# Patient Record
Sex: FEMALE | Race: BLACK OR AFRICAN AMERICAN | NOT HISPANIC OR LATINO | Employment: OTHER | URBAN - METROPOLITAN AREA
[De-identification: names, ages, dates, MRNs, and addresses within clinical notes are randomized per-mention and may not be internally consistent; named-entity substitution may affect disease eponyms.]

---

## 2017-01-12 DIAGNOSIS — I42.9 CARDIOMYOPATHY, UNSPECIFIED TYPE: Primary | ICD-10-CM

## 2017-01-20 ENCOUNTER — OFFICE VISIT (OUTPATIENT)
Dept: ELECTROPHYSIOLOGY | Facility: CLINIC | Age: 67
End: 2017-01-20
Payer: COMMERCIAL

## 2017-01-20 ENCOUNTER — HOSPITAL ENCOUNTER (OUTPATIENT)
Dept: CARDIOLOGY | Facility: CLINIC | Age: 67
Discharge: HOME | End: 2017-01-20
Payer: COMMERCIAL

## 2017-01-20 ENCOUNTER — CLINICAL SUPPORT (OUTPATIENT)
Dept: ELECTROPHYSIOLOGY | Facility: CLINIC | Age: 67
End: 2017-01-20
Payer: COMMERCIAL

## 2017-01-20 VITALS
DIASTOLIC BLOOD PRESSURE: 52 MMHG | HEART RATE: 60 BPM | WEIGHT: 202 LBS | HEIGHT: 65 IN | SYSTOLIC BLOOD PRESSURE: 95 MMHG | BODY MASS INDEX: 33.66 KG/M2

## 2017-01-20 DIAGNOSIS — E78.5 HYPERLIPIDEMIA, UNSPECIFIED HYPERLIPIDEMIA TYPE: Chronic | ICD-10-CM

## 2017-01-20 DIAGNOSIS — I48.0 PAROXYSMAL ATRIAL FIBRILLATION: Primary | ICD-10-CM

## 2017-01-20 DIAGNOSIS — I42.0 DILATED CARDIOMYOPATHY: Chronic | ICD-10-CM

## 2017-01-20 DIAGNOSIS — Z95.810 AUTOMATIC IMPLANTABLE CARDIOVERTER-DEFIBRILLATOR IN SITU: ICD-10-CM

## 2017-01-20 DIAGNOSIS — I42.9 CARDIOMYOPATHY, PRIMARY: ICD-10-CM

## 2017-01-20 DIAGNOSIS — I10 ESSENTIAL HYPERTENSION: Chronic | ICD-10-CM

## 2017-01-20 DIAGNOSIS — I42.9 CARDIOMYOPATHY, UNSPECIFIED TYPE: ICD-10-CM

## 2017-01-20 LAB — DIASTOLIC DYSFUNCTION: NO

## 2017-01-20 PROCEDURE — 94621 CARDIOPULM EXERCISE TESTING: CPT | Mod: S$GLB,,, | Performed by: INTERNAL MEDICINE

## 2017-01-20 PROCEDURE — 1157F ADVNC CARE PLAN IN RCRD: CPT | Mod: S$GLB,,, | Performed by: INTERNAL MEDICINE

## 2017-01-20 PROCEDURE — 93283 PRGRMG EVAL IMPLANTABLE DFB: CPT | Mod: S$GLB,,, | Performed by: INTERNAL MEDICINE

## 2017-01-20 PROCEDURE — 93000 ELECTROCARDIOGRAM COMPLETE: CPT | Mod: S$GLB,,, | Performed by: INTERNAL MEDICINE

## 2017-01-20 PROCEDURE — 1160F RVW MEDS BY RX/DR IN RCRD: CPT | Mod: S$GLB,,, | Performed by: INTERNAL MEDICINE

## 2017-01-20 PROCEDURE — 99214 OFFICE O/P EST MOD 30 MIN: CPT | Mod: S$GLB,,, | Performed by: INTERNAL MEDICINE

## 2017-01-20 PROCEDURE — 3078F DIAST BP <80 MM HG: CPT | Mod: S$GLB,,, | Performed by: INTERNAL MEDICINE

## 2017-01-20 PROCEDURE — 1126F AMNT PAIN NOTED NONE PRSNT: CPT | Mod: S$GLB,,, | Performed by: INTERNAL MEDICINE

## 2017-01-20 PROCEDURE — 99999 PR PBB SHADOW E&M-EST. PATIENT-LVL III: CPT | Mod: PBBFAC,,, | Performed by: INTERNAL MEDICINE

## 2017-01-20 PROCEDURE — 1159F MED LIST DOCD IN RCRD: CPT | Mod: S$GLB,,, | Performed by: INTERNAL MEDICINE

## 2017-01-20 PROCEDURE — 3074F SYST BP LT 130 MM HG: CPT | Mod: S$GLB,,, | Performed by: INTERNAL MEDICINE

## 2017-01-20 NOTE — MR AVS SNAPSHOT
[facility]  [address]  [facility] [address]  Phone: [phone]  Fax: [phone]                  Sherice Squires   2017 9:00 AM   Office Visit    Description:  Female : 1950   Provider:  Jose Reece MD   Department:  [facility]           Reason for Visit     Pacemaker Check           Diagnoses this Visit        Comments    Paroxysmal atrial fibrillation    -  Primary     Dilated cardiomyopathy         Essential hypertension         Hyperlipidemia, unspecified hyperlipidemia type         Automatic implantable cardioverter-defibrillator in situ                To Do List           Future Appointments        Provider Department Dept Phone    2017 2:15 PM CPX [facility] [phone]    2017 9:00 AM LAB, APPOINT[place] [facility] [phone]    2017 10:00 AM Annamarie Soria MD [facility] [phone]    2017 8:00 AM HOME MONITOR DEVICE CHECK, [facility] [phone]      Goals (5 Years of Data)     None      Follow-Up and Disposition     Return in about 1 year (around 2018).      [facility]     [facility] -  Assistance  Registered nurses in the [facility] provide clinical advisement, health education, appointment booking, and other advisory services.  Call for this free service at [phone].             Medications           Message regarding Medications     Verify the changes and/or additions to your medication regime listed below are the same as discussed with your clinician today.  If any of these changes or additions are incorrect, please notify your healthcare provider.             Verify that the below list of medications is an accurate representation of the medications you are currently taking.  If none reported, the list may be blank. If incorrect, please contact your healthcare provider. Carry this list with  "you in case of emergency.           Current Medications     apixaban 5 mg Tab Take 1 tablet (5 mg total) by mouth 2 (two) times daily.    bumetanide (BUMEX) 1 MG tablet Take 1 tablet (1 mg total) by mouth once daily.    digoxin (LANOXIN) 250 mcg tablet Take 1 tablet (0.25 mg total) by mouth once daily.    hydrALAZINE (APRESOLINE) 50 MG tablet Take 1 tablet (50 mg total) by mouth 3 (three) times daily.    isosorbide mononitrate (IMDUR) 30 MG 24 hr tablet Take 1 tablet (30 mg total) by mouth once daily.    lisinopril (PRINIVIL,ZESTRIL) 20 MG tablet Take 1 tablet (20 mg total) by mouth 2 (two) times daily.    metoprolol succinate (TOPROL-XL) 100 MG 24 hr tablet Take 1 tablet (100 mg total) by mouth 2 (two) times daily.    potassium chloride (KLOR-CON) 10 MEQ TbSR Take 2 tablets (20 mEq total) by mouth once daily.    simvastatin (ZOCOR) 40 MG tablet Take 1 tablet (40 mg total) by mouth every evening.    spironolactone (ALDACTONE) 25 MG tablet Take 1 tablet (25 mg total) by mouth once daily.           Clinical Reference Information           Vital Signs - Last Recorded  Most recent update: 1/20/2017  9:41 AM by Kaur Vyas MA    BP Pulse Ht Wt BMI    (!) 95/52 60 5' 5" (1.651 m) 91.6 kg (202 lb) 33.61 kg/m2      Blood Pressure          Most Recent Value    BP  (!)  95/52      Allergies as of 1/20/2017     No Known Allergies      Immunizations Administered on Date of Encounter - 1/20/2017     None      [facility] Sign-Up     Activating your [facility] account is as easy as 1-2-3!     1) Visit [email or web address], select Sign Up Now, enter this activation code and your date of birth, then select Next.  [email or web address]  Expires: 3/6/2017 10:07 AM      2) Create a username and password to use when you visit [facility] in the future and select a security question in case you lose your password and select Next.    3) Enter your e-mail address and click Sign Up!    Additional Information  If you have questions, please e-mail " [email or web address] or call [phone] to talk to our [facility] staff. Remember, [facility] is NOT to be used for urgent needs. For medical emergencies, dial 911.

## 2017-01-20 NOTE — PROGRESS NOTES
Subjective:    Patient ID:  Sherice Squires is a 66 y.o. female who presents for follow-up of Pacemaker Check      HPI  66 y.o. yo female  (mental health facility in [facility])  NICM, HTN, HLD  ICD 2010  asymptomatic atrial fibrillation discovered on ICD interrogation.  hx VF 2011, rx'd with ICD shocks  RBBB/LAFB, present since 5/2011    Interrogation reveals stable lead function, overall AMS burden 1%.  Continues to feel well: NYHA I sx. No CP. 3 flights stairs without a problem!    Echo 5/14 30% -> 11/16 30%    She is feeling well and denies any palpitations, increased sob, fatigue, syncope, or edema. She denies any falls or issues with balance.  My interpretation of today's ECG is SR, RBBB/LAFB  CHADS=2 on Eliquis    Review of Systems   Constitution: Negative. Negative for weakness and malaise/fatigue.   HENT: Negative.  Negative for ear pain and tinnitus.    Eyes: Negative for blurred vision.   Cardiovascular: Negative.  Negative for chest pain, dyspnea on exertion, irregular heartbeat, leg swelling, near-syncope, palpitations and syncope.   Respiratory: Negative.  Negative for shortness of breath.    Endocrine: Negative.  Negative for polyuria.   Hematologic/Lymphatic: Does not bruise/bleed easily.   Skin: Negative.  Negative for rash.   Musculoskeletal: Negative.  Negative for joint pain and muscle weakness.   Gastrointestinal: Negative.  Negative for abdominal pain and change in bowel habit.   Genitourinary: Negative for frequency.   Neurological: Negative.  Negative for dizziness and light-headedness.   Psychiatric/Behavioral: Negative.  Negative for depression. The patient is not nervous/anxious.    Allergic/Immunologic: Negative for environmental allergies.        Objective:    Physical Exam   Constitutional: She is oriented to person, place, and time. Vital signs are normal. She appears well-developed and well-nourished. She is active and cooperative.   HENT:   Head: Normocephalic and  atraumatic.   Eyes: Conjunctivae and EOM are normal.   Neck: Normal range of motion. Carotid bruit is not present. No tracheal deviation and no edema present. No thyroid mass and no thyromegaly present.   Cardiovascular: Normal rate, regular rhythm, normal heart sounds, intact distal pulses and normal pulses.   No extrasystoles are present. PMI is not displaced.  Exam reveals no gallop and no friction rub.    No murmur heard.  Pulmonary/Chest: Effort normal and breath sounds normal. No respiratory distress. She has no wheezes. She has no rales.   Abdominal: Soft. Normal appearance. She exhibits no distension. There is no hepatosplenomegaly.   Musculoskeletal: Normal range of motion.   Neurological: She is alert and oriented to person, place, and time. Coordination normal.   Skin: Skin is warm and dry. No rash noted.   Psychiatric: She has a normal mood and affect. Her speech is normal and behavior is normal. Thought content normal. Cognition and memory are normal.   Nursing note and vitals reviewed.        Assessment:       1. Dilated cardiomyopathy    2. Automatic implantable cardiac defibrillator in situ    3. Atrial fibrillation-paroxysmal, asymptomatic discovered on ICD interrogation    4. Hypertension    5. Hyperlipidemia         Plan:       Continue f/u in device clinic.  Return in 1 year, or earlier prn.

## 2017-01-23 ENCOUNTER — TELEPHONE (OUTPATIENT)
Dept: TRANSPLANT | Facility: CLINIC | Age: 67
End: 2017-01-23

## 2017-01-24 NOTE — TELEPHONE ENCOUNTER
----- Message from Nadir Gutierrez MD sent at 1/20/2017  3:28 PM CST -----  Please update pt that she did better on CPX  Will followup at next visit

## 2017-02-13 ENCOUNTER — LAB VISIT (OUTPATIENT)
Dept: LAB | Facility: HOSPITAL | Age: 67
End: 2017-02-13
Attending: INTERNAL MEDICINE
Payer: COMMERCIAL

## 2017-02-13 ENCOUNTER — OFFICE VISIT (OUTPATIENT)
Dept: TRANSPLANT | Facility: CLINIC | Age: 67
End: 2017-02-13
Payer: COMMERCIAL

## 2017-02-13 VITALS
DIASTOLIC BLOOD PRESSURE: 66 MMHG | SYSTOLIC BLOOD PRESSURE: 126 MMHG | HEART RATE: 59 BPM | WEIGHT: 204.38 LBS | HEIGHT: 66 IN | BODY MASS INDEX: 32.85 KG/M2

## 2017-02-13 DIAGNOSIS — I48.0 PAROXYSMAL ATRIAL FIBRILLATION: ICD-10-CM

## 2017-02-13 DIAGNOSIS — E78.5 HYPERLIPIDEMIA, UNSPECIFIED HYPERLIPIDEMIA TYPE: Chronic | ICD-10-CM

## 2017-02-13 DIAGNOSIS — Z95.810 AUTOMATIC IMPLANTABLE CARDIOVERTER-DEFIBRILLATOR IN SITU: ICD-10-CM

## 2017-02-13 DIAGNOSIS — I50.22 NYHA CLASS 2 AND ACA/AHA STAGE C CHRONIC SYSTOLIC CONGESTIVE HEART FAILURE: ICD-10-CM

## 2017-02-13 DIAGNOSIS — E66.9 NON MORBID OBESITY, UNSPECIFIED OBESITY TYPE: Chronic | ICD-10-CM

## 2017-02-13 DIAGNOSIS — I42.0 DILATED CARDIOMYOPATHY: Primary | Chronic | ICD-10-CM

## 2017-02-13 DIAGNOSIS — I10 ESSENTIAL HYPERTENSION: Chronic | ICD-10-CM

## 2017-02-13 LAB
ANION GAP SERPL CALC-SCNC: 8 MMOL/L
BNP SERPL-MCNC: 56 PG/ML
BUN SERPL-MCNC: 20 MG/DL
CALCIUM SERPL-MCNC: 9.2 MG/DL
CHLORIDE SERPL-SCNC: 106 MMOL/L
CO2 SERPL-SCNC: 29 MMOL/L
CREAT SERPL-MCNC: 1.2 MG/DL
EST. GFR  (AFRICAN AMERICAN): 54.4 ML/MIN/1.73 M^2
EST. GFR  (NON AFRICAN AMERICAN): 47.2 ML/MIN/1.73 M^2
GLUCOSE SERPL-MCNC: 96 MG/DL
POTASSIUM SERPL-SCNC: 4.2 MMOL/L
SODIUM SERPL-SCNC: 143 MMOL/L

## 2017-02-13 PROCEDURE — 36415 COLL VENOUS BLD VENIPUNCTURE: CPT

## 2017-02-13 PROCEDURE — 80048 BASIC METABOLIC PNL TOTAL CA: CPT

## 2017-02-13 PROCEDURE — 99213 OFFICE O/P EST LOW 20 MIN: CPT | Mod: S$GLB,,, | Performed by: INTERNAL MEDICINE

## 2017-02-13 PROCEDURE — 1126F AMNT PAIN NOTED NONE PRSNT: CPT | Mod: S$GLB,,, | Performed by: INTERNAL MEDICINE

## 2017-02-13 PROCEDURE — 83880 ASSAY OF NATRIURETIC PEPTIDE: CPT

## 2017-02-13 PROCEDURE — 3078F DIAST BP <80 MM HG: CPT | Mod: S$GLB,,, | Performed by: INTERNAL MEDICINE

## 2017-02-13 PROCEDURE — 99999 PR PBB SHADOW E&M-EST. PATIENT-LVL III: CPT | Mod: PBBFAC,,, | Performed by: INTERNAL MEDICINE

## 2017-02-13 PROCEDURE — 3074F SYST BP LT 130 MM HG: CPT | Mod: S$GLB,,, | Performed by: INTERNAL MEDICINE

## 2017-02-13 PROCEDURE — 1160F RVW MEDS BY RX/DR IN RCRD: CPT | Mod: S$GLB,,, | Performed by: INTERNAL MEDICINE

## 2017-02-13 PROCEDURE — 1157F ADVNC CARE PLAN IN RCRD: CPT | Mod: S$GLB,,, | Performed by: INTERNAL MEDICINE

## 2017-02-13 PROCEDURE — 1159F MED LIST DOCD IN RCRD: CPT | Mod: S$GLB,,, | Performed by: INTERNAL MEDICINE

## 2017-02-13 RX ORDER — ISOSORBIDE DINITRATE 30 MG/1
30 TABLET ORAL
COMMUNITY
End: 2017-02-13 | Stop reason: SDUPTHER

## 2017-02-13 RX ORDER — SPIRONOLACTONE 25 MG/1
25 TABLET ORAL DAILY
Qty: 30 TABLET | Refills: 11 | Status: SHIPPED | OUTPATIENT
Start: 2017-02-13 | End: 2018-02-21 | Stop reason: SDUPTHER

## 2017-02-13 RX ORDER — POTASSIUM CHLORIDE 750 MG/1
20 TABLET, EXTENDED RELEASE ORAL DAILY
Qty: 60 TABLET | Refills: 6 | Status: SHIPPED | OUTPATIENT
Start: 2017-02-13 | End: 2017-11-09 | Stop reason: SDUPTHER

## 2017-02-13 RX ORDER — METOPROLOL SUCCINATE 100 MG/1
100 TABLET, EXTENDED RELEASE ORAL 2 TIMES DAILY
Qty: 60 TABLET | Refills: 11 | Status: SHIPPED | OUTPATIENT
Start: 2017-02-13 | End: 2018-02-21 | Stop reason: SDUPTHER

## 2017-02-13 RX ORDER — LISINOPRIL 20 MG/1
20 TABLET ORAL 2 TIMES DAILY
Qty: 60 TABLET | Refills: 6 | Status: SHIPPED | OUTPATIENT
Start: 2017-02-13 | End: 2017-04-07

## 2017-02-13 NOTE — MR AVS SNAPSHOT
[facility]  [address]  [facility] [address]  Phone: [phone]                  Sherice Squires   2017 10:00 AM   Appointment    Description:  Female : 1950   Provider:  Annamarie Soria MD   Department:  [facility]                To Do List           Future Appointments        Provider Department Dept Phone    2017 9:00 AM LAB, [place]POINT[place] [facility] [phone]    2017 10:00 AM Annamarie Soria MD [facility] [phone]    2017 8:00 AM [facility]ICE [facility] [phone]      Goals (5 Years of Data)     None      [facility]     [facility] Care [facility] -  Assistance  Registered nurses in the [facility] provide clinical advisement, health education, appointment booking, and other advisory services.  Call for this free service at [phone].             Medications           Message regarding Medications     Verify the changes and/or additions to your medication regime listed below are the same as discussed with your clinician today.  If any of these changes or additions are incorrect, please notify your healthcare provider.             Verify that the below list of medications is an accurate representation of the medications you are currently taking.  If none reported, the list may be blank. If incorrect, please contact your healthcare provider. Carry this list with you in case of emergency.           Current Medications     apixaban 5 mg Tab Take 1 tablet (5 mg total) by mouth 2 (two) times daily.    bumetanide (BUMEX) 1 MG tablet Take 1 tablet (1 mg total) by mouth once daily.    digoxin (LANOXIN) 250 mcg tablet Take 1 tablet (0.25 mg total) by mouth once daily.    hydrALAZINE (APRESOLINE) 50 MG tablet Take 1 tablet (50 mg total) by mouth 3 (three) times daily.    isosorbide mononitrate (IMDUR) 30 MG 24 hr tablet Take 1 tablet (30 mg  total) by mouth once daily.    lisinopril (PRINIVIL,ZESTRIL) 20 MG tablet Take 1 tablet (20 mg total) by mouth 2 (two) times daily.    metoprolol succinate (TOPROL-XL) 100 MG 24 hr tablet Take 1 tablet (100 mg total) by mouth 2 (two) times daily.    potassium chloride (KLOR-CON) 10 MEQ TbSR Take 2 tablets (20 mEq total) by mouth once daily.    simvastatin (ZOCOR) 40 MG tablet Take 1 tablet (40 mg total) by mouth every evening.    spironolactone (ALDACTONE) 25 MG tablet Take 1 tablet (25 mg total) by mouth once daily.           Clinical Reference Information           Allergies as of 2/13/2017     No Known Allergies      Immunizations Administered on Date of Encounter - 2/13/2017     None      [facility] Sign-Up     Activating your [facility] account is as easy as 1-2-3!     1) Visit [email or web address], select Sign Up Now, enter this activation code and your date of birth, then select Next.  [email or web address]  Expires: 3/6/2017 10:07 AM      2) Create a username and password to use when you visit [facility] in the future and select a security question in case you lose your password and select Next.    3) Enter your e-mail address and click Sign Up!    Additional Information  If you have questions, please e-mail [email or web address] or call [phone] to talk to our [facility] staff. Remember, [facility] is NOT to be used for urgent needs. For medical emergencies, dial 911.         Language Assistance Services     ATTENTION: Language assistance services are available, free of charge. Please call [phone].      ATENCIÓN: Si habla español, tiene a mesa disposición servicios gratuitos de asistencia lingüística. Llame al [phone].     CHÚ Ý: N?u b?n nói Ti?ng Vi?t, có các d?ch v? h? tr? ngôn ng? mi?n phí dành cho b?n. G?i s? [phone].         [facility] complies with applicable Federal civil rights laws and does not discriminate on the basis of race, color, national origin, age, disability, or sex.

## 2017-02-13 NOTE — PROGRESS NOTES
Subjective: class 2     Patient ID:  Sherice Squires is a 66 y.o. female who presents for eight month follow-up of Congestive Heart Failure    Congestive Heart Failure   Pertinent negatives include no abdominal pain, anorexia, chest pain, chills, congestion, coughing, diaphoresis, fever, headaches, myalgias, nausea, rash, vomiting or weakness.   Mrs. Squires is a 66 year old AAF with NIDCM (LVEF 30 - Nov 2016), PkVO2 16.1 (June 2015)  s/p ICD who continues to be well from a cardiopulmonary standpoint, no complaints, no chest pain, chest pressure, N/V/F/C, lightheadedness, dizziness, PND, orthopnea, LE edema. Still working as a , and able to do everything she needs to do without any trouble, only occasional issue is her knee.     Review of Systems   Constitution: Negative for chills, decreased appetite, diaphoresis, fever, weakness, malaise/fatigue, weight gain and weight loss.   HENT: Negative for congestion and headaches.    Eyes: Negative for blurred vision and visual disturbance.   Cardiovascular: Negative for chest pain, dyspnea on exertion, irregular heartbeat, leg swelling, near-syncope, orthopnea, palpitations, paroxysmal nocturnal dyspnea and syncope.   Respiratory: Negative for cough, shortness of breath, sleep disturbances due to breathing, snoring and wheezing.    Hematologic/Lymphatic: Negative for bleeding problem. Does not bruise/bleed easily.   Skin: Negative for poor wound healing and rash.   Musculoskeletal: Positive for joint pain (knee). Negative for arthritis, muscle weakness and myalgias.   Gastrointestinal: Negative for bloating, abdominal pain, anorexia, constipation, diarrhea, hematemesis, hematochezia, jaundice, melena, nausea and vomiting.   Genitourinary: Negative for frequency and urgency.   Neurological: Negative for difficulty with concentration, excessive daytime sleepiness, dizziness and light-headedness.   Psychiatric/Behavioral: Negative for depression. The patient  "does not have insomnia and is not nervous/anxious.         Objective:    Physical Exam   Constitutional: She appears well-developed and well-nourished. No distress.   /66  Pulse (!) 59  Ht 5' 5.5" (1.664 m)  Wt 92.7 kg (204 lb 5.9 oz)  BMI 33.49 kg/m2     HENT:   Head: Normocephalic and atraumatic. Head is without abrasion and without contusion.   Right Ear: External ear normal.   Left Ear: External ear normal.   Nose: Nose normal. No epistaxis.   Mouth/Throat: Oropharynx is clear and moist. Mucous membranes are not cyanotic.   Eyes: Conjunctivae and EOM are normal. Pupils are equal, round, and reactive to light.   Neck: Normal range of motion. Neck supple. No tracheal deviation present.   Cardiovascular: Normal rate, regular rhythm, normal heart sounds and normal pulses.  Exam reveals no gallop.    No murmur heard.  Pulmonary/Chest: Effort normal and breath sounds normal. No stridor. No respiratory distress. She has no wheezes.   Abdominal: Soft. Normal appearance, normal aorta and bowel sounds are normal. She exhibits no distension. There is no tenderness.   Musculoskeletal: She exhibits no edema or tenderness.   Neurological: She is alert. She has normal strength and normal reflexes. She exhibits normal muscle tone.   Skin: Skin is warm. No rash noted. No erythema.   Psychiatric: She has a normal mood and affect. Her speech is normal and behavior is normal. Judgment and thought content normal. Cognition and memory are normal.   Nursing note and vitals reviewed.    Lab Results   Component Value Date    BNP 56 02/13/2017     02/13/2017    K 4.2 02/13/2017    MG 1.9 11/11/2016     02/13/2017    CO2 29 02/13/2017    BUN 20 02/13/2017    CREATININE 1.2 02/13/2017    GLU 96 02/13/2017    AST 18 11/11/2016    ALT 16 11/11/2016    ALBUMIN 3.5 11/11/2016    PROT 7.7 11/11/2016    BILITOT 0.3 11/11/2016    CHOL 176 09/10/2014    HDL 63 09/10/2014    LDLCALC 100.2 09/10/2014    TRIG 64 09/10/2014 "       Magnesium   Date Value Ref Range Status   11/11/2016 1.9 1.6 - 2.6 mg/dL Final       Lab Results   Component Value Date    WBC 9.13 11/11/2016    HGB 11.6 (L) 11/11/2016    HCT 37.8 11/11/2016    MCV 89 11/11/2016     11/11/2016       BNP   Date Value Ref Range Status   02/13/2017 56 0 - 99 pg/mL Final     Comment:     Values of less than 100 pg/ml are consistent with non-CHF populations.   11/11/2016 77 0 - 99 pg/mL Final     Comment:     Values of less than 100 pg/ml are consistent with non-CHF populations.   03/18/2016 94 0 - 99 pg/mL Final     Comment:     Values of less than 100 pg/ml are consistent with non-CHF populations.           Assessment:       1. Dilated cardiomyopathy    2. NYHA class 2 and LUDIN/AHA stage C chronic systolic congestive heart failure    3. Automatic implantable cardioverter-defibrillator in situ    4. Hyperlipidemia, unspecified hyperlipidemia type    5. Non morbid obesity, unspecified obesity type    6. Paroxysmal atrial fibrillation    7. Essential hypertension         Plan:       Patient did well on CPX last visit, with pkVO2 17.6 ml/kg/min.  Refilled medications.   Her blood pressure us usually in the 90s to 100s systolic at home, she will call us if her home pressures continue to run in the same range as they did today.   Otherwise no changes, continue current regimen.  NYHA FC I-II.  RTC 6 months with labs, clinic.

## 2017-02-16 ENCOUNTER — TELEPHONE (OUTPATIENT)
Dept: TRANSPLANT | Facility: CLINIC | Age: 67
End: 2017-02-16

## 2017-02-16 NOTE — TELEPHONE ENCOUNTER
----- Message from Andrez Mares sent at 2/16/2017  3:14 PM CST -----  Contact: patient  Please call pt at [phone]. Need copies of recent lab results and office visit with Dr Soria    Thank you

## 2017-04-06 RX ORDER — METOPROLOL SUCCINATE 100 MG/1
TABLET, EXTENDED RELEASE ORAL
Qty: 60 TABLET | Refills: 0 | Status: SHIPPED | OUTPATIENT
Start: 2017-04-06 | End: 2017-05-10 | Stop reason: SDUPTHER

## 2017-04-07 DIAGNOSIS — I42.0 DILATED CARDIOMYOPATHY: Primary | ICD-10-CM

## 2017-04-07 DIAGNOSIS — I50.22 NYHA CLASS 2 AND ACA/AHA STAGE C CHRONIC SYSTOLIC CONGESTIVE HEART FAILURE: ICD-10-CM

## 2017-04-07 RX ORDER — LOSARTAN POTASSIUM 25 MG/1
25 TABLET ORAL 2 TIMES DAILY
Qty: 180 TABLET | Refills: 3 | Status: SHIPPED | OUTPATIENT
Start: 2017-04-07 | End: 2018-01-24

## 2017-04-07 NOTE — TELEPHONE ENCOUNTER
4/7/17 1345 Returned patients call she is calling to inquire if her Dr. nAnamarie Soria would want to restart her Lisinopril 20 mg oral twice daily. She states that two weeks ago on March 2nd she was taken off by her primary care physician due to a reaction of lip swelling. But at this time she was also started on a new antibiotic as well Augmentin 875 mg. The physician discontinued both the antibiotic and Lisinopril. Now her PCP wants her cardiologist to recommend if she should restart the Lisinopril? Called [facility] spoke with Kacy to confirm the dates and name of medication. Confirmed that also on March 6th Doxycycline 100mg was prescribed. Augmentin has now been marked on patient chart as allergy with reaction as lip swelling.   Patient states that she has not been monitoring her blood pressures since she has discontinued the Lisinopril. But it was being taken as HF management.   ----- Message from Georgie Dawson MA sent at 4/7/2017 12:07 PM CDT -----  Contact:  patient called  Please call the patient at home she need to talk to you about her visit with  out patient clinic  and her medication. Thank you.

## 2017-04-07 NOTE — TELEPHONE ENCOUNTER
4/7/17 1715 RBVO Dr. Annamarie Soria/ Jessy Carrillo LPN Discontinue Lisinopril from medication list. Losartan 25 mg oral twice daily. CMP/ BNP on 4/17/17 at local facility and monitor blood pressures. Called patient informed of new medications. Requested new medication to be called in to [facility] locally. Will fax orders to local lab. Patient confirmed labs would be obtained on 4/17/17. Patient verbalized understanding of all instruction given. Nurse phone review put in for 4/17/17.

## 2017-04-10 NOTE — TELEPHONE ENCOUNTER
4/10/17 1200 Patient called requested labs to be rescheduled due to work scheduled. 4/18/17 to [facility] [place] [facility]. Changed per patient request Nurse phone review updated and mailed appointment.

## 2017-04-18 ENCOUNTER — LAB VISIT (OUTPATIENT)
Dept: LAB | Facility: HOSPITAL | Age: 67
End: 2017-04-18
Attending: INTERNAL MEDICINE
Payer: COMMERCIAL

## 2017-04-18 DIAGNOSIS — I10 ESSENTIAL HYPERTENSION: Chronic | ICD-10-CM

## 2017-04-18 DIAGNOSIS — E78.5 HYPERLIPIDEMIA: Chronic | ICD-10-CM

## 2017-04-18 DIAGNOSIS — M17.11 OSTEOARTHRITIS OF RIGHT KNEE, UNSPECIFIED OSTEOARTHRITIS TYPE: Chronic | ICD-10-CM

## 2017-04-18 DIAGNOSIS — I42.0 DILATED CARDIOMYOPATHY: Chronic | ICD-10-CM

## 2017-04-18 DIAGNOSIS — Z95.810 AUTOMATIC IMPLANTABLE CARDIOVERTER-DEFIBRILLATOR IN SITU: ICD-10-CM

## 2017-04-18 DIAGNOSIS — E66.9 OBESITY: Chronic | ICD-10-CM

## 2017-04-18 DIAGNOSIS — I48.20 CHRONIC ATRIAL FIBRILLATION: ICD-10-CM

## 2017-04-18 LAB
ALBUMIN SERPL BCP-MCNC: 3.7 G/DL
ALP SERPL-CCNC: 75 U/L
ALT SERPL W/O P-5'-P-CCNC: 20 U/L
ANION GAP SERPL CALC-SCNC: 12 MMOL/L
AST SERPL-CCNC: 19 U/L
BILIRUB SERPL-MCNC: 0.7 MG/DL
BNP SERPL-MCNC: 46 PG/ML
BUN SERPL-MCNC: 10 MG/DL
CALCIUM SERPL-MCNC: 9.1 MG/DL
CHLORIDE SERPL-SCNC: 105 MMOL/L
CO2 SERPL-SCNC: 27 MMOL/L
CREAT SERPL-MCNC: 1.1 MG/DL
DIGOXIN SERPL-MCNC: 1 NG/ML
EST. GFR  (AFRICAN AMERICAN): >60 ML/MIN/1.73 M^2
EST. GFR  (NON AFRICAN AMERICAN): 52.4 ML/MIN/1.73 M^2
GLUCOSE SERPL-MCNC: 92 MG/DL
INR PPP: 1
MAGNESIUM SERPL-MCNC: 1.7 MG/DL
POTASSIUM SERPL-SCNC: 3.5 MMOL/L
PROT SERPL-MCNC: 7.4 G/DL
PROTHROMBIN TIME: 10.7 SEC
SODIUM SERPL-SCNC: 144 MMOL/L
T4 SERPL-MCNC: 6.9 UG/DL

## 2017-04-18 PROCEDURE — 80162 ASSAY OF DIGOXIN TOTAL: CPT

## 2017-04-18 PROCEDURE — 85610 PROTHROMBIN TIME: CPT | Mod: PO

## 2017-04-18 PROCEDURE — 83735 ASSAY OF MAGNESIUM: CPT

## 2017-04-18 PROCEDURE — 80053 COMPREHEN METABOLIC PANEL: CPT

## 2017-04-18 PROCEDURE — 36415 COLL VENOUS BLD VENIPUNCTURE: CPT | Mod: PO

## 2017-04-18 PROCEDURE — 84436 ASSAY OF TOTAL THYROXINE: CPT

## 2017-04-18 PROCEDURE — 83880 ASSAY OF NATRIURETIC PEPTIDE: CPT

## 2017-04-24 ENCOUNTER — CLINICAL SUPPORT (OUTPATIENT)
Dept: ELECTROPHYSIOLOGY | Facility: CLINIC | Age: 67
End: 2017-04-24
Payer: COMMERCIAL

## 2017-04-24 DIAGNOSIS — I42.9 CARDIOMYOPATHY: ICD-10-CM

## 2017-04-24 DIAGNOSIS — Z95.810 PRESENCE OF AUTOMATIC CARDIOVERTER/DEFIBRILLATOR (AICD): ICD-10-CM

## 2017-04-24 PROCEDURE — 93295 DEV INTERROG REMOTE 1/2/MLT: CPT | Mod: S$GLB,,, | Performed by: INTERNAL MEDICINE

## 2017-04-24 PROCEDURE — 93296 REM INTERROG EVL PM/IDS: CPT | Mod: S$GLB,,, | Performed by: INTERNAL MEDICINE

## 2017-05-10 RX ORDER — METOPROLOL SUCCINATE 100 MG/1
TABLET, EXTENDED RELEASE ORAL
Qty: 60 TABLET | Refills: 0 | Status: SHIPPED | OUTPATIENT
Start: 2017-05-10 | End: 2017-06-20 | Stop reason: SDUPTHER

## 2017-06-22 RX ORDER — METOPROLOL SUCCINATE 100 MG/1
TABLET, EXTENDED RELEASE ORAL
Qty: 60 TABLET | Refills: 0 | Status: SHIPPED | OUTPATIENT
Start: 2017-06-22 | End: 2017-09-01 | Stop reason: SDUPTHER

## 2017-07-14 ENCOUNTER — TELEPHONE (OUTPATIENT)
Dept: ELECTROPHYSIOLOGY | Facility: CLINIC | Age: 67
End: 2017-07-14

## 2017-07-14 NOTE — TELEPHONE ENCOUNTER
----- Message from Caprice García sent at 7/14/2017  2:08 PM CDT -----  Contact: pt   PT called to cancel her upcoming appt on the 24th and reschedule.  The only date available was in September @ 8:20 am.  She said she works nights and would not be able to get here at that time.  She can be reached @ [phone] to reschedule.  Thanks!!

## 2017-07-14 NOTE — TELEPHONE ENCOUNTER
Attempted to return the patient's call on this afternoon.  Left message @ preferred # [phone].  Message left on voice mail did state the patient does Not have to come into the clinic for this ICD check as to she is able to be scheduled for a Remote check.  Contact # for the Device Clinic left on patient's voice mail.

## 2017-07-31 ENCOUNTER — CLINICAL SUPPORT (OUTPATIENT)
Dept: ELECTROPHYSIOLOGY | Facility: CLINIC | Age: 67
End: 2017-07-31
Payer: COMMERCIAL

## 2017-07-31 DIAGNOSIS — I42.9 CARDIOMYOPATHY: ICD-10-CM

## 2017-07-31 DIAGNOSIS — Z95.810 PRESENCE OF AUTOMATIC CARDIOVERTER/DEFIBRILLATOR (AICD): ICD-10-CM

## 2017-07-31 PROCEDURE — 93295 DEV INTERROG REMOTE 1/2/MLT: CPT | Mod: S$GLB,,, | Performed by: INTERNAL MEDICINE

## 2017-07-31 PROCEDURE — 93296 REM INTERROG EVL PM/IDS: CPT | Mod: S$GLB,,, | Performed by: INTERNAL MEDICINE

## 2017-08-25 RX ORDER — ISOSORBIDE MONONITRATE 30 MG/1
TABLET, EXTENDED RELEASE ORAL
Qty: 30 TABLET | Refills: 6 | OUTPATIENT
Start: 2017-08-25

## 2017-08-25 RX ORDER — BUMETANIDE 1 MG/1
TABLET ORAL
Qty: 30 TABLET | Refills: 6 | OUTPATIENT
Start: 2017-08-25

## 2017-08-25 RX ORDER — DIGOXIN 250 MCG
TABLET ORAL
Qty: 30 TABLET | Refills: 6 | OUTPATIENT
Start: 2017-08-25

## 2017-08-29 ENCOUNTER — TELEPHONE (OUTPATIENT)
Dept: TRANSPLANT | Facility: CLINIC | Age: 67
End: 2017-08-29

## 2017-08-29 DIAGNOSIS — I50.22 CHRONIC SYSTOLIC CONGESTIVE HEART FAILURE: Primary | ICD-10-CM

## 2017-08-30 RX ORDER — BUMETANIDE 1 MG/1
TABLET ORAL
Qty: 30 TABLET | Refills: 6 | Status: SHIPPED | OUTPATIENT
Start: 2017-08-30 | End: 2018-04-17 | Stop reason: SDUPTHER

## 2017-09-01 ENCOUNTER — OFFICE VISIT (OUTPATIENT)
Dept: TRANSPLANT | Facility: CLINIC | Age: 67
End: 2017-09-01
Payer: COMMERCIAL

## 2017-09-01 ENCOUNTER — TELEPHONE (OUTPATIENT)
Dept: TRANSPLANT | Facility: CLINIC | Age: 67
End: 2017-09-01

## 2017-09-01 ENCOUNTER — DOCUMENTATION ONLY (OUTPATIENT)
Dept: TRANSPLANT | Facility: CLINIC | Age: 67
End: 2017-09-01

## 2017-09-01 ENCOUNTER — LAB VISIT (OUTPATIENT)
Dept: LAB | Facility: HOSPITAL | Age: 67
End: 2017-09-01
Attending: INTERNAL MEDICINE
Payer: COMMERCIAL

## 2017-09-01 VITALS
BODY MASS INDEX: 33.43 KG/M2 | WEIGHT: 200.63 LBS | HEIGHT: 65 IN | DIASTOLIC BLOOD PRESSURE: 72 MMHG | SYSTOLIC BLOOD PRESSURE: 116 MMHG | HEART RATE: 60 BPM

## 2017-09-01 DIAGNOSIS — E66.9 NON MORBID OBESITY, UNSPECIFIED OBESITY TYPE: Chronic | ICD-10-CM

## 2017-09-01 DIAGNOSIS — I10 ESSENTIAL HYPERTENSION: Chronic | ICD-10-CM

## 2017-09-01 DIAGNOSIS — I50.22 NYHA CLASS 2 AND ACA/AHA STAGE C CHRONIC SYSTOLIC CONGESTIVE HEART FAILURE: Primary | ICD-10-CM

## 2017-09-01 DIAGNOSIS — I42.0 DILATED CARDIOMYOPATHY: Chronic | ICD-10-CM

## 2017-09-01 DIAGNOSIS — I50.22 CHRONIC SYSTOLIC CONGESTIVE HEART FAILURE: ICD-10-CM

## 2017-09-01 DIAGNOSIS — M17.11 OSTEOARTHRITIS OF RIGHT KNEE, UNSPECIFIED OSTEOARTHRITIS TYPE: Chronic | ICD-10-CM

## 2017-09-01 DIAGNOSIS — E78.5 HYPERLIPIDEMIA, UNSPECIFIED HYPERLIPIDEMIA TYPE: Chronic | ICD-10-CM

## 2017-09-01 DIAGNOSIS — Z95.810 AUTOMATIC IMPLANTABLE CARDIOVERTER-DEFIBRILLATOR IN SITU: ICD-10-CM

## 2017-09-01 LAB
ANION GAP SERPL CALC-SCNC: 9 MMOL/L
BNP SERPL-MCNC: 71 PG/ML
BUN SERPL-MCNC: 18 MG/DL
CALCIUM SERPL-MCNC: 9.6 MG/DL
CHLORIDE SERPL-SCNC: 101 MMOL/L
CO2 SERPL-SCNC: 32 MMOL/L
CREAT SERPL-MCNC: 1.4 MG/DL
EST. GFR  (AFRICAN AMERICAN): 45.2 ML/MIN/1.73 M^2
EST. GFR  (NON AFRICAN AMERICAN): 39.2 ML/MIN/1.73 M^2
GLUCOSE SERPL-MCNC: 111 MG/DL
POTASSIUM SERPL-SCNC: 3.6 MMOL/L
SODIUM SERPL-SCNC: 142 MMOL/L

## 2017-09-01 PROCEDURE — 1159F MED LIST DOCD IN RCRD: CPT | Mod: S$GLB,,, | Performed by: INTERNAL MEDICINE

## 2017-09-01 PROCEDURE — 3074F SYST BP LT 130 MM HG: CPT | Mod: S$GLB,,, | Performed by: INTERNAL MEDICINE

## 2017-09-01 PROCEDURE — 99999 PR PBB SHADOW E&M-EST. PATIENT-LVL III: CPT | Mod: PBBFAC,,, | Performed by: INTERNAL MEDICINE

## 2017-09-01 PROCEDURE — 80048 BASIC METABOLIC PNL TOTAL CA: CPT

## 2017-09-01 PROCEDURE — 3008F BODY MASS INDEX DOCD: CPT | Mod: S$GLB,,, | Performed by: INTERNAL MEDICINE

## 2017-09-01 PROCEDURE — 83880 ASSAY OF NATRIURETIC PEPTIDE: CPT

## 2017-09-01 PROCEDURE — 1126F AMNT PAIN NOTED NONE PRSNT: CPT | Mod: S$GLB,,, | Performed by: INTERNAL MEDICINE

## 2017-09-01 PROCEDURE — 3078F DIAST BP <80 MM HG: CPT | Mod: S$GLB,,, | Performed by: INTERNAL MEDICINE

## 2017-09-01 PROCEDURE — 99213 OFFICE O/P EST LOW 20 MIN: CPT | Mod: S$GLB,,, | Performed by: INTERNAL MEDICINE

## 2017-09-01 PROCEDURE — 36415 COLL VENOUS BLD VENIPUNCTURE: CPT

## 2017-09-01 RX ORDER — ISOSORBIDE MONONITRATE 30 MG/1
30 TABLET, EXTENDED RELEASE ORAL DAILY
Qty: 30 TABLET | Refills: 6 | Status: SHIPPED | OUTPATIENT
Start: 2017-09-01 | End: 2018-04-17 | Stop reason: SDUPTHER

## 2017-09-01 RX ORDER — DIGOXIN 250 MCG
0.25 TABLET ORAL DAILY
Qty: 30 TABLET | Refills: 6 | Status: SHIPPED | OUTPATIENT
Start: 2017-09-01 | End: 2018-04-17 | Stop reason: SDUPTHER

## 2017-09-01 NOTE — TELEPHONE ENCOUNTER
2:15 pm:  Dr. Soria completed dental clearance form during pts clinid visit today. This form just faxed to Dr. Fco Chicas's office.

## 2017-09-02 NOTE — PROGRESS NOTES
"2:15 pm:  Faxed" medical clearance for dental procedure" form to the office of Dr. Fco Chicas ( was completed and signed by Dr. Soria in clinic today).   "

## 2017-09-04 NOTE — PROGRESS NOTES
Subjective: class 2     Patient ID:  Sherice Squires is a 66 y.o. female who presents for eight month follow-up of Congestive Heart Failure    Congestive Heart Failure   Pertinent negatives include no abdominal pain, anorexia, chest pain, chills, congestion, coughing, diaphoresis, fever, headaches, myalgias, nausea, rash, vomiting or weakness.   Mrs. Squires is a 66 year old AAF with NIDCM (LVEF 30 - Nov 2016), PkVO2 16.1 (June 2015)  s/p ICD who continues to be well from a cardiopulmonary standpoint, no complaints, no chest pain, chest pressure, N/V/F/C, lightheadedness, dizziness, PND, orthopnea, LE edema. Still working as a , and able to do everything she needs to do without any trouble, only occasional issue is her knee. States she needs cleareance for dental extraction and bridge work.     Review of Systems   Constitution: Negative for chills, decreased appetite, diaphoresis, fever, weakness, malaise/fatigue, weight gain and weight loss.   HENT: Negative for congestion.    Eyes: Negative for blurred vision and visual disturbance.   Cardiovascular: Negative for chest pain, dyspnea on exertion, irregular heartbeat, leg swelling, near-syncope, orthopnea, palpitations, paroxysmal nocturnal dyspnea and syncope.   Respiratory: Negative for cough, shortness of breath, sleep disturbances due to breathing, snoring and wheezing.    Hematologic/Lymphatic: Negative for bleeding problem. Does not bruise/bleed easily.   Skin: Negative for poor wound healing and rash.   Musculoskeletal: Positive for joint pain (knee). Negative for arthritis, muscle weakness and myalgias.   Gastrointestinal: Negative for bloating, abdominal pain, anorexia, constipation, diarrhea, hematemesis, hematochezia, jaundice, melena, nausea and vomiting.   Genitourinary: Negative for frequency and urgency.   Neurological: Negative for difficulty with concentration, excessive daytime sleepiness, dizziness, headaches and light-headedness.  "  Psychiatric/Behavioral: Negative for depression. The patient does not have insomnia and is not nervous/anxious.         Objective:    Physical Exam   Constitutional: She appears well-developed and well-nourished. No distress.   /72 (BP Location: Left arm, Patient Position: Sitting, BP Method: Medium (Automatic))   Pulse 60   Ht 5' 5.2" (1.656 m)   Wt 91 kg (200 lb 9.9 oz)   BMI 33.18 kg/m²      HENT:   Head: Normocephalic and atraumatic. Head is without abrasion and without contusion.   Right Ear: External ear normal.   Left Ear: External ear normal.   Nose: Nose normal. No epistaxis.   Mouth/Throat: Oropharynx is clear and moist. Mucous membranes are not cyanotic.   Eyes: Conjunctivae and EOM are normal. Pupils are equal, round, and reactive to light.   Neck: Normal range of motion. Neck supple. No tracheal deviation present.   Cardiovascular: Normal rate, regular rhythm, normal heart sounds and normal pulses.  Exam reveals no gallop.    No murmur heard.  Pulmonary/Chest: Effort normal and breath sounds normal. No stridor. No respiratory distress. She has no wheezes.   Abdominal: Soft. Normal appearance, normal aorta and bowel sounds are normal. She exhibits no distension. There is no tenderness.   Musculoskeletal: She exhibits no edema or tenderness.   Neurological: She is alert. She has normal strength and normal reflexes. She exhibits normal muscle tone.   Skin: Skin is warm. No rash noted. No erythema.   Psychiatric: She has a normal mood and affect. Her speech is normal and behavior is normal. Judgment and thought content normal. Cognition and memory are normal.   Nursing note and vitals reviewed.    Lab Results   Component Value Date    BNP 71 09/01/2017     09/01/2017    K 3.6 09/01/2017    MG 1.7 04/18/2017     09/01/2017    CO2 32 (H) 09/01/2017    BUN 18 09/01/2017    CREATININE 1.4 09/01/2017     (H) 09/01/2017    AST 19 04/18/2017    ALT 20 04/18/2017    ALBUMIN 3.7 " 04/18/2017    PROT 7.4 04/18/2017    BILITOT 0.7 04/18/2017    CHOL 176 09/10/2014    HDL 63 09/10/2014    LDLCALC 100.2 09/10/2014    TRIG 64 09/10/2014       Magnesium   Date Value Ref Range Status   04/18/2017 1.7 1.6 - 2.6 mg/dL Final       Lab Results   Component Value Date    WBC 9.13 11/11/2016    HGB 11.6 (L) 11/11/2016    HCT 37.8 11/11/2016    MCV 89 11/11/2016     11/11/2016       BNP   Date Value Ref Range Status   09/01/2017 71 0 - 99 pg/mL Final     Comment:     Values of less than 100 pg/ml are consistent with non-CHF populations.   04/18/2017 46 0 - 99 pg/mL Final     Comment:     Values of less than 100 pg/ml are consistent with non-CHF populations.   02/13/2017 56 0 - 99 pg/mL Final     Comment:     Values of less than 100 pg/ml are consistent with non-CHF populations.           Assessment:       1. NYHA class 2 and LUDIN/AHA stage C chronic systolic congestive heart failure    2. Dilated cardiomyopathy    3. Automatic implantable cardioverter-defibrillator in situ    4. Non morbid obesity, unspecified obesity type    5. Essential hypertension    6. Hyperlipidemia, unspecified hyperlipidemia type    7. Osteoarthritis of right knee, unspecified osteoarthritis type         Plan:       Patient seems to continue to do well, really only limited by her knee more so than her heart failure. Despite her knee did well on CPX last visit, will reassess next clinic visit.   Patient can proceed with dental extraction, based on guidelines will not need pre-procedure antibiotics.   Otherwise no changes, continue current regimen.  NYHA FC II.  RTC 6 months with labs, clinic, and echo.

## 2017-11-02 ENCOUNTER — CLINICAL SUPPORT (OUTPATIENT)
Dept: ELECTROPHYSIOLOGY | Facility: CLINIC | Age: 67
End: 2017-11-02
Payer: COMMERCIAL

## 2017-11-02 DIAGNOSIS — Z95.810 PRESENCE OF AUTOMATIC CARDIOVERTER/DEFIBRILLATOR (AICD): ICD-10-CM

## 2017-11-02 DIAGNOSIS — I42.9 CARDIOMYOPATHY: ICD-10-CM

## 2017-11-02 PROCEDURE — 93296 REM INTERROG EVL PM/IDS: CPT | Mod: S$GLB,,, | Performed by: INTERNAL MEDICINE

## 2017-11-02 PROCEDURE — 93295 DEV INTERROG REMOTE 1/2/MLT: CPT | Mod: S$GLB,,, | Performed by: INTERNAL MEDICINE

## 2017-11-07 RX ORDER — POTASSIUM CHLORIDE 750 MG/1
TABLET, EXTENDED RELEASE ORAL
Qty: 60 TABLET | Refills: 6 | Status: CANCELLED | OUTPATIENT
Start: 2017-11-07

## 2017-11-09 RX ORDER — POTASSIUM CHLORIDE 750 MG/1
20 TABLET, EXTENDED RELEASE ORAL DAILY
Qty: 60 TABLET | Refills: 6 | Status: SHIPPED | OUTPATIENT
Start: 2017-11-09 | End: 2018-01-30

## 2018-01-01 ENCOUNTER — CLINICAL SUPPORT (OUTPATIENT)
Dept: CARDIOLOGY | Facility: HOSPITAL | Age: 68
End: 2018-01-01
Attending: INTERNAL MEDICINE
Payer: MEDICARE

## 2018-01-01 ENCOUNTER — TELEPHONE (OUTPATIENT)
Dept: TRANSPLANT | Facility: CLINIC | Age: 68
End: 2018-01-01

## 2018-01-01 ENCOUNTER — LAB VISIT (OUTPATIENT)
Dept: LAB | Facility: HOSPITAL | Age: 68
End: 2018-01-01
Attending: INTERNAL MEDICINE
Payer: MEDICARE

## 2018-01-01 DIAGNOSIS — Z95.810 AICD (AUTOMATIC CARDIOVERTER/DEFIBRILLATOR) PRESENT: ICD-10-CM

## 2018-01-01 DIAGNOSIS — I50.9 ACUTE HEART FAILURE, UNSPECIFIED HEART FAILURE TYPE: Primary | ICD-10-CM

## 2018-01-01 DIAGNOSIS — I48.91 ATRIAL FIBRILLATION: ICD-10-CM

## 2018-01-01 DIAGNOSIS — I50.9 ACUTE HEART FAILURE, UNSPECIFIED HEART FAILURE TYPE: ICD-10-CM

## 2018-01-01 DIAGNOSIS — I50.9 CHF (CONGESTIVE HEART FAILURE): ICD-10-CM

## 2018-01-01 DIAGNOSIS — I42.0 DILATED CARDIOMYOPATHY: ICD-10-CM

## 2018-01-01 LAB
ANION GAP SERPL CALC-SCNC: 10 MMOL/L
BUN SERPL-MCNC: 20 MG/DL
CALCIUM SERPL-MCNC: 9.5 MG/DL
CHLORIDE SERPL-SCNC: 101 MMOL/L
CO2 SERPL-SCNC: 31 MMOL/L
CREAT SERPL-MCNC: 1.3 MG/DL
EST. GFR  (AFRICAN AMERICAN): 49 ML/MIN/1.73 M^2
EST. GFR  (NON AFRICAN AMERICAN): 42 ML/MIN/1.73 M^2
GLUCOSE SERPL-MCNC: 116 MG/DL
POTASSIUM SERPL-SCNC: 4.1 MMOL/L
SODIUM SERPL-SCNC: 142 MMOL/L

## 2018-01-01 PROCEDURE — 93296 REM INTERROG EVL PM/IDS: CPT

## 2018-01-01 PROCEDURE — 36415 COLL VENOUS BLD VENIPUNCTURE: CPT

## 2018-01-01 PROCEDURE — 80048 BASIC METABOLIC PNL TOTAL CA: CPT

## 2018-01-22 DIAGNOSIS — I42.0 DILATED CARDIOMYOPATHY: ICD-10-CM

## 2018-01-22 DIAGNOSIS — Z95.810 AUTOMATIC IMPLANTABLE CARDIOVERTER-DEFIBRILLATOR IN SITU: ICD-10-CM

## 2018-01-22 DIAGNOSIS — I48.0 PAROXYSMAL ATRIAL FIBRILLATION: Primary | ICD-10-CM

## 2018-01-24 ENCOUNTER — OFFICE VISIT (OUTPATIENT)
Dept: ELECTROPHYSIOLOGY | Facility: CLINIC | Age: 68
End: 2018-01-24
Payer: COMMERCIAL

## 2018-01-24 ENCOUNTER — HOSPITAL ENCOUNTER (OUTPATIENT)
Dept: CARDIOLOGY | Facility: CLINIC | Age: 68
Discharge: HOME | End: 2018-01-24
Attending: INTERNAL MEDICINE
Payer: COMMERCIAL

## 2018-01-24 ENCOUNTER — HOSPITAL ENCOUNTER (OUTPATIENT)
Dept: CARDIOLOGY | Facility: CLINIC | Age: 68
Discharge: HOME | End: 2018-01-24
Payer: COMMERCIAL

## 2018-01-24 ENCOUNTER — OFFICE VISIT (OUTPATIENT)
Dept: TRANSPLANT | Facility: CLINIC | Age: 68
End: 2018-01-24
Payer: COMMERCIAL

## 2018-01-24 VITALS
WEIGHT: 198 LBS | BODY MASS INDEX: 32.99 KG/M2 | HEART RATE: 60 BPM | HEIGHT: 65 IN | DIASTOLIC BLOOD PRESSURE: 65 MMHG | SYSTOLIC BLOOD PRESSURE: 113 MMHG

## 2018-01-24 VITALS
BODY MASS INDEX: 32.17 KG/M2 | HEIGHT: 66 IN | WEIGHT: 200.19 LBS | SYSTOLIC BLOOD PRESSURE: 127 MMHG | HEART RATE: 59 BPM | DIASTOLIC BLOOD PRESSURE: 65 MMHG

## 2018-01-24 DIAGNOSIS — I50.22 NYHA CLASS 2 AND ACA/AHA STAGE C CHRONIC SYSTOLIC CONGESTIVE HEART FAILURE: ICD-10-CM

## 2018-01-24 DIAGNOSIS — Z95.810 AUTOMATIC IMPLANTABLE CARDIOVERTER-DEFIBRILLATOR IN SITU: ICD-10-CM

## 2018-01-24 DIAGNOSIS — E78.5 HYPERLIPIDEMIA, UNSPECIFIED HYPERLIPIDEMIA TYPE: Chronic | ICD-10-CM

## 2018-01-24 DIAGNOSIS — I42.0 DILATED CARDIOMYOPATHY: Chronic | ICD-10-CM

## 2018-01-24 DIAGNOSIS — I42.0 DILATED CARDIOMYOPATHY: Primary | ICD-10-CM

## 2018-01-24 DIAGNOSIS — I42.0 DILATED CARDIOMYOPATHY: ICD-10-CM

## 2018-01-24 DIAGNOSIS — I50.22 CHRONIC SYSTOLIC CONGESTIVE HEART FAILURE: Primary | ICD-10-CM

## 2018-01-24 DIAGNOSIS — I48.0 PAROXYSMAL ATRIAL FIBRILLATION: ICD-10-CM

## 2018-01-24 LAB
AORTIC VALVE REGURGITATION: ABNORMAL
ESTIMATED PA SYSTOLIC PRESSURE: 43.96
MITRAL VALVE MOBILITY: ABNORMAL
MITRAL VALVE REGURGITATION: ABNORMAL
RETIRED EF AND QEF - SEE NOTES: 25 (ref 55–65)
TRICUSPID VALVE REGURGITATION: ABNORMAL

## 2018-01-24 PROCEDURE — 1159F MED LIST DOCD IN RCRD: CPT | Mod: S$GLB,,, | Performed by: INTERNAL MEDICINE

## 2018-01-24 PROCEDURE — 1126F AMNT PAIN NOTED NONE PRSNT: CPT | Mod: S$GLB,,, | Performed by: INTERNAL MEDICINE

## 2018-01-24 PROCEDURE — 93000 ELECTROCARDIOGRAM COMPLETE: CPT | Mod: S$GLB,,, | Performed by: INTERNAL MEDICINE

## 2018-01-24 PROCEDURE — 99214 OFFICE O/P EST MOD 30 MIN: CPT | Mod: S$GLB,,, | Performed by: INTERNAL MEDICINE

## 2018-01-24 PROCEDURE — 99999 PR PBB SHADOW E&M-EST. PATIENT-LVL III: CPT | Mod: PBBFAC,,, | Performed by: INTERNAL MEDICINE

## 2018-01-24 PROCEDURE — 3008F BODY MASS INDEX DOCD: CPT | Mod: S$GLB,,, | Performed by: INTERNAL MEDICINE

## 2018-01-24 PROCEDURE — 93306 TTE W/DOPPLER COMPLETE: CPT | Mod: S$GLB,,, | Performed by: INTERNAL MEDICINE

## 2018-01-24 RX ORDER — LOSARTAN POTASSIUM 25 MG/1
25 TABLET ORAL 2 TIMES DAILY
Qty: 180 TABLET | Refills: 3 | Status: CANCELLED | OUTPATIENT
Start: 2018-01-24 | End: 2019-01-01

## 2018-01-24 RX ORDER — HYDRALAZINE HYDROCHLORIDE 50 MG/1
50 TABLET, FILM COATED ORAL 3 TIMES DAILY
Qty: 270 TABLET | Refills: 3 | Status: SHIPPED | OUTPATIENT
Start: 2018-01-24 | End: 2018-04-20 | Stop reason: SDUPTHER

## 2018-01-24 RX ORDER — SIMVASTATIN 40 MG/1
40 TABLET, FILM COATED ORAL NIGHTLY
Qty: 90 TABLET | Refills: 3 | Status: SHIPPED | OUTPATIENT
Start: 2018-01-24 | End: 2018-04-20 | Stop reason: SDUPTHER

## 2018-01-24 NOTE — PATIENT INSTRUCTIONS
STOP taking losartan (cozaar) and potassium tablets.    START: entresto ONE tablet TWICE a day.    Be careful with foods rich in potassium.    We will get labs in 1 week to make sure your labs are okay and that potassium and kidney function are stable.

## 2018-01-24 NOTE — PROGRESS NOTES
Subjective:    Patient ID:  Sherice Squires is a 67 y.o. female who presents for follow-up of Atrial Fibrillation      Atrial Fibrillation   Symptoms are negative for chest pain, dizziness, palpitations, shortness of breath, syncope and weakness. Past medical history includes atrial fibrillation.     67 y.o. yo female  ([facility] in [facility])  NICM,   HTN, HLD, both on meds  ICD 2010  asymptomatic atrial fibrillation discovered on ICD interrogation.  hx VF 2011, rx'd with ICD shocks  RBBB/LAFB, present since 5/2011    Interrogation reveals stable lead function, overall AMS burden 1%.  Continues to feel well: NYHA I sx. No CP. No NAZARIO. Limited only by knee discomfort, never by NAZARIO.    Echo 5/14 30% -> 11/16 30% -> 2018 pending    She is feeling well and denies any palpitations, increased sob, fatigue, syncope, or edema. She denies any falls or issues with balance.  My interpretation of today's ECG is SR, RBBB/LAFB  CHADS=2 on Eliquis    Review of Systems   Constitution: Negative. Negative for weakness and malaise/fatigue.   HENT: Negative.  Negative for ear pain and tinnitus.    Eyes: Negative for blurred vision.   Cardiovascular: Negative.  Negative for chest pain, dyspnea on exertion, irregular heartbeat, leg swelling, near-syncope, palpitations and syncope.   Respiratory: Negative.  Negative for shortness of breath.    Endocrine: Negative.  Negative for polyuria.   Hematologic/Lymphatic: Does not bruise/bleed easily.   Skin: Negative.  Negative for rash.   Musculoskeletal: Positive for joint pain. Negative for muscle weakness.   Gastrointestinal: Negative.  Negative for abdominal pain and change in bowel habit.   Genitourinary: Negative for frequency.   Neurological: Negative.  Negative for dizziness and light-headedness.   Psychiatric/Behavioral: Negative.  Negative for depression. The patient is not nervous/anxious.    Allergic/Immunologic: Negative for environmental allergies.         Objective:    Physical Exam   Constitutional: She is oriented to person, place, and time. Vital signs are normal. She appears well-developed and well-nourished. She is active and cooperative.   HENT:   Head: Normocephalic and atraumatic.   Eyes: Conjunctivae and EOM are normal.   Neck: Normal range of motion. Carotid bruit is not present. No tracheal deviation and no edema present. No thyroid mass and no thyromegaly present.   Cardiovascular: Normal rate, regular rhythm, normal heart sounds, intact distal pulses and normal pulses.   No extrasystoles are present. PMI is not displaced.  Exam reveals no gallop and no friction rub.    No murmur heard.  Pulmonary/Chest: Effort normal and breath sounds normal. No respiratory distress. She has no wheezes. She has no rales.   Abdominal: Soft. Normal appearance. She exhibits no distension. There is no hepatosplenomegaly.   Musculoskeletal: Normal range of motion.   Neurological: She is alert and oriented to person, place, and time. Coordination normal.   Skin: Skin is warm and dry. No rash noted.   Psychiatric: She has a normal mood and affect. Her speech is normal and behavior is normal. Thought content normal. Cognition and memory are normal.   Nursing note and vitals reviewed.        Assessment:       1. Dilated cardiomyopathy    2. Automatic implantable cardiac defibrillator in situ    3. Atrial fibrillation-paroxysmal, asymptomatic discovered on ICD interrogation    4. Hypertension    5. Hyperlipidemia         Plan:       Doing well. No indication for CRT, despite wide QRS and low LVEF, given no NAZARIO.  Continue f/u in device clinic.  Return in 1 year, or earlier prn (e.g., if she develops SOB/NAZARIO).

## 2018-01-24 NOTE — LETTER
January 24, 2018      Annamarie Soria MD  [address]  [facility] [address]           [facility] - Arrhythmia  [address]  [facility] [address]  Phone: [phone]  Fax: [phone]          Patient: Sherice Squires   MR Number: [id number]   YOB: 1950   Date of Visit: 1/24/2018       Dear Dr. Annamarie Soria:    Thank you for referring Sherice Squires to me for evaluation. Attached you will find relevant portions of my assessment and plan of care.    If you have questions, please do not hesitate to call me. I look forward to following Sherice Squires along with you.    Sincerely,    Jose Reece MD    Enclosure  CC:  No Recipients    If you would like to receive this communication electronically, please contact [email or web address] or [phone] to request more information on [facility] Link access.    For providers and/or their staff who would like to refer a patient to [facility], please contact us through our one-stop-shop provider referral line, [facility], at [phone].    If you feel you have received this communication in error or would no longer like to receive these types of communications, please e-mail [email or web address]

## 2018-01-30 ENCOUNTER — LAB VISIT (OUTPATIENT)
Dept: LAB | Facility: HOSPITAL | Age: 68
End: 2018-01-30
Attending: INTERNAL MEDICINE
Payer: COMMERCIAL

## 2018-01-30 DIAGNOSIS — I50.22 NYHA CLASS 2 AND ACA/AHA STAGE C CHRONIC SYSTOLIC CONGESTIVE HEART FAILURE: ICD-10-CM

## 2018-01-30 DIAGNOSIS — I42.0 DILATED CARDIOMYOPATHY: ICD-10-CM

## 2018-01-30 LAB
ANION GAP SERPL CALC-SCNC: 10 MMOL/L
BUN SERPL-MCNC: 13 MG/DL
CALCIUM SERPL-MCNC: 9.3 MG/DL
CHLORIDE SERPL-SCNC: 106 MMOL/L
CO2 SERPL-SCNC: 29 MMOL/L
CREAT SERPL-MCNC: 1.1 MG/DL
DIGOXIN SERPL-MCNC: 1 NG/ML
EST. GFR  (AFRICAN AMERICAN): >60 ML/MIN/1.73 M^2
EST. GFR  (NON AFRICAN AMERICAN): 52.1 ML/MIN/1.73 M^2
GLUCOSE SERPL-MCNC: 94 MG/DL
POTASSIUM SERPL-SCNC: 4.1 MMOL/L
SODIUM SERPL-SCNC: 145 MMOL/L

## 2018-01-30 PROCEDURE — 80162 ASSAY OF DIGOXIN TOTAL: CPT

## 2018-01-30 PROCEDURE — 36415 COLL VENOUS BLD VENIPUNCTURE: CPT | Mod: PO

## 2018-01-30 PROCEDURE — 80048 BASIC METABOLIC PNL TOTAL CA: CPT

## 2018-01-31 NOTE — PROGRESS NOTES
Subjective: class 2     Patient ID:  Sherice Squires is a 67 y.o. female who presents for eight month follow-up of Congestive Heart Failure    Congestive Heart Failure   Pertinent negatives include no abdominal pain, anorexia, chest pain, chills, congestion, coughing, diaphoresis, fever, headaches, myalgias, nausea, rash, vomiting or weakness.   Mrs. Squires is a 67 year old AAF with NIDCM (LVEF 30 - Nov 2016), PkVO2 16.1 (June 2015)  s/p ICD who continues to be well from a cardiopulmonary standpoint, no complaints, no chest pain, chest pressure, N/V/F/C, lightheadedness, dizziness, PND, orthopnea, LE edema. She is retiring finally from being a , states she is excited and cannot wait. Does a lot of walking and stair climbing at work, without any cardiopulmonary complaints. States her son is buying a treadmill that will stay at her house and she plans to use this for exercise since she will not be as active as when she was/is a . Denies shortness of breath, functionally doing very well, without any cardiopulmonary complaints. Seeing Dr. Reece today as well for ICD follow up and afib. ROS really only continues to be her knee pain.     TTE 01/24/18:   1 - Eccentric LVH with severely depressed left ventricular systolic function (EF 25-30%). LVEDD 6.4cm    2 - Severe left atrial enlargement.     3 - Normal LAP.     4 - Normal central venous pressures.     5 - Moderate mitral regurgitation.     6 - Mild to moderate tricuspid regurgitation.     7 - Pulmonary hypertension. The estimated PA systolic pressure is 44 mmHg.     8 - Normal RV size with low normal right ventricular systolic function .     Review of Systems   Constitution: Negative for chills, decreased appetite, diaphoresis, fever, weakness, malaise/fatigue, weight gain and weight loss.   HENT: Negative for congestion.    Eyes: Negative for blurred vision and visual disturbance.   Cardiovascular: Negative for chest pain, dyspnea on  "exertion, irregular heartbeat, leg swelling, near-syncope, orthopnea, palpitations, paroxysmal nocturnal dyspnea and syncope.   Respiratory: Negative for cough, shortness of breath, sleep disturbances due to breathing, snoring and wheezing.    Hematologic/Lymphatic: Negative for bleeding problem. Does not bruise/bleed easily.   Skin: Negative for poor wound healing and rash.   Musculoskeletal: Positive for joint pain (knee). Negative for arthritis, muscle weakness and myalgias.   Gastrointestinal: Negative for bloating, abdominal pain, anorexia, constipation, diarrhea, hematemesis, hematochezia, jaundice, melena, nausea and vomiting.   Genitourinary: Negative for frequency and urgency.   Neurological: Negative for difficulty with concentration, excessive daytime sleepiness, dizziness, headaches and light-headedness.   Psychiatric/Behavioral: Negative for depression. The patient does not have insomnia and is not nervous/anxious.         Objective:    Physical Exam   Constitutional: She appears well-developed and well-nourished. No distress.   /65 (BP Location: Right arm, Patient Position: Sitting, BP Method: Large (Automatic))   Pulse (!) 59   Ht 5' 5.5" (1.664 m)   Wt 90.8 kg (200 lb 2.8 oz)   BMI 32.80 kg/m²      HENT:   Head: Normocephalic and atraumatic. Head is without abrasion and without contusion.   Right Ear: External ear normal.   Left Ear: External ear normal.   Nose: Nose normal. No epistaxis.   Mouth/Throat: Oropharynx is clear and moist. Mucous membranes are not cyanotic.   Eyes: Conjunctivae and EOM are normal. Pupils are equal, round, and reactive to light.   Neck: Normal range of motion. Neck supple. No tracheal deviation present.   Cardiovascular: Normal rate, regular rhythm, normal heart sounds and normal pulses.  Exam reveals no gallop.    No murmur heard.  Pulmonary/Chest: Effort normal and breath sounds normal. No stridor. No respiratory distress. She has no wheezes.   Abdominal: Soft. " Normal appearance, normal aorta and bowel sounds are normal. She exhibits no distension. There is no tenderness.   Musculoskeletal: She exhibits no edema or tenderness.   Neurological: She is alert. She has normal strength and normal reflexes. She exhibits normal muscle tone.   Skin: Skin is warm. No rash noted. No erythema.   Psychiatric: She has a normal mood and affect. Her speech is normal and behavior is normal. Judgment and thought content normal. Cognition and memory are normal.   Nursing note and vitals reviewed.    Lab Results   Component Value Date     (H) 01/24/2018     01/30/2018    K 4.1 01/30/2018    MG 1.9 01/24/2018     01/30/2018    CO2 29 01/30/2018    BUN 13 01/30/2018    CREATININE 1.1 01/30/2018    GLU 94 01/30/2018    AST 16 01/24/2018    ALT 13 01/24/2018    ALBUMIN 3.5 01/24/2018    PROT 7.6 01/24/2018    BILITOT 0.5 01/24/2018    CHOL 176 09/10/2014    HDL 63 09/10/2014    LDLCALC 100.2 09/10/2014    TRIG 64 09/10/2014       Magnesium   Date Value Ref Range Status   01/24/2018 1.9 1.6 - 2.6 mg/dL Final       Lab Results   Component Value Date    WBC 7.86 01/24/2018    HGB 11.8 (L) 01/24/2018    HCT 38.3 01/24/2018    MCV 88 01/24/2018     (L) 01/24/2018       BNP   Date Value Ref Range Status   01/24/2018 229 (H) 0 - 99 pg/mL Final     Comment:     Values of less than 100 pg/ml are consistent with non-CHF populations.   09/01/2017 71 0 - 99 pg/mL Final     Comment:     Values of less than 100 pg/ml are consistent with non-CHF populations.   04/18/2017 46 0 - 99 pg/mL Final     Comment:     Values of less than 100 pg/ml are consistent with non-CHF populations.           Assessment:       1. Dilated cardiomyopathy    2. NYHA class 2 and LUDIN/AHA stage C chronic systolic congestive heart failure    3. Automatic implantable cardioverter-defibrillator in situ    4. Hyperlipidemia, unspecified hyperlipidemia type         Plan:       Will have patient try entresto. Stop  taking losartan and potassium tablets.   She will start entresto at 49/51 BID given her dose of losartan she is on now.  Repeat labs in 1 week to follow up on renal function and potassium.   Otherwise, continue rest of regimen as indicated/tolerated.   Encourage exercise, weight loss if possible, reduction in carbs, and increase in protein and complex carbs.   Continue rest of regimen with HF meds including toprol XL, aldactone, digoxin.  zocor for lipids, repeat lipid panel next visit.   RTC 6 months with labs, clinic.    NYHA FC II.

## 2018-02-21 DIAGNOSIS — E78.5 HYPERLIPIDEMIA, UNSPECIFIED HYPERLIPIDEMIA TYPE: Chronic | ICD-10-CM

## 2018-02-21 DIAGNOSIS — I42.0 DILATED CARDIOMYOPATHY: Chronic | ICD-10-CM

## 2018-02-21 RX ORDER — SPIRONOLACTONE 25 MG/1
TABLET ORAL
Qty: 30 TABLET | Refills: 11 | Status: SHIPPED | OUTPATIENT
Start: 2018-02-21 | End: 2018-04-20 | Stop reason: SDUPTHER

## 2018-02-21 RX ORDER — METOPROLOL SUCCINATE 100 MG/1
TABLET, EXTENDED RELEASE ORAL
Qty: 60 TABLET | Refills: 11 | Status: SHIPPED | OUTPATIENT
Start: 2018-02-21 | End: 2018-03-02 | Stop reason: SDUPTHER

## 2018-02-21 RX ORDER — APIXABAN 5 MG/1
TABLET, FILM COATED ORAL
Qty: 60 TABLET | Refills: 11 | Status: SHIPPED | OUTPATIENT
Start: 2018-02-21 | End: 2018-04-20 | Stop reason: SDUPTHER

## 2018-02-23 ENCOUNTER — LAB VISIT (OUTPATIENT)
Dept: LAB | Facility: HOSPITAL | Age: 68
End: 2018-02-23
Attending: INTERNAL MEDICINE
Payer: COMMERCIAL

## 2018-02-23 DIAGNOSIS — I42.0 DILATED CARDIOMYOPATHY: Chronic | ICD-10-CM

## 2018-02-23 DIAGNOSIS — E78.5 HYPERLIPIDEMIA, UNSPECIFIED HYPERLIPIDEMIA TYPE: Chronic | ICD-10-CM

## 2018-02-23 LAB
ANION GAP SERPL CALC-SCNC: 14 MMOL/L
BUN SERPL-MCNC: 10 MG/DL
CALCIUM SERPL-MCNC: 9.4 MG/DL
CHLORIDE SERPL-SCNC: 105 MMOL/L
CO2 SERPL-SCNC: 26 MMOL/L
CREAT SERPL-MCNC: 1 MG/DL
EST. GFR  (AFRICAN AMERICAN): >60 ML/MIN/1.73 M^2
EST. GFR  (NON AFRICAN AMERICAN): 58 ML/MIN/1.73 M^2
GLUCOSE SERPL-MCNC: 104 MG/DL
POTASSIUM SERPL-SCNC: 3.3 MMOL/L
SODIUM SERPL-SCNC: 145 MMOL/L

## 2018-02-23 PROCEDURE — 80048 BASIC METABOLIC PNL TOTAL CA: CPT | Mod: PO

## 2018-02-23 PROCEDURE — 36415 COLL VENOUS BLD VENIPUNCTURE: CPT | Mod: PO

## 2018-02-23 NOTE — TELEPHONE ENCOUNTER
1:00-1:15 pm:  Returned call to pt. Sheeba Ang LPN-Dr. Soria wants pts Entresto dose increased to 97/103 (1) tablet by mouth twice daily and labs in 1 week  Returned call to pt and explained this as well as Dr. Soria has reviewed her medications.  Pt told me she was told by her local [facility] pharmacy this am-a refill of the Entresto 49-51 will cost her $500 and she cannot afford this. Pt told me she is currently taking the 30 day free trial. She also reports she had already activated the $10 co pay card and told [facility] this as well.   Pt told me she has 18 tablets left of the 49/51 dose-so if she doubles up on it (2) tablets twice daily she will run out after Tuesday, 2/27/18 am  dose. Afraid we may not have the 97/103 dose approved to a c opay she can afford by then-so I asked pt to remain on the 49/51 (1) tablet once daily dose for now-so at least this way she wont be without any Entresto until we can get the higher dose approve. Pt also aware that she will not need the 1 week lab appt until she is taking the higher dose for a week.   Pt lives in [place]-but in light of the difficulty she is having with getting Entresto at an affordable rate; pt agreeable for this higher dose RX be sent to [facility], since they work with numerous pts in assisting with Entresto., PA, tier change etc. And they are able to mail RX to pts home. Explained to pt once it is approved to a copay she can afford , the rX can be transferred to her local [facility] pharmacy.    Will also send an epic office message to out [facility] pharmacy letting them know whats going on  Pt will call her local pharmacy and advise them not to pursue running the 49/51 rx through since her doctor is changing the dose and she is getting filled elsewhere.         ----- Message from Andrez Mares sent at 2/23/2018 12:16 PM CST -----  Contact: patient  Please call pt at [phone]. Patient is returning a nurse from today. Labs done  today    Thank you

## 2018-02-26 ENCOUNTER — CLINICAL SUPPORT (OUTPATIENT)
Dept: ELECTROPHYSIOLOGY | Facility: CLINIC | Age: 68
End: 2018-02-26
Attending: INTERNAL MEDICINE
Payer: COMMERCIAL

## 2018-02-26 ENCOUNTER — TELEPHONE (OUTPATIENT)
Dept: ELECTROPHYSIOLOGY | Facility: CLINIC | Age: 68
End: 2018-02-26

## 2018-02-26 DIAGNOSIS — Z95.810 AICD (AUTOMATIC CARDIOVERTER/DEFIBRILLATOR) PRESENT: Primary | ICD-10-CM

## 2018-02-26 DIAGNOSIS — Z95.810 AICD (AUTOMATIC CARDIOVERTER/DEFIBRILLATOR) PRESENT: ICD-10-CM

## 2018-02-26 DIAGNOSIS — I42.0 DILATED CARDIOMYOPATHY: ICD-10-CM

## 2018-02-26 DIAGNOSIS — I50.9 CHF (CONGESTIVE HEART FAILURE): ICD-10-CM

## 2018-02-26 DIAGNOSIS — I48.91 ATRIAL FIBRILLATION: ICD-10-CM

## 2018-02-26 PROCEDURE — 93295 DEV INTERROG REMOTE 1/2/MLT: CPT | Mod: S$GLB,,, | Performed by: INTERNAL MEDICINE

## 2018-02-26 PROCEDURE — 93296 REM INTERROG EVL PM/IDS: CPT | Mod: S$GLB,,, | Performed by: INTERNAL MEDICINE

## 2018-02-26 NOTE — TELEPHONE ENCOUNTER
Patient was returning a call to the Device Clinic in relation to multiple shocks from her ICD.  After speaking to the patient, it was found that she was at work when the shocks occurred and she was brought to [facility] via EMS.  Patient stated a person from [employer] came to the hospital and checked her ICD and gave her a copy of the print outs to give to her doctor.  Patient then stated she was instructed to see Dr. Jacob Dela Cruz for further follow up.  Asked the patient if that is the doctor that she is being followed by.  The patient stated she has not seen this doctor for a few years she was just doing what she was instructed to do.  Informed the patient that Dr. Reece is the doctor that has been following her and that is also who does her remote monitoring.  Patient stated she does want to stay with Dr. Reece.  Informed the patient a message has already been sent to Dr. Reece who is currently working @ the [facility] for today and will return to this office on tomorrow.  Patient is asymptomatic now and has not had any additional problems.  Informed the patient she would receive a phone call on tomorrow with further recommendations and/or instructions.  Patient verbalized understanding to all of the above.

## 2018-02-26 NOTE — TELEPHONE ENCOUNTER
Left voicemails on both home and mobile #'s listed.  Pt shocked by her AICD 2/23/18, rhythms c/w probable SVT.  Will notify pt's primary EP.

## 2018-02-27 ENCOUNTER — TELEPHONE (OUTPATIENT)
Dept: ELECTROPHYSIOLOGY | Facility: CLINIC | Age: 68
End: 2018-02-27

## 2018-02-27 DIAGNOSIS — I42.9 CARDIOMYOPATHY, UNSPECIFIED TYPE: Primary | ICD-10-CM

## 2018-02-27 NOTE — TELEPHONE ENCOUNTER
Pt contacted the clinic today for further recommendations of her recent shocks.  Pt states she feels fine.  Was seen at her local ED for shocks, was interrogated and given printouts of the 8 treated events.  Pt was advised that she could be seen by Dr. Reece this week and was given an appointment.  She expressed concern over being told by the local ED to see Dr. Cali (or Abebe).  I've advised her to contact his office to see if he will manage her arrhythmia OR if he prefers her to see Dr. Reece, who is a heart rhythm specialist.  Pt expresses concern about needing a letter to return to work.  I've advised her that we will hold a time for her on Friday, 3/2/18 to see Dr. Reece and address any employment restrictions.  I also asked the patient to please call us if she DOES NOT intend to come to [place] for this appointment.

## 2018-03-02 ENCOUNTER — OFFICE VISIT (OUTPATIENT)
Dept: ELECTROPHYSIOLOGY | Facility: CLINIC | Age: 68
End: 2018-03-02
Payer: COMMERCIAL

## 2018-03-02 ENCOUNTER — HOSPITAL ENCOUNTER (OUTPATIENT)
Dept: CARDIOLOGY | Facility: CLINIC | Age: 68
Discharge: HOME | End: 2018-03-02
Payer: COMMERCIAL

## 2018-03-02 VITALS
WEIGHT: 202 LBS | HEART RATE: 60 BPM | SYSTOLIC BLOOD PRESSURE: 126 MMHG | BODY MASS INDEX: 33.66 KG/M2 | DIASTOLIC BLOOD PRESSURE: 71 MMHG | HEIGHT: 65 IN

## 2018-03-02 DIAGNOSIS — I48.0 PAROXYSMAL ATRIAL FIBRILLATION: Primary | ICD-10-CM

## 2018-03-02 DIAGNOSIS — E78.5 HYPERLIPIDEMIA, UNSPECIFIED HYPERLIPIDEMIA TYPE: Chronic | ICD-10-CM

## 2018-03-02 DIAGNOSIS — I47.10 PSVT (PAROXYSMAL SUPRAVENTRICULAR TACHYCARDIA): ICD-10-CM

## 2018-03-02 DIAGNOSIS — I10 ESSENTIAL HYPERTENSION: Chronic | ICD-10-CM

## 2018-03-02 DIAGNOSIS — I42.9 CARDIOMYOPATHY, UNSPECIFIED TYPE: ICD-10-CM

## 2018-03-02 DIAGNOSIS — I42.0 DILATED CARDIOMYOPATHY: Chronic | ICD-10-CM

## 2018-03-02 PROCEDURE — 3078F DIAST BP <80 MM HG: CPT | Mod: S$GLB,,, | Performed by: INTERNAL MEDICINE

## 2018-03-02 PROCEDURE — 99999 PR PBB SHADOW E&M-EST. PATIENT-LVL III: CPT | Mod: PBBFAC,,, | Performed by: INTERNAL MEDICINE

## 2018-03-02 PROCEDURE — 3074F SYST BP LT 130 MM HG: CPT | Mod: S$GLB,,, | Performed by: INTERNAL MEDICINE

## 2018-03-02 PROCEDURE — 93000 ELECTROCARDIOGRAM COMPLETE: CPT | Mod: S$GLB,,, | Performed by: INTERNAL MEDICINE

## 2018-03-02 PROCEDURE — 99214 OFFICE O/P EST MOD 30 MIN: CPT | Mod: S$GLB,,, | Performed by: INTERNAL MEDICINE

## 2018-03-02 RX ORDER — METOPROLOL SUCCINATE 200 MG/1
200 TABLET, EXTENDED RELEASE ORAL 2 TIMES DAILY
Qty: 180 TABLET | Refills: 3 | Status: SHIPPED | OUTPATIENT
Start: 2018-03-02 | End: 2018-04-20 | Stop reason: SDUPTHER

## 2018-03-02 NOTE — PROGRESS NOTES
Subjective:    Patient ID:  Sherice Squires is a 67 y.o. female who presents for follow-up of Atrial Fibrillation      Atrial Fibrillation   Symptoms are negative for chest pain, dizziness, palpitations, shortness of breath, syncope and weakness. Past medical history includes atrial fibrillation.     67 y.o. yo female  ([facility] facility in [facility])  NICM,   HTN, HLD, both on meds  ICD 2010  asymptomatic atrial fibrillation discovered on ICD interrogation.  hx VF 2011, rx'd with ICD shocks  RBBB/LAFB, present since 5/2011    Interrogation reveals stable lead function, overall AMS burden 1%.  Continues to feel well: NYHA I sx. No CP. No NAZARIO. Limited only by knee discomfort, never by NAZARIO.  She is feeling well and denies any palpitations, increased sob, fatigue, syncope, or edema. She denies any falls or issues with balance.    On 2/23/18, was at work seated, feeling fine. No palpitations, no LH. Shocked by ICD x 6.  ICD interrogation shows AT with RVR, as well as degeneration to AF, resulting in ATP and shocks.    My interpretation of today's ECG is SR, RBBB/LAFB  CHADS=2 on Eliquis    Review of Systems   Constitution: Negative. Negative for weakness and malaise/fatigue.   HENT: Negative.  Negative for ear pain and tinnitus.    Eyes: Negative for blurred vision.   Cardiovascular: Negative.  Negative for chest pain, dyspnea on exertion, irregular heartbeat, leg swelling, near-syncope, palpitations and syncope.   Respiratory: Negative.  Negative for shortness of breath.    Endocrine: Negative.  Negative for polyuria.   Hematologic/Lymphatic: Does not bruise/bleed easily.   Skin: Negative.  Negative for rash.   Musculoskeletal: Positive for joint pain. Negative for muscle weakness.   Gastrointestinal: Negative.  Negative for abdominal pain and change in bowel habit.   Genitourinary: Negative for frequency.   Neurological: Negative.  Negative for dizziness and light-headedness.    Psychiatric/Behavioral: Negative.  Negative for depression. The patient is not nervous/anxious.    Allergic/Immunologic: Negative for environmental allergies.        Objective:    Physical Exam   Constitutional: She is oriented to person, place, and time. Vital signs are normal. She appears well-developed and well-nourished. She is active and cooperative.   HENT:   Head: Normocephalic and atraumatic.   Eyes: Conjunctivae and EOM are normal.   Neck: Normal range of motion. Carotid bruit is not present. No tracheal deviation and no edema present. No thyroid mass and no thyromegaly present.   Cardiovascular: Normal rate, regular rhythm, normal heart sounds, intact distal pulses and normal pulses.   No extrasystoles are present. PMI is not displaced.  Exam reveals no gallop and no friction rub.    No murmur heard.  Pulmonary/Chest: Effort normal and breath sounds normal. No respiratory distress. She has no wheezes. She has no rales.   Abdominal: Soft. Normal appearance. She exhibits no distension. There is no hepatosplenomegaly.   Musculoskeletal: Normal range of motion.   Neurological: She is alert and oriented to person, place, and time. Coordination normal.   Skin: Skin is warm and dry. No rash noted.   Psychiatric: She has a normal mood and affect. Her speech is normal and behavior is normal. Thought content normal. Cognition and memory are normal.   Nursing note and vitals reviewed.        Assessment:       1. Dilated cardiomyopathy    2. Automatic implantable cardiac defibrillator in situ    3. Atrial fibrillation-paroxysmal, asymptomatic discovered on ICD interrogation    4. Hypertension    5. Hyperlipidemia         Plan:       AT/AF with RVR.  Increase beta blocker: 100bid -> 200am/100pm x 2wk, then if tolerates, plan 200 bid.  If this recurs, consider amio vs ablation.    No indication for CRT, despite wide QRS and low LVEF, given no NAZARIO.  Continue f/u in device clinic.  Return in 1 year, or earlier prn  (e.g., if she develops SOB/NAZARIO).

## 2018-03-07 ENCOUNTER — TELEPHONE (OUTPATIENT)
Dept: ELECTROPHYSIOLOGY | Facility: CLINIC | Age: 68
End: 2018-03-07

## 2018-03-07 NOTE — TELEPHONE ENCOUNTER
Arrhythmia clinic  Pt had an appt booked from November to be seen on 3/8/18 for device check.  Pt had remote and clinic visit recently.  Called and left vm for pt that she does not need to come in for an appt tomorrow.  Will reschedule device check 3mos from now.  Left clinic number for pt to call back

## 2018-03-20 ENCOUNTER — TELEPHONE (OUTPATIENT)
Dept: TRANSPLANT | Facility: CLINIC | Age: 68
End: 2018-03-20

## 2018-03-20 NOTE — TELEPHONE ENCOUNTER
Pt called to notify us that she will have medicare  As of Mat and she will only be able to have scripts filled by [employer] mail order. Pt will call us mid May to request we send new scripts to [employer].

## 2018-04-03 ENCOUNTER — TELEPHONE (OUTPATIENT)
Dept: ELECTROPHYSIOLOGY | Facility: CLINIC | Age: 68
End: 2018-04-03

## 2018-04-03 NOTE — TELEPHONE ENCOUNTER
----- Message from Georgie Dawson MA sent at 4/3/2018  9:35 AM CDT -----  Contact: patient called  Please call the patient at [phone] the patient would like to reschedule her appointment on 6- she need morning time. Thank you.      I called patient and moved her appointment to 07/10/18 at 08:40 AM. Patient would rather this time.

## 2018-04-17 RX ORDER — ISOSORBIDE MONONITRATE 30 MG/1
TABLET, EXTENDED RELEASE ORAL
Qty: 30 TABLET | Refills: 6 | Status: SHIPPED | OUTPATIENT
Start: 2018-04-17 | End: 2018-04-20 | Stop reason: SDUPTHER

## 2018-04-17 RX ORDER — DIGOXIN 250 MCG
TABLET ORAL
Qty: 30 TABLET | Refills: 6 | Status: CANCELLED | OUTPATIENT
Start: 2018-04-17

## 2018-04-17 RX ORDER — BUMETANIDE 1 MG/1
TABLET ORAL
Qty: 30 TABLET | Refills: 6 | Status: SHIPPED | OUTPATIENT
Start: 2018-04-17 | End: 2018-04-20 | Stop reason: SDUPTHER

## 2018-04-17 RX ORDER — DIGOXIN 125 MCG
0.25 TABLET ORAL DAILY
Qty: 60 TABLET | Refills: 11 | Status: SHIPPED | OUTPATIENT
Start: 2018-04-17 | End: 2018-08-14

## 2018-04-20 DIAGNOSIS — I42.0 DILATED CARDIOMYOPATHY: Chronic | ICD-10-CM

## 2018-04-20 DIAGNOSIS — E78.5 HYPERLIPIDEMIA, UNSPECIFIED HYPERLIPIDEMIA TYPE: Chronic | ICD-10-CM

## 2018-04-22 RX ORDER — HYDRALAZINE HYDROCHLORIDE 50 MG/1
50 TABLET, FILM COATED ORAL 3 TIMES DAILY
Qty: 270 TABLET | Refills: 3 | Status: SHIPPED | OUTPATIENT
Start: 2018-04-22 | End: 2019-01-01 | Stop reason: SDUPTHER

## 2018-04-22 RX ORDER — METOPROLOL SUCCINATE 200 MG/1
200 TABLET, EXTENDED RELEASE ORAL 2 TIMES DAILY
Qty: 180 TABLET | Refills: 3 | Status: SHIPPED | OUTPATIENT
Start: 2018-04-22 | End: 2019-01-01 | Stop reason: SDUPTHER

## 2018-04-22 RX ORDER — BUMETANIDE 1 MG/1
1 TABLET ORAL DAILY
Qty: 180 TABLET | Refills: 3 | Status: SHIPPED | OUTPATIENT
Start: 2018-04-22 | End: 2019-01-01 | Stop reason: SDUPTHER

## 2018-04-22 RX ORDER — SPIRONOLACTONE 25 MG/1
25 TABLET ORAL DAILY
Qty: 90 TABLET | Refills: 3 | Status: SHIPPED | OUTPATIENT
Start: 2018-04-22 | End: 2019-01-01 | Stop reason: SDUPTHER

## 2018-04-22 RX ORDER — SIMVASTATIN 40 MG/1
40 TABLET, FILM COATED ORAL NIGHTLY
Qty: 90 TABLET | Refills: 3 | Status: SHIPPED | OUTPATIENT
Start: 2018-04-22 | End: 2019-01-01 | Stop reason: SDUPTHER

## 2018-04-22 RX ORDER — ISOSORBIDE MONONITRATE 30 MG/1
30 TABLET, EXTENDED RELEASE ORAL DAILY
Qty: 90 TABLET | Refills: 3 | Status: SHIPPED | OUTPATIENT
Start: 2018-04-22 | End: 2019-01-01 | Stop reason: SDUPTHER

## 2018-07-24 ENCOUNTER — LAB VISIT (OUTPATIENT)
Dept: LAB | Facility: HOSPITAL | Age: 68
End: 2018-07-24
Attending: INTERNAL MEDICINE
Payer: MEDICARE

## 2018-07-24 ENCOUNTER — OFFICE VISIT (OUTPATIENT)
Dept: TRANSPLANT | Facility: CLINIC | Age: 68
End: 2018-07-24
Payer: MEDICARE

## 2018-07-24 VITALS
HEART RATE: 60 BPM | HEIGHT: 65 IN | SYSTOLIC BLOOD PRESSURE: 126 MMHG | WEIGHT: 187.81 LBS | RESPIRATION RATE: 20 BRPM | DIASTOLIC BLOOD PRESSURE: 82 MMHG | BODY MASS INDEX: 31.29 KG/M2

## 2018-07-24 DIAGNOSIS — I42.0 DILATED CARDIOMYOPATHY: Chronic | ICD-10-CM

## 2018-07-24 DIAGNOSIS — I42.0 DILATED CARDIOMYOPATHY: ICD-10-CM

## 2018-07-24 DIAGNOSIS — I10 ESSENTIAL HYPERTENSION: Primary | ICD-10-CM

## 2018-07-24 DIAGNOSIS — I50.22 NYHA CLASS 2 AND ACA/AHA STAGE C CHRONIC SYSTOLIC CONGESTIVE HEART FAILURE: ICD-10-CM

## 2018-07-24 LAB
BNP SERPL-MCNC: 225 PG/ML
DIGOXIN SERPL-MCNC: 0.7 NG/ML
MAGNESIUM SERPL-MCNC: 1.8 MG/DL

## 2018-07-24 PROCEDURE — 99213 OFFICE O/P EST LOW 20 MIN: CPT | Mod: S$GLB,,, | Performed by: INTERNAL MEDICINE

## 2018-07-24 PROCEDURE — 3074F SYST BP LT 130 MM HG: CPT | Mod: CPTII,S$GLB,, | Performed by: INTERNAL MEDICINE

## 2018-07-24 PROCEDURE — 99999 PR PBB SHADOW E&M-EST. PATIENT-LVL III: CPT | Mod: PBBFAC,,, | Performed by: INTERNAL MEDICINE

## 2018-07-24 PROCEDURE — 93000 ELECTROCARDIOGRAM COMPLETE: CPT | Mod: S$GLB,,, | Performed by: INTERNAL MEDICINE

## 2018-07-24 PROCEDURE — 83735 ASSAY OF MAGNESIUM: CPT

## 2018-07-24 PROCEDURE — 3079F DIAST BP 80-89 MM HG: CPT | Mod: CPTII,S$GLB,, | Performed by: INTERNAL MEDICINE

## 2018-07-24 PROCEDURE — 80162 ASSAY OF DIGOXIN TOTAL: CPT

## 2018-07-24 PROCEDURE — 83880 ASSAY OF NATRIURETIC PEPTIDE: CPT

## 2018-07-24 PROCEDURE — 36415 COLL VENOUS BLD VENIPUNCTURE: CPT

## 2018-07-25 ENCOUNTER — TELEPHONE (OUTPATIENT)
Dept: TRANSPLANT | Facility: CLINIC | Age: 68
End: 2018-07-25

## 2018-07-25 NOTE — TELEPHONE ENCOUNTER
----- Message from Georgie Dawson MA sent at 7/25/2018 12:34 PM CDT -----  Contact: patient called  The patient would like to talk to the nurse about her medication Entresto . Please call [phone]. Thank you.

## 2018-07-30 NOTE — PROGRESS NOTES
Subjective: class 2     Patient ID:  Sherice Squires is a 67 y.o. female who presents for eight month follow-up of Congestive Heart Failure    Congestive Heart Failure   Pertinent negatives include no abdominal pain, anorexia, chest pain, chills, congestion, coughing, diaphoresis, fever, headaches, myalgias, nausea, rash, vomiting or weakness.   Mrs. Squires is a 67 year old AAF with NIDCM (LVEF 30 - Nov 2016), PkVO2 16.1 (June 2015)  s/p ICD who continues to be well from a cardiopulmonary standpoint, no complaints, no chest pain, chest pressure, N/V/F/C, lightheadedness, dizziness, PND, orthopnea, LE edema.   Patient with some knee pain, had some weight loss with getting a couple of teeth pulled which she was excited about. Denies any symptoms from her heart failure, is walking on the treadmill her son has at her house without any trouble. Though she is retired she states she is staying active around the house and doing renovations which have kept her busy. Patient denies N/V/F/C, lightheadedness, dizziness, PND, orthopnea, LE edema, abdominal pain, abdominal pressure, chest pain, chest pressure, syncope, pre-syncope.  NYHA FC II.     TTE 01/24/18:   1 - Eccentric LVH with severely depressed left ventricular systolic function (EF 25-30%). LVEDD 6.4cm    2 - Severe left atrial enlargement.     3 - Normal LAP.     4 - Normal central venous pressures.     5 - Moderate mitral regurgitation.     6 - Mild to moderate tricuspid regurgitation.     7 - Pulmonary hypertension. The estimated PA systolic pressure is 44 mmHg.     8 - Normal RV size with low normal right ventricular systolic function .     Review of Systems   Constitution: Negative for chills, decreased appetite, diaphoresis, fever, weakness, malaise/fatigue, weight gain and weight loss.   HENT: Negative for congestion.    Eyes: Negative for blurred vision and visual disturbance.   Cardiovascular: Negative for chest pain, dyspnea on exertion, irregular  "heartbeat, leg swelling, near-syncope, orthopnea, palpitations, paroxysmal nocturnal dyspnea and syncope.   Respiratory: Negative for cough, shortness of breath, sleep disturbances due to breathing, snoring and wheezing.    Hematologic/Lymphatic: Negative for bleeding problem. Does not bruise/bleed easily.   Skin: Negative for poor wound healing and rash.   Musculoskeletal: Positive for joint pain (knee). Negative for arthritis, muscle weakness and myalgias.   Gastrointestinal: Negative for bloating, abdominal pain, anorexia, constipation, diarrhea, hematemesis, hematochezia, jaundice, melena, nausea and vomiting.   Genitourinary: Negative for frequency and urgency.   Neurological: Negative for difficulty with concentration, excessive daytime sleepiness, dizziness, headaches and light-headedness.   Psychiatric/Behavioral: Negative for depression. The patient does not have insomnia and is not nervous/anxious.         Objective:    Physical Exam   Constitutional: She appears well-developed and well-nourished. No distress.   /82 (BP Location: Left arm, Patient Position: Sitting)   Pulse 60   Resp 20   Ht 5' 5" (1.651 m)   Wt 85.2 kg (187 lb 13.3 oz)   BMI 31.26 kg/m²      HENT:   Head: Normocephalic and atraumatic. Head is without abrasion and without contusion.   Right Ear: External ear normal.   Left Ear: External ear normal.   Nose: Nose normal. No epistaxis.   Mouth/Throat: Oropharynx is clear and moist. Mucous membranes are not cyanotic.   Eyes: Conjunctivae and EOM are normal. Pupils are equal, round, and reactive to light.   Neck: Normal range of motion. Neck supple. No tracheal deviation present.   Cardiovascular: Normal rate, regular rhythm, normal heart sounds and normal pulses.  Exam reveals no gallop.    No murmur heard.  Pulmonary/Chest: Effort normal and breath sounds normal. No stridor. No respiratory distress. She has no wheezes.   Abdominal: Soft. Normal appearance, normal aorta and bowel " sounds are normal. She exhibits no distension. There is no tenderness.   Musculoskeletal: She exhibits no edema or tenderness.   Neurological: She is alert. She has normal strength and normal reflexes. She exhibits normal muscle tone.   Skin: Skin is warm. No rash noted. No erythema.   Psychiatric: She has a normal mood and affect. Her speech is normal and behavior is normal. Judgment and thought content normal. Cognition and memory are normal.   Nursing note and vitals reviewed.    Lab Results   Component Value Date     (H) 07/24/2018     02/23/2018    K 3.3 (L) 02/23/2018    MG 1.8 07/24/2018     02/23/2018    CO2 26 02/23/2018    BUN 10 02/23/2018    CREATININE 1.0 02/23/2018     02/23/2018    AST 16 01/24/2018    ALT 13 01/24/2018    ALBUMIN 3.5 01/24/2018    PROT 7.6 01/24/2018    BILITOT 0.5 01/24/2018    CHOL 176 09/10/2014    HDL 63 09/10/2014    LDLCALC 100.2 09/10/2014    TRIG 64 09/10/2014       Magnesium   Date Value Ref Range Status   07/24/2018 1.8 1.6 - 2.6 mg/dL Final       Lab Results   Component Value Date    WBC 7.86 01/24/2018    HGB 11.8 (L) 01/24/2018    HCT 38.3 01/24/2018    MCV 88 01/24/2018     (L) 01/24/2018       BNP   Date Value Ref Range Status   07/24/2018 225 (H) 0 - 99 pg/mL Final     Comment:     Values of less than 100 pg/ml are consistent with non-CHF populations.   01/24/2018 229 (H) 0 - 99 pg/mL Final     Comment:     Values of less than 100 pg/ml are consistent with non-CHF populations.   09/01/2017 71 0 - 99 pg/mL Final     Comment:     Values of less than 100 pg/ml are consistent with non-CHF populations.           Assessment:       1. Essential hypertension    2. Dilated cardiomyopathy    3. NYHA class 2 and LUDIN/AHA stage C chronic systolic congestive heart failure         Plan:       No changes today to her meds, is on highest entresto dose and hydralazine/isordil combination, in addition to other BP meds.  Will get repeat echo in 6 months,  with labs, ekg. Otherwise, no changes, continue to work on exercise, weight loss. Patient is doing quite well. Willing to alternate [facility] and [facility] for clinic visits.  RTC 6 months.   NYHA FC II.

## 2018-08-02 ENCOUNTER — TELEPHONE (OUTPATIENT)
Dept: TRANSPLANT | Facility: CLINIC | Age: 68
End: 2018-08-02

## 2018-08-02 NOTE — TELEPHONE ENCOUNTER
Spoke to Marce at [facility] and Marce says pts Eliquis is $0 for 30day supply, and the Entresto is $161.00 on patients [employer] Insurance and doesn't require a PA. I gave patient the Entresto [facility] number and asked her to call and ask for assistance with her Entresto. Pt verbalized understanding and says she has lots of Entresto left definitely more than 2 months.

## 2018-08-13 ENCOUNTER — CLINICAL SUPPORT (OUTPATIENT)
Dept: ELECTROPHYSIOLOGY | Facility: CLINIC | Age: 68
End: 2018-08-13
Payer: MEDICARE

## 2018-08-13 DIAGNOSIS — Z95.810 ICD (IMPLANTABLE CARDIOVERTER-DEFIBRILLATOR) IN PLACE: ICD-10-CM

## 2018-08-13 DIAGNOSIS — I42.0 DILATED CARDIOMYOPATHY: ICD-10-CM

## 2018-08-13 DIAGNOSIS — Z95.810 ICD (IMPLANTABLE CARDIOVERTER-DEFIBRILLATOR) IN PLACE: Primary | ICD-10-CM

## 2018-08-13 PROCEDURE — 93283 PRGRMG EVAL IMPLANTABLE DFB: CPT | Mod: S$GLB,,, | Performed by: INTERNAL MEDICINE

## 2018-08-14 DIAGNOSIS — I50.22 CHRONIC SYSTOLIC CONGESTIVE HEART FAILURE: Primary | ICD-10-CM

## 2018-08-14 RX ORDER — DIGOXIN 125 MCG
TABLET ORAL
Qty: 30 TABLET | Refills: 11 | Status: ON HOLD | OUTPATIENT
Start: 2018-08-14 | End: 2019-01-01 | Stop reason: HOSPADM

## 2018-08-21 ENCOUNTER — TELEPHONE (OUTPATIENT)
Dept: TRANSPLANT | Facility: CLINIC | Age: 68
End: 2018-08-21

## 2018-08-29 ENCOUNTER — TELEPHONE (OUTPATIENT)
Dept: TRANSPLANT | Facility: CLINIC | Age: 68
End: 2018-08-29

## 2018-08-29 NOTE — TELEPHONE ENCOUNTER
----- Message from Georgie Dawson MA sent at 8/29/2018  8:41 AM CDT -----  Contact: patient called  The patient is returning the nurse phone called from yesterday. Please call [phone]. Thank you.

## 2018-08-29 NOTE — TELEPHONE ENCOUNTER
LVM for pt again today. No need for pt to retunr my call at this time. I will call her once I get a  Response from [employer] on Tier exception request.

## 2018-08-30 ENCOUNTER — CLINICAL SUPPORT (OUTPATIENT)
Dept: CARDIOLOGY | Facility: CLINIC | Age: 68
End: 2018-08-30
Attending: INTERNAL MEDICINE
Payer: MEDICARE

## 2018-08-30 DIAGNOSIS — I10 ESSENTIAL HYPERTENSION: ICD-10-CM

## 2018-08-30 LAB
ESTIMATED PA SYSTOLIC PRESSURE: 26.83
MITRAL VALVE REGURGITATION: ABNORMAL
RETIRED EF AND QEF - SEE NOTES: 25 (ref 55–65)

## 2018-08-30 PROCEDURE — 93306 TTE W/DOPPLER COMPLETE: CPT | Mod: S$GLB,,, | Performed by: INTERNAL MEDICINE

## 2018-12-21 NOTE — TELEPHONE ENCOUNTER
12/21/18 - Following up with patient for BAR Adame LPN. Patient called Wednesday 12/19/18 with complaint of SOB while walking and unable to lie flat when sleeping.  Per RICHARD Soria M.D./BAR Adame LPN, patient was instructed to increase Bumex to 1 mg twice a day x 3 days wieth BMP Friday.  Received lab results BUN - 20, Cr - 1.3, K+ 4.1, Na - 142.  Called and spoke with patient who stated she feels much better now.  Patient states she is able to lie flat and is no longer short of breath while walking.  Asked patient what her weight was today and patient stated she does not really weigh herself.  Discussed/Reveiwed with RICHARD Soria M.D.  VORB: RICHARD Soria M.D./LONNY Sanches RN: Decrease Bumex to 1 mg daily.  Instruct patient if she becomes symptomatic with SOB and unable to lie flat again may increase Bumex to 1 mg twice a day, but patient must notify our office first.  Above instructions given to patient and patient verbalized understanding to all instructions given.

## 2019-01-01 ENCOUNTER — TELEPHONE (OUTPATIENT)
Dept: CARDIOLOGY | Facility: CLINIC | Age: 69
End: 2019-01-01

## 2019-01-01 ENCOUNTER — NURSE TRIAGE (OUTPATIENT)
Dept: ADMINISTRATIVE | Facility: CLINIC | Age: 69
End: 2019-01-01

## 2019-01-01 ENCOUNTER — DOCUMENTATION ONLY (OUTPATIENT)
Dept: CARDIOLOGY | Facility: HOSPITAL | Age: 69
End: 2019-01-01

## 2019-01-01 ENCOUNTER — TELEPHONE (OUTPATIENT)
Dept: ELECTROPHYSIOLOGY | Facility: CLINIC | Age: 69
End: 2019-01-01

## 2019-01-01 ENCOUNTER — LAB VISIT (OUTPATIENT)
Dept: LAB | Facility: HOSPITAL | Age: 69
End: 2019-01-01
Attending: INTERNAL MEDICINE
Payer: MEDICARE

## 2019-01-01 ENCOUNTER — TELEPHONE (OUTPATIENT)
Dept: CARDIOTHORACIC SURGERY | Facility: CLINIC | Age: 69
End: 2019-01-01

## 2019-01-01 ENCOUNTER — TELEPHONE (OUTPATIENT)
Dept: TRANSPLANT | Facility: CLINIC | Age: 69
End: 2019-01-01

## 2019-01-01 ENCOUNTER — HOSPITAL ENCOUNTER (OUTPATIENT)
Dept: CARDIOLOGY | Facility: CLINIC | Age: 69
Discharge: HOME | End: 2019-03-01
Attending: INTERNAL MEDICINE
Payer: MEDICARE

## 2019-01-01 ENCOUNTER — OFFICE VISIT (OUTPATIENT)
Dept: ORTHOPEDICS | Facility: CLINIC | Age: 69
End: 2019-01-01
Payer: MEDICARE

## 2019-01-01 ENCOUNTER — HOSPITAL ENCOUNTER (OUTPATIENT)
Dept: RADIOLOGY | Facility: HOSPITAL | Age: 69
Discharge: HOME | End: 2019-04-16
Attending: FAMILY MEDICINE
Payer: MEDICARE

## 2019-01-01 ENCOUNTER — HOSPITAL ENCOUNTER (OUTPATIENT)
Dept: CARDIOLOGY | Facility: CLINIC | Age: 69
Discharge: HOME | End: 2019-03-01
Payer: MEDICARE

## 2019-01-01 ENCOUNTER — OFFICE VISIT (OUTPATIENT)
Dept: TRANSPLANT | Facility: CLINIC | Age: 69
End: 2019-01-01
Payer: MEDICARE

## 2019-01-01 ENCOUNTER — HOSPITAL ENCOUNTER (OUTPATIENT)
Facility: HOSPITAL | Age: 69
Discharge: HOME | End: 2019-03-08
Attending: INTERNAL MEDICINE | Admitting: INTERNAL MEDICINE
Payer: MEDICARE

## 2019-01-01 ENCOUNTER — HOSPITAL ENCOUNTER (EMERGENCY)
Facility: HOSPITAL | Age: 69
Discharge: OTHER INSTITUTION | End: 2019-08-19
Attending: EMERGENCY MEDICINE
Payer: MEDICARE

## 2019-01-01 ENCOUNTER — OFFICE VISIT (OUTPATIENT)
Dept: ELECTROPHYSIOLOGY | Facility: CLINIC | Age: 69
End: 2019-01-01
Payer: MEDICARE

## 2019-01-01 ENCOUNTER — TELEPHONE (OUTPATIENT)
Dept: ORTHOPEDICS | Facility: CLINIC | Age: 69
End: 2019-01-01

## 2019-01-01 ENCOUNTER — TELEPHONE (OUTPATIENT)
Dept: LAB | Facility: HOSPITAL | Age: 69
End: 2019-01-01

## 2019-01-01 ENCOUNTER — ANESTHESIA (OUTPATIENT)
Dept: MEDSURG UNIT | Facility: HOSPITAL | Age: 69
DRG: 226 | End: 2019-01-01
Payer: MEDICARE

## 2019-01-01 ENCOUNTER — ANESTHESIA EVENT (OUTPATIENT)
Dept: MEDSURG UNIT | Facility: HOSPITAL | Age: 69
DRG: 226 | End: 2019-01-01
Payer: MEDICARE

## 2019-01-01 ENCOUNTER — TELEPHONE (OUTPATIENT)
Dept: CARDIOLOGY | Facility: HOSPITAL | Age: 69
End: 2019-01-01

## 2019-01-01 ENCOUNTER — CLINICAL SUPPORT (OUTPATIENT)
Dept: CARDIOLOGY | Facility: HOSPITAL | Age: 69
DRG: 226 | End: 2019-01-01
Attending: INTERNAL MEDICINE
Payer: MEDICARE

## 2019-01-01 ENCOUNTER — HOSPITAL ENCOUNTER (EMERGENCY)
Facility: HOSPITAL | Age: 69
Discharge: HOME | End: 2019-06-28
Attending: EMERGENCY MEDICINE
Payer: MEDICARE

## 2019-01-01 ENCOUNTER — CLINICAL SUPPORT (OUTPATIENT)
Dept: CARDIOLOGY | Facility: HOSPITAL | Age: 69
End: 2019-01-01
Attending: INTERNAL MEDICINE
Payer: MEDICARE

## 2019-01-01 ENCOUNTER — HOSPITAL ENCOUNTER (INPATIENT)
Facility: HOSPITAL | Age: 69
LOS: 24 days | Discharge: NURSING, REHAB OR LONG-TERM CARE | DRG: 226 | End: 2019-09-13
Attending: INTERNAL MEDICINE | Admitting: INTERNAL MEDICINE
Payer: MEDICARE

## 2019-01-01 ENCOUNTER — CLINICAL SUPPORT (OUTPATIENT)
Dept: CARDIOLOGY | Facility: CLINIC | Age: 69
End: 2019-01-01
Attending: NURSE PRACTITIONER
Payer: MEDICARE

## 2019-01-01 ENCOUNTER — OFFICE VISIT (OUTPATIENT)
Dept: CARDIOLOGY | Facility: CLINIC | Age: 69
End: 2019-01-01
Payer: MEDICARE

## 2019-01-01 ENCOUNTER — HOSPITAL ENCOUNTER (OUTPATIENT)
Dept: RADIOLOGY | Facility: HOSPITAL | Age: 69
Discharge: HOME | End: 2019-07-18
Attending: NURSE PRACTITIONER
Payer: MEDICARE

## 2019-01-01 ENCOUNTER — ANESTHESIA (OUTPATIENT)
Dept: MEDSURG UNIT | Facility: HOSPITAL | Age: 69
End: 2019-01-01
Payer: MEDICARE

## 2019-01-01 ENCOUNTER — DOCUMENTATION ONLY (OUTPATIENT)
Dept: ELECTROPHYSIOLOGY | Facility: CLINIC | Age: 69
End: 2019-01-01

## 2019-01-01 ENCOUNTER — LAB VISIT (OUTPATIENT)
Dept: LAB | Facility: HOSPITAL | Age: 69
End: 2019-01-01
Attending: NURSE PRACTITIONER
Payer: MEDICARE

## 2019-01-01 ENCOUNTER — ANESTHESIA EVENT (OUTPATIENT)
Dept: MEDSURG UNIT | Facility: HOSPITAL | Age: 69
End: 2019-01-01
Payer: MEDICARE

## 2019-01-01 VITALS
SYSTOLIC BLOOD PRESSURE: 100 MMHG | OXYGEN SATURATION: 98 % | HEIGHT: 65 IN | WEIGHT: 202 LBS | HEART RATE: 70 BPM | DIASTOLIC BLOOD PRESSURE: 62 MMHG | BODY MASS INDEX: 33.66 KG/M2 | HEART RATE: 85 BPM | DIASTOLIC BLOOD PRESSURE: 78 MMHG | BODY MASS INDEX: 34.16 KG/M2 | HEIGHT: 65 IN | SYSTOLIC BLOOD PRESSURE: 100 MMHG | WEIGHT: 205 LBS

## 2019-01-01 VITALS
SYSTOLIC BLOOD PRESSURE: 116 MMHG | DIASTOLIC BLOOD PRESSURE: 76 MMHG | WEIGHT: 184.31 LBS | HEART RATE: 98 BPM | HEIGHT: 65 IN | BODY MASS INDEX: 30.71 KG/M2

## 2019-01-01 VITALS
BODY MASS INDEX: 27.25 KG/M2 | SYSTOLIC BLOOD PRESSURE: 114 MMHG | WEIGHT: 163.56 LBS | HEIGHT: 65 IN | RESPIRATION RATE: 18 BRPM | TEMPERATURE: 98 F | DIASTOLIC BLOOD PRESSURE: 70 MMHG | HEART RATE: 70 BPM | OXYGEN SATURATION: 96 %

## 2019-01-01 VITALS
BODY MASS INDEX: 29.12 KG/M2 | DIASTOLIC BLOOD PRESSURE: 68 MMHG | HEIGHT: 66 IN | WEIGHT: 181.19 LBS | HEART RATE: 68 BPM | SYSTOLIC BLOOD PRESSURE: 94 MMHG

## 2019-01-01 VITALS
SYSTOLIC BLOOD PRESSURE: 100 MMHG | DIASTOLIC BLOOD PRESSURE: 64 MMHG | DIASTOLIC BLOOD PRESSURE: 62 MMHG | HEIGHT: 65 IN | HEART RATE: 63 BPM | WEIGHT: 194 LBS | WEIGHT: 195.13 LBS | HEIGHT: 65 IN | BODY MASS INDEX: 32.32 KG/M2 | HEART RATE: 98 BPM | OXYGEN SATURATION: 97 % | BODY MASS INDEX: 32.51 KG/M2 | SYSTOLIC BLOOD PRESSURE: 102 MMHG

## 2019-01-01 VITALS
RESPIRATION RATE: 27 BRPM | OXYGEN SATURATION: 96 % | HEART RATE: 81 BPM | WEIGHT: 186.19 LBS | SYSTOLIC BLOOD PRESSURE: 115 MMHG | DIASTOLIC BLOOD PRESSURE: 58 MMHG | BODY MASS INDEX: 30.98 KG/M2 | TEMPERATURE: 99 F

## 2019-01-01 VITALS
DIASTOLIC BLOOD PRESSURE: 70 MMHG | SYSTOLIC BLOOD PRESSURE: 108 MMHG | HEART RATE: 64 BPM | BODY MASS INDEX: 30.89 KG/M2 | HEIGHT: 65 IN | WEIGHT: 185.44 LBS

## 2019-01-01 VITALS
RESPIRATION RATE: 16 BRPM | BODY MASS INDEX: 31.66 KG/M2 | WEIGHT: 197 LBS | SYSTOLIC BLOOD PRESSURE: 99 MMHG | HEIGHT: 66 IN | DIASTOLIC BLOOD PRESSURE: 64 MMHG | HEART RATE: 60 BPM | OXYGEN SATURATION: 96 % | TEMPERATURE: 97 F

## 2019-01-01 VITALS
HEART RATE: 71 BPM | BODY MASS INDEX: 34.16 KG/M2 | WEIGHT: 205 LBS | SYSTOLIC BLOOD PRESSURE: 104 MMHG | HEIGHT: 65 IN | DIASTOLIC BLOOD PRESSURE: 62 MMHG

## 2019-01-01 VITALS
HEIGHT: 65 IN | HEART RATE: 80 BPM | SYSTOLIC BLOOD PRESSURE: 112 MMHG | BODY MASS INDEX: 34.16 KG/M2 | DIASTOLIC BLOOD PRESSURE: 74 MMHG | WEIGHT: 205 LBS

## 2019-01-01 VITALS
WEIGHT: 176 LBS | BODY MASS INDEX: 29.32 KG/M2 | TEMPERATURE: 98 F | SYSTOLIC BLOOD PRESSURE: 104 MMHG | HEART RATE: 109 BPM | RESPIRATION RATE: 20 BRPM | OXYGEN SATURATION: 100 % | DIASTOLIC BLOOD PRESSURE: 54 MMHG | HEIGHT: 65 IN

## 2019-01-01 VITALS
SYSTOLIC BLOOD PRESSURE: 100 MMHG | HEART RATE: 60 BPM | BODY MASS INDEX: 33.76 KG/M2 | WEIGHT: 202.63 LBS | DIASTOLIC BLOOD PRESSURE: 62 MMHG | HEIGHT: 65 IN

## 2019-01-01 VITALS
HEART RATE: 70 BPM | SYSTOLIC BLOOD PRESSURE: 106 MMHG | BODY MASS INDEX: 30.82 KG/M2 | HEIGHT: 65 IN | DIASTOLIC BLOOD PRESSURE: 76 MMHG | WEIGHT: 185 LBS

## 2019-01-01 DIAGNOSIS — I48.91 ATRIAL FIBRILLATION WITH RVR: ICD-10-CM

## 2019-01-01 DIAGNOSIS — M17.11 PRIMARY LOCALIZED OSTEOARTHRITIS OF RIGHT KNEE: ICD-10-CM

## 2019-01-01 DIAGNOSIS — M17.11 PRIMARY OSTEOARTHRITIS OF RIGHT KNEE: ICD-10-CM

## 2019-01-01 DIAGNOSIS — Z95.810 AICD (AUTOMATIC CARDIOVERTER/DEFIBRILLATOR) PRESENT: ICD-10-CM

## 2019-01-01 DIAGNOSIS — I50.9 CONGESTIVE HEART FAILURE, NYHA CLASS 3 AND ACC/AHA STAGE C: ICD-10-CM

## 2019-01-01 DIAGNOSIS — E87.6 HYPOKALEMIA: ICD-10-CM

## 2019-01-01 DIAGNOSIS — I10 ESSENTIAL HYPERTENSION: Chronic | ICD-10-CM

## 2019-01-01 DIAGNOSIS — I42.0 DILATED CARDIOMYOPATHY: Chronic | ICD-10-CM

## 2019-01-01 DIAGNOSIS — I42.0 DILATED CARDIOMYOPATHY: Primary | Chronic | ICD-10-CM

## 2019-01-01 DIAGNOSIS — I42.0 DILATED CARDIOMYOPATHY: ICD-10-CM

## 2019-01-01 DIAGNOSIS — Z95.810 AICD (AUTOMATIC CARDIOVERTER/DEFIBRILLATOR) PRESENT: Primary | ICD-10-CM

## 2019-01-01 DIAGNOSIS — I10 ESSENTIAL HYPERTENSION: ICD-10-CM

## 2019-01-01 DIAGNOSIS — R07.9 CHEST PAIN: ICD-10-CM

## 2019-01-01 DIAGNOSIS — I50.22 CHRONIC SYSTOLIC CONGESTIVE HEART FAILURE: Primary | ICD-10-CM

## 2019-01-01 DIAGNOSIS — E78.5 HYPERLIPIDEMIA, UNSPECIFIED HYPERLIPIDEMIA TYPE: Chronic | ICD-10-CM

## 2019-01-01 DIAGNOSIS — I50.9 CONGESTIVE HEART FAILURE, NYHA CLASS 3 AND ACC/AHA STAGE C: Primary | ICD-10-CM

## 2019-01-01 DIAGNOSIS — I50.22 CHRONIC SYSTOLIC HEART FAILURE: ICD-10-CM

## 2019-01-01 DIAGNOSIS — M17.12 PRIMARY OSTEOARTHRITIS OF LEFT KNEE: Primary | ICD-10-CM

## 2019-01-01 DIAGNOSIS — N18.9 ACUTE RENAL FAILURE SUPERIMPOSED ON CHRONIC KIDNEY DISEASE, UNSPECIFIED CKD STAGE, UNSPECIFIED ACUTE RENAL FAILURE TYPE: ICD-10-CM

## 2019-01-01 DIAGNOSIS — M18.12 OSTEOARTHRITIS OF LEFT THUMB: ICD-10-CM

## 2019-01-01 DIAGNOSIS — Z95.810 AUTOMATIC IMPLANTABLE CARDIOVERTER-DEFIBRILLATOR IN SITU: ICD-10-CM

## 2019-01-01 DIAGNOSIS — I50.22 NYHA CLASS 2 AND ACA/AHA STAGE C CHRONIC SYSTOLIC CONGESTIVE HEART FAILURE: ICD-10-CM

## 2019-01-01 DIAGNOSIS — I50.43 ACUTE ON CHRONIC COMBINED SYSTOLIC AND DIASTOLIC HEART FAILURE: Primary | ICD-10-CM

## 2019-01-01 DIAGNOSIS — I50.22 NYHA CLASS 2 AND ACA/AHA STAGE C CHRONIC SYSTOLIC CONGESTIVE HEART FAILURE: Primary | ICD-10-CM

## 2019-01-01 DIAGNOSIS — R52 PAIN: Primary | ICD-10-CM

## 2019-01-01 DIAGNOSIS — R52 PAIN: ICD-10-CM

## 2019-01-01 DIAGNOSIS — R06.09 DOE (DYSPNEA ON EXERTION): ICD-10-CM

## 2019-01-01 DIAGNOSIS — I48.91 ATRIAL FIBRILLATION: Primary | ICD-10-CM

## 2019-01-01 DIAGNOSIS — I50.9 ACUTE DECOMPENSATED HEART FAILURE: Primary | ICD-10-CM

## 2019-01-01 DIAGNOSIS — E78.2 MIXED HYPERLIPIDEMIA: Chronic | ICD-10-CM

## 2019-01-01 DIAGNOSIS — I48.91 ATRIAL FIBRILLATION, UNSPECIFIED TYPE: Primary | ICD-10-CM

## 2019-01-01 DIAGNOSIS — K59.09 OTHER CONSTIPATION: ICD-10-CM

## 2019-01-01 DIAGNOSIS — I48.91 ATRIAL FIBRILLATION: ICD-10-CM

## 2019-01-01 DIAGNOSIS — R57.0 CARDIOGENIC SHOCK: ICD-10-CM

## 2019-01-01 DIAGNOSIS — I48.19 PERSISTENT ATRIAL FIBRILLATION: ICD-10-CM

## 2019-01-01 DIAGNOSIS — I42.8 NON-ISCHEMIC CARDIOMYOPATHY: ICD-10-CM

## 2019-01-01 DIAGNOSIS — I48.0 PAF (PAROXYSMAL ATRIAL FIBRILLATION): ICD-10-CM

## 2019-01-01 DIAGNOSIS — I48.91 A-FIB: ICD-10-CM

## 2019-01-01 DIAGNOSIS — I49.9 ARRHYTHMIA: ICD-10-CM

## 2019-01-01 DIAGNOSIS — N39.0 SEPSIS DUE TO GRAM-NEGATIVE URINARY TRACT INFECTION: ICD-10-CM

## 2019-01-01 DIAGNOSIS — Z45.02 ICD (IMPLANTABLE CARDIOVERTER-DEFIBRILLATOR) BATTERY DEPLETION: ICD-10-CM

## 2019-01-01 DIAGNOSIS — I34.81 MITRAL ANNULAR CALCIFICATION: ICD-10-CM

## 2019-01-01 DIAGNOSIS — R07.9 CHEST PAIN, UNSPECIFIED TYPE: ICD-10-CM

## 2019-01-01 DIAGNOSIS — I50.9 CHF (CONGESTIVE HEART FAILURE): ICD-10-CM

## 2019-01-01 DIAGNOSIS — I50.22 CHRONIC SYSTOLIC HEART FAILURE: Primary | ICD-10-CM

## 2019-01-01 DIAGNOSIS — I50.22 CHRONIC SYSTOLIC CONGESTIVE HEART FAILURE: ICD-10-CM

## 2019-01-01 DIAGNOSIS — I50.9 HF (HEART FAILURE): ICD-10-CM

## 2019-01-01 DIAGNOSIS — D69.6 THROMBOCYTOPENIA, UNSPECIFIED: ICD-10-CM

## 2019-01-01 DIAGNOSIS — N17.9 ACUTE RENAL FAILURE SUPERIMPOSED ON CHRONIC KIDNEY DISEASE, UNSPECIFIED CKD STAGE, UNSPECIFIED ACUTE RENAL FAILURE TYPE: ICD-10-CM

## 2019-01-01 DIAGNOSIS — Z74.09 IMPAIRED FUNCTIONAL MOBILITY AND ENDURANCE: ICD-10-CM

## 2019-01-01 DIAGNOSIS — G89.29 CHRONIC PAIN OF LEFT KNEE: ICD-10-CM

## 2019-01-01 DIAGNOSIS — I50.9 CHF (CONGESTIVE HEART FAILURE): Primary | ICD-10-CM

## 2019-01-01 DIAGNOSIS — Z96.651 H/O TOTAL KNEE REPLACEMENT, RIGHT: ICD-10-CM

## 2019-01-01 DIAGNOSIS — A41.50 SEPSIS DUE TO GRAM-NEGATIVE URINARY TRACT INFECTION: ICD-10-CM

## 2019-01-01 DIAGNOSIS — N17.0 ACUTE RENAL FAILURE WITH ACUTE TUBULAR NECROSIS SUPERIMPOSED ON STAGE 3 CHRONIC KIDNEY DISEASE: ICD-10-CM

## 2019-01-01 DIAGNOSIS — I48.0 PAROXYSMAL ATRIAL FIBRILLATION: ICD-10-CM

## 2019-01-01 DIAGNOSIS — I48.91 ATRIAL FIBRILLATION, UNSPECIFIED TYPE: ICD-10-CM

## 2019-01-01 DIAGNOSIS — N18.30 ACUTE RENAL FAILURE WITH ACUTE TUBULAR NECROSIS SUPERIMPOSED ON STAGE 3 CHRONIC KIDNEY DISEASE: ICD-10-CM

## 2019-01-01 DIAGNOSIS — M25.562 CHRONIC PAIN OF LEFT KNEE: ICD-10-CM

## 2019-01-01 DIAGNOSIS — R94.31 QT PROLONGATION: ICD-10-CM

## 2019-01-01 DIAGNOSIS — I48.0 PAF (PAROXYSMAL ATRIAL FIBRILLATION): Primary | ICD-10-CM

## 2019-01-01 DIAGNOSIS — E78.5 HYPERLIPIDEMIA, UNSPECIFIED HYPERLIPIDEMIA TYPE: ICD-10-CM

## 2019-01-01 DIAGNOSIS — I42.9 CARDIOMYOPATHY, UNSPECIFIED TYPE: ICD-10-CM

## 2019-01-01 DIAGNOSIS — I49.9 CARDIAC RHYTHM DISORDER OR DISTURBANCE OR CHANGE: ICD-10-CM

## 2019-01-01 LAB
ABO + RH BLD: NORMAL
ABO + RH BLD: NORMAL
ALBUMIN SERPL BCP-MCNC: 2.1 G/DL (ref 3.5–5.2)
ALBUMIN SERPL BCP-MCNC: 2.1 G/DL (ref 3.5–5.2)
ALBUMIN SERPL BCP-MCNC: 2.2 G/DL (ref 3.5–5.2)
ALBUMIN SERPL BCP-MCNC: 2.3 G/DL (ref 3.5–5.2)
ALBUMIN SERPL BCP-MCNC: 2.4 G/DL (ref 3.5–5.2)
ALBUMIN SERPL BCP-MCNC: 2.5 G/DL (ref 3.5–5.2)
ALBUMIN SERPL BCP-MCNC: 2.6 G/DL (ref 3.5–5.2)
ALBUMIN SERPL BCP-MCNC: 2.7 G/DL (ref 3.5–5.2)
ALBUMIN SERPL BCP-MCNC: 2.8 G/DL (ref 3.5–5.2)
ALBUMIN SERPL BCP-MCNC: 2.9 G/DL (ref 3.5–5.2)
ALBUMIN SERPL BCP-MCNC: 3.1 G/DL (ref 3.5–5.2)
ALBUMIN SERPL BCP-MCNC: 3.5 G/DL
ALBUMIN SERPL BCP-MCNC: 3.9 G/DL
ALLENS TEST: ABNORMAL
ALP SERPL-CCNC: 100 U/L (ref 55–135)
ALP SERPL-CCNC: 101 U/L (ref 55–135)
ALP SERPL-CCNC: 104 U/L (ref 55–135)
ALP SERPL-CCNC: 106 U/L (ref 55–135)
ALP SERPL-CCNC: 106 U/L (ref 55–135)
ALP SERPL-CCNC: 108 U/L (ref 55–135)
ALP SERPL-CCNC: 109 U/L (ref 55–135)
ALP SERPL-CCNC: 110 U/L (ref 55–135)
ALP SERPL-CCNC: 110 U/L (ref 55–135)
ALP SERPL-CCNC: 111 U/L (ref 55–135)
ALP SERPL-CCNC: 113 U/L (ref 55–135)
ALP SERPL-CCNC: 113 U/L (ref 55–135)
ALP SERPL-CCNC: 114 U/L (ref 55–135)
ALP SERPL-CCNC: 117 U/L (ref 55–135)
ALP SERPL-CCNC: 117 U/L (ref 55–135)
ALP SERPL-CCNC: 119 U/L (ref 55–135)
ALP SERPL-CCNC: 119 U/L (ref 55–135)
ALP SERPL-CCNC: 120 U/L (ref 55–135)
ALP SERPL-CCNC: 124 U/L (ref 55–135)
ALP SERPL-CCNC: 125 U/L (ref 55–135)
ALP SERPL-CCNC: 125 U/L (ref 55–135)
ALP SERPL-CCNC: 129 U/L (ref 55–135)
ALP SERPL-CCNC: 136 U/L (ref 55–135)
ALP SERPL-CCNC: 139 U/L (ref 55–135)
ALP SERPL-CCNC: 150 U/L (ref 55–135)
ALP SERPL-CCNC: 154 U/L (ref 55–135)
ALP SERPL-CCNC: 155 U/L (ref 55–135)
ALP SERPL-CCNC: 159 U/L (ref 55–135)
ALP SERPL-CCNC: 168 U/L (ref 55–135)
ALP SERPL-CCNC: 169 U/L (ref 55–135)
ALP SERPL-CCNC: 181 U/L (ref 55–135)
ALP SERPL-CCNC: 186 U/L (ref 55–135)
ALP SERPL-CCNC: 204 U/L (ref 55–135)
ALP SERPL-CCNC: 212 U/L (ref 55–135)
ALP SERPL-CCNC: 225 U/L (ref 55–135)
ALP SERPL-CCNC: 242 U/L (ref 55–135)
ALP SERPL-CCNC: 242 U/L (ref 55–135)
ALP SERPL-CCNC: 245 U/L (ref 55–135)
ALP SERPL-CCNC: 251 U/L (ref 55–135)
ALP SERPL-CCNC: 262 U/L (ref 55–135)
ALP SERPL-CCNC: 265 U/L (ref 55–135)
ALP SERPL-CCNC: 265 U/L (ref 55–135)
ALP SERPL-CCNC: 274 U/L (ref 55–135)
ALP SERPL-CCNC: 277 U/L (ref 55–135)
ALP SERPL-CCNC: 280 U/L (ref 55–135)
ALP SERPL-CCNC: 297 U/L (ref 55–135)
ALP SERPL-CCNC: 308 U/L (ref 55–135)
ALP SERPL-CCNC: 312 U/L (ref 55–135)
ALP SERPL-CCNC: 314 U/L (ref 55–135)
ALP SERPL-CCNC: 64 U/L
ALP SERPL-CCNC: 66 U/L
ALP SERPL-CCNC: 83 U/L (ref 55–135)
ALP SERPL-CCNC: 88 U/L (ref 55–135)
ALT SERPL W/O P-5'-P-CCNC: 11 U/L
ALT SERPL W/O P-5'-P-CCNC: 13 U/L (ref 10–44)
ALT SERPL W/O P-5'-P-CCNC: 14 U/L
ALT SERPL W/O P-5'-P-CCNC: 14 U/L (ref 10–44)
ALT SERPL W/O P-5'-P-CCNC: 15 U/L (ref 10–44)
ALT SERPL W/O P-5'-P-CCNC: 16 U/L (ref 10–44)
ALT SERPL W/O P-5'-P-CCNC: 17 U/L (ref 10–44)
ALT SERPL W/O P-5'-P-CCNC: 17 U/L (ref 10–44)
ALT SERPL W/O P-5'-P-CCNC: 18 U/L (ref 10–44)
ALT SERPL W/O P-5'-P-CCNC: 19 U/L (ref 10–44)
ALT SERPL W/O P-5'-P-CCNC: 20 U/L (ref 10–44)
ALT SERPL W/O P-5'-P-CCNC: 20 U/L (ref 10–44)
ALT SERPL W/O P-5'-P-CCNC: 21 U/L (ref 10–44)
ALT SERPL W/O P-5'-P-CCNC: 22 U/L (ref 10–44)
ALT SERPL W/O P-5'-P-CCNC: 23 U/L (ref 10–44)
ALT SERPL W/O P-5'-P-CCNC: 24 U/L (ref 10–44)
ALT SERPL W/O P-5'-P-CCNC: 25 U/L (ref 10–44)
ALT SERPL W/O P-5'-P-CCNC: 26 U/L (ref 10–44)
ALT SERPL W/O P-5'-P-CCNC: 27 U/L (ref 10–44)
ALT SERPL W/O P-5'-P-CCNC: 28 U/L (ref 10–44)
ALT SERPL W/O P-5'-P-CCNC: 28 U/L (ref 10–44)
ALT SERPL W/O P-5'-P-CCNC: 30 U/L (ref 10–44)
ALT SERPL W/O P-5'-P-CCNC: 31 U/L (ref 10–44)
ALT SERPL W/O P-5'-P-CCNC: 31 U/L (ref 10–44)
ALT SERPL W/O P-5'-P-CCNC: 32 U/L (ref 10–44)
ALT SERPL W/O P-5'-P-CCNC: 33 U/L (ref 10–44)
ALT SERPL W/O P-5'-P-CCNC: 36 U/L (ref 10–44)
ANION GAP SERPL CALC-SCNC: 10 MMOL/L (ref 8–16)
ANION GAP SERPL CALC-SCNC: 11 MMOL/L (ref 8–16)
ANION GAP SERPL CALC-SCNC: 12 MMOL/L (ref 8–16)
ANION GAP SERPL CALC-SCNC: 13 MMOL/L
ANION GAP SERPL CALC-SCNC: 13 MMOL/L (ref 8–16)
ANION GAP SERPL CALC-SCNC: 14 MMOL/L (ref 8–16)
ANION GAP SERPL CALC-SCNC: 15 MMOL/L (ref 8–16)
ANION GAP SERPL CALC-SCNC: 16 MMOL/L (ref 8–16)
ANION GAP SERPL CALC-SCNC: 17 MMOL/L (ref 8–16)
ANION GAP SERPL CALC-SCNC: 17 MMOL/L (ref 8–16)
ANION GAP SERPL CALC-SCNC: 18 MMOL/L (ref 8–16)
ANION GAP SERPL CALC-SCNC: 18 MMOL/L (ref 8–16)
ANION GAP SERPL CALC-SCNC: 20 MMOL/L (ref 8–16)
ANION GAP SERPL CALC-SCNC: 23 MMOL/L (ref 8–16)
ANION GAP SERPL CALC-SCNC: 8 MMOL/L
ANION GAP SERPL CALC-SCNC: 8 MMOL/L (ref 8–16)
ANION GAP SERPL CALC-SCNC: 9 MMOL/L (ref 8–16)
ANISOCYTOSIS BLD QL SMEAR: SLIGHT
AORTIC ROOT ANNULUS: 2 CM
AORTIC VALVE REGURGITATION: ABNORMAL
APTT BLDCRRT: 26.4 SEC (ref 21–32)
APTT BLDCRRT: 26.5 SEC (ref 21–32)
APTT BLDCRRT: 32.4 SEC (ref 21–32)
APTT BLDCRRT: 37.5 SEC (ref 21–32)
APTT BLDCRRT: 38.2 SEC (ref 21–32)
APTT BLDCRRT: 42.2 SEC (ref 21–32)
APTT BLDCRRT: 43 SEC (ref 21–32)
APTT BLDCRRT: 43.9 SEC (ref 21–32)
APTT BLDCRRT: 45.7 SEC (ref 21–32)
APTT BLDCRRT: 46.3 SEC (ref 21–32)
APTT BLDCRRT: 47 SEC (ref 21–32)
APTT BLDCRRT: 47.8 SEC (ref 21–32)
APTT BLDCRRT: 48.1 SEC (ref 21–32)
APTT BLDCRRT: 49.9 SEC (ref 21–32)
APTT BLDCRRT: 49.9 SEC (ref 21–32)
APTT BLDCRRT: 53.8 SEC (ref 21–32)
APTT BLDCRRT: 62.5 SEC (ref 21–32)
APTT BLDCRRT: 67.2 SEC (ref 21–32)
APTT BLDCRRT: 74.9 SEC (ref 21–32)
ASCENDING AORTA: 3.07 CM
ASCENDING AORTA: 3.1 CM
ASCENDING AORTA: 3.29 CM
AST SERPL-CCNC: 12 U/L
AST SERPL-CCNC: 14 U/L (ref 10–40)
AST SERPL-CCNC: 14 U/L (ref 10–40)
AST SERPL-CCNC: 15 U/L
AST SERPL-CCNC: 15 U/L (ref 10–40)
AST SERPL-CCNC: 17 U/L (ref 10–40)
AST SERPL-CCNC: 18 U/L (ref 10–40)
AST SERPL-CCNC: 19 U/L (ref 10–40)
AST SERPL-CCNC: 20 U/L (ref 10–40)
AST SERPL-CCNC: 21 U/L (ref 10–40)
AST SERPL-CCNC: 21 U/L (ref 10–40)
AST SERPL-CCNC: 23 U/L (ref 10–40)
AST SERPL-CCNC: 24 U/L (ref 10–40)
AST SERPL-CCNC: 25 U/L (ref 10–40)
AST SERPL-CCNC: 26 U/L (ref 10–40)
AST SERPL-CCNC: 27 U/L (ref 10–40)
AST SERPL-CCNC: 27 U/L (ref 10–40)
AST SERPL-CCNC: 28 U/L (ref 10–40)
AST SERPL-CCNC: 29 U/L (ref 10–40)
AST SERPL-CCNC: 30 U/L (ref 10–40)
AST SERPL-CCNC: 30 U/L (ref 10–40)
AST SERPL-CCNC: 31 U/L (ref 10–40)
AST SERPL-CCNC: 31 U/L (ref 10–40)
AST SERPL-CCNC: 32 U/L (ref 10–40)
AST SERPL-CCNC: 32 U/L (ref 10–40)
AST SERPL-CCNC: 34 U/L (ref 10–40)
AST SERPL-CCNC: 35 U/L (ref 10–40)
AST SERPL-CCNC: 35 U/L (ref 10–40)
AST SERPL-CCNC: 38 U/L (ref 10–40)
AST SERPL-CCNC: 40 U/L (ref 10–40)
AST SERPL-CCNC: 42 U/L (ref 10–40)
AST SERPL-CCNC: 50 U/L (ref 10–40)
AV INDEX (PROSTH): 0.49
AV INDEX (PROSTH): 0.52
AV MEAN GRADIENT: 3.68 MMHG
AV MEAN GRADIENT: 4 MMHG
AV PEAK GRADIENT: 7.29 MMHG
AV PEAK GRADIENT: 8 MMHG
AV VALVE AREA: 1.36 CM2
AV VALVE AREA: 1.78 CM2
AV VELOCITY RATIO: 0.52
AV VELOCITY RATIO: 0.55
BACTERIA #/AREA URNS AUTO: ABNORMAL /HPF
BACTERIA #/AREA URNS HPF: ABNORMAL /HPF
BACTERIA UR CULT: ABNORMAL
BACTERIA UR CULT: ABNORMAL
BASOPHILS # BLD AUTO: 0.01 K/UL (ref 0–0.2)
BASOPHILS # BLD AUTO: 0.02 K/UL (ref 0–0.2)
BASOPHILS # BLD AUTO: 0.03 K/UL (ref 0–0.2)
BASOPHILS # BLD AUTO: 0.04 K/UL (ref 0–0.2)
BASOPHILS # BLD AUTO: 0.05 K/UL (ref 0–0.2)
BASOPHILS NFR BLD: 0.1 % (ref 0–1.9)
BASOPHILS NFR BLD: 0.2 % (ref 0–1.9)
BASOPHILS NFR BLD: 0.3 % (ref 0–1.9)
BASOPHILS NFR BLD: 0.4 % (ref 0–1.9)
BASOPHILS NFR BLD: 0.5 % (ref 0–1.9)
BASOPHILS NFR BLD: 0.5 % (ref 0–1.9)
BASOPHILS NFR BLD: 0.6 % (ref 0–1.9)
BASOPHILS NFR BLD: 0.6 % (ref 0–1.9)
BILIRUB SERPL-MCNC: 0.3 MG/DL
BILIRUB SERPL-MCNC: 0.4 MG/DL (ref 0.1–1)
BILIRUB SERPL-MCNC: 0.5 MG/DL
BILIRUB SERPL-MCNC: 0.5 MG/DL (ref 0.1–1)
BILIRUB SERPL-MCNC: 0.6 MG/DL (ref 0.1–1)
BILIRUB SERPL-MCNC: 0.7 MG/DL (ref 0.1–1)
BILIRUB SERPL-MCNC: 0.8 MG/DL (ref 0.1–1)
BILIRUB SERPL-MCNC: 0.9 MG/DL (ref 0.1–1)
BILIRUB SERPL-MCNC: 1.2 MG/DL (ref 0.1–1)
BILIRUB SERPL-MCNC: 1.2 MG/DL (ref 0.1–1)
BILIRUB SERPL-MCNC: 1.9 MG/DL (ref 0.1–1)
BILIRUB SERPL-MCNC: 2.3 MG/DL (ref 0.1–1)
BILIRUB UR QL STRIP: ABNORMAL
BILIRUB UR QL STRIP: NEGATIVE
BLD GP AB SCN CELLS X3 SERPL QL: NORMAL
BLD GP AB SCN CELLS X3 SERPL QL: NORMAL
BNP SERPL-MCNC: 1145 PG/ML (ref 0–99)
BNP SERPL-MCNC: 1822 PG/ML (ref 0–99)
BNP SERPL-MCNC: 2783 PG/ML (ref 0–99)
BNP SERPL-MCNC: 2901 PG/ML (ref 0–99)
BNP SERPL-MCNC: 2967 PG/ML (ref 0–99)
BNP SERPL-MCNC: 3234 PG/ML (ref 0–99)
BNP SERPL-MCNC: 3347 PG/ML (ref 0–99)
BNP SERPL-MCNC: 3347 PG/ML (ref 0–99)
BNP SERPL-MCNC: 479 PG/ML
BNP SERPL-MCNC: 915 PG/ML (ref 0–99)
BSA FOR ECHO PROCEDURE: 1.95 M2
BSA FOR ECHO PROCEDURE: 2.03 M2
BSA FOR ECHO PROCEDURE: 2.05 M2
BUN SERPL-MCNC: 19 MG/DL
BUN SERPL-MCNC: 20 MG/DL
BUN SERPL-MCNC: 23 MG/DL (ref 8–23)
BUN SERPL-MCNC: 23 MG/DL (ref 8–23)
BUN SERPL-MCNC: 24 MG/DL (ref 8–23)
BUN SERPL-MCNC: 25 MG/DL (ref 8–23)
BUN SERPL-MCNC: 26 MG/DL (ref 8–23)
BUN SERPL-MCNC: 27 MG/DL (ref 8–23)
BUN SERPL-MCNC: 28 MG/DL (ref 8–23)
BUN SERPL-MCNC: 30 MG/DL (ref 8–23)
BUN SERPL-MCNC: 34 MG/DL (ref 8–23)
BUN SERPL-MCNC: 34 MG/DL (ref 8–23)
BUN SERPL-MCNC: 37 MG/DL (ref 8–23)
BUN SERPL-MCNC: 38 MG/DL (ref 8–23)
BUN SERPL-MCNC: 39 MG/DL (ref 8–23)
BUN SERPL-MCNC: 39 MG/DL (ref 8–23)
BUN SERPL-MCNC: 41 MG/DL (ref 8–23)
BUN SERPL-MCNC: 42 MG/DL (ref 8–23)
BUN SERPL-MCNC: 42 MG/DL (ref 8–23)
BUN SERPL-MCNC: 43 MG/DL (ref 8–23)
BUN SERPL-MCNC: 44 MG/DL (ref 8–23)
BUN SERPL-MCNC: 45 MG/DL (ref 8–23)
BUN SERPL-MCNC: 46 MG/DL (ref 8–23)
BUN SERPL-MCNC: 46 MG/DL (ref 8–23)
BUN SERPL-MCNC: 47 MG/DL (ref 8–23)
BUN SERPL-MCNC: 47 MG/DL (ref 8–23)
BUN SERPL-MCNC: 48 MG/DL (ref 8–23)
BUN SERPL-MCNC: 49 MG/DL (ref 8–23)
BUN SERPL-MCNC: 50 MG/DL (ref 8–23)
BUN SERPL-MCNC: 51 MG/DL (ref 8–23)
BUN SERPL-MCNC: 54 MG/DL (ref 8–23)
BUN SERPL-MCNC: 54 MG/DL (ref 8–23)
BUN SERPL-MCNC: 57 MG/DL (ref 8–23)
BUN SERPL-MCNC: 58 MG/DL (ref 8–23)
BUN SERPL-MCNC: 60 MG/DL (ref 8–23)
BUN SERPL-MCNC: 63 MG/DL (ref 8–23)
BUN SERPL-MCNC: 69 MG/DL (ref 8–23)
BUN SERPL-MCNC: 71 MG/DL (ref 8–23)
BUN SERPL-MCNC: 71 MG/DL (ref 8–23)
BUN SERPL-MCNC: 74 MG/DL (ref 8–23)
BUN SERPL-MCNC: 76 MG/DL (ref 8–23)
BUN SERPL-MCNC: 79 MG/DL (ref 8–23)
BUN SERPL-MCNC: 84 MG/DL (ref 8–23)
BUN SERPL-MCNC: 85 MG/DL (ref 8–23)
BUN SERPL-MCNC: 86 MG/DL (ref 8–23)
BUN SERPL-MCNC: 87 MG/DL (ref 8–23)
BUN SERPL-MCNC: 87 MG/DL (ref 8–23)
BUN SERPL-MCNC: 88 MG/DL (ref 8–23)
BUN SERPL-MCNC: 90 MG/DL (ref 8–23)
BURR CELLS BLD QL SMEAR: ABNORMAL
C DIFF GDH STL QL: NEGATIVE
C DIFF TOX A+B STL QL IA: NEGATIVE
CALCIUM SERPL-MCNC: 10 MG/DL (ref 8.7–10.5)
CALCIUM SERPL-MCNC: 10.1 MG/DL (ref 8.7–10.5)
CALCIUM SERPL-MCNC: 10.2 MG/DL (ref 8.7–10.5)
CALCIUM SERPL-MCNC: 8.6 MG/DL (ref 8.7–10.5)
CALCIUM SERPL-MCNC: 8.8 MG/DL (ref 8.7–10.5)
CALCIUM SERPL-MCNC: 8.8 MG/DL (ref 8.7–10.5)
CALCIUM SERPL-MCNC: 8.9 MG/DL (ref 8.7–10.5)
CALCIUM SERPL-MCNC: 8.9 MG/DL (ref 8.7–10.5)
CALCIUM SERPL-MCNC: 9 MG/DL (ref 8.7–10.5)
CALCIUM SERPL-MCNC: 9 MG/DL (ref 8.7–10.5)
CALCIUM SERPL-MCNC: 9.1 MG/DL (ref 8.7–10.5)
CALCIUM SERPL-MCNC: 9.2 MG/DL (ref 8.7–10.5)
CALCIUM SERPL-MCNC: 9.3 MG/DL (ref 8.7–10.5)
CALCIUM SERPL-MCNC: 9.4 MG/DL (ref 8.7–10.5)
CALCIUM SERPL-MCNC: 9.5 MG/DL (ref 8.7–10.5)
CALCIUM SERPL-MCNC: 9.6 MG/DL
CALCIUM SERPL-MCNC: 9.6 MG/DL (ref 8.7–10.5)
CALCIUM SERPL-MCNC: 9.7 MG/DL
CALCIUM SERPL-MCNC: 9.7 MG/DL (ref 8.7–10.5)
CALCIUM SERPL-MCNC: 9.8 MG/DL (ref 8.7–10.5)
CALCIUM SERPL-MCNC: 9.9 MG/DL (ref 8.7–10.5)
CCP AB SER IA-ACNC: <0.5 U/ML
CHLORIDE SERPL-SCNC: 100 MMOL/L (ref 95–110)
CHLORIDE SERPL-SCNC: 101 MMOL/L (ref 95–110)
CHLORIDE SERPL-SCNC: 102 MMOL/L (ref 95–110)
CHLORIDE SERPL-SCNC: 103 MMOL/L (ref 95–110)
CHLORIDE SERPL-SCNC: 103 MMOL/L (ref 95–110)
CHLORIDE SERPL-SCNC: 104 MMOL/L (ref 95–110)
CHLORIDE SERPL-SCNC: 105 MMOL/L
CHLORIDE SERPL-SCNC: 105 MMOL/L
CHLORIDE SERPL-SCNC: 105 MMOL/L (ref 95–110)
CHLORIDE SERPL-SCNC: 106 MMOL/L (ref 95–110)
CHLORIDE SERPL-SCNC: 83 MMOL/L (ref 95–110)
CHLORIDE SERPL-SCNC: 84 MMOL/L (ref 95–110)
CHLORIDE SERPL-SCNC: 85 MMOL/L (ref 95–110)
CHLORIDE SERPL-SCNC: 86 MMOL/L (ref 95–110)
CHLORIDE SERPL-SCNC: 87 MMOL/L (ref 95–110)
CHLORIDE SERPL-SCNC: 89 MMOL/L (ref 95–110)
CHLORIDE SERPL-SCNC: 89 MMOL/L (ref 95–110)
CHLORIDE SERPL-SCNC: 90 MMOL/L (ref 95–110)
CHLORIDE SERPL-SCNC: 90 MMOL/L (ref 95–110)
CHLORIDE SERPL-SCNC: 91 MMOL/L (ref 95–110)
CHLORIDE SERPL-SCNC: 94 MMOL/L (ref 95–110)
CHLORIDE SERPL-SCNC: 95 MMOL/L (ref 95–110)
CHLORIDE SERPL-SCNC: 96 MMOL/L (ref 95–110)
CHLORIDE SERPL-SCNC: 96 MMOL/L (ref 95–110)
CHLORIDE SERPL-SCNC: 97 MMOL/L (ref 95–110)
CHLORIDE SERPL-SCNC: 97 MMOL/L (ref 95–110)
CHLORIDE SERPL-SCNC: 98 MMOL/L (ref 95–110)
CHLORIDE SERPL-SCNC: 99 MMOL/L (ref 95–110)
CLARITY UR REFRACT.AUTO: ABNORMAL
CLARITY UR: CLEAR
CO2 SERPL-SCNC: 16 MMOL/L (ref 23–29)
CO2 SERPL-SCNC: 17 MMOL/L (ref 23–29)
CO2 SERPL-SCNC: 18 MMOL/L (ref 23–29)
CO2 SERPL-SCNC: 18 MMOL/L (ref 23–29)
CO2 SERPL-SCNC: 19 MMOL/L (ref 23–29)
CO2 SERPL-SCNC: 21 MMOL/L (ref 23–29)
CO2 SERPL-SCNC: 21 MMOL/L (ref 23–29)
CO2 SERPL-SCNC: 22 MMOL/L (ref 23–29)
CO2 SERPL-SCNC: 22 MMOL/L (ref 23–29)
CO2 SERPL-SCNC: 24 MMOL/L (ref 23–29)
CO2 SERPL-SCNC: 25 MMOL/L (ref 23–29)
CO2 SERPL-SCNC: 26 MMOL/L (ref 23–29)
CO2 SERPL-SCNC: 27 MMOL/L (ref 23–29)
CO2 SERPL-SCNC: 28 MMOL/L
CO2 SERPL-SCNC: 28 MMOL/L (ref 23–29)
CO2 SERPL-SCNC: 29 MMOL/L (ref 23–29)
CO2 SERPL-SCNC: 29 MMOL/L (ref 23–29)
CO2 SERPL-SCNC: 30 MMOL/L (ref 23–29)
CO2 SERPL-SCNC: 30 MMOL/L (ref 23–29)
CO2 SERPL-SCNC: 31 MMOL/L
CO2 SERPL-SCNC: 31 MMOL/L (ref 23–29)
CO2 SERPL-SCNC: 32 MMOL/L (ref 23–29)
CO2 SERPL-SCNC: 33 MMOL/L (ref 23–29)
CO2 SERPL-SCNC: 33 MMOL/L (ref 23–29)
CO2 SERPL-SCNC: 35 MMOL/L (ref 23–29)
CO2 SERPL-SCNC: 36 MMOL/L (ref 23–29)
COLOR UR AUTO: ABNORMAL
COLOR UR: YELLOW
CREAT SERPL-MCNC: 1.1 MG/DL
CREAT SERPL-MCNC: 1.2 MG/DL
CREAT SERPL-MCNC: 1.2 MG/DL (ref 0.5–1.4)
CREAT SERPL-MCNC: 1.3 MG/DL (ref 0.5–1.4)
CREAT SERPL-MCNC: 1.4 MG/DL (ref 0.5–1.4)
CREAT SERPL-MCNC: 1.5 MG/DL (ref 0.5–1.4)
CREAT SERPL-MCNC: 1.7 MG/DL (ref 0.5–1.4)
CREAT SERPL-MCNC: 1.8 MG/DL (ref 0.5–1.4)
CREAT SERPL-MCNC: 1.9 MG/DL (ref 0.5–1.4)
CREAT SERPL-MCNC: 2 MG/DL (ref 0.5–1.4)
CREAT SERPL-MCNC: 2.2 MG/DL (ref 0.5–1.4)
CREAT SERPL-MCNC: 2.2 MG/DL (ref 0.5–1.4)
CREAT SERPL-MCNC: 2.3 MG/DL (ref 0.5–1.4)
CREAT SERPL-MCNC: 2.6 MG/DL (ref 0.5–1.4)
CREAT SERPL-MCNC: 2.7 MG/DL (ref 0.5–1.4)
CREAT SERPL-MCNC: 2.7 MG/DL (ref 0.5–1.4)
CREAT SERPL-MCNC: 2.8 MG/DL (ref 0.5–1.4)
CREAT SERPL-MCNC: 2.8 MG/DL (ref 0.5–1.4)
CREAT SERPL-MCNC: 2.9 MG/DL (ref 0.5–1.4)
CREAT SERPL-MCNC: 3.1 MG/DL (ref 0.5–1.4)
CREAT SERPL-MCNC: 3.3 MG/DL (ref 0.5–1.4)
CREAT SERPL-MCNC: 3.4 MG/DL (ref 0.5–1.4)
CREAT SERPL-MCNC: 3.7 MG/DL (ref 0.5–1.4)
CREAT SERPL-MCNC: 3.8 MG/DL (ref 0.5–1.4)
CREAT SERPL-MCNC: 3.8 MG/DL (ref 0.5–1.4)
CREAT SERPL-MCNC: 4 MG/DL (ref 0.5–1.4)
CREAT SERPL-MCNC: 4 MG/DL (ref 0.5–1.4)
CRP SERPL-MCNC: 38 MG/L (ref 0–8.2)
CV ECHO LV RWT: 0.23 CM
CV ECHO LV RWT: 0.28 CM
DACRYOCYTES BLD QL SMEAR: ABNORMAL
DELSYS: ABNORMAL
DEPRECATED SRA 0.1U/ML PORCINE HEPARIN S: 1 %
DEPRECATED SRA 100U/ML PORCINE HEPARIN S: 1 %
DIASTOLIC DYSFUNCTION: NO
DIFFERENTIAL METHOD: ABNORMAL
DIGOXIN SERPL-MCNC: 0.1 NG/ML (ref 0.8–2)
DIGOXIN SERPL-MCNC: 0.7 NG/ML (ref 0.8–2)
DIGOXIN SERPL-MCNC: 1 NG/ML (ref 0.8–2)
DOP CALC AO PEAK VEL: 1.35 M/S
DOP CALC AO PEAK VEL: 1.44 M/S
DOP CALC AO VTI: 17.27 CM
DOP CALC AO VTI: 21.13 CM
DOP CALC LVOT AREA: 2.77 CM2
DOP CALC LVOT AREA: 3.4 CM2
DOP CALC LVOT DIAMETER: 1.88 CM
DOP CALC LVOT DIAMETER: 2.09 CM
DOP CALC LVOT PEAK VEL: 0.74 M/S
DOP CALC LVOT PEAK VEL: 0.75 M/S
DOP CALC LVOT STROKE VOLUME: 28.69 CM3
DOP CALC LVOT STROKE VOLUME: 30.69 CM3
DOP CALCLVOT PEAK VEL VTI: 10.34 CM
DOP CALCLVOT PEAK VEL VTI: 8.95 CM
E/E' RATIO: 16 M/S
E/E' RATIO: 18.67
ECHO LV POSTERIOR WALL: 0.75 CM (ref 0.6–1.1)
ECHO LV POSTERIOR WALL: 0.9 CM (ref 0.6–1.1)
EOSINOPHIL # BLD AUTO: 0 K/UL (ref 0–0.5)
EOSINOPHIL # BLD AUTO: 0.1 K/UL (ref 0–0.5)
EOSINOPHIL NFR BLD: 0 % (ref 0–8)
EOSINOPHIL NFR BLD: 0.1 % (ref 0–8)
EOSINOPHIL NFR BLD: 0.2 % (ref 0–8)
EOSINOPHIL NFR BLD: 0.3 % (ref 0–8)
EOSINOPHIL NFR BLD: 0.4 % (ref 0–8)
EOSINOPHIL NFR BLD: 0.4 % (ref 0–8)
EOSINOPHIL NFR BLD: 0.5 % (ref 0–8)
EOSINOPHIL NFR BLD: 0.6 % (ref 0–8)
EOSINOPHIL NFR BLD: 0.6 % (ref 0–8)
EOSINOPHIL NFR BLD: 0.7 % (ref 0–8)
EOSINOPHIL NFR BLD: 0.7 % (ref 0–8)
EOSINOPHIL NFR BLD: 0.8 % (ref 0–8)
EOSINOPHIL NFR BLD: 1.1 % (ref 0–8)
EOSINOPHIL NFR BLD: 1.2 % (ref 0–8)
EOSINOPHIL NFR BLD: 1.4 % (ref 0–8)
ERYTHROCYTE [DISTWIDTH] IN BLOOD BY AUTOMATED COUNT: 16.1 % (ref 11.5–14.5)
ERYTHROCYTE [DISTWIDTH] IN BLOOD BY AUTOMATED COUNT: 17.2 % (ref 11.5–14.5)
ERYTHROCYTE [DISTWIDTH] IN BLOOD BY AUTOMATED COUNT: 17.4 % (ref 11.5–14.5)
ERYTHROCYTE [DISTWIDTH] IN BLOOD BY AUTOMATED COUNT: 17.5 % (ref 11.5–14.5)
ERYTHROCYTE [DISTWIDTH] IN BLOOD BY AUTOMATED COUNT: 17.6 % (ref 11.5–14.5)
ERYTHROCYTE [DISTWIDTH] IN BLOOD BY AUTOMATED COUNT: 17.6 % (ref 11.5–14.5)
ERYTHROCYTE [DISTWIDTH] IN BLOOD BY AUTOMATED COUNT: 17.7 % (ref 11.5–14.5)
ERYTHROCYTE [DISTWIDTH] IN BLOOD BY AUTOMATED COUNT: 17.7 % (ref 11.5–14.5)
ERYTHROCYTE [DISTWIDTH] IN BLOOD BY AUTOMATED COUNT: 17.8 % (ref 11.5–14.5)
ERYTHROCYTE [DISTWIDTH] IN BLOOD BY AUTOMATED COUNT: 17.9 % (ref 11.5–14.5)
ERYTHROCYTE [DISTWIDTH] IN BLOOD BY AUTOMATED COUNT: 18 % (ref 11.5–14.5)
ERYTHROCYTE [DISTWIDTH] IN BLOOD BY AUTOMATED COUNT: 18.1 % (ref 11.5–14.5)
ERYTHROCYTE [DISTWIDTH] IN BLOOD BY AUTOMATED COUNT: 18.2 % (ref 11.5–14.5)
ERYTHROCYTE [DISTWIDTH] IN BLOOD BY AUTOMATED COUNT: 18.3 % (ref 11.5–14.5)
ERYTHROCYTE [DISTWIDTH] IN BLOOD BY AUTOMATED COUNT: 18.3 % (ref 11.5–14.5)
ERYTHROCYTE [DISTWIDTH] IN BLOOD BY AUTOMATED COUNT: 18.4 % (ref 11.5–14.5)
ERYTHROCYTE [DISTWIDTH] IN BLOOD BY AUTOMATED COUNT: 18.5 % (ref 11.5–14.5)
ERYTHROCYTE [DISTWIDTH] IN BLOOD BY AUTOMATED COUNT: 18.8 % (ref 11.5–14.5)
ERYTHROCYTE [DISTWIDTH] IN BLOOD BY AUTOMATED COUNT: 18.9 % (ref 11.5–14.5)
ERYTHROCYTE [DISTWIDTH] IN BLOOD BY AUTOMATED COUNT: 19.1 % (ref 11.5–14.5)
ERYTHROCYTE [DISTWIDTH] IN BLOOD BY AUTOMATED COUNT: 19.3 % (ref 11.5–14.5)
ERYTHROCYTE [DISTWIDTH] IN BLOOD BY AUTOMATED COUNT: 19.4 % (ref 11.5–14.5)
ERYTHROCYTE [SEDIMENTATION RATE] IN BLOOD BY WESTERGREN METHOD: 18 MM/H
ERYTHROCYTE [SEDIMENTATION RATE] IN BLOOD BY WESTERGREN METHOD: >120 MM/HR (ref 0–36)
EST. GFR  (AFRICAN AMERICAN): 12.5 ML/MIN/1.73 M^2
EST. GFR  (AFRICAN AMERICAN): 12.5 ML/MIN/1.73 M^2
EST. GFR  (AFRICAN AMERICAN): 13 ML/MIN/1.73 M^2
EST. GFR  (AFRICAN AMERICAN): 13.3 ML/MIN/1.73 M^2
EST. GFR  (AFRICAN AMERICAN): 13.8 ML/MIN/1.73 M^2
EST. GFR  (AFRICAN AMERICAN): 15.2 ML/MIN/1.73 M^2
EST. GFR  (AFRICAN AMERICAN): 15.8 ML/MIN/1.73 M^2
EST. GFR  (AFRICAN AMERICAN): 17 ML/MIN/1.73 M^2
EST. GFR  (AFRICAN AMERICAN): 18.5 ML/MIN/1.73 M^2
EST. GFR  (AFRICAN AMERICAN): 19.3 ML/MIN/1.73 M^2
EST. GFR  (AFRICAN AMERICAN): 19.3 ML/MIN/1.73 M^2
EST. GFR  (AFRICAN AMERICAN): 20 ML/MIN/1.73 M^2
EST. GFR  (AFRICAN AMERICAN): 20.1 ML/MIN/1.73 M^2
EST. GFR  (AFRICAN AMERICAN): 21.1 ML/MIN/1.73 M^2
EST. GFR  (AFRICAN AMERICAN): 24.4 ML/MIN/1.73 M^2
EST. GFR  (AFRICAN AMERICAN): 25.8 ML/MIN/1.73 M^2
EST. GFR  (AFRICAN AMERICAN): 25.8 ML/MIN/1.73 M^2
EST. GFR  (AFRICAN AMERICAN): 28.9 ML/MIN/1.73 M^2
EST. GFR  (AFRICAN AMERICAN): 30.8 ML/MIN/1.73 M^2
EST. GFR  (AFRICAN AMERICAN): 32.9 ML/MIN/1.73 M^2
EST. GFR  (AFRICAN AMERICAN): 35.2 ML/MIN/1.73 M^2
EST. GFR  (AFRICAN AMERICAN): 40.7 ML/MIN/1.73 M^2
EST. GFR  (AFRICAN AMERICAN): 41 ML/MIN/1.73 M^2
EST. GFR  (AFRICAN AMERICAN): 44.2 ML/MIN/1.73 M^2
EST. GFR  (AFRICAN AMERICAN): 44.5 ML/MIN/1.73 M^2
EST. GFR  (AFRICAN AMERICAN): 48.7 ML/MIN/1.73 M^2
EST. GFR  (AFRICAN AMERICAN): 48.7 ML/MIN/1.73 M^2
EST. GFR  (AFRICAN AMERICAN): 49 ML/MIN/1.73 M^2
EST. GFR  (AFRICAN AMERICAN): 53.7 ML/MIN/1.73 M^2
EST. GFR  (AFRICAN AMERICAN): 53.7 ML/MIN/1.73 M^2
EST. GFR  (AFRICAN AMERICAN): 59.6 ML/MIN/1.73 M^2
EST. GFR  (NON AFRICAN AMERICAN): 10.9 ML/MIN/1.73 M^2
EST. GFR  (NON AFRICAN AMERICAN): 10.9 ML/MIN/1.73 M^2
EST. GFR  (NON AFRICAN AMERICAN): 11.6 ML/MIN/1.73 M^2
EST. GFR  (NON AFRICAN AMERICAN): 11.9 ML/MIN/1.73 M^2
EST. GFR  (NON AFRICAN AMERICAN): 12 ML/MIN/1.73 M^2
EST. GFR  (NON AFRICAN AMERICAN): 13.2 ML/MIN/1.73 M^2
EST. GFR  (NON AFRICAN AMERICAN): 13.7 ML/MIN/1.73 M^2
EST. GFR  (NON AFRICAN AMERICAN): 14.8 ML/MIN/1.73 M^2
EST. GFR  (NON AFRICAN AMERICAN): 15 ML/MIN/1.73 M^2
EST. GFR  (NON AFRICAN AMERICAN): 15 ML/MIN/1.73 M^2
EST. GFR  (NON AFRICAN AMERICAN): 16 ML/MIN/1.73 M^2
EST. GFR  (NON AFRICAN AMERICAN): 16.7 ML/MIN/1.73 M^2
EST. GFR  (NON AFRICAN AMERICAN): 16.7 ML/MIN/1.73 M^2
EST. GFR  (NON AFRICAN AMERICAN): 17 ML/MIN/1.73 M^2
EST. GFR  (NON AFRICAN AMERICAN): 17.5 ML/MIN/1.73 M^2
EST. GFR  (NON AFRICAN AMERICAN): 18.3 ML/MIN/1.73 M^2
EST. GFR  (NON AFRICAN AMERICAN): 21.2 ML/MIN/1.73 M^2
EST. GFR  (NON AFRICAN AMERICAN): 22.4 ML/MIN/1.73 M^2
EST. GFR  (NON AFRICAN AMERICAN): 22.4 ML/MIN/1.73 M^2
EST. GFR  (NON AFRICAN AMERICAN): 25.1 ML/MIN/1.73 M^2
EST. GFR  (NON AFRICAN AMERICAN): 26.7 ML/MIN/1.73 M^2
EST. GFR  (NON AFRICAN AMERICAN): 28.5 ML/MIN/1.73 M^2
EST. GFR  (NON AFRICAN AMERICAN): 30.6 ML/MIN/1.73 M^2
EST. GFR  (NON AFRICAN AMERICAN): 35.3 ML/MIN/1.73 M^2
EST. GFR  (NON AFRICAN AMERICAN): 35.5 ML/MIN/1.73 M^2
EST. GFR  (NON AFRICAN AMERICAN): 36 ML/MIN/1.73 M^2
EST. GFR  (NON AFRICAN AMERICAN): 38.4 ML/MIN/1.73 M^2
EST. GFR  (NON AFRICAN AMERICAN): 38.6 ML/MIN/1.73 M^2
EST. GFR  (NON AFRICAN AMERICAN): 42 ML/MIN/1.73 M^2
EST. GFR  (NON AFRICAN AMERICAN): 42.3 ML/MIN/1.73 M^2
EST. GFR  (NON AFRICAN AMERICAN): 42.3 ML/MIN/1.73 M^2
EST. GFR  (NON AFRICAN AMERICAN): 46.5 ML/MIN/1.73 M^2
EST. GFR  (NON AFRICAN AMERICAN): 46.5 ML/MIN/1.73 M^2
EST. GFR  (NON AFRICAN AMERICAN): 51.7 ML/MIN/1.73 M^2
ESTIMATED PA SYSTOLIC PRESSURE: 54.19
FIO2: 21
FIO2: 21
FLOW: 4
FRACTIONAL SHORTENING: 7 % (ref 28–44)
FRACTIONAL SHORTENING: 8 % (ref 28–44)
GLUCOSE SERPL-MCNC: 100 MG/DL (ref 70–110)
GLUCOSE SERPL-MCNC: 101 MG/DL (ref 70–110)
GLUCOSE SERPL-MCNC: 103 MG/DL (ref 70–110)
GLUCOSE SERPL-MCNC: 104 MG/DL
GLUCOSE SERPL-MCNC: 104 MG/DL (ref 70–110)
GLUCOSE SERPL-MCNC: 105 MG/DL (ref 70–110)
GLUCOSE SERPL-MCNC: 106 MG/DL (ref 70–110)
GLUCOSE SERPL-MCNC: 106 MG/DL (ref 70–110)
GLUCOSE SERPL-MCNC: 107 MG/DL
GLUCOSE SERPL-MCNC: 108 MG/DL (ref 70–110)
GLUCOSE SERPL-MCNC: 109 MG/DL (ref 70–110)
GLUCOSE SERPL-MCNC: 110 MG/DL (ref 70–110)
GLUCOSE SERPL-MCNC: 111 MG/DL (ref 70–110)
GLUCOSE SERPL-MCNC: 111 MG/DL (ref 70–110)
GLUCOSE SERPL-MCNC: 112 MG/DL (ref 70–110)
GLUCOSE SERPL-MCNC: 114 MG/DL (ref 70–110)
GLUCOSE SERPL-MCNC: 118 MG/DL (ref 70–110)
GLUCOSE SERPL-MCNC: 119 MG/DL (ref 70–110)
GLUCOSE SERPL-MCNC: 119 MG/DL (ref 70–110)
GLUCOSE SERPL-MCNC: 120 MG/DL (ref 70–110)
GLUCOSE SERPL-MCNC: 123 MG/DL (ref 70–110)
GLUCOSE SERPL-MCNC: 125 MG/DL (ref 70–110)
GLUCOSE SERPL-MCNC: 126 MG/DL (ref 70–110)
GLUCOSE SERPL-MCNC: 127 MG/DL (ref 70–110)
GLUCOSE SERPL-MCNC: 147 MG/DL (ref 70–110)
GLUCOSE SERPL-MCNC: 154 MG/DL (ref 70–110)
GLUCOSE SERPL-MCNC: 158 MG/DL (ref 70–110)
GLUCOSE SERPL-MCNC: 159 MG/DL (ref 70–110)
GLUCOSE SERPL-MCNC: 163 MG/DL (ref 70–110)
GLUCOSE SERPL-MCNC: 164 MG/DL (ref 70–110)
GLUCOSE SERPL-MCNC: 164 MG/DL (ref 70–110)
GLUCOSE SERPL-MCNC: 171 MG/DL (ref 70–110)
GLUCOSE SERPL-MCNC: 178 MG/DL (ref 70–110)
GLUCOSE SERPL-MCNC: 195 MG/DL (ref 70–110)
GLUCOSE SERPL-MCNC: 211 MG/DL (ref 70–110)
GLUCOSE SERPL-MCNC: 211 MG/DL (ref 70–110)
GLUCOSE SERPL-MCNC: 230 MG/DL (ref 70–110)
GLUCOSE SERPL-MCNC: 64 MG/DL (ref 70–110)
GLUCOSE SERPL-MCNC: 68 MG/DL (ref 70–110)
GLUCOSE SERPL-MCNC: 68 MG/DL (ref 70–110)
GLUCOSE SERPL-MCNC: 69 MG/DL (ref 70–110)
GLUCOSE SERPL-MCNC: 74 MG/DL (ref 70–110)
GLUCOSE SERPL-MCNC: 76 MG/DL (ref 70–110)
GLUCOSE SERPL-MCNC: 76 MG/DL (ref 70–110)
GLUCOSE SERPL-MCNC: 82 MG/DL (ref 70–110)
GLUCOSE SERPL-MCNC: 83 MG/DL (ref 70–110)
GLUCOSE SERPL-MCNC: 83 MG/DL (ref 70–110)
GLUCOSE SERPL-MCNC: 84 MG/DL (ref 70–110)
GLUCOSE SERPL-MCNC: 85 MG/DL (ref 70–110)
GLUCOSE SERPL-MCNC: 88 MG/DL (ref 70–110)
GLUCOSE SERPL-MCNC: 89 MG/DL (ref 70–110)
GLUCOSE SERPL-MCNC: 90 MG/DL (ref 70–110)
GLUCOSE SERPL-MCNC: 92 MG/DL (ref 70–110)
GLUCOSE SERPL-MCNC: 94 MG/DL (ref 70–110)
GLUCOSE SERPL-MCNC: 95 MG/DL (ref 70–110)
GLUCOSE SERPL-MCNC: 99 MG/DL (ref 70–110)
GLUCOSE UR QL STRIP: NEGATIVE
GLUCOSE UR QL STRIP: NEGATIVE
HCO3 UR-SCNC: 22.8 MMOL/L (ref 24–28)
HCO3 UR-SCNC: 23.7 MMOL/L (ref 24–28)
HCO3 UR-SCNC: 23.8 MMOL/L (ref 24–28)
HCO3 UR-SCNC: 25.1 MMOL/L (ref 24–28)
HCO3 UR-SCNC: 26.3 MMOL/L (ref 24–28)
HCO3 UR-SCNC: 26.4 MMOL/L (ref 24–28)
HCO3 UR-SCNC: 27.4 MMOL/L (ref 24–28)
HCO3 UR-SCNC: 28.1 MMOL/L (ref 24–28)
HCO3 UR-SCNC: 29.4 MMOL/L (ref 24–28)
HCO3 UR-SCNC: 30.4 MMOL/L (ref 24–28)
HCO3 UR-SCNC: 31.7 MMOL/L (ref 24–28)
HCO3 UR-SCNC: 33.1 MMOL/L (ref 24–28)
HCO3 UR-SCNC: 33.8 MMOL/L (ref 24–28)
HCO3 UR-SCNC: 33.9 MMOL/L (ref 24–28)
HCO3 UR-SCNC: 34.2 MMOL/L (ref 24–28)
HCO3 UR-SCNC: 34.6 MMOL/L (ref 24–28)
HCO3 UR-SCNC: 34.6 MMOL/L (ref 24–28)
HCO3 UR-SCNC: 35.3 MMOL/L (ref 24–28)
HCO3 UR-SCNC: 36.6 MMOL/L (ref 24–28)
HCO3 UR-SCNC: 37.8 MMOL/L (ref 24–28)
HCO3 UR-SCNC: 37.9 MMOL/L (ref 24–28)
HCO3 UR-SCNC: 37.9 MMOL/L (ref 24–28)
HCO3 UR-SCNC: 38 MMOL/L (ref 24–28)
HCO3 UR-SCNC: 38.4 MMOL/L (ref 24–28)
HCO3 UR-SCNC: 38.5 MMOL/L (ref 24–28)
HCO3 UR-SCNC: 38.6 MMOL/L (ref 24–28)
HCT VFR BLD AUTO: 34.1 % (ref 37–48.5)
HCT VFR BLD AUTO: 34.4 % (ref 37–48.5)
HCT VFR BLD AUTO: 34.7 % (ref 37–48.5)
HCT VFR BLD AUTO: 34.8 % (ref 37–48.5)
HCT VFR BLD AUTO: 35 % (ref 37–48.5)
HCT VFR BLD AUTO: 35 % (ref 37–48.5)
HCT VFR BLD AUTO: 35.3 % (ref 37–48.5)
HCT VFR BLD AUTO: 35.5 % (ref 37–48.5)
HCT VFR BLD AUTO: 35.6 % (ref 37–48.5)
HCT VFR BLD AUTO: 35.6 % (ref 37–48.5)
HCT VFR BLD AUTO: 36 % (ref 37–48.5)
HCT VFR BLD AUTO: 36 % (ref 37–48.5)
HCT VFR BLD AUTO: 36.3 % (ref 37–48.5)
HCT VFR BLD AUTO: 37.2 % (ref 37–48.5)
HCT VFR BLD AUTO: 37.5 % (ref 37–48.5)
HCT VFR BLD AUTO: 37.6 % (ref 37–48.5)
HCT VFR BLD AUTO: 37.8 % (ref 37–48.5)
HCT VFR BLD AUTO: 38.1 % (ref 37–48.5)
HCT VFR BLD AUTO: 38.5 % (ref 37–48.5)
HCT VFR BLD AUTO: 39 % (ref 37–48.5)
HCT VFR BLD AUTO: 39.3 % (ref 37–48.5)
HCT VFR BLD AUTO: 39.4 % (ref 37–48.5)
HCT VFR BLD AUTO: 39.8 % (ref 37–48.5)
HCT VFR BLD AUTO: 39.9 % (ref 37–48.5)
HCT VFR BLD AUTO: 40.1 % (ref 37–48.5)
HCT VFR BLD AUTO: 40.8 % (ref 37–48.5)
HCT VFR BLD AUTO: 41.4 % (ref 37–48.5)
HCT VFR BLD AUTO: 41.9 % (ref 37–48.5)
HCV AB SERPL QL IA: NEGATIVE
HEPARIN INDUCED THROMBOCYTOPENIA: 0.16 OD (ref 0–0.4)
HGB BLD-MCNC: 10.7 G/DL (ref 12–16)
HGB BLD-MCNC: 10.8 G/DL (ref 12–16)
HGB BLD-MCNC: 10.9 G/DL (ref 12–16)
HGB BLD-MCNC: 11.1 G/DL (ref 12–16)
HGB BLD-MCNC: 11.2 G/DL (ref 12–16)
HGB BLD-MCNC: 11.2 G/DL (ref 12–16)
HGB BLD-MCNC: 11.3 G/DL (ref 12–16)
HGB BLD-MCNC: 11.3 G/DL (ref 12–16)
HGB BLD-MCNC: 11.5 G/DL (ref 12–16)
HGB BLD-MCNC: 11.7 G/DL (ref 12–16)
HGB BLD-MCNC: 12.1 G/DL (ref 12–16)
HGB BLD-MCNC: 12.2 G/DL (ref 12–16)
HGB BLD-MCNC: 12.3 G/DL (ref 12–16)
HGB BLD-MCNC: 12.6 G/DL (ref 12–16)
HGB BLD-MCNC: 12.7 G/DL (ref 12–16)
HGB BLD-MCNC: 13 G/DL (ref 12–16)
HGB BLD-MCNC: 13.2 G/DL (ref 12–16)
HGB BLD-MCNC: 13.2 G/DL (ref 12–16)
HGB BLD-MCNC: 13.3 G/DL (ref 12–16)
HGB BLD-MCNC: 13.4 G/DL (ref 12–16)
HGB BLD-MCNC: 13.4 G/DL (ref 12–16)
HGB BLD-MCNC: 13.7 G/DL (ref 12–16)
HGB BLD-MCNC: 13.9 G/DL (ref 12–16)
HGB UR QL STRIP: ABNORMAL
HGB UR QL STRIP: NEGATIVE
HYALINE CASTS #/AREA URNS LPF: 15 /LPF
HYALINE CASTS UR QL AUTO: 75 /LPF
HYPOCHROMIA BLD QL SMEAR: ABNORMAL
IMM GRANULOCYTES # BLD AUTO: 0.03 K/UL (ref 0–0.04)
IMM GRANULOCYTES # BLD AUTO: 0.04 K/UL (ref 0–0.04)
IMM GRANULOCYTES # BLD AUTO: 0.05 K/UL (ref 0–0.04)
IMM GRANULOCYTES # BLD AUTO: 0.06 K/UL (ref 0–0.04)
IMM GRANULOCYTES # BLD AUTO: 0.07 K/UL (ref 0–0.04)
IMM GRANULOCYTES # BLD AUTO: 0.08 K/UL (ref 0–0.04)
IMM GRANULOCYTES # BLD AUTO: 0.08 K/UL (ref 0–0.04)
IMM GRANULOCYTES # BLD AUTO: 0.09 K/UL (ref 0–0.04)
IMM GRANULOCYTES NFR BLD AUTO: 0.3 % (ref 0–0.5)
IMM GRANULOCYTES NFR BLD AUTO: 0.4 % (ref 0–0.5)
IMM GRANULOCYTES NFR BLD AUTO: 0.5 % (ref 0–0.5)
IMM GRANULOCYTES NFR BLD AUTO: 0.6 % (ref 0–0.5)
IMM GRANULOCYTES NFR BLD AUTO: 0.7 % (ref 0–0.5)
IMM GRANULOCYTES NFR BLD AUTO: 0.8 % (ref 0–0.5)
IMM GRANULOCYTES NFR BLD AUTO: 1 % (ref 0–0.5)
IMM GRANULOCYTES NFR BLD AUTO: 1.1 % (ref 0–0.5)
IMM GRANULOCYTES NFR BLD AUTO: 1.2 % (ref 0–0.5)
INR PPP: 1.1 (ref 0.8–1.2)
INR PPP: 1.2 (ref 0.8–1.2)
INR PPP: 1.3 (ref 0.8–1.2)
INR PPP: 1.6 (ref 0.8–1.2)
INR PPP: 1.7 (ref 0.8–1.2)
INR PPP: 1.9 (ref 0.8–1.2)
INR PPP: 2 (ref 0.8–1.2)
INR PPP: 2 (ref 0.8–1.2)
INR PPP: 2.1 (ref 0.8–1.2)
INR PPP: 2.2 (ref 0.8–1.2)
INR PPP: 2.3 (ref 0.8–1.2)
INR PPP: 2.4 (ref 0.8–1.2)
INR PPP: 2.5 (ref 0.8–1.2)
INR PPP: 2.7 (ref 0.8–1.2)
INR PPP: 2.8 (ref 0.8–1.2)
INR PPP: 3 (ref 0.8–1.2)
INTERVENTRICULAR SEPTUM: 0.78 CM (ref 0.6–1.1)
INTERVENTRICULAR SEPTUM: 1.04 CM (ref 0.6–1.1)
KETONES UR QL STRIP: NEGATIVE
KETONES UR QL STRIP: NEGATIVE
LA MAJOR: 6.64 CM
LA MAJOR: 7.3 CM
LA MINOR: 6.34 CM
LA MINOR: 7.2 CM
LA WIDTH: 5.68 CM
LA WIDTH: 6.62 CM
LACTATE SERPL-SCNC: 2 MMOL/L (ref 0.5–2.2)
LACTATE SERPL-SCNC: 2.1 MMOL/L (ref 0.5–2.2)
LACTATE SERPL-SCNC: 2.7 MMOL/L (ref 0.5–2.2)
LACTATE SERPL-SCNC: 4 MMOL/L (ref 0.5–2.2)
LACTATE SERPL-SCNC: 4.2 MMOL/L (ref 0.5–2.2)
LACTOFERRIN, STOOL: NEGATIVE
LEFT ATRIUM SIZE: 4.9 CM
LEFT ATRIUM SIZE: 6 CM
LEFT ATRIUM VOLUME INDEX: 110.2 ML/M2
LEFT ATRIUM VOLUME INDEX: 91.3 ML/M2
LEFT ATRIUM VOLUME: 171.51 CM3
LEFT ATRIUM VOLUME: 219 CM3
LEFT INTERNAL DIMENSION IN SYSTOLE: 6.03 CM (ref 2.1–4)
LEFT INTERNAL DIMENSION IN SYSTOLE: 6.05 CM (ref 2.1–4)
LEFT VENTRICLE DIASTOLIC VOLUME INDEX: 110.53 ML/M2
LEFT VENTRICLE DIASTOLIC VOLUME INDEX: 115.39 ML/M2
LEFT VENTRICLE DIASTOLIC VOLUME: 216.83 ML
LEFT VENTRICLE DIASTOLIC VOLUME: 219.57 ML
LEFT VENTRICLE MASS INDEX: 121 G/M2
LEFT VENTRICLE MASS INDEX: 125.6 G/M2
LEFT VENTRICLE SYSTOLIC VOLUME INDEX: 92.4 ML/M2
LEFT VENTRICLE SYSTOLIC VOLUME INDEX: 96.7 ML/M2
LEFT VENTRICLE SYSTOLIC VOLUME: 181.73 ML
LEFT VENTRICLE SYSTOLIC VOLUME: 183.48 ML
LEFT VENTRICULAR INTERNAL DIMENSION IN DIASTOLE: 6.51 CM (ref 3.5–6)
LEFT VENTRICULAR INTERNAL DIMENSION IN DIASTOLE: 6.55 CM (ref 3.5–6)
LEFT VENTRICULAR MASS: 228.12 G
LEFT VENTRICULAR MASS: 249.44 G
LEUKOCYTE ESTERASE UR QL STRIP: ABNORMAL
LEUKOCYTE ESTERASE UR QL STRIP: ABNORMAL
LV LATERAL E/E' RATIO: 16 M/S
LV LATERAL E/E' RATIO: 18.67
LV SEPTAL E/E' RATIO: 16 M/S
LV SEPTAL E/E' RATIO: 18.67
LYMPHOCYTES # BLD AUTO: 0.9 K/UL (ref 1–4.8)
LYMPHOCYTES # BLD AUTO: 1 K/UL (ref 1–4.8)
LYMPHOCYTES # BLD AUTO: 1.1 K/UL (ref 1–4.8)
LYMPHOCYTES # BLD AUTO: 1.2 K/UL (ref 1–4.8)
LYMPHOCYTES # BLD AUTO: 1.3 K/UL (ref 1–4.8)
LYMPHOCYTES # BLD AUTO: 1.3 K/UL (ref 1–4.8)
LYMPHOCYTES # BLD AUTO: 1.4 K/UL (ref 1–4.8)
LYMPHOCYTES # BLD AUTO: 1.5 K/UL (ref 1–4.8)
LYMPHOCYTES # BLD AUTO: 1.6 K/UL (ref 1–4.8)
LYMPHOCYTES # BLD AUTO: 1.7 K/UL (ref 1–4.8)
LYMPHOCYTES # BLD AUTO: 1.8 K/UL (ref 1–4.8)
LYMPHOCYTES # BLD AUTO: 1.9 K/UL (ref 1–4.8)
LYMPHOCYTES # BLD AUTO: 1.9 K/UL (ref 1–4.8)
LYMPHOCYTES # BLD AUTO: 2.1 K/UL (ref 1–4.8)
LYMPHOCYTES # BLD AUTO: 2.2 K/UL (ref 1–4.8)
LYMPHOCYTES # BLD AUTO: 2.5 K/UL (ref 1–4.8)
LYMPHOCYTES NFR BLD: 10 % (ref 18–48)
LYMPHOCYTES NFR BLD: 11.6 % (ref 18–48)
LYMPHOCYTES NFR BLD: 14 % (ref 18–48)
LYMPHOCYTES NFR BLD: 16.2 % (ref 18–48)
LYMPHOCYTES NFR BLD: 16.5 % (ref 18–48)
LYMPHOCYTES NFR BLD: 16.5 % (ref 18–48)
LYMPHOCYTES NFR BLD: 16.8 % (ref 18–48)
LYMPHOCYTES NFR BLD: 16.8 % (ref 18–48)
LYMPHOCYTES NFR BLD: 16.9 % (ref 18–48)
LYMPHOCYTES NFR BLD: 17 % (ref 18–48)
LYMPHOCYTES NFR BLD: 17.5 % (ref 18–48)
LYMPHOCYTES NFR BLD: 17.6 % (ref 18–48)
LYMPHOCYTES NFR BLD: 18.3 % (ref 18–48)
LYMPHOCYTES NFR BLD: 18.4 % (ref 18–48)
LYMPHOCYTES NFR BLD: 18.5 % (ref 18–48)
LYMPHOCYTES NFR BLD: 19.4 % (ref 18–48)
LYMPHOCYTES NFR BLD: 20.5 % (ref 18–48)
LYMPHOCYTES NFR BLD: 20.6 % (ref 18–48)
LYMPHOCYTES NFR BLD: 21 % (ref 18–48)
LYMPHOCYTES NFR BLD: 22.9 % (ref 18–48)
LYMPHOCYTES NFR BLD: 23.2 % (ref 18–48)
LYMPHOCYTES NFR BLD: 23.3 % (ref 18–48)
LYMPHOCYTES NFR BLD: 24.7 % (ref 18–48)
LYMPHOCYTES NFR BLD: 25.5 % (ref 18–48)
LYMPHOCYTES NFR BLD: 27.3 % (ref 18–48)
LYMPHOCYTES NFR BLD: 27.8 % (ref 18–48)
LYMPHOCYTES NFR BLD: 32.2 % (ref 18–48)
LYMPHOCYTES NFR BLD: 37.4 % (ref 18–48)
MAGNESIUM SERPL-MCNC: 1.5 MG/DL (ref 1.6–2.6)
MAGNESIUM SERPL-MCNC: 1.6 MG/DL (ref 1.6–2.6)
MAGNESIUM SERPL-MCNC: 1.7 MG/DL (ref 1.6–2.6)
MAGNESIUM SERPL-MCNC: 1.8 MG/DL (ref 1.6–2.6)
MAGNESIUM SERPL-MCNC: 1.9 MG/DL (ref 1.6–2.6)
MAGNESIUM SERPL-MCNC: 2 MG/DL (ref 1.6–2.6)
MAGNESIUM SERPL-MCNC: 2.1 MG/DL
MAGNESIUM SERPL-MCNC: 2.1 MG/DL (ref 1.6–2.6)
MAGNESIUM SERPL-MCNC: 2.2 MG/DL (ref 1.6–2.6)
MAGNESIUM SERPL-MCNC: 2.3 MG/DL (ref 1.6–2.6)
MAGNESIUM SERPL-MCNC: 2.4 MG/DL (ref 1.6–2.6)
MAGNESIUM SERPL-MCNC: 3 MG/DL (ref 1.6–2.6)
MCH RBC QN AUTO: 28 PG (ref 27–31)
MCH RBC QN AUTO: 28.3 PG (ref 27–31)
MCH RBC QN AUTO: 28.4 PG (ref 27–31)
MCH RBC QN AUTO: 28.4 PG (ref 27–31)
MCH RBC QN AUTO: 28.5 PG (ref 27–31)
MCH RBC QN AUTO: 28.5 PG (ref 27–31)
MCH RBC QN AUTO: 28.6 PG (ref 27–31)
MCH RBC QN AUTO: 28.8 PG (ref 27–31)
MCH RBC QN AUTO: 28.9 PG (ref 27–31)
MCH RBC QN AUTO: 29 PG (ref 27–31)
MCH RBC QN AUTO: 29.3 PG (ref 27–31)
MCH RBC QN AUTO: 29.5 PG (ref 27–31)
MCH RBC QN AUTO: 29.8 PG (ref 27–31)
MCH RBC QN AUTO: 29.9 PG (ref 27–31)
MCH RBC QN AUTO: 30 PG (ref 27–31)
MCH RBC QN AUTO: 30.2 PG (ref 27–31)
MCH RBC QN AUTO: 30.6 PG (ref 27–31)
MCHC RBC AUTO-ENTMCNC: 30.3 G/DL (ref 32–36)
MCHC RBC AUTO-ENTMCNC: 30.4 G/DL (ref 32–36)
MCHC RBC AUTO-ENTMCNC: 30.9 G/DL (ref 32–36)
MCHC RBC AUTO-ENTMCNC: 31.1 G/DL (ref 32–36)
MCHC RBC AUTO-ENTMCNC: 31.1 G/DL (ref 32–36)
MCHC RBC AUTO-ENTMCNC: 31.2 G/DL (ref 32–36)
MCHC RBC AUTO-ENTMCNC: 31.4 G/DL (ref 32–36)
MCHC RBC AUTO-ENTMCNC: 31.5 G/DL (ref 32–36)
MCHC RBC AUTO-ENTMCNC: 31.7 G/DL (ref 32–36)
MCHC RBC AUTO-ENTMCNC: 31.9 G/DL (ref 32–36)
MCHC RBC AUTO-ENTMCNC: 32 G/DL (ref 32–36)
MCHC RBC AUTO-ENTMCNC: 32.4 G/DL (ref 32–36)
MCHC RBC AUTO-ENTMCNC: 32.4 G/DL (ref 32–36)
MCHC RBC AUTO-ENTMCNC: 32.6 G/DL (ref 32–36)
MCHC RBC AUTO-ENTMCNC: 32.6 G/DL (ref 32–36)
MCHC RBC AUTO-ENTMCNC: 32.7 G/DL (ref 32–36)
MCHC RBC AUTO-ENTMCNC: 32.8 G/DL (ref 32–36)
MCHC RBC AUTO-ENTMCNC: 33 G/DL (ref 32–36)
MCHC RBC AUTO-ENTMCNC: 33.1 G/DL (ref 32–36)
MCHC RBC AUTO-ENTMCNC: 33.2 G/DL (ref 32–36)
MCHC RBC AUTO-ENTMCNC: 33.3 G/DL (ref 32–36)
MCHC RBC AUTO-ENTMCNC: 33.4 G/DL (ref 32–36)
MCHC RBC AUTO-ENTMCNC: 33.5 G/DL (ref 32–36)
MCHC RBC AUTO-ENTMCNC: 33.6 G/DL (ref 32–36)
MCHC RBC AUTO-ENTMCNC: 34.3 G/DL (ref 32–36)
MCHC RBC AUTO-ENTMCNC: 34.4 G/DL (ref 32–36)
MCHC RBC AUTO-ENTMCNC: 34.6 G/DL (ref 32–36)
MCHC RBC AUTO-ENTMCNC: 34.8 G/DL (ref 32–36)
MCV RBC AUTO: 84 FL (ref 82–98)
MCV RBC AUTO: 84 FL (ref 82–98)
MCV RBC AUTO: 85 FL (ref 82–98)
MCV RBC AUTO: 87 FL (ref 82–98)
MCV RBC AUTO: 87 FL (ref 82–98)
MCV RBC AUTO: 88 FL (ref 82–98)
MCV RBC AUTO: 89 FL (ref 82–98)
MCV RBC AUTO: 90 FL (ref 82–98)
MCV RBC AUTO: 91 FL (ref 82–98)
MCV RBC AUTO: 92 FL (ref 82–98)
MCV RBC AUTO: 93 FL (ref 82–98)
MCV RBC AUTO: 94 FL (ref 82–98)
METHEMOGLOBIN: 0.3 % (ref 0–3)
METHEMOGLOBIN: 0.4 % (ref 0–3)
MICROSCOPIC COMMENT: ABNORMAL
MICROSCOPIC COMMENT: ABNORMAL
MITRAL VALVE REGURGITATION: ABNORMAL
MODE: ABNORMAL
MONOCYTES # BLD AUTO: 0.5 K/UL (ref 0.3–1)
MONOCYTES # BLD AUTO: 0.5 K/UL (ref 0.3–1)
MONOCYTES # BLD AUTO: 0.6 K/UL (ref 0.3–1)
MONOCYTES # BLD AUTO: 0.8 K/UL (ref 0.3–1)
MONOCYTES # BLD AUTO: 0.9 K/UL (ref 0.3–1)
MONOCYTES # BLD AUTO: 1 K/UL (ref 0.3–1)
MONOCYTES # BLD AUTO: 1.1 K/UL (ref 0.3–1)
MONOCYTES # BLD AUTO: 1.2 K/UL (ref 0.3–1)
MONOCYTES # BLD AUTO: 1.3 K/UL (ref 0.3–1)
MONOCYTES # BLD AUTO: 1.4 K/UL (ref 0.3–1)
MONOCYTES NFR BLD: 11 % (ref 4–15)
MONOCYTES NFR BLD: 11.2 % (ref 4–15)
MONOCYTES NFR BLD: 11.3 % (ref 4–15)
MONOCYTES NFR BLD: 11.3 % (ref 4–15)
MONOCYTES NFR BLD: 11.5 % (ref 4–15)
MONOCYTES NFR BLD: 11.6 % (ref 4–15)
MONOCYTES NFR BLD: 11.7 % (ref 4–15)
MONOCYTES NFR BLD: 12 % (ref 4–15)
MONOCYTES NFR BLD: 12.6 % (ref 4–15)
MONOCYTES NFR BLD: 12.7 % (ref 4–15)
MONOCYTES NFR BLD: 13.2 % (ref 4–15)
MONOCYTES NFR BLD: 13.2 % (ref 4–15)
MONOCYTES NFR BLD: 13.3 % (ref 4–15)
MONOCYTES NFR BLD: 13.4 % (ref 4–15)
MONOCYTES NFR BLD: 13.5 % (ref 4–15)
MONOCYTES NFR BLD: 14 % (ref 4–15)
MONOCYTES NFR BLD: 14.2 % (ref 4–15)
MONOCYTES NFR BLD: 14.4 % (ref 4–15)
MONOCYTES NFR BLD: 14.4 % (ref 4–15)
MONOCYTES NFR BLD: 14.5 % (ref 4–15)
MONOCYTES NFR BLD: 15.1 % (ref 4–15)
MONOCYTES NFR BLD: 15.2 % (ref 4–15)
MONOCYTES NFR BLD: 15.6 % (ref 4–15)
MONOCYTES NFR BLD: 7.7 % (ref 4–15)
MONOCYTES NFR BLD: 8 % (ref 4–15)
MONOCYTES NFR BLD: 8 % (ref 4–15)
MONOCYTES NFR BLD: 8.6 % (ref 4–15)
MONOCYTES NFR BLD: 8.9 % (ref 4–15)
MONOCYTES NFR BLD: 9 % (ref 4–15)
MONOCYTES NFR BLD: 9.9 % (ref 4–15)
MV PEAK E VEL: 1.12 M/S
MV PEAK E VEL: 1.12 M/S
NEUTROPHILS # BLD AUTO: 3.5 K/UL (ref 1.8–7.7)
NEUTROPHILS # BLD AUTO: 3.6 K/UL (ref 1.8–7.7)
NEUTROPHILS # BLD AUTO: 3.6 K/UL (ref 1.8–7.7)
NEUTROPHILS # BLD AUTO: 3.7 K/UL (ref 1.8–7.7)
NEUTROPHILS # BLD AUTO: 4 K/UL (ref 1.8–7.7)
NEUTROPHILS # BLD AUTO: 4.1 K/UL (ref 1.8–7.7)
NEUTROPHILS # BLD AUTO: 4.1 K/UL (ref 1.8–7.7)
NEUTROPHILS # BLD AUTO: 4.2 K/UL (ref 1.8–7.7)
NEUTROPHILS # BLD AUTO: 4.5 K/UL (ref 1.8–7.7)
NEUTROPHILS # BLD AUTO: 4.7 K/UL (ref 1.8–7.7)
NEUTROPHILS # BLD AUTO: 4.7 K/UL (ref 1.8–7.7)
NEUTROPHILS # BLD AUTO: 5 K/UL (ref 1.8–7.7)
NEUTROPHILS # BLD AUTO: 5.1 K/UL (ref 1.8–7.7)
NEUTROPHILS # BLD AUTO: 5.2 K/UL (ref 1.8–7.7)
NEUTROPHILS # BLD AUTO: 5.3 K/UL (ref 1.8–7.7)
NEUTROPHILS # BLD AUTO: 5.3 K/UL (ref 1.8–7.7)
NEUTROPHILS # BLD AUTO: 5.6 K/UL (ref 1.8–7.7)
NEUTROPHILS # BLD AUTO: 5.8 K/UL (ref 1.8–7.7)
NEUTROPHILS # BLD AUTO: 6 K/UL (ref 1.8–7.7)
NEUTROPHILS # BLD AUTO: 6 K/UL (ref 1.8–7.7)
NEUTROPHILS # BLD AUTO: 6.2 K/UL (ref 1.8–7.7)
NEUTROPHILS # BLD AUTO: 6.4 K/UL (ref 1.8–7.7)
NEUTROPHILS # BLD AUTO: 6.7 K/UL (ref 1.8–7.7)
NEUTROPHILS # BLD AUTO: 6.9 K/UL (ref 1.8–7.7)
NEUTROPHILS # BLD AUTO: 7.1 K/UL (ref 1.8–7.7)
NEUTROPHILS # BLD AUTO: 7.2 K/UL (ref 1.8–7.7)
NEUTROPHILS # BLD AUTO: 7.8 K/UL (ref 1.8–7.7)
NEUTROPHILS # BLD AUTO: 7.9 K/UL (ref 1.8–7.7)
NEUTROPHILS NFR BLD: 52.8 % (ref 38–73)
NEUTROPHILS NFR BLD: 54.2 % (ref 38–73)
NEUTROPHILS NFR BLD: 58.6 % (ref 38–73)
NEUTROPHILS NFR BLD: 58.6 % (ref 38–73)
NEUTROPHILS NFR BLD: 60.8 % (ref 38–73)
NEUTROPHILS NFR BLD: 61.3 % (ref 38–73)
NEUTROPHILS NFR BLD: 62.3 % (ref 38–73)
NEUTROPHILS NFR BLD: 62.6 % (ref 38–73)
NEUTROPHILS NFR BLD: 64 % (ref 38–73)
NEUTROPHILS NFR BLD: 64.3 % (ref 38–73)
NEUTROPHILS NFR BLD: 64.9 % (ref 38–73)
NEUTROPHILS NFR BLD: 64.9 % (ref 38–73)
NEUTROPHILS NFR BLD: 66.5 % (ref 38–73)
NEUTROPHILS NFR BLD: 66.6 % (ref 38–73)
NEUTROPHILS NFR BLD: 66.7 % (ref 38–73)
NEUTROPHILS NFR BLD: 66.9 % (ref 38–73)
NEUTROPHILS NFR BLD: 67.3 % (ref 38–73)
NEUTROPHILS NFR BLD: 67.5 % (ref 38–73)
NEUTROPHILS NFR BLD: 67.8 % (ref 38–73)
NEUTROPHILS NFR BLD: 68.9 % (ref 38–73)
NEUTROPHILS NFR BLD: 69.1 % (ref 38–73)
NEUTROPHILS NFR BLD: 70.2 % (ref 38–73)
NEUTROPHILS NFR BLD: 70.6 % (ref 38–73)
NEUTROPHILS NFR BLD: 70.7 % (ref 38–73)
NEUTROPHILS NFR BLD: 72.9 % (ref 38–73)
NEUTROPHILS NFR BLD: 73 % (ref 38–73)
NEUTROPHILS NFR BLD: 74.1 % (ref 38–73)
NEUTROPHILS NFR BLD: 74.8 % (ref 38–73)
NEUTROPHILS NFR BLD: 75.4 % (ref 38–73)
NEUTROPHILS NFR BLD: 77.9 % (ref 38–73)
NITRITE UR QL STRIP: NEGATIVE
NITRITE UR QL STRIP: NEGATIVE
NRBC BLD-RTO: 0 /100 WBC
NRBC BLD-RTO: 1 /100 WBC
NRBC BLD-RTO: 3 /100 WBC
NRBC BLD-RTO: 4 /100 WBC
NRBC BLD-RTO: 5 /100 WBC
NT-PROBNP SERPL-MCNC: ABNORMAL PG/ML
NT-PROBNP SERPL-MCNC: ABNORMAL PG/ML
OVALOCYTES BLD QL SMEAR: ABNORMAL
PCO2 BLDA: 40.7 MMHG (ref 35–45)
PCO2 BLDA: 41.1 MMHG (ref 35–45)
PCO2 BLDA: 41.3 MMHG (ref 35–45)
PCO2 BLDA: 41.3 MMHG (ref 35–45)
PCO2 BLDA: 41.4 MMHG (ref 35–45)
PCO2 BLDA: 42 MMHG (ref 35–45)
PCO2 BLDA: 43.2 MMHG (ref 35–45)
PCO2 BLDA: 44.4 MMHG (ref 35–45)
PCO2 BLDA: 46.7 MMHG (ref 35–45)
PCO2 BLDA: 46.9 MMHG (ref 35–45)
PCO2 BLDA: 47.1 MMHG (ref 35–45)
PCO2 BLDA: 47.6 MMHG (ref 35–45)
PCO2 BLDA: 47.6 MMHG (ref 35–45)
PCO2 BLDA: 48 MMHG (ref 35–45)
PCO2 BLDA: 49.2 MMHG (ref 35–45)
PCO2 BLDA: 49.3 MMHG (ref 35–45)
PCO2 BLDA: 50.3 MMHG (ref 35–45)
PCO2 BLDA: 51.2 MMHG (ref 35–45)
PCO2 BLDA: 53.8 MMHG (ref 35–45)
PCO2 BLDA: 54.7 MMHG (ref 35–45)
PCO2 BLDA: 54.7 MMHG (ref 35–45)
PCO2 BLDA: 55.4 MMHG (ref 35–45)
PCO2 BLDA: 55.5 MMHG (ref 35–45)
PCO2 BLDA: 56.6 MMHG (ref 35–45)
PCO2 BLDA: 56.7 MMHG (ref 35–45)
PCO2 BLDA: 58.1 MMHG (ref 35–45)
PCO2 BLDA: 59.8 MMHG (ref 35–45)
PCO2 BLDA: 60.2 MMHG (ref 35–45)
PH SMN: 7.34 [PH] (ref 7.35–7.45)
PH SMN: 7.34 [PH] (ref 7.35–7.45)
PH SMN: 7.35 [PH] (ref 7.35–7.45)
PH SMN: 7.37 [PH] (ref 7.35–7.45)
PH SMN: 7.41 [PH] (ref 7.35–7.45)
PH SMN: 7.41 [PH] (ref 7.35–7.45)
PH SMN: 7.42 [PH] (ref 7.35–7.45)
PH SMN: 7.42 [PH] (ref 7.35–7.45)
PH SMN: 7.43 [PH] (ref 7.35–7.45)
PH SMN: 7.44 [PH] (ref 7.35–7.45)
PH SMN: 7.45 [PH] (ref 7.35–7.45)
PH SMN: 7.46 [PH] (ref 7.35–7.45)
PH SMN: 7.46 [PH] (ref 7.35–7.45)
PH SMN: 7.47 [PH] (ref 7.35–7.45)
PH SMN: 7.48 [PH] (ref 7.35–7.45)
PH SMN: 7.49 [PH] (ref 7.35–7.45)
PH SMN: 7.49 [PH] (ref 7.35–7.45)
PH UR STRIP: 5 [PH] (ref 5–8)
PH UR STRIP: 5 [PH] (ref 5–8)
PISA TR MAX VEL: 2.8 M/S
PISA TR MAX VEL: 2.98 M/S
PISA TR MAX VEL: 3.1 M/S
PLATELET # BLD AUTO: 107 K/UL (ref 150–350)
PLATELET # BLD AUTO: 118 K/UL (ref 150–350)
PLATELET # BLD AUTO: 127 K/UL (ref 150–350)
PLATELET # BLD AUTO: 131 K/UL (ref 150–350)
PLATELET # BLD AUTO: 132 K/UL (ref 150–350)
PLATELET # BLD AUTO: 132 K/UL (ref 150–350)
PLATELET # BLD AUTO: 135 K/UL (ref 150–350)
PLATELET # BLD AUTO: 148 K/UL (ref 150–350)
PLATELET # BLD AUTO: 149 K/UL (ref 150–350)
PLATELET # BLD AUTO: 152 K/UL (ref 150–350)
PLATELET # BLD AUTO: 166 K/UL (ref 150–350)
PLATELET # BLD AUTO: 178 K/UL (ref 150–350)
PLATELET # BLD AUTO: 180 K/UL (ref 150–350)
PLATELET # BLD AUTO: 183 K/UL (ref 150–350)
PLATELET # BLD AUTO: 184 K/UL (ref 150–350)
PLATELET # BLD AUTO: 187 K/UL (ref 150–350)
PLATELET # BLD AUTO: 192 K/UL (ref 150–350)
PLATELET # BLD AUTO: 196 K/UL (ref 150–350)
PLATELET # BLD AUTO: 198 K/UL (ref 150–350)
PLATELET # BLD AUTO: 218 K/UL (ref 150–350)
PLATELET # BLD AUTO: 222 K/UL (ref 150–350)
PLATELET # BLD AUTO: 243 K/UL (ref 150–350)
PLATELET # BLD AUTO: 249 K/UL (ref 150–350)
PLATELET # BLD AUTO: 256 K/UL (ref 150–350)
PLATELET # BLD AUTO: 266 K/UL (ref 150–350)
PLATELET # BLD AUTO: 75 K/UL (ref 150–350)
PLATELET # BLD AUTO: 85 K/UL (ref 150–350)
PLATELET # BLD AUTO: 87 K/UL (ref 150–350)
PLATELET # BLD AUTO: 95 K/UL (ref 150–350)
PLATELET # BLD AUTO: 98 K/UL (ref 150–350)
PLATELET BLD QL SMEAR: ABNORMAL
PMV BLD AUTO: 11.7 FL (ref 9.2–12.9)
PMV BLD AUTO: 11.8 FL (ref 9.2–12.9)
PMV BLD AUTO: 12.1 FL (ref 9.2–12.9)
PMV BLD AUTO: 12.2 FL (ref 9.2–12.9)
PMV BLD AUTO: 12.3 FL (ref 9.2–12.9)
PMV BLD AUTO: 12.4 FL (ref 9.2–12.9)
PMV BLD AUTO: 12.4 FL (ref 9.2–12.9)
PMV BLD AUTO: 12.5 FL (ref 9.2–12.9)
PMV BLD AUTO: 12.6 FL (ref 9.2–12.9)
PMV BLD AUTO: 12.7 FL (ref 9.2–12.9)
PMV BLD AUTO: 12.7 FL (ref 9.2–12.9)
PMV BLD AUTO: 12.8 FL (ref 9.2–12.9)
PMV BLD AUTO: 12.9 FL (ref 9.2–12.9)
PMV BLD AUTO: 13 FL (ref 9.2–12.9)
PMV BLD AUTO: 13.2 FL (ref 9.2–12.9)
PMV BLD AUTO: 13.3 FL (ref 9.2–12.9)
PMV BLD AUTO: 13.5 FL (ref 9.2–12.9)
PMV BLD AUTO: 13.7 FL (ref 9.2–12.9)
PO2 BLDA: 27 MMHG (ref 40–60)
PO2 BLDA: 28 MMHG (ref 40–60)
PO2 BLDA: 29 MMHG (ref 40–60)
PO2 BLDA: 29 MMHG (ref 40–60)
PO2 BLDA: 30 MMHG (ref 40–60)
PO2 BLDA: 31 MMHG (ref 40–60)
PO2 BLDA: 32 MMHG (ref 40–60)
PO2 BLDA: 33 MMHG (ref 40–60)
PO2 BLDA: 33 MMHG (ref 40–60)
PO2 BLDA: 34 MMHG (ref 40–60)
PO2 BLDA: 35 MMHG (ref 40–60)
PO2 BLDA: 36 MMHG (ref 40–60)
PO2 BLDA: 37 MMHG (ref 40–60)
PO2 BLDA: 37 MMHG (ref 40–60)
PO2 BLDA: 38 MMHG (ref 40–60)
PO2 BLDA: 39 MMHG (ref 40–60)
PO2 BLDA: 41 MMHG (ref 40–60)
PO2 BLDA: 43 MMHG (ref 40–60)
POC ACTIVATED CLOTTING TIME K: 142 SEC (ref 74–137)
POC BE: -1 MMOL/L
POC BE: -2 MMOL/L
POC BE: -2 MMOL/L
POC BE: -3 MMOL/L
POC BE: 1 MMOL/L
POC BE: 10 MMOL/L
POC BE: 11 MMOL/L
POC BE: 13 MMOL/L
POC BE: 13 MMOL/L
POC BE: 14 MMOL/L
POC BE: 15 MMOL/L
POC BE: 2 MMOL/L
POC BE: 2 MMOL/L
POC BE: 3 MMOL/L
POC BE: 5 MMOL/L
POC BE: 7 MMOL/L
POC BE: 8 MMOL/L
POC BE: 9 MMOL/L
POC SATURATED O2: 54 % (ref 95–100)
POC SATURATED O2: 55 % (ref 95–100)
POC SATURATED O2: 57 % (ref 95–100)
POC SATURATED O2: 57 % (ref 95–100)
POC SATURATED O2: 58 % (ref 95–100)
POC SATURATED O2: 58 % (ref 95–100)
POC SATURATED O2: 59 % (ref 95–100)
POC SATURATED O2: 59 % (ref 95–100)
POC SATURATED O2: 61 % (ref 95–100)
POC SATURATED O2: 62 % (ref 95–100)
POC SATURATED O2: 63 % (ref 95–100)
POC SATURATED O2: 63 % (ref 95–100)
POC SATURATED O2: 64 % (ref 95–100)
POC SATURATED O2: 64 % (ref 95–100)
POC SATURATED O2: 65 % (ref 95–100)
POC SATURATED O2: 67 % (ref 95–100)
POC SATURATED O2: 69 % (ref 95–100)
POC SATURATED O2: 71 % (ref 95–100)
POC SATURATED O2: 72 % (ref 95–100)
POC SATURATED O2: 73 % (ref 95–100)
POC SATURATED O2: 73 % (ref 95–100)
POC SATURATED O2: 74 % (ref 95–100)
POC SATURATED O2: 76 % (ref 95–100)
POC SATURATED O2: 80 % (ref 95–100)
POC TCO2: 24 MMOL/L (ref 24–29)
POC TCO2: 25 MMOL/L (ref 24–29)
POC TCO2: 25 MMOL/L (ref 24–29)
POC TCO2: 27 MMOL/L (ref 24–29)
POC TCO2: 28 MMOL/L (ref 24–29)
POC TCO2: 28 MMOL/L (ref 24–29)
POC TCO2: 29 MMOL/L (ref 24–29)
POC TCO2: 30 MMOL/L (ref 24–29)
POC TCO2: 31 MMOL/L (ref 24–29)
POC TCO2: 32 MMOL/L (ref 24–29)
POC TCO2: 33 MMOL/L (ref 24–29)
POC TCO2: 35 MMOL/L (ref 24–29)
POC TCO2: 36 MMOL/L (ref 24–29)
POC TCO2: 36 MMOL/L (ref 24–29)
POC TCO2: 37 MMOL/L (ref 24–29)
POC TCO2: 38 MMOL/L (ref 24–29)
POC TCO2: 39 MMOL/L (ref 24–29)
POC TCO2: 40 MMOL/L (ref 24–29)
POIKILOCYTOSIS BLD QL SMEAR: SLIGHT
POLYCHROMASIA BLD QL SMEAR: ABNORMAL
POTASSIUM SERPL-SCNC: 3.3 MMOL/L (ref 3.5–5.1)
POTASSIUM SERPL-SCNC: 3.4 MMOL/L (ref 3.5–5.1)
POTASSIUM SERPL-SCNC: 3.4 MMOL/L (ref 3.5–5.1)
POTASSIUM SERPL-SCNC: 3.5 MMOL/L (ref 3.5–5.1)
POTASSIUM SERPL-SCNC: 3.6 MMOL/L (ref 3.5–5.1)
POTASSIUM SERPL-SCNC: 3.7 MMOL/L
POTASSIUM SERPL-SCNC: 3.7 MMOL/L (ref 3.5–5.1)
POTASSIUM SERPL-SCNC: 3.8 MMOL/L (ref 3.5–5.1)
POTASSIUM SERPL-SCNC: 3.9 MMOL/L (ref 3.5–5.1)
POTASSIUM SERPL-SCNC: 4 MMOL/L (ref 3.5–5.1)
POTASSIUM SERPL-SCNC: 4.1 MMOL/L
POTASSIUM SERPL-SCNC: 4.1 MMOL/L (ref 3.5–5.1)
POTASSIUM SERPL-SCNC: 4.2 MMOL/L (ref 3.5–5.1)
POTASSIUM SERPL-SCNC: 4.3 MMOL/L (ref 3.5–5.1)
POTASSIUM SERPL-SCNC: 4.4 MMOL/L (ref 3.5–5.1)
POTASSIUM SERPL-SCNC: 4.5 MMOL/L (ref 3.5–5.1)
POTASSIUM SERPL-SCNC: 4.5 MMOL/L (ref 3.5–5.1)
POTASSIUM SERPL-SCNC: 4.6 MMOL/L (ref 3.5–5.1)
POTASSIUM SERPL-SCNC: 4.7 MMOL/L (ref 3.5–5.1)
POTASSIUM SERPL-SCNC: 4.8 MMOL/L (ref 3.5–5.1)
POTASSIUM SERPL-SCNC: 4.9 MMOL/L (ref 3.5–5.1)
POTASSIUM SERPL-SCNC: 5 MMOL/L (ref 3.5–5.1)
POTASSIUM SERPL-SCNC: 5.1 MMOL/L (ref 3.5–5.1)
POTASSIUM SERPL-SCNC: 5.2 MMOL/L (ref 3.5–5.1)
POTASSIUM SERPL-SCNC: 5.3 MMOL/L (ref 3.5–5.1)
POTASSIUM SERPL-SCNC: 5.3 MMOL/L (ref 3.5–5.1)
PROT SERPL-MCNC: 6 G/DL (ref 6–8.4)
PROT SERPL-MCNC: 6.2 G/DL (ref 6–8.4)
PROT SERPL-MCNC: 6.3 G/DL (ref 6–8.4)
PROT SERPL-MCNC: 6.3 G/DL (ref 6–8.4)
PROT SERPL-MCNC: 6.4 G/DL (ref 6–8.4)
PROT SERPL-MCNC: 6.5 G/DL (ref 6–8.4)
PROT SERPL-MCNC: 6.5 G/DL (ref 6–8.4)
PROT SERPL-MCNC: 6.6 G/DL (ref 6–8.4)
PROT SERPL-MCNC: 6.7 G/DL
PROT SERPL-MCNC: 6.7 G/DL (ref 6–8.4)
PROT SERPL-MCNC: 6.8 G/DL (ref 6–8.4)
PROT SERPL-MCNC: 6.9 G/DL (ref 6–8.4)
PROT SERPL-MCNC: 7 G/DL (ref 6–8.4)
PROT SERPL-MCNC: 7.1 G/DL (ref 6–8.4)
PROT SERPL-MCNC: 7.1 G/DL (ref 6–8.4)
PROT SERPL-MCNC: 7.2 G/DL (ref 6–8.4)
PROT SERPL-MCNC: 7.4 G/DL (ref 6–8.4)
PROT SERPL-MCNC: 7.4 G/DL (ref 6–8.4)
PROT SERPL-MCNC: 7.5 G/DL (ref 6–8.4)
PROT SERPL-MCNC: 7.6 G/DL (ref 6–8.4)
PROT SERPL-MCNC: 7.7 G/DL (ref 6–8.4)
PROT SERPL-MCNC: 8.1 G/DL (ref 6–8.4)
PROT SERPL-MCNC: 8.2 G/DL
PROT UR QL STRIP: ABNORMAL
PROT UR QL STRIP: ABNORMAL
PROTHROMBIN TIME: 11.7 SEC (ref 9–12.5)
PROTHROMBIN TIME: 12.1 SEC (ref 9–12.5)
PROTHROMBIN TIME: 12.6 SEC (ref 9–12.5)
PROTHROMBIN TIME: 12.9 SEC (ref 9–12.5)
PROTHROMBIN TIME: 13 SEC (ref 9–12.5)
PROTHROMBIN TIME: 15.9 SEC (ref 9–12.5)
PROTHROMBIN TIME: 17.1 SEC (ref 9–12.5)
PROTHROMBIN TIME: 18.4 SEC (ref 9–12.5)
PROTHROMBIN TIME: 18.5 SEC (ref 9–12.5)
PROTHROMBIN TIME: 18.7 SEC (ref 9–12.5)
PROTHROMBIN TIME: 19.1 SEC (ref 9–12.5)
PROTHROMBIN TIME: 19.3 SEC (ref 9–12.5)
PROTHROMBIN TIME: 20 SEC (ref 9–12.5)
PROTHROMBIN TIME: 21.1 SEC (ref 9–12.5)
PROTHROMBIN TIME: 21.9 SEC (ref 9–12.5)
PROTHROMBIN TIME: 22.2 SEC (ref 9–12.5)
PROTHROMBIN TIME: 22.6 SEC (ref 9–12.5)
PROTHROMBIN TIME: 23.1 SEC (ref 9–12.5)
PROTHROMBIN TIME: 23.2 SEC (ref 9–12.5)
PROTHROMBIN TIME: 23.2 SEC (ref 9–12.5)
PROTHROMBIN TIME: 24.2 SEC (ref 9–12.5)
PROTHROMBIN TIME: 26.3 SEC (ref 9–12.5)
PROTHROMBIN TIME: 26.8 SEC (ref 9–12.5)
PROTHROMBIN TIME: 29.3 SEC (ref 9–12.5)
RA MAJOR: 5.35 CM
RA MAJOR: 5.63 CM
RA PRESSURE: 15 MMHG
RA PRESSURE: 8 MMHG
RA WIDTH: 4.31 CM
RA WIDTH: 4.35 CM
RBC # BLD AUTO: 3.79 M/UL (ref 4–5.4)
RBC # BLD AUTO: 3.8 M/UL (ref 4–5.4)
RBC # BLD AUTO: 3.82 M/UL (ref 4–5.4)
RBC # BLD AUTO: 3.85 M/UL (ref 4–5.4)
RBC # BLD AUTO: 3.86 M/UL (ref 4–5.4)
RBC # BLD AUTO: 3.87 M/UL (ref 4–5.4)
RBC # BLD AUTO: 3.88 M/UL (ref 4–5.4)
RBC # BLD AUTO: 3.89 M/UL (ref 4–5.4)
RBC # BLD AUTO: 3.91 M/UL (ref 4–5.4)
RBC # BLD AUTO: 3.92 M/UL (ref 4–5.4)
RBC # BLD AUTO: 3.97 M/UL (ref 4–5.4)
RBC # BLD AUTO: 4.1 M/UL (ref 4–5.4)
RBC # BLD AUTO: 4.11 M/UL (ref 4–5.4)
RBC # BLD AUTO: 4.17 M/UL (ref 4–5.4)
RBC # BLD AUTO: 4.24 M/UL (ref 4–5.4)
RBC # BLD AUTO: 4.26 M/UL (ref 4–5.4)
RBC # BLD AUTO: 4.29 M/UL (ref 4–5.4)
RBC # BLD AUTO: 4.3 M/UL (ref 4–5.4)
RBC # BLD AUTO: 4.35 M/UL (ref 4–5.4)
RBC # BLD AUTO: 4.36 M/UL (ref 4–5.4)
RBC # BLD AUTO: 4.37 M/UL (ref 4–5.4)
RBC # BLD AUTO: 4.43 M/UL (ref 4–5.4)
RBC # BLD AUTO: 4.44 M/UL (ref 4–5.4)
RBC # BLD AUTO: 4.51 M/UL (ref 4–5.4)
RBC # BLD AUTO: 4.57 M/UL (ref 4–5.4)
RBC # BLD AUTO: 4.58 M/UL (ref 4–5.4)
RBC # BLD AUTO: 4.6 M/UL (ref 4–5.4)
RBC # BLD AUTO: 4.68 M/UL (ref 4–5.4)
RBC #/AREA URNS AUTO: 6 /HPF (ref 0–4)
RETIRED EF AND QEF - SEE NOTES: 20 (ref 55–65)
RHEUMATOID FACT SERPL-ACNC: <10 IU/ML (ref 0–15)
RIGHT VENTRICULAR END-DIASTOLIC DIMENSION: 4.51 CM
RIGHT VENTRICULAR END-DIASTOLIC DIMENSION: 5.12 CM
RV TISSUE DOPPLER FREE WALL SYSTOLIC VELOCITY 1 (APICAL 4 CHAMBER VIEW): 8.87 M/S
SAMPLE: ABNORMAL
SINUS: 2.86 CM
SINUS: 2.9 CM
SINUS: 3 CM
SITE: ABNORMAL
SODIUM SERPL-SCNC: 129 MMOL/L (ref 136–145)
SODIUM SERPL-SCNC: 130 MMOL/L (ref 136–145)
SODIUM SERPL-SCNC: 131 MMOL/L (ref 136–145)
SODIUM SERPL-SCNC: 133 MMOL/L (ref 136–145)
SODIUM SERPL-SCNC: 134 MMOL/L (ref 136–145)
SODIUM SERPL-SCNC: 135 MMOL/L (ref 136–145)
SODIUM SERPL-SCNC: 136 MMOL/L (ref 136–145)
SODIUM SERPL-SCNC: 137 MMOL/L (ref 136–145)
SODIUM SERPL-SCNC: 138 MMOL/L (ref 136–145)
SODIUM SERPL-SCNC: 139 MMOL/L (ref 136–145)
SODIUM SERPL-SCNC: 140 MMOL/L (ref 136–145)
SODIUM SERPL-SCNC: 141 MMOL/L (ref 136–145)
SODIUM SERPL-SCNC: 141 MMOL/L (ref 136–145)
SODIUM SERPL-SCNC: 142 MMOL/L (ref 136–145)
SODIUM SERPL-SCNC: 142 MMOL/L (ref 136–145)
SODIUM SERPL-SCNC: 143 MMOL/L (ref 136–145)
SODIUM SERPL-SCNC: 144 MMOL/L
SODIUM SERPL-SCNC: 144 MMOL/L (ref 136–145)
SODIUM SERPL-SCNC: 146 MMOL/L
SP GR UR STRIP: 1.02 (ref 1–1.03)
SP GR UR STRIP: 1.02 (ref 1–1.03)
SP02: 100
SP02: 95
SP02: 95
SP02: 97
SP02: 97
SP02: 98
SQUAMOUS #/AREA URNS AUTO: 3 /HPF
SQUAMOUS #/AREA URNS HPF: 10 /HPF
SRA PORCINE INTERP SER-IMP: NEGATIVE
STJ: 2.6 CM
STJ: 2.77 CM
STJ: 3.25 CM
T4 FREE SERPL-MCNC: 1.07 NG/DL (ref 0.71–1.51)
TARGETS BLD QL SMEAR: ABNORMAL
TDI LATERAL: 0.06
TDI LATERAL: 0.07 M/S
TDI SEPTAL: 0.06
TDI SEPTAL: 0.07 M/S
TDI: 0.06
TDI: 0.07 M/S
TR MAX PG: 31 MMHG
TR MAX PG: 36 MMHG
TR MAX PG: 38.44 MMHG
TRICUSPID ANNULAR PLANE SYSTOLIC EXCURSION: 0.92 CM
TRICUSPID ANNULAR PLANE SYSTOLIC EXCURSION: 1.07 CM
TRICUSPID VALVE REGURGITATION: ABNORMAL
TROPONIN I SERPL DL<=0.01 NG/ML-MCNC: 0.03 NG/ML (ref 0–0.03)
TROPONIN I SERPL DL<=0.01 NG/ML-MCNC: 0.04 NG/ML (ref 0–0.03)
TSH SERPL DL<=0.005 MIU/L-ACNC: 1.25 UIU/ML (ref 0.4–4)
TV REST PULMONARY ARTERY PRESSURE: 46 MMHG
TV REST PULMONARY ARTERY PRESSURE: 51 MMHG
URATE SERPL-MCNC: 13 MG/DL (ref 2.4–5.7)
URN SPEC COLLECT METH UR: ABNORMAL
URN SPEC COLLECT METH UR: ABNORMAL
UROBILINOGEN UR STRIP-ACNC: 1 EU/DL
WBC # BLD AUTO: 10.08 K/UL (ref 3.9–12.7)
WBC # BLD AUTO: 10.23 K/UL (ref 3.9–12.7)
WBC # BLD AUTO: 11.03 K/UL (ref 3.9–12.7)
WBC # BLD AUTO: 5.97 K/UL (ref 3.9–12.7)
WBC # BLD AUTO: 6.12 K/UL (ref 3.9–12.7)
WBC # BLD AUTO: 6.34 K/UL (ref 3.9–12.7)
WBC # BLD AUTO: 6.43 K/UL (ref 3.9–12.7)
WBC # BLD AUTO: 6.59 K/UL (ref 3.9–12.7)
WBC # BLD AUTO: 6.66 K/UL (ref 3.9–12.7)
WBC # BLD AUTO: 6.68 K/UL (ref 3.9–12.7)
WBC # BLD AUTO: 6.76 K/UL (ref 3.9–12.7)
WBC # BLD AUTO: 6.77 K/UL (ref 3.9–12.7)
WBC # BLD AUTO: 7.1 K/UL (ref 3.9–12.7)
WBC # BLD AUTO: 7.27 K/UL (ref 3.9–12.7)
WBC # BLD AUTO: 7.41 K/UL (ref 3.9–12.7)
WBC # BLD AUTO: 7.76 K/UL (ref 3.9–12.7)
WBC # BLD AUTO: 7.8 K/UL (ref 3.9–12.7)
WBC # BLD AUTO: 7.87 K/UL (ref 3.9–12.7)
WBC # BLD AUTO: 8.28 K/UL (ref 3.9–12.7)
WBC # BLD AUTO: 8.33 K/UL (ref 3.9–12.7)
WBC # BLD AUTO: 8.38 K/UL (ref 3.9–12.7)
WBC # BLD AUTO: 8.64 K/UL (ref 3.9–12.7)
WBC # BLD AUTO: 8.72 K/UL (ref 3.9–12.7)
WBC # BLD AUTO: 8.94 K/UL (ref 3.9–12.7)
WBC # BLD AUTO: 9.01 K/UL (ref 3.9–12.7)
WBC # BLD AUTO: 9.53 K/UL (ref 3.9–12.7)
WBC # BLD AUTO: 9.54 K/UL (ref 3.9–12.7)
WBC # BLD AUTO: 9.91 K/UL (ref 3.9–12.7)
WBC # BLD AUTO: 9.96 K/UL (ref 3.9–12.7)
WBC # BLD AUTO: 9.98 K/UL (ref 3.9–12.7)
WBC #/AREA URNS AUTO: >100 /HPF (ref 0–5)
WBC #/AREA URNS HPF: >100 /HPF (ref 0–5)

## 2019-01-01 PROCEDURE — 87077 CULTURE AEROBIC IDENTIFY: CPT

## 2019-01-01 PROCEDURE — 3074F SYST BP LT 130 MM HG: CPT | Mod: CPTII,S$GLB,, | Performed by: INTERNAL MEDICINE

## 2019-01-01 PROCEDURE — 3078F PR MOST RECENT DIASTOLIC BLOOD PRESSURE < 80 MM HG: ICD-10-PCS | Mod: CPTII,S$GLB,, | Performed by: INTERNAL MEDICINE

## 2019-01-01 PROCEDURE — 86200 CCP ANTIBODY: CPT

## 2019-01-01 PROCEDURE — 97530 THERAPEUTIC ACTIVITIES: CPT

## 2019-01-01 PROCEDURE — 97110 THERAPEUTIC EXERCISES: CPT

## 2019-01-01 PROCEDURE — 99999 PR PBB SHADOW E&M-EST. PATIENT-LVL III: ICD-10-PCS | Mod: PBBFAC,,, | Performed by: INTERNAL MEDICINE

## 2019-01-01 PROCEDURE — D9220A PRA ANESTHESIA: ICD-10-PCS | Mod: ANES,,, | Performed by: ANESTHESIOLOGY

## 2019-01-01 PROCEDURE — 3078F PR MOST RECENT DIASTOLIC BLOOD PRESSURE < 80 MM HG: ICD-10-PCS | Mod: CPTII,S$GLB,, | Performed by: NURSE PRACTITIONER

## 2019-01-01 PROCEDURE — 63600175 PHARM REV CODE 636 W HCPCS: Performed by: NURSE ANESTHETIST, CERTIFIED REGISTERED

## 2019-01-01 PROCEDURE — 3074F PR MOST RECENT SYSTOLIC BLOOD PRESSURE < 130 MM HG: ICD-10-PCS | Mod: CPTII,S$GLB,, | Performed by: FAMILY MEDICINE

## 2019-01-01 PROCEDURE — 80048 BASIC METABOLIC PNL TOTAL CA: CPT

## 2019-01-01 PROCEDURE — 85025 COMPLETE CBC W/AUTO DIFF WBC: CPT

## 2019-01-01 PROCEDURE — 93005 ELECTROCARDIOGRAM TRACING: CPT

## 2019-01-01 PROCEDURE — 80053 COMPREHEN METABOLIC PANEL: CPT | Mod: 91

## 2019-01-01 PROCEDURE — 94761 N-INVAS EAR/PLS OXIMETRY MLT: CPT

## 2019-01-01 PROCEDURE — 63600175 PHARM REV CODE 636 W HCPCS: Performed by: INTERNAL MEDICINE

## 2019-01-01 PROCEDURE — 85610 PROTHROMBIN TIME: CPT

## 2019-01-01 PROCEDURE — 3078F DIAST BP <80 MM HG: CPT | Mod: CPTII,S$GLB,, | Performed by: INTERNAL MEDICINE

## 2019-01-01 PROCEDURE — 85730 THROMBOPLASTIN TIME PARTIAL: CPT

## 2019-01-01 PROCEDURE — 86850 RBC ANTIBODY SCREEN: CPT

## 2019-01-01 PROCEDURE — 25000003 PHARM REV CODE 250: Performed by: INTERNAL MEDICINE

## 2019-01-01 PROCEDURE — 36415 COLL VENOUS BLD VENIPUNCTURE: CPT

## 2019-01-01 PROCEDURE — 83735 ASSAY OF MAGNESIUM: CPT

## 2019-01-01 PROCEDURE — 96365 THER/PROPH/DIAG IV INF INIT: CPT

## 2019-01-01 PROCEDURE — 20600001 HC STEP DOWN PRIVATE ROOM

## 2019-01-01 PROCEDURE — 83735 ASSAY OF MAGNESIUM: CPT | Mod: 91

## 2019-01-01 PROCEDURE — 3074F PR MOST RECENT SYSTOLIC BLOOD PRESSURE < 130 MM HG: ICD-10-PCS | Mod: CPTII,S$GLB,, | Performed by: INTERNAL MEDICINE

## 2019-01-01 PROCEDURE — 82803 BLOOD GASES ANY COMBINATION: CPT

## 2019-01-01 PROCEDURE — 3074F SYST BP LT 130 MM HG: CPT | Mod: CPTII,S$GLB,, | Performed by: NURSE PRACTITIONER

## 2019-01-01 PROCEDURE — 92960 CARDIOVERSION ELECTRIC EXT: CPT | Mod: ,,, | Performed by: INTERNAL MEDICINE

## 2019-01-01 PROCEDURE — 27100094 HC IABP, SET-UP

## 2019-01-01 PROCEDURE — 80053 COMPREHEN METABOLIC PANEL: CPT

## 2019-01-01 PROCEDURE — 97116 GAIT TRAINING THERAPY: CPT

## 2019-01-01 PROCEDURE — 97803 MED NUTRITION INDIV SUBSEQ: CPT

## 2019-01-01 PROCEDURE — 63600175 PHARM REV CODE 636 W HCPCS: Performed by: PHYSICIAN ASSISTANT

## 2019-01-01 PROCEDURE — 99233 SBSQ HOSP IP/OBS HIGH 50: CPT | Mod: ,,, | Performed by: INTERNAL MEDICINE

## 2019-01-01 PROCEDURE — D9220A PRA ANESTHESIA: Mod: ANES,,, | Performed by: ANESTHESIOLOGY

## 2019-01-01 PROCEDURE — 37000009 HC ANESTHESIA EA ADD 15 MINS: Performed by: INTERNAL MEDICINE

## 2019-01-01 PROCEDURE — 3078F PR MOST RECENT DIASTOLIC BLOOD PRESSURE < 80 MM HG: ICD-10-PCS | Mod: CPTII,S$GLB,, | Performed by: FAMILY MEDICINE

## 2019-01-01 PROCEDURE — 20000000 HC ICU ROOM

## 2019-01-01 PROCEDURE — 99233 PR SUBSEQUENT HOSPITAL CARE,LEVL III: ICD-10-PCS | Mod: ,,, | Performed by: INTERNAL MEDICINE

## 2019-01-01 PROCEDURE — 85730 THROMBOPLASTIN TIME PARTIAL: CPT | Mod: 91

## 2019-01-01 PROCEDURE — 27200234 HC IABP BALLOON

## 2019-01-01 PROCEDURE — 63600175 PHARM REV CODE 636 W HCPCS: Performed by: NURSE PRACTITIONER

## 2019-01-01 PROCEDURE — 87186 SC STD MICRODIL/AGAR DIL: CPT

## 2019-01-01 PROCEDURE — 97535 SELF CARE MNGMENT TRAINING: CPT

## 2019-01-01 PROCEDURE — 93010 EKG 12-LEAD: ICD-10-PCS | Mod: ,,, | Performed by: INTERNAL MEDICINE

## 2019-01-01 PROCEDURE — 33225 L VENTRIC PACING LEAD ADD-ON: CPT | Performed by: INTERNAL MEDICINE

## 2019-01-01 PROCEDURE — 87449 NOS EACH ORGANISM AG IA: CPT

## 2019-01-01 PROCEDURE — 93000 ELECTROCARDIOGRAM COMPLETE: CPT | Mod: S$GLB,,, | Performed by: INTERNAL MEDICINE

## 2019-01-01 PROCEDURE — 25000003 PHARM REV CODE 250: Performed by: STUDENT IN AN ORGANIZED HEALTH CARE EDUCATION/TRAINING PROGRAM

## 2019-01-01 PROCEDURE — 99214 OFFICE O/P EST MOD 30 MIN: CPT | Mod: S$GLB,,, | Performed by: NURSE PRACTITIONER

## 2019-01-01 PROCEDURE — 99232 PR SUBSEQUENT HOSPITAL CARE,LEVL II: ICD-10-PCS | Mod: GC,,, | Performed by: INTERNAL MEDICINE

## 2019-01-01 PROCEDURE — 33264 PR REMV&REPLC CVD GEN MULT LEAD: ICD-10-PCS | Mod: ,,, | Performed by: INTERNAL MEDICINE

## 2019-01-01 PROCEDURE — 93000 ELECTROCARDIOGRAM COMPLETE: CPT | Mod: S$GLB,,, | Performed by: NUCLEAR MEDICINE

## 2019-01-01 PROCEDURE — 83880 ASSAY OF NATRIURETIC PEPTIDE: CPT

## 2019-01-01 PROCEDURE — 1101F PR PT FALLS ASSESS DOC 0-1 FALLS W/OUT INJ PAST YR: ICD-10-PCS | Mod: CPTII,S$GLB,, | Performed by: FAMILY MEDICINE

## 2019-01-01 PROCEDURE — 25000003 PHARM REV CODE 250: Performed by: NURSE PRACTITIONER

## 2019-01-01 PROCEDURE — 36556 INSERT NON-TUNNEL CV CATH: CPT

## 2019-01-01 PROCEDURE — 1101F PR PT FALLS ASSESS DOC 0-1 FALLS W/OUT INJ PAST YR: ICD-10-PCS | Mod: CPTII,S$GLB,, | Performed by: INTERNAL MEDICINE

## 2019-01-01 PROCEDURE — 1101F PT FALLS ASSESS-DOCD LE1/YR: CPT | Mod: CPTII,S$GLB,, | Performed by: FAMILY MEDICINE

## 2019-01-01 PROCEDURE — 1101F PT FALLS ASSESS-DOCD LE1/YR: CPT | Mod: CPTII,S$GLB,, | Performed by: INTERNAL MEDICINE

## 2019-01-01 PROCEDURE — 83050 HGB METHEMOGLOBIN QUAN: CPT

## 2019-01-01 PROCEDURE — 97162 PT EVAL MOD COMPLEX 30 MIN: CPT

## 2019-01-01 PROCEDURE — 33255 ABLATE ATRIA W/O BYPASS EXT: CPT | Mod: ,,, | Performed by: INTERNAL MEDICINE

## 2019-01-01 PROCEDURE — 25000003 PHARM REV CODE 250: Performed by: EMERGENCY MEDICINE

## 2019-01-01 PROCEDURE — 37000008 HC ANESTHESIA 1ST 15 MINUTES: Performed by: INTERNAL MEDICINE

## 2019-01-01 PROCEDURE — 25000003 PHARM REV CODE 250: Performed by: PHYSICIAN ASSISTANT

## 2019-01-01 PROCEDURE — 63600175 PHARM REV CODE 636 W HCPCS: Performed by: STUDENT IN AN ORGANIZED HEALTH CARE EDUCATION/TRAINING PROGRAM

## 2019-01-01 PROCEDURE — 85025 COMPLETE CBC W/AUTO DIFF WBC: CPT | Mod: 91

## 2019-01-01 PROCEDURE — 80162 ASSAY OF DIGOXIN TOTAL: CPT

## 2019-01-01 PROCEDURE — D9220A PRA ANESTHESIA: ICD-10-PCS | Mod: CRNA,,, | Performed by: NURSE ANESTHETIST, CERTIFIED REGISTERED

## 2019-01-01 PROCEDURE — 81000 URINALYSIS NONAUTO W/SCOPE: CPT

## 2019-01-01 PROCEDURE — 99900035 HC TECH TIME PER 15 MIN (STAT)

## 2019-01-01 PROCEDURE — 99291 CRITICAL CARE FIRST HOUR: CPT | Mod: ,,, | Performed by: INTERNAL MEDICINE

## 2019-01-01 PROCEDURE — 99232 SBSQ HOSP IP/OBS MODERATE 35: CPT | Mod: ,,, | Performed by: INTERNAL MEDICINE

## 2019-01-01 PROCEDURE — 63600175 PHARM REV CODE 636 W HCPCS

## 2019-01-01 PROCEDURE — C1900 LEAD, CORONARY VENOUS: HCPCS | Performed by: INTERNAL MEDICINE

## 2019-01-01 PROCEDURE — 99232 SBSQ HOSP IP/OBS MODERATE 35: CPT | Mod: GC,,, | Performed by: INTERNAL MEDICINE

## 2019-01-01 PROCEDURE — 27200188 HC TRANSDUCER, ART ADULT/PEDS

## 2019-01-01 PROCEDURE — 99232 PR SUBSEQUENT HOSPITAL CARE,LEVL II: ICD-10-PCS | Mod: ,,, | Performed by: INTERNAL MEDICINE

## 2019-01-01 PROCEDURE — 93287 PR PERI-PROC PROG EVAL (IN PERSON) IMPLANT DEVICE,CARDVERT/DEFIB: ICD-10-PCS | Mod: 26,59,, | Performed by: INTERNAL MEDICINE

## 2019-01-01 PROCEDURE — 73562 X-RAY EXAM OF KNEE 3: CPT | Mod: 26,59,RT, | Performed by: RADIOLOGY

## 2019-01-01 PROCEDURE — 82800 BLOOD PH: CPT

## 2019-01-01 PROCEDURE — 1101F PR PT FALLS ASSESS DOC 0-1 FALLS W/OUT INJ PAST YR: ICD-10-PCS | Mod: CPTII,S$GLB,, | Performed by: NURSE PRACTITIONER

## 2019-01-01 PROCEDURE — 86022 PLATELET ANTIBODIES: CPT

## 2019-01-01 PROCEDURE — 99999 PR PBB SHADOW E&M-EST. PATIENT-LVL III: ICD-10-PCS | Mod: PBBFAC,,, | Performed by: NURSE PRACTITIONER

## 2019-01-01 PROCEDURE — 93312 ECHO TRANSESOPHAGEAL: CPT

## 2019-01-01 PROCEDURE — 81001 URINALYSIS AUTO W/SCOPE: CPT

## 2019-01-01 PROCEDURE — 27000221 HC OXYGEN, UP TO 24 HOURS

## 2019-01-01 PROCEDURE — 37799 UNLISTED PX VASCULAR SURGERY: CPT

## 2019-01-01 PROCEDURE — 99214 OFFICE O/P EST MOD 30 MIN: CPT | Mod: S$GLB,,, | Performed by: INTERNAL MEDICINE

## 2019-01-01 PROCEDURE — 99232 SBSQ HOSP IP/OBS MODERATE 35: CPT | Mod: ,,, | Performed by: NURSE PRACTITIONER

## 2019-01-01 PROCEDURE — 3074F PR MOST RECENT SYSTOLIC BLOOD PRESSURE < 130 MM HG: ICD-10-PCS | Mod: CPTII,S$GLB,, | Performed by: NURSE PRACTITIONER

## 2019-01-01 PROCEDURE — 86431 RHEUMATOID FACTOR QUANT: CPT

## 2019-01-01 PROCEDURE — 99215 OFFICE O/P EST HI 40 MIN: CPT | Mod: S$GLB,,, | Performed by: INTERNAL MEDICINE

## 2019-01-01 PROCEDURE — 93010 EKG 12-LEAD: ICD-10-PCS | Mod: 59,,, | Performed by: INTERNAL MEDICINE

## 2019-01-01 PROCEDURE — 84550 ASSAY OF BLOOD/URIC ACID: CPT

## 2019-01-01 PROCEDURE — 1101F PT FALLS ASSESS-DOCD LE1/YR: CPT | Mod: CPTII,S$GLB,, | Performed by: NURSE PRACTITIONER

## 2019-01-01 PROCEDURE — 84439 ASSAY OF FREE THYROXINE: CPT

## 2019-01-01 PROCEDURE — 93005 ELECTROCARDIOGRAM TRACING: CPT | Mod: S$GLB,,, | Performed by: NURSE PRACTITIONER

## 2019-01-01 PROCEDURE — 86803 HEPATITIS C AB TEST: CPT

## 2019-01-01 PROCEDURE — 27201423 OPTIME MED/SURG SUP & DEVICES STERILE SUPPLY: Performed by: INTERNAL MEDICINE

## 2019-01-01 PROCEDURE — 99291 PR CRITICAL CARE, E/M 30-74 MINUTES: ICD-10-PCS | Mod: ,,, | Performed by: NURSE PRACTITIONER

## 2019-01-01 PROCEDURE — 33264 RMVL & RPLCMT DFB GEN MLT LD: CPT | Performed by: INTERNAL MEDICINE

## 2019-01-01 PROCEDURE — 86140 C-REACTIVE PROTEIN: CPT

## 2019-01-01 PROCEDURE — A9502 TC99M TETROFOSMIN: HCPCS

## 2019-01-01 PROCEDURE — 33967 INSERT I-AORT PERCUT DEVICE: CPT

## 2019-01-01 PROCEDURE — S0073 INJECTION, AZTREONAM, 500 MG: HCPCS | Performed by: STUDENT IN AN ORGANIZED HEALTH CARE EDUCATION/TRAINING PROGRAM

## 2019-01-01 PROCEDURE — 93306 2D ECHO WITH COLOR FLOW DOPPLER: ICD-10-PCS | Mod: S$GLB,,, | Performed by: NUCLEAR MEDICINE

## 2019-01-01 PROCEDURE — 27000202 HC IABP, ADD'L DAY

## 2019-01-01 PROCEDURE — 3078F DIAST BP <80 MM HG: CPT | Mod: CPTII,S$GLB,, | Performed by: NURSE PRACTITIONER

## 2019-01-01 PROCEDURE — 99999 PR PBB SHADOW E&M-EST. PATIENT-LVL III: CPT | Mod: PBBFAC,,, | Performed by: INTERNAL MEDICINE

## 2019-01-01 PROCEDURE — 99999 PR PBB SHADOW E&M-EST. PATIENT-LVL III: CPT | Mod: PBBFAC,,, | Performed by: NURSE PRACTITIONER

## 2019-01-01 PROCEDURE — 25500020 PHARM REV CODE 255: Performed by: INTERNAL MEDICINE

## 2019-01-01 PROCEDURE — 99233 PR SUBSEQUENT HOSPITAL CARE,LEVL III: ICD-10-PCS | Mod: GC,,, | Performed by: INTERNAL MEDICINE

## 2019-01-01 PROCEDURE — 93010 ELECTROCARDIOGRAM REPORT: CPT | Mod: ,,, | Performed by: INTERNAL MEDICINE

## 2019-01-01 PROCEDURE — 73564 XR KNEE ORTHO RIGHT WITH FLEXION: ICD-10-PCS | Mod: 26,RT,, | Performed by: RADIOLOGY

## 2019-01-01 PROCEDURE — 93287 PERI-PX DEVICE EVAL & PRGR: CPT | Mod: 26,59,, | Performed by: INTERNAL MEDICINE

## 2019-01-01 PROCEDURE — C1751 CATH, INF, PER/CENT/MIDLINE: HCPCS

## 2019-01-01 PROCEDURE — 93010 ELECTROCARDIOGRAM REPORT: CPT | Mod: 76,,, | Performed by: INTERNAL MEDICINE

## 2019-01-01 PROCEDURE — 73564 X-RAY EXAM KNEE 4 OR MORE: CPT | Mod: 26,RT,, | Performed by: RADIOLOGY

## 2019-01-01 PROCEDURE — C1733 CATH, EP, OTHR THAN COOL-TIP: HCPCS | Performed by: INTERNAL MEDICINE

## 2019-01-01 PROCEDURE — 63600367 HC NITRIC OXIDE PER HOUR

## 2019-01-01 PROCEDURE — 99214 PR OFFICE/OUTPT VISIT, EST, LEVL IV, 30-39 MIN: ICD-10-PCS | Mod: S$GLB,,, | Performed by: INTERNAL MEDICINE

## 2019-01-01 PROCEDURE — 99999 PR PBB SHADOW E&M-EST. PATIENT-LVL III: CPT | Mod: PBBFAC,,, | Performed by: FAMILY MEDICINE

## 2019-01-01 PROCEDURE — 99233 PR SUBSEQUENT HOSPITAL CARE,LEVL III: ICD-10-PCS | Mod: 25,,, | Performed by: INTERNAL MEDICINE

## 2019-01-01 PROCEDURE — D9220A PRA ANESTHESIA: Mod: CRNA,,, | Performed by: NURSE ANESTHETIST, CERTIFIED REGISTERED

## 2019-01-01 PROCEDURE — C1894 INTRO/SHEATH, NON-LASER: HCPCS | Performed by: INTERNAL MEDICINE

## 2019-01-01 PROCEDURE — 99214 PR OFFICE/OUTPT VISIT, EST, LEVL IV, 30-39 MIN: ICD-10-PCS | Mod: S$GLB,,, | Performed by: NURSE PRACTITIONER

## 2019-01-01 PROCEDURE — 99223 1ST HOSP IP/OBS HIGH 75: CPT | Mod: 25,GC,, | Performed by: INTERNAL MEDICINE

## 2019-01-01 PROCEDURE — 92960 CARDIOVERSION ELECTRIC EXT: CPT | Performed by: INTERNAL MEDICINE

## 2019-01-01 PROCEDURE — 99222 1ST HOSP IP/OBS MODERATE 55: CPT | Mod: ,,, | Performed by: NURSE PRACTITIONER

## 2019-01-01 PROCEDURE — 78452 HT MUSCLE IMAGE SPECT MULT: CPT | Mod: 26,,, | Performed by: INTERNAL MEDICINE

## 2019-01-01 PROCEDURE — 99285 EMERGENCY DEPT VISIT HI MDM: CPT | Mod: 25

## 2019-01-01 PROCEDURE — 99214 PR OFFICE/OUTPT VISIT, EST, LEVL IV, 30-39 MIN: ICD-10-PCS | Mod: 25,S$GLB,, | Performed by: FAMILY MEDICINE

## 2019-01-01 PROCEDURE — 93010 RHYTHM STRIP: ICD-10-PCS | Mod: S$GLB,,, | Performed by: INTERNAL MEDICINE

## 2019-01-01 PROCEDURE — 97168 OT RE-EVAL EST PLAN CARE: CPT

## 2019-01-01 PROCEDURE — 63600175 PHARM REV CODE 636 W HCPCS: Performed by: EMERGENCY MEDICINE

## 2019-01-01 PROCEDURE — 99223 PR INITIAL HOSPITAL CARE,LEVL III: ICD-10-PCS | Mod: 25,GC,, | Performed by: INTERNAL MEDICINE

## 2019-01-01 PROCEDURE — 20610 LARGE JOINT ASPIRATION/INJECTION: L KNEE: ICD-10-PCS | Mod: LT,S$GLB,, | Performed by: FAMILY MEDICINE

## 2019-01-01 PROCEDURE — 93296 REM INTERROG EVL PM/IDS: CPT

## 2019-01-01 PROCEDURE — 92960 PR CARDIOVERSION, ELECTIVE;EXTERN: ICD-10-PCS | Mod: ,,, | Performed by: INTERNAL MEDICINE

## 2019-01-01 PROCEDURE — 97165 OT EVAL LOW COMPLEX 30 MIN: CPT

## 2019-01-01 PROCEDURE — 99215 PR OFFICE/OUTPT VISIT, EST, LEVL V, 40-54 MIN: ICD-10-PCS | Mod: S$GLB,,, | Performed by: INTERNAL MEDICINE

## 2019-01-01 PROCEDURE — 97802 MEDICAL NUTRITION INDIV IN: CPT

## 2019-01-01 PROCEDURE — 93650 ICAR CATH ABLTJ AV NODE FUNC: CPT | Mod: ,,, | Performed by: INTERNAL MEDICINE

## 2019-01-01 PROCEDURE — C1769 GUIDE WIRE: HCPCS | Performed by: INTERNAL MEDICINE

## 2019-01-01 PROCEDURE — 93650 PR ABLATE AV NODE FUNCTION: ICD-10-PCS | Mod: ,,, | Performed by: INTERNAL MEDICINE

## 2019-01-01 PROCEDURE — C1730 CATH, EP, 19 OR FEW ELECT: HCPCS | Performed by: INTERNAL MEDICINE

## 2019-01-01 PROCEDURE — 51701 INSERT BLADDER CATHETER: CPT

## 2019-01-01 PROCEDURE — 85347 COAGULATION TIME ACTIVATED: CPT

## 2019-01-01 PROCEDURE — 99232 PR SUBSEQUENT HOSPITAL CARE,LEVL II: ICD-10-PCS | Mod: ,,, | Performed by: NURSE PRACTITIONER

## 2019-01-01 PROCEDURE — 96375 TX/PRO/DX INJ NEW DRUG ADDON: CPT

## 2019-01-01 PROCEDURE — 3078F DIAST BP <80 MM HG: CPT | Mod: CPTII,S$GLB,, | Performed by: FAMILY MEDICINE

## 2019-01-01 PROCEDURE — 92960 CARDIOVERSION ELECTRIC EXT: CPT

## 2019-01-01 PROCEDURE — 83880 ASSAY OF NATRIURETIC PEPTIDE: CPT | Mod: 91

## 2019-01-01 PROCEDURE — 78452 NM MULTI PHARM STRESS CARDIAC COMPONENT: ICD-10-PCS | Mod: 26,,, | Performed by: INTERNAL MEDICINE

## 2019-01-01 PROCEDURE — 99291 CRITICAL CARE FIRST HOUR: CPT | Mod: ,,, | Performed by: NURSE PRACTITIONER

## 2019-01-01 PROCEDURE — 93306 TRANSTHORACIC ECHO (TTE) COMPLETE: ICD-10-PCS | Mod: S$GLB,,, | Performed by: INTERNAL MEDICINE

## 2019-01-01 PROCEDURE — 20605 DRAIN/INJ JOINT/BURSA W/O US: CPT | Mod: LT,,, | Performed by: STUDENT IN AN ORGANIZED HEALTH CARE EDUCATION/TRAINING PROGRAM

## 2019-01-01 PROCEDURE — 97112 NEUROMUSCULAR REEDUCATION: CPT

## 2019-01-01 PROCEDURE — 93306 TTE W/DOPPLER COMPLETE: CPT | Mod: S$GLB,,, | Performed by: NUCLEAR MEDICINE

## 2019-01-01 PROCEDURE — 83605 ASSAY OF LACTIC ACID: CPT

## 2019-01-01 PROCEDURE — 93000 RHYTHM STRIP: ICD-10-PCS | Mod: S$GLB,,, | Performed by: INTERNAL MEDICINE

## 2019-01-01 PROCEDURE — 73562 X-RAY EXAM OF KNEE 3: CPT | Mod: TC,RT

## 2019-01-01 PROCEDURE — 92960 CARDIOVERSION ELECTRIC EXT: CPT | Mod: 59,,, | Performed by: INTERNAL MEDICINE

## 2019-01-01 PROCEDURE — 84132 ASSAY OF SERUM POTASSIUM: CPT

## 2019-01-01 PROCEDURE — 99233 SBSQ HOSP IP/OBS HIGH 50: CPT | Mod: 25,,, | Performed by: INTERNAL MEDICINE

## 2019-01-01 PROCEDURE — 25000003 PHARM REV CODE 250: Performed by: NURSE ANESTHETIST, CERTIFIED REGISTERED

## 2019-01-01 PROCEDURE — S0073 INJECTION, AZTREONAM, 500 MG: HCPCS | Performed by: INTERNAL MEDICINE

## 2019-01-01 PROCEDURE — 20605 ARTHROCENTESIS: ICD-10-PCS | Mod: LT,,, | Performed by: STUDENT IN AN ORGANIZED HEALTH CARE EDUCATION/TRAINING PROGRAM

## 2019-01-01 PROCEDURE — 99222 PR INITIAL HOSPITAL CARE,LEVL II: ICD-10-PCS | Mod: ,,, | Performed by: NURSE PRACTITIONER

## 2019-01-01 PROCEDURE — 20610 DRAIN/INJ JOINT/BURSA W/O US: CPT | Mod: LT,S$GLB,, | Performed by: FAMILY MEDICINE

## 2019-01-01 PROCEDURE — 93005 ELECTROCARDIOGRAM TRACING: CPT | Mod: 59

## 2019-01-01 PROCEDURE — A4216 STERILE WATER/SALINE, 10 ML: HCPCS | Performed by: NURSE ANESTHETIST, CERTIFIED REGISTERED

## 2019-01-01 PROCEDURE — 33264 RMVL & RPLCMT DFB GEN MLT LD: CPT | Mod: ,,, | Performed by: INTERNAL MEDICINE

## 2019-01-01 PROCEDURE — 84484 ASSAY OF TROPONIN QUANT: CPT | Mod: 91

## 2019-01-01 PROCEDURE — 87088 URINE BACTERIA CULTURE: CPT

## 2019-01-01 PROCEDURE — 93010 EKG 12-LEAD: ICD-10-PCS | Mod: 76,,, | Performed by: INTERNAL MEDICINE

## 2019-01-01 PROCEDURE — 99238 HOSP IP/OBS DSCHRG MGMT 30/<: CPT | Mod: ,,, | Performed by: INTERNAL MEDICINE

## 2019-01-01 PROCEDURE — C1725 CATH, TRANSLUMIN NON-LASER: HCPCS | Performed by: INTERNAL MEDICINE

## 2019-01-01 PROCEDURE — 63600175 PHARM REV CODE 636 W HCPCS: Performed by: ANESTHESIOLOGY

## 2019-01-01 PROCEDURE — 99214 PR OFFICE/OUTPT VISIT, EST, LEVL IV, 30-39 MIN: ICD-10-PCS | Mod: S$GLB,,, | Performed by: FAMILY MEDICINE

## 2019-01-01 PROCEDURE — 73562 XR KNEE ORTHO RIGHT WITH FLEXION: ICD-10-PCS | Mod: 26,59,RT, | Performed by: RADIOLOGY

## 2019-01-01 PROCEDURE — 87086 URINE CULTURE/COLONY COUNT: CPT

## 2019-01-01 PROCEDURE — 92960 PR CARDIOVERSION, ELECTIVE;EXTERN: ICD-10-PCS | Mod: 59,,, | Performed by: INTERNAL MEDICINE

## 2019-01-01 PROCEDURE — 85610 PROTHROMBIN TIME: CPT | Mod: 91

## 2019-01-01 PROCEDURE — 99214 OFFICE O/P EST MOD 30 MIN: CPT | Mod: S$GLB,,, | Performed by: FAMILY MEDICINE

## 2019-01-01 PROCEDURE — C1882 AICD, OTHER THAN SING/DUAL: HCPCS | Performed by: INTERNAL MEDICINE

## 2019-01-01 PROCEDURE — 99238 PR HOSPITAL DISCHARGE DAY,<30 MIN: ICD-10-PCS | Mod: ,,, | Performed by: INTERNAL MEDICINE

## 2019-01-01 PROCEDURE — 3074F SYST BP LT 130 MM HG: CPT | Mod: CPTII,S$GLB,, | Performed by: FAMILY MEDICINE

## 2019-01-01 PROCEDURE — 86022 PLATELET ANTIBODIES: CPT | Mod: 91

## 2019-01-01 PROCEDURE — 84443 ASSAY THYROID STIM HORMONE: CPT

## 2019-01-01 PROCEDURE — 80048 BASIC METABOLIC PNL TOTAL CA: CPT | Mod: 91

## 2019-01-01 PROCEDURE — 83630 LACTOFERRIN FECAL (QUAL): CPT

## 2019-01-01 PROCEDURE — 99222 PR INITIAL HOSPITAL CARE,LEVL II: ICD-10-PCS | Mod: ,,, | Performed by: INTERNAL MEDICINE

## 2019-01-01 PROCEDURE — 84484 ASSAY OF TROPONIN QUANT: CPT

## 2019-01-01 PROCEDURE — 93005 EKG 12-LEAD: ICD-10-PCS | Mod: S$GLB,,, | Performed by: NURSE PRACTITIONER

## 2019-01-01 PROCEDURE — 99291 CRITICAL CARE FIRST HOUR: CPT | Mod: 25

## 2019-01-01 PROCEDURE — 99291 PR CRITICAL CARE, E/M 30-74 MINUTES: ICD-10-PCS | Mod: ,,, | Performed by: INTERNAL MEDICINE

## 2019-01-01 PROCEDURE — 99214 OFFICE O/P EST MOD 30 MIN: CPT | Mod: 25,S$GLB,, | Performed by: FAMILY MEDICINE

## 2019-01-01 PROCEDURE — 33255 PR ABLATE/ RECONSTUCT ATRIA, EXTENS, W/O BYPASS: ICD-10-PCS | Mod: ,,, | Performed by: INTERNAL MEDICINE

## 2019-01-01 PROCEDURE — 93005 ELECTROCARDIOGRAM TRACING: CPT | Mod: S$GLB,,, | Performed by: INTERNAL MEDICINE

## 2019-01-01 PROCEDURE — 25000003 PHARM REV CODE 250

## 2019-01-01 PROCEDURE — 99222 1ST HOSP IP/OBS MODERATE 55: CPT | Mod: ,,, | Performed by: INTERNAL MEDICINE

## 2019-01-01 PROCEDURE — 93015 CV STRESS TEST SUPVJ I&R: CPT | Mod: S$GLB,,, | Performed by: INTERNAL MEDICINE

## 2019-01-01 PROCEDURE — 93000 EKG 12-LEAD: ICD-10-PCS | Mod: S$GLB,,, | Performed by: NUCLEAR MEDICINE

## 2019-01-01 PROCEDURE — C1887 CATHETER, GUIDING: HCPCS | Performed by: INTERNAL MEDICINE

## 2019-01-01 PROCEDURE — 51702 INSERT TEMP BLADDER CATH: CPT

## 2019-01-01 PROCEDURE — 93015 NM MULTI PHARM STRESS CARDIAC COMPONENT: ICD-10-PCS | Mod: S$GLB,,, | Performed by: INTERNAL MEDICINE

## 2019-01-01 PROCEDURE — 93650 ICAR CATH ABLTJ AV NODE FUNC: CPT | Performed by: INTERNAL MEDICINE

## 2019-01-01 PROCEDURE — 93306 TTE W/DOPPLER COMPLETE: CPT | Mod: S$GLB,,, | Performed by: INTERNAL MEDICINE

## 2019-01-01 PROCEDURE — 85652 RBC SED RATE AUTOMATED: CPT

## 2019-01-01 PROCEDURE — 93010 ELECTROCARDIOGRAM REPORT: CPT | Mod: 59,,, | Performed by: INTERNAL MEDICINE

## 2019-01-01 PROCEDURE — 93287 PERI-PX DEVICE EVAL & PRGR: CPT | Mod: 59 | Performed by: INTERNAL MEDICINE

## 2019-01-01 PROCEDURE — 99999 PR PBB SHADOW E&M-EST. PATIENT-LVL III: ICD-10-PCS | Mod: PBBFAC,,, | Performed by: FAMILY MEDICINE

## 2019-01-01 PROCEDURE — 93005 RHYTHM STRIP: ICD-10-PCS | Mod: S$GLB,,, | Performed by: INTERNAL MEDICINE

## 2019-01-01 PROCEDURE — 27201037 HC PRESSURE MONITORING SET UP

## 2019-01-01 PROCEDURE — 92960 CARDIOVERSION ELECTRIC EXT: CPT | Mod: 59 | Performed by: INTERNAL MEDICINE

## 2019-01-01 PROCEDURE — 93010 ELECTROCARDIOGRAM REPORT: CPT | Mod: S$GLB,,, | Performed by: INTERNAL MEDICINE

## 2019-01-01 PROCEDURE — 99233 SBSQ HOSP IP/OBS HIGH 50: CPT | Mod: GC,,, | Performed by: INTERNAL MEDICINE

## 2019-01-01 PROCEDURE — C1893 INTRO/SHEATH, FIXED,NON-PEEL: HCPCS | Performed by: INTERNAL MEDICINE

## 2019-01-01 DEVICE — IMPLANTABLE DEVICE: Type: IMPLANTABLE DEVICE | Site: HEART | Status: FUNCTIONAL

## 2019-01-01 DEVICE — Μ LEFT HEART LEAD
Type: IMPLANTABLE DEVICE | Site: HEART | Status: FUNCTIONAL
Brand: QUICKFLEX™

## 2019-01-01 RX ORDER — LIDOCAINE HYDROCHLORIDE 20 MG/ML
SOLUTION OROPHARYNGEAL
Status: DISCONTINUED | OUTPATIENT
Start: 2019-01-01 | End: 2019-01-01

## 2019-01-01 RX ORDER — ASPIRIN 325 MG
325 TABLET ORAL
Status: COMPLETED | OUTPATIENT
Start: 2019-01-01 | End: 2019-01-01

## 2019-01-01 RX ORDER — HYDRALAZINE HYDROCHLORIDE 25 MG/1
25 TABLET, FILM COATED ORAL ONCE
Status: COMPLETED | OUTPATIENT
Start: 2019-01-01 | End: 2019-01-01

## 2019-01-01 RX ORDER — SILVER SULFADIAZINE 10 G/1000G
CREAM TOPICAL
Status: DISCONTINUED | OUTPATIENT
Start: 2019-01-01 | End: 2019-01-01

## 2019-01-01 RX ORDER — POLYETHYLENE GLYCOL 3350 17 G/17G
17 POWDER, FOR SOLUTION ORAL DAILY
Status: DISCONTINUED | OUTPATIENT
Start: 2019-01-01 | End: 2019-01-01

## 2019-01-01 RX ORDER — SODIUM CHLORIDE 0.9 % (FLUSH) 0.9 %
5 SYRINGE (ML) INJECTION
Status: DISCONTINUED | OUTPATIENT
Start: 2019-01-01 | End: 2019-01-01 | Stop reason: HOSPADM

## 2019-01-01 RX ORDER — OXYCODONE AND ACETAMINOPHEN 5; 325 MG/1; MG/1
1 TABLET ORAL ONCE
Status: COMPLETED | OUTPATIENT
Start: 2019-01-01 | End: 2019-01-01

## 2019-01-01 RX ORDER — SIMVASTATIN 40 MG/1
TABLET, FILM COATED ORAL
Qty: 90 TABLET | Refills: 3 | Status: ON HOLD | OUTPATIENT
Start: 2019-01-01 | End: 2019-01-01 | Stop reason: HOSPADM

## 2019-01-01 RX ORDER — BUPIVACAINE HYDROCHLORIDE 2.5 MG/ML
INJECTION, SOLUTION EPIDURAL; INFILTRATION; INTRACAUDAL
Status: DISCONTINUED | OUTPATIENT
Start: 2019-01-01 | End: 2019-01-01

## 2019-01-01 RX ORDER — POTASSIUM CHLORIDE 20 MEQ/1
20 TABLET, EXTENDED RELEASE ORAL ONCE
Status: COMPLETED | OUTPATIENT
Start: 2019-01-01 | End: 2019-01-01

## 2019-01-01 RX ORDER — PROPOFOL 10 MG/ML
VIAL (ML) INTRAVENOUS
Status: DISCONTINUED | OUTPATIENT
Start: 2019-01-01 | End: 2019-01-01

## 2019-01-01 RX ORDER — ISOSORBIDE DINITRATE 20 MG/1
20 TABLET ORAL EVERY 8 HOURS
Status: DISCONTINUED | OUTPATIENT
Start: 2019-01-01 | End: 2019-01-01

## 2019-01-01 RX ORDER — LISINOPRIL 2.5 MG/1
2.5 TABLET ORAL DAILY
Status: DISCONTINUED | OUTPATIENT
Start: 2019-01-01 | End: 2019-01-01

## 2019-01-01 RX ORDER — BUMETANIDE 1 MG/1
1 TABLET ORAL DAILY
Qty: 90 TABLET | Refills: 3 | Status: SHIPPED | OUTPATIENT
Start: 2019-01-01 | End: 2019-01-01 | Stop reason: SDUPTHER

## 2019-01-01 RX ORDER — LIDOCAINE HYDROCHLORIDE 10 MG/ML
1 INJECTION INFILTRATION; PERINEURAL ONCE
Status: COMPLETED | OUTPATIENT
Start: 2019-01-01 | End: 2019-01-01

## 2019-01-01 RX ORDER — DIPHENHYDRAMINE HYDROCHLORIDE 50 MG/ML
25 INJECTION INTRAMUSCULAR; INTRAVENOUS EVERY 6 HOURS PRN
Status: DISCONTINUED | OUTPATIENT
Start: 2019-01-01 | End: 2019-01-01

## 2019-01-01 RX ORDER — WARFARIN 7.5 MG/1
7.5 TABLET ORAL DAILY
Status: DISCONTINUED | OUTPATIENT
Start: 2019-01-01 | End: 2019-01-01

## 2019-01-01 RX ORDER — HYDRALAZINE HYDROCHLORIDE 25 MG/1
25 TABLET, FILM COATED ORAL EVERY 8 HOURS
Status: DISCONTINUED | OUTPATIENT
Start: 2019-01-01 | End: 2019-01-01

## 2019-01-01 RX ORDER — WARFARIN 2.5 MG/1
2.5 TABLET ORAL
Status: DISCONTINUED | OUTPATIENT
Start: 2019-01-01 | End: 2019-01-01 | Stop reason: HOSPADM

## 2019-01-01 RX ORDER — LORAZEPAM 2 MG/ML
INJECTION INTRAMUSCULAR
Status: COMPLETED
Start: 2019-01-01 | End: 2019-01-01

## 2019-01-01 RX ORDER — LANOLIN ALCOHOL/MO/W.PET/CERES
400 CREAM (GRAM) TOPICAL DAILY
Status: DISCONTINUED | OUTPATIENT
Start: 2019-01-01 | End: 2019-01-01 | Stop reason: HOSPADM

## 2019-01-01 RX ORDER — WARFARIN SODIUM 5 MG/1
5 TABLET ORAL DAILY
Status: DISCONTINUED | OUTPATIENT
Start: 2019-01-01 | End: 2019-01-01

## 2019-01-01 RX ORDER — AMIODARONE HYDROCHLORIDE 200 MG/1
200 TABLET ORAL DAILY
Status: DISCONTINUED | OUTPATIENT
Start: 2019-01-01 | End: 2019-01-01 | Stop reason: HOSPADM

## 2019-01-01 RX ORDER — VANCOMYCIN HCL IN 5 % DEXTROSE 1G/250ML
1000 PLASTIC BAG, INJECTION (ML) INTRAVENOUS
Status: DISCONTINUED | OUTPATIENT
Start: 2019-01-01 | End: 2019-01-01

## 2019-01-01 RX ORDER — COLCHICINE 0.6 MG/1
0.6 TABLET, FILM COATED ORAL 2 TIMES DAILY
Status: DISCONTINUED | OUTPATIENT
Start: 2019-01-01 | End: 2019-01-01

## 2019-01-01 RX ORDER — DOXYCYCLINE HYCLATE 100 MG
100 TABLET ORAL EVERY 12 HOURS
Status: COMPLETED | OUTPATIENT
Start: 2019-01-01 | End: 2019-01-01

## 2019-01-01 RX ORDER — AMIODARONE HYDROCHLORIDE 200 MG/1
200 TABLET ORAL DAILY
Qty: 30 TABLET | Refills: 11
Start: 2019-01-01 | End: 2019-01-01

## 2019-01-01 RX ORDER — WARFARIN 2.5 MG/1
2.5 TABLET ORAL
Status: DISCONTINUED | OUTPATIENT
Start: 2019-01-01 | End: 2019-01-01

## 2019-01-01 RX ORDER — MAGNESIUM SULFATE HEPTAHYDRATE 40 MG/ML
2 INJECTION, SOLUTION INTRAVENOUS ONCE
Status: COMPLETED | OUTPATIENT
Start: 2019-01-01 | End: 2019-01-01

## 2019-01-01 RX ORDER — OXYCODONE HYDROCHLORIDE 5 MG/1
5 TABLET ORAL ONCE
Status: COMPLETED | OUTPATIENT
Start: 2019-01-01 | End: 2019-01-01

## 2019-01-01 RX ORDER — ISOSORBIDE DINITRATE 10 MG/1
10 TABLET ORAL EVERY 8 HOURS
Status: DISCONTINUED | OUTPATIENT
Start: 2019-01-01 | End: 2019-01-01

## 2019-01-01 RX ORDER — LIDOCAINE HYDROCHLORIDE 20 MG/ML
INJECTION, SOLUTION INFILTRATION; PERINEURAL
Status: DISCONTINUED | OUTPATIENT
Start: 2019-01-01 | End: 2019-01-01

## 2019-01-01 RX ORDER — LIDOCAINE HYDROCHLORIDE 10 MG/ML
INJECTION, SOLUTION EPIDURAL; INFILTRATION; INTRACAUDAL; PERINEURAL
Status: DISCONTINUED
Start: 2019-01-01 | End: 2019-01-01 | Stop reason: WASHOUT

## 2019-01-01 RX ORDER — ACETAMINOPHEN 325 MG/1
650 TABLET ORAL ONCE
Status: COMPLETED | OUTPATIENT
Start: 2019-01-01 | End: 2019-01-01

## 2019-01-01 RX ORDER — LIDOCAINE HYDROCHLORIDE 10 MG/ML
INJECTION, SOLUTION EPIDURAL; INFILTRATION; INTRACAUDAL; PERINEURAL
Status: COMPLETED
Start: 2019-01-01 | End: 2019-01-01

## 2019-01-01 RX ORDER — PHENYLEPHRINE HYDROCHLORIDE 10 MG/ML
INJECTION INTRAVENOUS
Status: DISCONTINUED | OUTPATIENT
Start: 2019-01-01 | End: 2019-01-01

## 2019-01-01 RX ORDER — ISOSORBIDE DINITRATE 10 MG/1
10 TABLET ORAL ONCE
Status: DISCONTINUED | OUTPATIENT
Start: 2019-01-01 | End: 2019-01-01

## 2019-01-01 RX ORDER — HYDRALAZINE HYDROCHLORIDE 25 MG/1
25 TABLET, FILM COATED ORAL ONCE
Status: DISCONTINUED | OUTPATIENT
Start: 2019-01-01 | End: 2019-01-01

## 2019-01-01 RX ORDER — METOPROLOL SUCCINATE 200 MG/1
200 TABLET, EXTENDED RELEASE ORAL 2 TIMES DAILY
Qty: 180 TABLET | Refills: 3 | Status: SHIPPED | OUTPATIENT
Start: 2019-01-01 | End: 2019-01-01 | Stop reason: SDUPTHER

## 2019-01-01 RX ORDER — TRIAMCINOLONE ACETONIDE 40 MG/ML
80 INJECTION, SUSPENSION INTRA-ARTICULAR; INTRAMUSCULAR
Status: DISCONTINUED | OUTPATIENT
Start: 2019-01-01 | End: 2019-01-01 | Stop reason: HOSPADM

## 2019-01-01 RX ORDER — PROPOFOL 10 MG/ML
INJECTION, EMULSION INTRAVENOUS
Status: DISCONTINUED | OUTPATIENT
Start: 2019-01-01 | End: 2019-01-01

## 2019-01-01 RX ORDER — FENTANYL CITRATE 50 UG/ML
25 INJECTION, SOLUTION INTRAMUSCULAR; INTRAVENOUS EVERY 5 MIN PRN
Status: DISCONTINUED | OUTPATIENT
Start: 2019-01-01 | End: 2019-01-01

## 2019-01-01 RX ORDER — DILTIAZEM HYDROCHLORIDE 5 MG/ML
20 INJECTION INTRAVENOUS
Status: COMPLETED | OUTPATIENT
Start: 2019-01-01 | End: 2019-01-01

## 2019-01-01 RX ORDER — FUROSEMIDE 10 MG/ML
40 INJECTION INTRAMUSCULAR; INTRAVENOUS DAILY
Status: DISCONTINUED | OUTPATIENT
Start: 2019-01-01 | End: 2019-01-01

## 2019-01-01 RX ORDER — DEXMEDETOMIDINE HYDROCHLORIDE 100 UG/ML
INJECTION, SOLUTION INTRAVENOUS
Status: DISCONTINUED | OUTPATIENT
Start: 2019-01-01 | End: 2019-01-01

## 2019-01-01 RX ORDER — POTASSIUM CHLORIDE 29.8 MG/ML
40 INJECTION INTRAVENOUS ONCE
Status: COMPLETED | OUTPATIENT
Start: 2019-01-01 | End: 2019-01-01

## 2019-01-01 RX ORDER — BUMETANIDE 1 MG/1
2 TABLET ORAL 2 TIMES DAILY
Qty: 120 TABLET | Refills: 3 | Status: SHIPPED | OUTPATIENT
Start: 2019-01-01 | End: 2019-01-01 | Stop reason: CLARIF

## 2019-01-01 RX ORDER — KETAMINE HCL IN 0.9 % NACL 50 MG/5 ML
SYRINGE (ML) INTRAVENOUS
Status: DISCONTINUED | OUTPATIENT
Start: 2019-01-01 | End: 2019-01-01

## 2019-01-01 RX ORDER — EPHEDRINE SULFATE 50 MG/ML
INJECTION, SOLUTION INTRAVENOUS
Status: DISCONTINUED | OUTPATIENT
Start: 2019-01-01 | End: 2019-01-01

## 2019-01-01 RX ORDER — SPIRONOLACTONE 25 MG/1
25 TABLET ORAL DAILY
Status: DISCONTINUED | OUTPATIENT
Start: 2019-01-01 | End: 2019-01-01

## 2019-01-01 RX ORDER — ISOSORBIDE DINITRATE 10 MG/1
10 TABLET ORAL 3 TIMES DAILY
Status: DISCONTINUED | OUTPATIENT
Start: 2019-01-01 | End: 2019-01-01

## 2019-01-01 RX ORDER — TORSEMIDE 20 MG/1
40 TABLET ORAL ONCE
Status: DISCONTINUED | OUTPATIENT
Start: 2019-01-01 | End: 2019-01-01

## 2019-01-01 RX ORDER — WARFARIN SODIUM 5 MG/1
5 TABLET ORAL
Status: DISCONTINUED | OUTPATIENT
Start: 2019-01-01 | End: 2019-01-01

## 2019-01-01 RX ORDER — LANOLIN ALCOHOL/MO/W.PET/CERES
800 CREAM (GRAM) TOPICAL ONCE
Status: COMPLETED | OUTPATIENT
Start: 2019-01-01 | End: 2019-01-01

## 2019-01-01 RX ORDER — ISOSORBIDE DINITRATE 20 MG/1
20 TABLET ORAL 3 TIMES DAILY
Status: DISCONTINUED | OUTPATIENT
Start: 2019-01-01 | End: 2019-01-01

## 2019-01-01 RX ORDER — WARFARIN 2.5 MG/1
2.5 TABLET ORAL ONCE
Status: COMPLETED | OUTPATIENT
Start: 2019-01-01 | End: 2019-01-01

## 2019-01-01 RX ORDER — AZTREONAM 1 G/1
1000 INJECTION, POWDER, LYOPHILIZED, FOR SOLUTION INTRAMUSCULAR; INTRAVENOUS
Status: COMPLETED | OUTPATIENT
Start: 2019-01-01 | End: 2019-01-01

## 2019-01-01 RX ORDER — MORPHINE SULFATE 2 MG/ML
2 INJECTION, SOLUTION INTRAMUSCULAR; INTRAVENOUS ONCE
Status: COMPLETED | OUTPATIENT
Start: 2019-01-01 | End: 2019-01-01

## 2019-01-01 RX ORDER — POTASSIUM CHLORIDE 20 MEQ/1
40 TABLET, EXTENDED RELEASE ORAL ONCE
Status: COMPLETED | OUTPATIENT
Start: 2019-01-01 | End: 2019-01-01

## 2019-01-01 RX ORDER — LIDOCAINE HCL/PF 100 MG/5ML
SYRINGE (ML) INTRAVENOUS
Status: DISCONTINUED | OUTPATIENT
Start: 2019-01-01 | End: 2019-01-01

## 2019-01-01 RX ORDER — METOPROLOL SUCCINATE 25 MG/1
25 TABLET, EXTENDED RELEASE ORAL DAILY
Qty: 30 TABLET | Refills: 11
Start: 2019-01-01 | End: 2020-09-11

## 2019-01-01 RX ORDER — ONDANSETRON 2 MG/ML
4 INJECTION INTRAMUSCULAR; INTRAVENOUS
Status: COMPLETED | OUTPATIENT
Start: 2019-01-01 | End: 2019-01-01

## 2019-01-01 RX ORDER — FUROSEMIDE 40 MG/1
40 TABLET ORAL DAILY
Qty: 30 TABLET | Refills: 11
Start: 2019-01-01 | End: 2020-09-13

## 2019-01-01 RX ORDER — DIGOXIN 125 MCG
0.12 TABLET ORAL
Status: DISCONTINUED | OUTPATIENT
Start: 2019-01-01 | End: 2019-01-01 | Stop reason: HOSPADM

## 2019-01-01 RX ORDER — ACETAMINOPHEN 325 MG/1
650 TABLET ORAL EVERY 6 HOURS PRN
Status: DISCONTINUED | OUTPATIENT
Start: 2019-01-01 | End: 2019-01-01 | Stop reason: HOSPADM

## 2019-01-01 RX ORDER — SENNOSIDES 8.6 MG/1
8.6 TABLET ORAL 2 TIMES DAILY
Status: DISCONTINUED | OUTPATIENT
Start: 2019-01-01 | End: 2019-01-01

## 2019-01-01 RX ORDER — DIGOXIN 125 MCG
0.12 TABLET ORAL
Qty: 12 TABLET | Refills: 11
Start: 2019-01-01 | End: 2020-09-12

## 2019-01-01 RX ORDER — SODIUM CHLORIDE 9 MG/ML
INJECTION, SOLUTION INTRAVENOUS ONCE
Status: DISCONTINUED | OUTPATIENT
Start: 2019-01-01 | End: 2019-01-01

## 2019-01-01 RX ORDER — FUROSEMIDE 20 MG/1
20 TABLET ORAL DAILY
Status: DISCONTINUED | OUTPATIENT
Start: 2019-01-01 | End: 2019-01-01

## 2019-01-01 RX ORDER — POTASSIUM CHLORIDE 20 MEQ/1
20 TABLET, EXTENDED RELEASE ORAL 2 TIMES DAILY
Status: DISCONTINUED | OUTPATIENT
Start: 2019-01-01 | End: 2019-01-01

## 2019-01-01 RX ORDER — SPIRONOLACTONE 25 MG/1
TABLET ORAL
Qty: 90 TABLET | Refills: 3 | Status: SHIPPED | OUTPATIENT
Start: 2019-01-01

## 2019-01-01 RX ORDER — HYDRALAZINE HYDROCHLORIDE 50 MG/1
50 TABLET, FILM COATED ORAL 3 TIMES DAILY
Qty: 270 TABLET | Refills: 3 | Status: ON HOLD | OUTPATIENT
Start: 2019-01-01 | End: 2019-01-01 | Stop reason: HOSPADM

## 2019-01-01 RX ORDER — ONDANSETRON 2 MG/ML
INJECTION INTRAMUSCULAR; INTRAVENOUS
Status: DISCONTINUED | OUTPATIENT
Start: 2019-01-01 | End: 2019-01-01

## 2019-01-01 RX ORDER — FAMOTIDINE 20 MG/1
20 TABLET, FILM COATED ORAL DAILY
Status: DISCONTINUED | OUTPATIENT
Start: 2019-01-01 | End: 2019-01-01 | Stop reason: HOSPADM

## 2019-01-01 RX ORDER — PREDNISONE 20 MG/1
40 TABLET ORAL DAILY
Status: DISCONTINUED | OUTPATIENT
Start: 2019-01-01 | End: 2019-01-01

## 2019-01-01 RX ORDER — POTASSIUM CHLORIDE 20 MEQ/1
20 TABLET, EXTENDED RELEASE ORAL DAILY
Qty: 30 TABLET | Refills: 3 | Status: ON HOLD | OUTPATIENT
Start: 2019-01-01 | End: 2019-01-01 | Stop reason: HOSPADM

## 2019-01-01 RX ORDER — FUROSEMIDE 10 MG/ML
80 INJECTION INTRAMUSCULAR; INTRAVENOUS 2 TIMES DAILY
Status: DISCONTINUED | OUTPATIENT
Start: 2019-01-01 | End: 2019-01-01

## 2019-01-01 RX ORDER — TORSEMIDE 20 MG/1
40 TABLET ORAL ONCE
Status: COMPLETED | OUTPATIENT
Start: 2019-01-01 | End: 2019-01-01

## 2019-01-01 RX ORDER — SODIUM CHLORIDE 9 MG/ML
INJECTION, SOLUTION INTRAVENOUS CONTINUOUS
Status: ACTIVE | OUTPATIENT
Start: 2019-01-01 | End: 2019-01-01

## 2019-01-01 RX ORDER — ISOSORBIDE DINITRATE 10 MG/1
10 TABLET ORAL ONCE
Status: COMPLETED | OUTPATIENT
Start: 2019-01-01 | End: 2019-01-01

## 2019-01-01 RX ORDER — HYDRALAZINE HYDROCHLORIDE 50 MG/1
50 TABLET, FILM COATED ORAL EVERY 8 HOURS
Status: DISCONTINUED | OUTPATIENT
Start: 2019-01-01 | End: 2019-01-01

## 2019-01-01 RX ORDER — TORSEMIDE 20 MG/1
20 TABLET ORAL 2 TIMES DAILY
Qty: 60 TABLET | Refills: 3 | Status: ON HOLD | OUTPATIENT
Start: 2019-01-01 | End: 2019-01-01 | Stop reason: HOSPADM

## 2019-01-01 RX ORDER — HYDROMORPHONE HYDROCHLORIDE 1 MG/ML
0.2 INJECTION, SOLUTION INTRAMUSCULAR; INTRAVENOUS; SUBCUTANEOUS ONCE
Status: COMPLETED | OUTPATIENT
Start: 2019-01-01 | End: 2019-01-01

## 2019-01-01 RX ORDER — VASOPRESSIN 20 [USP'U]/ML
INJECTION, SOLUTION INTRAMUSCULAR; SUBCUTANEOUS
Status: DISCONTINUED | OUTPATIENT
Start: 2019-01-01 | End: 2019-01-01

## 2019-01-01 RX ORDER — COLCHICINE 0.6 MG/1
0.6 TABLET, FILM COATED ORAL DAILY
Status: DISCONTINUED | OUTPATIENT
Start: 2019-01-01 | End: 2019-01-01

## 2019-01-01 RX ORDER — SODIUM CHLORIDE 0.9 % (FLUSH) 0.9 %
3 SYRINGE (ML) INJECTION
Status: DISCONTINUED | OUTPATIENT
Start: 2019-01-01 | End: 2019-01-01 | Stop reason: HOSPADM

## 2019-01-01 RX ORDER — ETOMIDATE 2 MG/ML
INJECTION INTRAVENOUS
Status: DISCONTINUED | OUTPATIENT
Start: 2019-01-01 | End: 2019-01-01

## 2019-01-01 RX ORDER — RAMELTEON 8 MG/1
8 TABLET ORAL NIGHTLY PRN
Status: DISCONTINUED | OUTPATIENT
Start: 2019-01-01 | End: 2019-01-01 | Stop reason: HOSPADM

## 2019-01-01 RX ORDER — HYDROGEN PEROXIDE 3 %
20 SOLUTION, NON-ORAL MISCELLANEOUS
COMMUNITY

## 2019-01-01 RX ORDER — VANCOMYCIN HYDROCHLORIDE 1 G/20ML
INJECTION, POWDER, LYOPHILIZED, FOR SOLUTION INTRAVENOUS
Status: DISCONTINUED | OUTPATIENT
Start: 2019-01-01 | End: 2019-01-01

## 2019-01-01 RX ORDER — SODIUM CHLORIDE 0.9 % (FLUSH) 0.9 %
10 SYRINGE (ML) INJECTION
Status: DISCONTINUED | OUTPATIENT
Start: 2019-01-01 | End: 2019-01-01

## 2019-01-01 RX ORDER — MORPHINE SULFATE 2 MG/ML
1 INJECTION, SOLUTION INTRAMUSCULAR; INTRAVENOUS ONCE
Status: COMPLETED | OUTPATIENT
Start: 2019-01-01 | End: 2019-01-01

## 2019-01-01 RX ORDER — WARFARIN SODIUM 5 MG/1
5 TABLET ORAL
Status: DISCONTINUED | OUTPATIENT
Start: 2019-01-01 | End: 2019-01-01 | Stop reason: HOSPADM

## 2019-01-01 RX ORDER — HYDROMORPHONE HYDROCHLORIDE 1 MG/ML
0.2 INJECTION, SOLUTION INTRAMUSCULAR; INTRAVENOUS; SUBCUTANEOUS EVERY 5 MIN PRN
Status: DISCONTINUED | OUTPATIENT
Start: 2019-01-01 | End: 2019-01-01

## 2019-01-01 RX ORDER — LIDOCAINE HYDROCHLORIDE 40 MG/ML
INJECTION, SOLUTION RETROBULBAR
Status: DISCONTINUED | OUTPATIENT
Start: 2019-01-01 | End: 2019-01-01

## 2019-01-01 RX ORDER — POLYETHYLENE GLYCOL 3350 17 G/17G
17 POWDER, FOR SOLUTION ORAL 2 TIMES DAILY PRN
Status: DISCONTINUED | OUTPATIENT
Start: 2019-01-01 | End: 2019-01-01

## 2019-01-01 RX ORDER — DIGOXIN 0.25 MG/ML
500 INJECTION INTRAMUSCULAR; INTRAVENOUS ONCE
Status: COMPLETED | OUTPATIENT
Start: 2019-01-01 | End: 2019-01-01

## 2019-01-01 RX ORDER — METOPROLOL SUCCINATE 25 MG/1
25 TABLET, EXTENDED RELEASE ORAL DAILY
Status: DISCONTINUED | OUTPATIENT
Start: 2019-01-01 | End: 2019-01-01 | Stop reason: HOSPADM

## 2019-01-01 RX ORDER — METOLAZONE 2.5 MG/1
TABLET ORAL
Qty: 5 TABLET | Refills: 0 | Status: ON HOLD | OUTPATIENT
Start: 2019-01-01 | End: 2019-01-01 | Stop reason: HOSPADM

## 2019-01-01 RX ORDER — METOPROLOL SUCCINATE 200 MG/1
200 TABLET, EXTENDED RELEASE ORAL 2 TIMES DAILY
Qty: 180 TABLET | Refills: 3 | Status: ON HOLD | OUTPATIENT
Start: 2019-01-01 | End: 2019-01-01 | Stop reason: HOSPADM

## 2019-01-01 RX ORDER — AMOXICILLIN 250 MG
2 CAPSULE ORAL 2 TIMES DAILY
Status: DISCONTINUED | OUTPATIENT
Start: 2019-01-01 | End: 2019-01-01

## 2019-01-01 RX ORDER — IODIXANOL 320 MG/ML
INJECTION, SOLUTION INTRAVASCULAR
Status: DISCONTINUED | OUTPATIENT
Start: 2019-01-01 | End: 2019-01-01

## 2019-01-01 RX ORDER — ISOSORBIDE MONONITRATE 30 MG/1
30 TABLET, EXTENDED RELEASE ORAL DAILY
Qty: 90 TABLET | Refills: 3 | Status: SHIPPED | OUTPATIENT
Start: 2019-01-01 | End: 2019-01-01 | Stop reason: SDUPTHER

## 2019-01-01 RX ORDER — LORAZEPAM 2 MG/ML
0.5 INJECTION INTRAMUSCULAR ONCE
Status: COMPLETED | OUTPATIENT
Start: 2019-01-01 | End: 2019-01-01

## 2019-01-01 RX ORDER — MIDAZOLAM HYDROCHLORIDE 1 MG/ML
INJECTION, SOLUTION INTRAMUSCULAR; INTRAVENOUS
Status: DISCONTINUED | OUTPATIENT
Start: 2019-01-01 | End: 2019-01-01

## 2019-01-01 RX ORDER — ACETAMINOPHEN 325 MG/1
650 TABLET ORAL EVERY 4 HOURS PRN
Status: DISPENSED | OUTPATIENT
Start: 2019-01-01 | End: 2019-01-01

## 2019-01-01 RX ORDER — POTASSIUM CHLORIDE 20 MEQ/1
40 TABLET, EXTENDED RELEASE ORAL
Status: COMPLETED | OUTPATIENT
Start: 2019-01-01 | End: 2019-01-01

## 2019-01-01 RX ORDER — SODIUM CHLORIDE 9 MG/ML
INJECTION, SOLUTION INTRAVENOUS CONTINUOUS
Status: DISCONTINUED | OUTPATIENT
Start: 2019-01-01 | End: 2019-01-01 | Stop reason: HOSPADM

## 2019-01-01 RX ORDER — METOLAZONE 2.5 MG/1
5 TABLET ORAL DAILY
Status: DISCONTINUED | OUTPATIENT
Start: 2019-01-01 | End: 2019-01-01

## 2019-01-01 RX ORDER — FUROSEMIDE 40 MG/1
40 TABLET ORAL DAILY
Status: DISCONTINUED | OUTPATIENT
Start: 2019-01-01 | End: 2019-01-01 | Stop reason: HOSPADM

## 2019-01-01 RX ORDER — FUROSEMIDE 10 MG/ML
80 INJECTION INTRAMUSCULAR; INTRAVENOUS ONCE
Status: COMPLETED | OUTPATIENT
Start: 2019-01-01 | End: 2019-01-01

## 2019-01-01 RX ORDER — HYDRALAZINE HYDROCHLORIDE 50 MG/1
50 TABLET, FILM COATED ORAL 3 TIMES DAILY
Qty: 270 TABLET | Refills: 3 | Status: SHIPPED | OUTPATIENT
Start: 2019-01-01 | End: 2019-01-01 | Stop reason: SDUPTHER

## 2019-01-01 RX ORDER — EPINEPHRINE 1 MG/ML
INJECTION, SOLUTION INTRACARDIAC; INTRAMUSCULAR; INTRAVENOUS; SUBCUTANEOUS
Status: DISCONTINUED | OUTPATIENT
Start: 2019-01-01 | End: 2019-01-01

## 2019-01-01 RX ORDER — POTASSIUM CHLORIDE 20 MEQ/1
60 TABLET, EXTENDED RELEASE ORAL ONCE
Qty: 3 TABLET | Refills: 0 | Status: SHIPPED | OUTPATIENT
Start: 2019-01-01 | End: 2019-01-01

## 2019-01-01 RX ORDER — METOLAZONE 2.5 MG/1
10 TABLET ORAL ONCE
Status: COMPLETED | OUTPATIENT
Start: 2019-01-01 | End: 2019-01-01

## 2019-01-01 RX ORDER — COLCHICINE 0.6 MG/1
1.2 TABLET, FILM COATED ORAL ONCE
Status: COMPLETED | OUTPATIENT
Start: 2019-01-01 | End: 2019-01-01

## 2019-01-01 RX ORDER — AMIODARONE HYDROCHLORIDE 400 MG/1
TABLET ORAL
Qty: 90 TABLET | Refills: 3 | Status: SHIPPED | OUTPATIENT
Start: 2019-01-01 | End: 2020-09-26

## 2019-01-01 RX ORDER — LANOLIN ALCOHOL/MO/W.PET/CERES
800 CREAM (GRAM) TOPICAL EVERY 4 HOURS
Status: COMPLETED | OUTPATIENT
Start: 2019-01-01 | End: 2019-01-01

## 2019-01-01 RX ORDER — DIGOXIN 0.25 MG/ML
250 INJECTION INTRAMUSCULAR; INTRAVENOUS ONCE
Status: COMPLETED | OUTPATIENT
Start: 2019-01-01 | End: 2019-01-01

## 2019-01-01 RX ORDER — WARFARIN 2.5 MG/1
2.5 TABLET ORAL
Qty: 12 TABLET | Refills: 11
Start: 2019-01-01 | End: 2020-09-13

## 2019-01-01 RX ORDER — WARFARIN SODIUM 5 MG/1
5 TABLET ORAL
Qty: 12 TABLET | Refills: 11
Start: 2019-01-01 | End: 2020-09-12

## 2019-01-01 RX ORDER — SILVER SULFADIAZINE 10 G/1000G
CREAM TOPICAL
Status: DISCONTINUED | OUTPATIENT
Start: 2019-01-01 | End: 2019-01-01 | Stop reason: HOSPADM

## 2019-01-01 RX ORDER — CIPROFLOXACIN 2 MG/ML
400 INJECTION, SOLUTION INTRAVENOUS
Status: DISCONTINUED | OUTPATIENT
Start: 2019-01-01 | End: 2019-01-01

## 2019-01-01 RX ORDER — ISOSORBIDE MONONITRATE 30 MG/1
30 TABLET, EXTENDED RELEASE ORAL DAILY
Qty: 90 TABLET | Refills: 3 | Status: ON HOLD | OUTPATIENT
Start: 2019-01-01 | End: 2019-01-01 | Stop reason: HOSPADM

## 2019-01-01 RX ORDER — HEPARIN SODIUM,PORCINE/D5W 25000/250
14 INTRAVENOUS SOLUTION INTRAVENOUS CONTINUOUS
Status: DISPENSED | OUTPATIENT
Start: 2019-01-01 | End: 2019-01-01

## 2019-01-01 RX ADMIN — DIGOXIN 500 MCG: 0.25 INJECTION INTRAMUSCULAR; INTRAVENOUS at 10:08

## 2019-01-01 RX ADMIN — WARFARIN SODIUM 7.5 MG: 7.5 TABLET ORAL at 05:08

## 2019-01-01 RX ADMIN — ACETAMINOPHEN 650 MG: 325 TABLET ORAL at 03:08

## 2019-01-01 RX ADMIN — HEPARIN SODIUM AND DEXTROSE 12 UNITS/KG/HR: 10000; 5 INJECTION INTRAVENOUS at 08:08

## 2019-01-01 RX ADMIN — EPINEPHRINE 5 MCG: 1 INJECTION, SOLUTION INTRAMUSCULAR; SUBCUTANEOUS at 04:08

## 2019-01-01 RX ADMIN — ACETAMINOPHEN 650 MG: 325 TABLET ORAL at 08:09

## 2019-01-01 RX ADMIN — POLYETHYLENE GLYCOL 3350 17 G: 17 POWDER, FOR SOLUTION ORAL at 08:08

## 2019-01-01 RX ADMIN — VASOPRESSIN 1 UNITS: 20 INJECTION, SOLUTION INTRAMUSCULAR; SUBCUTANEOUS at 02:08

## 2019-01-01 RX ADMIN — WARFARIN SODIUM 5 MG: 5 TABLET ORAL at 12:09

## 2019-01-01 RX ADMIN — WARFARIN SODIUM 2.5 MG: 2.5 TABLET ORAL at 05:09

## 2019-01-01 RX ADMIN — HYDRALAZINE HYDROCHLORIDE 25 MG: 25 TABLET, FILM COATED ORAL at 09:09

## 2019-01-01 RX ADMIN — FUROSEMIDE 40 MG/HR: 10 INJECTION, SOLUTION INTRAMUSCULAR; INTRAVENOUS at 11:08

## 2019-01-01 RX ADMIN — POTASSIUM CHLORIDE 20 MEQ: 20 TABLET, EXTENDED RELEASE ORAL at 08:08

## 2019-01-01 RX ADMIN — SENNOSIDES 8.6 MG: 8.6 TABLET, FILM COATED ORAL at 09:08

## 2019-01-01 RX ADMIN — AMIODARONE HYDROCHLORIDE 1 MG/MIN: 1.8 INJECTION, SOLUTION INTRAVENOUS at 05:08

## 2019-01-01 RX ADMIN — AMIODARONE HYDROCHLORIDE 0.5 MG/MIN: 1.8 INJECTION, SOLUTION INTRAVENOUS at 05:08

## 2019-01-01 RX ADMIN — METOPROLOL SUCCINATE 25 MG: 25 TABLET, EXTENDED RELEASE ORAL at 08:09

## 2019-01-01 RX ADMIN — SENNOSIDES,DOCUSATE SODIUM 2 TABLET: 8.6; 5 TABLET, FILM COATED ORAL at 08:08

## 2019-01-01 RX ADMIN — ISOSORBIDE DINITRATE 20 MG: 10 TABLET ORAL at 05:08

## 2019-01-01 RX ADMIN — SENNOSIDES,DOCUSATE SODIUM 2 TABLET: 8.6; 5 TABLET, FILM COATED ORAL at 09:08

## 2019-01-01 RX ADMIN — MAGNESIUM OXIDE TAB 400 MG (241.3 MG ELEMENTAL MG) 400 MG: 400 (241.3 MG) TAB at 08:08

## 2019-01-01 RX ADMIN — FAMOTIDINE 20 MG: 20 TABLET, FILM COATED ORAL at 09:09

## 2019-01-01 RX ADMIN — PHENYLEPHRINE HYDROCHLORIDE 200 MCG: 10 INJECTION INTRAVENOUS at 12:08

## 2019-01-01 RX ADMIN — MIDAZOLAM 1 MG: 1 INJECTION INTRAMUSCULAR; INTRAVENOUS at 08:03

## 2019-01-01 RX ADMIN — METOLAZONE 5 MG: 2.5 TABLET ORAL at 08:08

## 2019-01-01 RX ADMIN — MAGNESIUM SULFATE IN WATER 2 G: 40 INJECTION, SOLUTION INTRAVENOUS at 11:08

## 2019-01-01 RX ADMIN — POTASSIUM CHLORIDE 40 MEQ: 20 TABLET, EXTENDED RELEASE ORAL at 06:08

## 2019-01-01 RX ADMIN — FUROSEMIDE 80 MG: 10 INJECTION, SOLUTION INTRAMUSCULAR; INTRAVENOUS at 10:08

## 2019-01-01 RX ADMIN — TRAZODONE HYDROCHLORIDE 50 MG: 50 TABLET ORAL at 08:08

## 2019-01-01 RX ADMIN — MAGNESIUM SULFATE IN WATER 2 G: 40 INJECTION, SOLUTION INTRAVENOUS at 06:08

## 2019-01-01 RX ADMIN — FUROSEMIDE 40 MG/HR: 10 INJECTION, SOLUTION INTRAMUSCULAR; INTRAVENOUS at 03:08

## 2019-01-01 RX ADMIN — FUROSEMIDE 40 MG/HR: 10 INJECTION, SOLUTION INTRAMUSCULAR; INTRAVENOUS at 04:08

## 2019-01-01 RX ADMIN — WARFARIN SODIUM 5 MG: 5 TABLET ORAL at 05:09

## 2019-01-01 RX ADMIN — AMIODARONE HYDROCHLORIDE 1 MG/MIN: 1.8 INJECTION, SOLUTION INTRAVENOUS at 04:08

## 2019-01-01 RX ADMIN — DOXYCYCLINE HYCLATE 100 MG: 100 TABLET, COATED ORAL at 10:09

## 2019-01-01 RX ADMIN — EPHEDRINE SULFATE 15 MG: 50 INJECTION, SOLUTION INTRAMUSCULAR; INTRAVENOUS; SUBCUTANEOUS at 04:08

## 2019-01-01 RX ADMIN — AZTREONAM 500 MG: 1 INJECTION, POWDER, LYOPHILIZED, FOR SOLUTION INTRAMUSCULAR; INTRAVENOUS at 06:08

## 2019-01-01 RX ADMIN — FAMOTIDINE 20 MG: 20 TABLET, FILM COATED ORAL at 08:09

## 2019-01-01 RX ADMIN — HYDRALAZINE HYDROCHLORIDE 25 MG: 25 TABLET, FILM COATED ORAL at 08:08

## 2019-01-01 RX ADMIN — HYDRALAZINE HYDROCHLORIDE 25 MG: 25 TABLET, FILM COATED ORAL at 06:09

## 2019-01-01 RX ADMIN — LIDOCAINE HYDROCHLORIDE 1 ML: 10 INJECTION, SOLUTION EPIDURAL; INFILTRATION; INTRACAUDAL; PERINEURAL at 06:08

## 2019-01-01 RX ADMIN — FUROSEMIDE 40 MG: 40 TABLET ORAL at 08:09

## 2019-01-01 RX ADMIN — PROPOFOL 30 MG: 10 INJECTION, EMULSION INTRAVENOUS at 04:08

## 2019-01-01 RX ADMIN — SACUBITRIL AND VALSARTAN 1 TABLET: 97; 103 TABLET, FILM COATED ORAL at 09:09

## 2019-01-01 RX ADMIN — CALCIUM CHLORIDE 500 MG: 100 INJECTION, SOLUTION INTRAVENOUS at 02:08

## 2019-01-01 RX ADMIN — MAGNESIUM OXIDE TAB 400 MG (241.3 MG ELEMENTAL MG) 400 MG: 400 (241.3 MG) TAB at 09:09

## 2019-01-01 RX ADMIN — HEPARIN SODIUM AND DEXTROSE 14 UNITS/KG/HR: 10000; 5 INJECTION INTRAVENOUS at 12:08

## 2019-01-01 RX ADMIN — HEPARIN SODIUM AND DEXTROSE 14 UNITS/KG/HR: 10000; 5 INJECTION INTRAVENOUS at 06:08

## 2019-01-01 RX ADMIN — ISOSORBIDE DINITRATE 20 MG: 20 TABLET ORAL at 02:09

## 2019-01-01 RX ADMIN — AMIODARONE HYDROCHLORIDE 200 MG: 200 TABLET ORAL at 08:09

## 2019-01-01 RX ADMIN — PROPOFOL 10 MG: 10 INJECTION, EMULSION INTRAVENOUS at 04:08

## 2019-01-01 RX ADMIN — Medication 10 MG: at 11:08

## 2019-01-01 RX ADMIN — WARFARIN SODIUM 2.5 MG: 2.5 TABLET ORAL at 04:09

## 2019-01-01 RX ADMIN — TRIAMCINOLONE ACETONIDE 80 MG: 40 INJECTION, SUSPENSION INTRA-ARTICULAR; INTRAMUSCULAR at 10:06

## 2019-01-01 RX ADMIN — ISOSORBIDE DINITRATE 20 MG: 20 TABLET ORAL at 03:09

## 2019-01-01 RX ADMIN — AMIODARONE HYDROCHLORIDE 200 MG: 200 TABLET ORAL at 09:09

## 2019-01-01 RX ADMIN — MAGNESIUM SULFATE HEPTAHYDRATE 1 G: 500 INJECTION, SOLUTION INTRAMUSCULAR; INTRAVENOUS at 09:08

## 2019-01-01 RX ADMIN — MAGNESIUM OXIDE TAB 400 MG (241.3 MG ELEMENTAL MG) 800 MG: 400 (241.3 MG) TAB at 09:08

## 2019-01-01 RX ADMIN — HYDRALAZINE HYDROCHLORIDE 50 MG: 50 TABLET ORAL at 02:08

## 2019-01-01 RX ADMIN — AZTREONAM 1000 MG: 1 INJECTION, POWDER, LYOPHILIZED, FOR SOLUTION INTRAMUSCULAR; INTRAVENOUS at 02:08

## 2019-01-01 RX ADMIN — VASOPRESSIN 1 UNITS: 20 INJECTION, SOLUTION INTRAMUSCULAR; SUBCUTANEOUS at 03:08

## 2019-01-01 RX ADMIN — EPHEDRINE SULFATE 5 MG: 50 INJECTION, SOLUTION INTRAMUSCULAR; INTRAVENOUS; SUBCUTANEOUS at 03:08

## 2019-01-01 RX ADMIN — AMIODARONE HYDROCHLORIDE 1 MG/MIN: 1.8 INJECTION, SOLUTION INTRAVENOUS at 10:08

## 2019-01-01 RX ADMIN — SPIRONOLACTONE 25 MG: 25 TABLET, FILM COATED ORAL at 12:09

## 2019-01-01 RX ADMIN — PROPOFOL 35 MG: 10 INJECTION, EMULSION INTRAVENOUS at 02:08

## 2019-01-01 RX ADMIN — MAGNESIUM OXIDE TAB 400 MG (241.3 MG ELEMENTAL MG) 800 MG: 400 (241.3 MG) TAB at 07:08

## 2019-01-01 RX ADMIN — PHENYLEPHRINE HYDROCHLORIDE 100 MCG: 10 INJECTION INTRAVENOUS at 10:08

## 2019-01-01 RX ADMIN — AZTREONAM 1000 MG: 1 INJECTION, POWDER, LYOPHILIZED, FOR SOLUTION INTRAMUSCULAR; INTRAVENOUS at 10:08

## 2019-01-01 RX ADMIN — FUROSEMIDE 40 MG/HR: 10 INJECTION, SOLUTION INTRAMUSCULAR; INTRAVENOUS at 06:08

## 2019-01-01 RX ADMIN — DOXYCYCLINE HYCLATE 100 MG: 100 TABLET, COATED ORAL at 08:09

## 2019-01-01 RX ADMIN — HEPARIN SODIUM AND DEXTROSE 14 UNITS/KG/HR: 10000; 5 INJECTION INTRAVENOUS at 08:08

## 2019-01-01 RX ADMIN — AMIODARONE HYDROCHLORIDE 0.5 MG/MIN: 1.8 INJECTION, SOLUTION INTRAVENOUS at 06:08

## 2019-01-01 RX ADMIN — MORPHINE SULFATE 2 MG: 2 INJECTION, SOLUTION INTRAMUSCULAR; INTRAVENOUS at 04:09

## 2019-01-01 RX ADMIN — DOXYCYCLINE HYCLATE 100 MG: 100 TABLET, COATED ORAL at 09:09

## 2019-01-01 RX ADMIN — DIGOXIN 0.12 MG: 125 TABLET ORAL at 09:08

## 2019-01-01 RX ADMIN — ISOSORBIDE DINITRATE 10 MG: 10 TABLET ORAL at 06:09

## 2019-01-01 RX ADMIN — LORAZEPAM 0.5 MG: 2 INJECTION INTRAMUSCULAR; INTRAVENOUS at 06:08

## 2019-01-01 RX ADMIN — POTASSIUM CHLORIDE 20 MEQ: 20 TABLET, EXTENDED RELEASE ORAL at 11:09

## 2019-01-01 RX ADMIN — PHENYLEPHRINE HYDROCHLORIDE 100 MCG: 10 INJECTION INTRAVENOUS at 11:08

## 2019-01-01 RX ADMIN — FUROSEMIDE 40 MG/HR: 10 INJECTION, SOLUTION INTRAMUSCULAR; INTRAVENOUS at 02:08

## 2019-01-01 RX ADMIN — POTASSIUM CHLORIDE 20 MEQ: 20 TABLET, EXTENDED RELEASE ORAL at 09:08

## 2019-01-01 RX ADMIN — MAGNESIUM OXIDE TAB 400 MG (241.3 MG ELEMENTAL MG) 400 MG: 400 (241.3 MG) TAB at 10:09

## 2019-01-01 RX ADMIN — WARFARIN SODIUM 5 MG: 5 TABLET ORAL at 04:09

## 2019-01-01 RX ADMIN — POLYETHYLENE GLYCOL 3350 17 G: 17 POWDER, FOR SOLUTION ORAL at 09:08

## 2019-01-01 RX ADMIN — OXYCODONE HYDROCHLORIDE AND ACETAMINOPHEN 1 TABLET: 5; 325 TABLET ORAL at 04:08

## 2019-01-01 RX ADMIN — ISOSORBIDE DINITRATE 10 MG: 10 TABLET ORAL at 10:09

## 2019-01-01 RX ADMIN — PHENYLEPHRINE HYDROCHLORIDE 100 MCG: 10 INJECTION INTRAVENOUS at 02:08

## 2019-01-01 RX ADMIN — AMIODARONE HYDROCHLORIDE 1 MG/MIN: 1.8 INJECTION, SOLUTION INTRAVENOUS at 06:08

## 2019-01-01 RX ADMIN — ETOMIDATE 2 MG: 2 INJECTION, SOLUTION INTRAVENOUS at 04:08

## 2019-01-01 RX ADMIN — POTASSIUM CHLORIDE 20 MEQ: 20 TABLET, EXTENDED RELEASE ORAL at 02:08

## 2019-01-01 RX ADMIN — HEPARIN SODIUM AND DEXTROSE 14 UNITS/KG/HR: 10000; 5 INJECTION INTRAVENOUS at 03:08

## 2019-01-01 RX ADMIN — ACETAMINOPHEN 650 MG: 325 TABLET ORAL at 09:09

## 2019-01-01 RX ADMIN — WARFARIN SODIUM 5 MG: 5 TABLET ORAL at 06:09

## 2019-01-01 RX ADMIN — ISOSORBIDE DINITRATE 20 MG: 10 TABLET ORAL at 02:08

## 2019-01-01 RX ADMIN — ISOSORBIDE DINITRATE 10 MG: 10 TABLET ORAL at 09:09

## 2019-01-01 RX ADMIN — ACETAMINOPHEN 650 MG: 325 TABLET ORAL at 12:09

## 2019-01-01 RX ADMIN — AMIODARONE HYDROCHLORIDE 1 MG/MIN: 1.8 INJECTION, SOLUTION INTRAVENOUS at 11:08

## 2019-01-01 RX ADMIN — AMIODARONE HYDROCHLORIDE 1 MG/MIN: 1.8 INJECTION, SOLUTION INTRAVENOUS at 03:08

## 2019-01-01 RX ADMIN — FUROSEMIDE 40 MG/HR: 10 INJECTION, SOLUTION INTRAMUSCULAR; INTRAVENOUS at 05:08

## 2019-01-01 RX ADMIN — LIDOCAINE HYDROCHLORIDE 80 MG: 40 INJECTION, SOLUTION RETROBULBAR; TOPICAL at 08:03

## 2019-01-01 RX ADMIN — SPIRONOLACTONE 25 MG: 25 TABLET, FILM COATED ORAL at 08:09

## 2019-01-01 RX ADMIN — HYDRALAZINE HYDROCHLORIDE 50 MG: 50 TABLET ORAL at 06:08

## 2019-01-01 RX ADMIN — LORAZEPAM 0.5 MG: 2 INJECTION INTRAMUSCULAR at 06:08

## 2019-01-01 RX ADMIN — POTASSIUM CHLORIDE 40 MEQ: 20 TABLET, EXTENDED RELEASE ORAL at 04:08

## 2019-01-01 RX ADMIN — SACUBITRIL AND VALSARTAN 1 TABLET: 97; 103 TABLET, FILM COATED ORAL at 08:09

## 2019-01-01 RX ADMIN — HYDRALAZINE HYDROCHLORIDE 25 MG: 25 TABLET, FILM COATED ORAL at 09:08

## 2019-01-01 RX ADMIN — FAMOTIDINE 20 MG: 20 TABLET, FILM COATED ORAL at 10:09

## 2019-01-01 RX ADMIN — MAGNESIUM OXIDE TAB 400 MG (241.3 MG ELEMENTAL MG) 400 MG: 400 (241.3 MG) TAB at 08:09

## 2019-01-01 RX ADMIN — WARFARIN SODIUM 7.5 MG: 7.5 TABLET ORAL at 04:08

## 2019-01-01 RX ADMIN — VANCOMYCIN HYDROCHLORIDE 1 G: 1 INJECTION, POWDER, LYOPHILIZED, FOR SOLUTION INTRAVENOUS at 10:08

## 2019-01-01 RX ADMIN — WARFARIN SODIUM 5 MG: 5 TABLET ORAL at 05:08

## 2019-01-01 RX ADMIN — CHLOROTHIAZIDE SODIUM 250 MG: 500 INJECTION, POWDER, LYOPHILIZED, FOR SOLUTION INTRAVENOUS at 06:08

## 2019-01-01 RX ADMIN — FUROSEMIDE 40 MG/HR: 10 INJECTION, SOLUTION INTRAMUSCULAR; INTRAVENOUS at 08:08

## 2019-01-01 RX ADMIN — POTASSIUM CHLORIDE 40 MEQ: 20 TABLET, EXTENDED RELEASE ORAL at 10:08

## 2019-01-01 RX ADMIN — Medication 10 MG: at 01:08

## 2019-01-01 RX ADMIN — RAMELTEON 8 MG: 8 TABLET, FILM COATED ORAL at 01:08

## 2019-01-01 RX ADMIN — HEPARIN SODIUM AND DEXTROSE 14 UNITS/KG/HR: 10000; 5 INJECTION INTRAVENOUS at 11:08

## 2019-01-01 RX ADMIN — AMIODARONE HYDROCHLORIDE 1 MG/MIN: 1.8 INJECTION, SOLUTION INTRAVENOUS at 12:08

## 2019-01-01 RX ADMIN — PHENYLEPHRINE HYDROCHLORIDE 100 MCG: 10 INJECTION INTRAVENOUS at 01:08

## 2019-01-01 RX ADMIN — DIGOXIN 0.12 MG: 125 TABLET ORAL at 08:09

## 2019-01-01 RX ADMIN — POTASSIUM CHLORIDE 40 MEQ: 20 TABLET, EXTENDED RELEASE ORAL at 07:08

## 2019-01-01 RX ADMIN — HYDRALAZINE HYDROCHLORIDE 50 MG: 50 TABLET ORAL at 03:09

## 2019-01-01 RX ADMIN — LIDOCAINE HYDROCHLORIDE 100 MG: 20 INJECTION, SOLUTION INTRAVENOUS at 04:08

## 2019-01-01 RX ADMIN — HUMAN ALBUMIN MICROSPHERES AND PERFLUTREN 0.66 MG: 10; .22 INJECTION, SOLUTION INTRAVENOUS at 09:08

## 2019-01-01 RX ADMIN — HYDRALAZINE HYDROCHLORIDE 50 MG: 50 TABLET ORAL at 02:09

## 2019-01-01 RX ADMIN — ETOMIDATE 4 MG: 2 INJECTION, SOLUTION INTRAVENOUS at 08:03

## 2019-01-01 RX ADMIN — FUROSEMIDE 40 MG/HR: 10 INJECTION, SOLUTION INTRAMUSCULAR; INTRAVENOUS at 10:08

## 2019-01-01 RX ADMIN — HYDRALAZINE HYDROCHLORIDE 25 MG: 25 TABLET, FILM COATED ORAL at 05:08

## 2019-01-01 RX ADMIN — ETOMIDATE 7 MG: 2 INJECTION, SOLUTION INTRAVENOUS at 02:08

## 2019-01-01 RX ADMIN — POTASSIUM CHLORIDE 40 MEQ: 400 INJECTION, SOLUTION INTRAVENOUS at 02:08

## 2019-01-01 RX ADMIN — COLCHICINE 0.6 MG: 0.6 TABLET, FILM COATED ORAL at 09:09

## 2019-01-01 RX ADMIN — LIDOCAINE HYDROCHLORIDE 1 ML: 10 INJECTION INFILTRATION; PERINEURAL at 06:08

## 2019-01-01 RX ADMIN — ISOSORBIDE DINITRATE 10 MG: 10 TABLET ORAL at 08:08

## 2019-01-01 RX ADMIN — POTASSIUM CHLORIDE 20 MEQ: 20 TABLET, EXTENDED RELEASE ORAL at 12:08

## 2019-01-01 RX ADMIN — FUROSEMIDE 20 MG: 20 TABLET ORAL at 09:09

## 2019-01-01 RX ADMIN — SODIUM CHLORIDE 250 ML: 0.9 INJECTION, SOLUTION INTRAVENOUS at 12:08

## 2019-01-01 RX ADMIN — COLCHICINE 0.6 MG: 0.6 TABLET, FILM COATED ORAL at 08:09

## 2019-01-01 RX ADMIN — MAGNESIUM OXIDE TAB 400 MG (241.3 MG ELEMENTAL MG) 400 MG: 400 (241.3 MG) TAB at 09:08

## 2019-01-01 RX ADMIN — SACUBITRIL AND VALSARTAN 1 TABLET: 49; 51 TABLET, FILM COATED ORAL at 09:09

## 2019-01-01 RX ADMIN — DIGOXIN 0.12 MG: 125 TABLET ORAL at 09:09

## 2019-01-01 RX ADMIN — DOXYCYCLINE 100 MG: 100 INJECTION, POWDER, LYOPHILIZED, FOR SOLUTION INTRAVENOUS at 07:08

## 2019-01-01 RX ADMIN — ISOSORBIDE DINITRATE 10 MG: 10 TABLET ORAL at 02:09

## 2019-01-01 RX ADMIN — FAMOTIDINE 20 MG: 20 TABLET, FILM COATED ORAL at 09:08

## 2019-01-01 RX ADMIN — EPINEPHRINE 0.02 MCG/KG/MIN: 1 INJECTION INTRAMUSCULAR; INTRAVENOUS; SUBCUTANEOUS at 04:08

## 2019-01-01 RX ADMIN — HYDRALAZINE HYDROCHLORIDE 50 MG: 50 TABLET ORAL at 09:08

## 2019-01-01 RX ADMIN — METOLAZONE 10 MG: 2.5 TABLET ORAL at 03:08

## 2019-01-01 RX ADMIN — Medication 10 MG: at 03:08

## 2019-01-01 RX ADMIN — AZTREONAM 1000 MG: 1 INJECTION, POWDER, LYOPHILIZED, FOR SOLUTION INTRAMUSCULAR; INTRAVENOUS at 01:08

## 2019-01-01 RX ADMIN — HYDRALAZINE HYDROCHLORIDE 25 MG: 25 TABLET, FILM COATED ORAL at 06:08

## 2019-01-01 RX ADMIN — FUROSEMIDE 20 MG/HR: 10 INJECTION, SOLUTION INTRAMUSCULAR; INTRAVENOUS at 11:08

## 2019-01-01 RX ADMIN — PHENYLEPHRINE HYDROCHLORIDE 100 MCG: 10 INJECTION INTRAVENOUS at 03:08

## 2019-01-01 RX ADMIN — HUMAN ALBUMIN MICROSPHERES AND PERFLUTREN 0.66 MG: 10; .22 INJECTION, SOLUTION INTRAVENOUS at 04:08

## 2019-01-01 RX ADMIN — ISOSORBIDE DINITRATE 20 MG: 10 TABLET ORAL at 09:08

## 2019-01-01 RX ADMIN — ISOSORBIDE DINITRATE 20 MG: 10 TABLET ORAL at 06:08

## 2019-01-01 RX ADMIN — LIDOCAINE HYDROCHLORIDE 5 ML: 20 SOLUTION OROPHARYNGEAL at 08:03

## 2019-01-01 RX ADMIN — HEPARIN SODIUM AND DEXTROSE 14 UNITS/KG/HR: 10000; 5 INJECTION INTRAVENOUS at 02:08

## 2019-01-01 RX ADMIN — EPHEDRINE SULFATE 15 MG: 50 INJECTION, SOLUTION INTRAMUSCULAR; INTRAVENOUS; SUBCUTANEOUS at 03:08

## 2019-01-01 RX ADMIN — AMIODARONE HYDROCHLORIDE 150 MG: 1.5 INJECTION, SOLUTION INTRAVENOUS at 05:08

## 2019-01-01 RX ADMIN — PROPOFOL 20 MG: 10 INJECTION, EMULSION INTRAVENOUS at 04:08

## 2019-01-01 RX ADMIN — FUROSEMIDE 40 MG/HR: 10 INJECTION, SOLUTION INTRAMUSCULAR; INTRAVENOUS at 01:08

## 2019-01-01 RX ADMIN — ACETAMINOPHEN 650 MG: 325 TABLET ORAL at 09:08

## 2019-01-01 RX ADMIN — FAMOTIDINE 20 MG: 20 TABLET, FILM COATED ORAL at 08:08

## 2019-01-01 RX ADMIN — PHENYLEPHRINE HYDROCHLORIDE 100 MCG: 10 INJECTION INTRAVENOUS at 12:08

## 2019-01-01 RX ADMIN — SODIUM CHLORIDE: 0.9 INJECTION, SOLUTION INTRAVENOUS at 05:08

## 2019-01-01 RX ADMIN — FUROSEMIDE 80 MG: 10 INJECTION, SOLUTION INTRAMUSCULAR; INTRAVENOUS at 09:08

## 2019-01-01 RX ADMIN — ASPIRIN 325 MG ORAL TABLET 325 MG: 325 PILL ORAL at 07:06

## 2019-01-01 RX ADMIN — MAGNESIUM SULFATE IN WATER 2 G: 40 INJECTION, SOLUTION INTRAVENOUS at 04:08

## 2019-01-01 RX ADMIN — AMIODARONE HYDROCHLORIDE 1 MG/MIN: 1.8 INJECTION, SOLUTION INTRAVENOUS at 09:08

## 2019-01-01 RX ADMIN — FUROSEMIDE 10 MG/HR: 10 INJECTION, SOLUTION INTRAMUSCULAR; INTRAVENOUS at 02:08

## 2019-01-01 RX ADMIN — AZTREONAM 1000 MG: 1 INJECTION, POWDER, LYOPHILIZED, FOR SOLUTION INTRAMUSCULAR; INTRAVENOUS at 09:08

## 2019-01-01 RX ADMIN — ETOMIDATE 4 MG: 2 INJECTION, SOLUTION INTRAVENOUS at 04:08

## 2019-01-01 RX ADMIN — Medication 20 MG: at 11:08

## 2019-01-01 RX ADMIN — FAMOTIDINE 20 MG: 20 TABLET, FILM COATED ORAL at 11:08

## 2019-01-01 RX ADMIN — VANCOMYCIN HYDROCHLORIDE 1000 MG: 1 INJECTION, POWDER, LYOPHILIZED, FOR SOLUTION INTRAVENOUS at 11:08

## 2019-01-01 RX ADMIN — COLCHICINE 0.6 MG: 0.6 TABLET, FILM COATED ORAL at 10:09

## 2019-01-01 RX ADMIN — CHLOROTHIAZIDE SODIUM 250 MG: 500 INJECTION, POWDER, LYOPHILIZED, FOR SOLUTION INTRAVENOUS at 11:08

## 2019-01-01 RX ADMIN — SODIUM CHLORIDE, SODIUM GLUCONATE, SODIUM ACETATE, POTASSIUM CHLORIDE, MAGNESIUM CHLORIDE, SODIUM PHOSPHATE, DIBASIC, AND POTASSIUM PHOSPHATE: .53; .5; .37; .037; .03; .012; .00082 INJECTION, SOLUTION INTRAVENOUS at 04:08

## 2019-01-01 RX ADMIN — FUROSEMIDE 20 MG/HR: 10 INJECTION, SOLUTION INTRAMUSCULAR; INTRAVENOUS at 12:08

## 2019-01-01 RX ADMIN — HYDRALAZINE HYDROCHLORIDE 25 MG: 25 TABLET, FILM COATED ORAL at 10:09

## 2019-01-01 RX ADMIN — HYDROMORPHONE HYDROCHLORIDE 0.2 MG: 1 INJECTION, SOLUTION INTRAMUSCULAR; INTRAVENOUS; SUBCUTANEOUS at 12:08

## 2019-01-01 RX ADMIN — SODIUM CHLORIDE 250 ML: 0.9 INJECTION, SOLUTION INTRAVENOUS at 06:08

## 2019-01-01 RX ADMIN — OXYCODONE HYDROCHLORIDE 5 MG: 5 TABLET ORAL at 10:08

## 2019-01-01 RX ADMIN — OXYCODONE HYDROCHLORIDE 5 MG: 5 TABLET ORAL at 09:09

## 2019-01-01 RX ADMIN — DIGOXIN 250 MCG: 0.25 INJECTION INTRAMUSCULAR; INTRAVENOUS at 10:08

## 2019-01-01 RX ADMIN — SPIRONOLACTONE 25 MG: 25 TABLET, FILM COATED ORAL at 09:09

## 2019-01-01 RX ADMIN — METOLAZONE 5 MG: 2.5 TABLET ORAL at 10:08

## 2019-01-01 RX ADMIN — FUROSEMIDE 40 MG/HR: 10 INJECTION, SOLUTION INTRAMUSCULAR; INTRAVENOUS at 07:08

## 2019-01-01 RX ADMIN — MIDAZOLAM HYDROCHLORIDE 1 MG: 1 INJECTION, SOLUTION INTRAMUSCULAR; INTRAVENOUS at 10:08

## 2019-01-01 RX ADMIN — FUROSEMIDE 20 MG: 20 TABLET ORAL at 08:09

## 2019-01-01 RX ADMIN — ETOMIDATE 2 MG: 2 INJECTION, SOLUTION INTRAVENOUS at 05:08

## 2019-01-01 RX ADMIN — HYDRALAZINE HYDROCHLORIDE 25 MG: 25 TABLET, FILM COATED ORAL at 03:08

## 2019-01-01 RX ADMIN — SENNOSIDES,DOCUSATE SODIUM 2 TABLET: 8.6; 5 TABLET, FILM COATED ORAL at 06:08

## 2019-01-01 RX ADMIN — HYDRALAZINE HYDROCHLORIDE 25 MG: 25 TABLET, FILM COATED ORAL at 02:08

## 2019-01-01 RX ADMIN — LISINOPRIL 2.5 MG: 2.5 TABLET ORAL at 05:08

## 2019-01-01 RX ADMIN — FUROSEMIDE 80 MG: 10 INJECTION, SOLUTION INTRAMUSCULAR; INTRAVENOUS at 04:08

## 2019-01-01 RX ADMIN — HEPARIN SODIUM AND DEXTROSE 14 UNITS/KG/HR: 10000; 5 INJECTION INTRAVENOUS at 09:08

## 2019-01-01 RX ADMIN — AMIODARONE HYDROCHLORIDE 1 MG/MIN: 1.8 INJECTION, SOLUTION INTRAVENOUS at 02:08

## 2019-01-01 RX ADMIN — MAGNESIUM SULFATE HEPTAHYDRATE 3 G: 500 INJECTION, SOLUTION INTRAMUSCULAR; INTRAVENOUS at 02:08

## 2019-01-01 RX ADMIN — DEXMEDETOMIDINE HYDROCHLORIDE 1 MCG/KG/HR: 100 INJECTION, SOLUTION, CONCENTRATE INTRAVENOUS at 08:03

## 2019-01-01 RX ADMIN — DOXYCYCLINE HYCLATE 100 MG: 100 TABLET, COATED ORAL at 08:08

## 2019-01-01 RX ADMIN — MORPHINE SULFATE 1 MG: 2 INJECTION, SOLUTION INTRAMUSCULAR; INTRAVENOUS at 03:09

## 2019-01-01 RX ADMIN — LIDOCAINE HYDROCHLORIDE 10 ML: 20 SOLUTION OROPHARYNGEAL at 04:08

## 2019-01-01 RX ADMIN — WARFARIN SODIUM 5 MG: 5 TABLET ORAL at 06:08

## 2019-01-01 RX ADMIN — DEXMEDETOMIDINE HYDROCHLORIDE 44 MCG: 100 INJECTION, SOLUTION, CONCENTRATE INTRAVENOUS at 08:03

## 2019-01-01 RX ADMIN — PHENYLEPHRINE HYDROCHLORIDE 100 MCG: 10 INJECTION INTRAVENOUS at 08:03

## 2019-01-01 RX ADMIN — DOCUSATE SODIUM 50 MG: 50 CAPSULE, LIQUID FILLED ORAL at 09:09

## 2019-01-01 RX ADMIN — FUROSEMIDE 40 MG/HR: 10 INJECTION, SOLUTION INTRAMUSCULAR; INTRAVENOUS at 12:08

## 2019-01-01 RX ADMIN — ISOSORBIDE DINITRATE 20 MG: 10 TABLET ORAL at 03:08

## 2019-01-01 RX ADMIN — PHENYLEPHRINE HYDROCHLORIDE 50 MCG: 10 INJECTION INTRAVENOUS at 10:08

## 2019-01-01 RX ADMIN — COLCHICINE 1.2 MG: 0.6 TABLET, FILM COATED ORAL at 11:09

## 2019-01-01 RX ADMIN — FUROSEMIDE 40 MG: 10 INJECTION, SOLUTION INTRAMUSCULAR; INTRAVENOUS at 11:09

## 2019-01-01 RX ADMIN — HYDRALAZINE HYDROCHLORIDE 50 MG: 50 TABLET ORAL at 03:08

## 2019-01-01 RX ADMIN — DEXMEDETOMIDINE HYDROCHLORIDE 0.5 MCG/KG/HR: 100 INJECTION, SOLUTION, CONCENTRATE INTRAVENOUS at 04:08

## 2019-01-01 RX ADMIN — FUROSEMIDE 20 MG/HR: 10 INJECTION, SOLUTION INTRAMUSCULAR; INTRAVENOUS at 04:08

## 2019-01-01 RX ADMIN — ONDANSETRON 4 MG: 2 INJECTION INTRAMUSCULAR; INTRAVENOUS at 04:08

## 2019-01-01 RX ADMIN — SODIUM CHLORIDE 1000 ML: 0.9 INJECTION, SOLUTION INTRAVENOUS at 07:03

## 2019-01-01 RX ADMIN — DIGOXIN 0.12 MG: 125 TABLET ORAL at 08:08

## 2019-01-01 RX ADMIN — AMIODARONE HYDROCHLORIDE 200 MG: 200 TABLET ORAL at 10:09

## 2019-01-01 RX ADMIN — RAMELTEON 8 MG: 8 TABLET, FILM COATED ORAL at 08:09

## 2019-01-01 RX ADMIN — DIGOXIN 0.12 MG: 125 TABLET ORAL at 10:09

## 2019-01-01 RX ADMIN — HYDRALAZINE HYDROCHLORIDE 25 MG: 25 TABLET, FILM COATED ORAL at 02:09

## 2019-01-01 RX ADMIN — POTASSIUM CHLORIDE 20 MEQ: 20 TABLET, EXTENDED RELEASE ORAL at 05:08

## 2019-01-01 RX ADMIN — FUROSEMIDE 10 MG/HR: 10 INJECTION, SOLUTION INTRAMUSCULAR; INTRAVENOUS at 11:08

## 2019-01-01 RX ADMIN — MIDAZOLAM HYDROCHLORIDE 1 MG: 1 INJECTION, SOLUTION INTRAMUSCULAR; INTRAVENOUS at 01:08

## 2019-01-01 RX ADMIN — SACUBITRIL AND VALSARTAN 1 TABLET: 24; 26 TABLET, FILM COATED ORAL at 09:09

## 2019-01-01 RX ADMIN — TORSEMIDE 40 MG: 20 TABLET ORAL at 11:09

## 2019-01-01 RX ADMIN — AMIODARONE HYDROCHLORIDE 1 MG/MIN: 1.8 INJECTION, SOLUTION INTRAVENOUS at 08:08

## 2019-01-01 RX ADMIN — DEXMEDETOMIDINE HYDROCHLORIDE 0.3 MCG/KG/HR: 100 INJECTION, SOLUTION, CONCENTRATE INTRAVENOUS at 10:08

## 2019-01-01 RX ADMIN — AMIODARONE HYDROCHLORIDE 150 MG: 1.5 INJECTION, SOLUTION INTRAVENOUS at 10:08

## 2019-01-01 RX ADMIN — DOXYCYCLINE HYCLATE 100 MG: 100 TABLET, COATED ORAL at 09:08

## 2019-01-01 RX ADMIN — AMIODARONE HYDROCHLORIDE 1 MG/MIN: 1.8 INJECTION, SOLUTION INTRAVENOUS at 07:08

## 2019-01-01 RX ADMIN — ISOSORBIDE DINITRATE 10 MG: 10 TABLET ORAL at 09:08

## 2019-01-01 RX ADMIN — AMIODARONE HYDROCHLORIDE 0.5 MG/MIN: 1.8 INJECTION, SOLUTION INTRAVENOUS at 08:08

## 2019-01-01 RX ADMIN — ISOSORBIDE DINITRATE 10 MG: 10 TABLET ORAL at 03:08

## 2019-01-01 RX ADMIN — METOPROLOL SUCCINATE 25 MG: 25 TABLET, EXTENDED RELEASE ORAL at 09:09

## 2019-01-01 RX ADMIN — Medication 10 MG: at 02:08

## 2019-01-01 RX ADMIN — FUROSEMIDE 20 MG/HR: 10 INJECTION, SOLUTION INTRAMUSCULAR; INTRAVENOUS at 01:08

## 2019-01-01 RX ADMIN — AMIODARONE HYDROCHLORIDE 200 MG: 200 TABLET ORAL at 12:08

## 2019-01-01 RX ADMIN — ISOSORBIDE DINITRATE 10 MG: 10 TABLET ORAL at 05:08

## 2019-01-01 RX ADMIN — MAGNESIUM OXIDE TAB 400 MG (241.3 MG ELEMENTAL MG) 800 MG: 400 (241.3 MG) TAB at 10:08

## 2019-01-01 RX ADMIN — DILTIAZEM HYDROCHLORIDE 20 MG: 5 INJECTION INTRAVENOUS at 04:08

## 2019-01-01 RX ADMIN — FUROSEMIDE 80 MG: 10 INJECTION, SOLUTION INTRAMUSCULAR; INTRAVENOUS at 05:08

## 2019-01-01 RX ADMIN — AZTREONAM 1000 MG: 1 INJECTION, POWDER, LYOPHILIZED, FOR SOLUTION INTRAMUSCULAR; INTRAVENOUS at 05:08

## 2019-01-01 RX ADMIN — ISOSORBIDE DINITRATE 20 MG: 10 TABLET ORAL at 11:08

## 2019-01-01 RX ADMIN — SACUBITRIL AND VALSARTAN 1 TABLET: 24; 26 TABLET, FILM COATED ORAL at 08:09

## 2019-01-01 RX ADMIN — POTASSIUM CHLORIDE 20 MEQ: 20 TABLET, EXTENDED RELEASE ORAL at 06:08

## 2019-01-01 RX ADMIN — HYDROMORPHONE HYDROCHLORIDE 0.2 MG: 1 INJECTION, SOLUTION INTRAMUSCULAR; INTRAVENOUS; SUBCUTANEOUS at 08:08

## 2019-01-01 RX ADMIN — TRAZODONE HYDROCHLORIDE 25 MG: 50 TABLET ORAL at 02:08

## 2019-01-01 RX ADMIN — EPINEPHRINE 0.02 MCG/KG/MIN: 1 INJECTION INTRAMUSCULAR; INTRAVENOUS; SUBCUTANEOUS at 03:08

## 2019-01-01 RX ADMIN — AZTREONAM 500 MG: 1 INJECTION, POWDER, LYOPHILIZED, FOR SOLUTION INTRAMUSCULAR; INTRAVENOUS at 10:08

## 2019-01-01 RX ADMIN — HEPARIN SODIUM AND DEXTROSE 14 UNITS/KG/HR: 10000; 5 INJECTION INTRAVENOUS at 10:08

## 2019-01-01 RX ADMIN — MIDAZOLAM HYDROCHLORIDE 2 MG: 1 INJECTION, SOLUTION INTRAMUSCULAR; INTRAVENOUS at 10:08

## 2019-01-01 RX ADMIN — HYDRALAZINE HYDROCHLORIDE 50 MG: 50 TABLET ORAL at 05:08

## 2019-01-01 RX ADMIN — ETOMIDATE 6 MG: 2 INJECTION, SOLUTION INTRAVENOUS at 08:03

## 2019-01-01 RX ADMIN — SODIUM CHLORIDE, SODIUM GLUCONATE, SODIUM ACETATE, POTASSIUM CHLORIDE, MAGNESIUM CHLORIDE, SODIUM PHOSPHATE, DIBASIC, AND POTASSIUM PHOSPHATE: .53; .5; .37; .037; .03; .012; .00082 INJECTION, SOLUTION INTRAVENOUS at 10:08

## 2019-01-01 RX ADMIN — PREDNISONE 40 MG: 20 TABLET ORAL at 04:09

## 2019-01-01 RX ADMIN — MAGNESIUM SULFATE IN WATER 2 G: 40 INJECTION, SOLUTION INTRAVENOUS at 03:08

## 2019-01-01 RX ADMIN — ISOSORBIDE DINITRATE 20 MG: 20 TABLET ORAL at 10:09

## 2019-01-01 RX ADMIN — WARFARIN SODIUM 5 MG: 5 TABLET ORAL at 04:08

## 2019-01-22 NOTE — TELEPHONE ENCOUNTER
In response to a fax from [employer] [facility] letter that pt faxed to us, I tried to reach pt lvm. I then contacted [employer] and spoke to Gulshan who says pts pharmacy just needed to submit a claim and pt needs to contact them to renew the gladys for assistance. Gulshan says pt had $315 balance credit but assistance was discontinued because she didn't call to renew. .

## 2019-01-22 NOTE — PROGRESS NOTES
Subjective: class 2     Patient ID:  Sherice Squires is a 68 y.o. female who presents for eight month follow-up of No chief complaint on file.    Congestive Heart Failure   Pertinent negatives include no abdominal pain, anorexia, chest pain, chills, congestion, coughing, diaphoresis, fever, headaches, myalgias, nausea, rash, vomiting or weakness.   Mrs. Squires is a 68 year old AAF with NIDCM (LVEF 30 - Nov 2016), PkVO2 16.1 (June 2015)  s/p ICD who presents for routine follow up. Recently had a significant cold/URI, which led to volume overload/volume retention. Patient had diuretic bolused, and returned to baseline. She stated she feels great, denies any cardiopulmonary complaints again, states breathing is stable, back to baseline, happened about1 month ago. Feels volume status is stable, has started exercising again without any difficulty, knows she has gained some weight, but exercise tolerance is still good. Knee is an issue for her. NYHA FC II.     TTE 08/30/18:    1 - Severe left atrial enlargement.     2 - Eccentric hypertrophy.     3 - No wall motion abnormalities.     4 - Severely depressed left ventricular systolic function (EF 25-30%).     5 - Low normal right ventricular systolic function .     6 - The estimated PA systolic pressure is greater than 27 mmHg.     7 - Moderate mitral regurgitation.       Review of Systems   Constitution: Negative for chills, decreased appetite, diaphoresis, fever, weakness, malaise/fatigue, weight gain and weight loss.   HENT: Negative for congestion.    Eyes: Negative for blurred vision and visual disturbance.   Cardiovascular: Negative for chest pain, dyspnea on exertion, irregular heartbeat, leg swelling, near-syncope, orthopnea, palpitations, paroxysmal nocturnal dyspnea and syncope.   Respiratory: Negative for cough, shortness of breath, sleep disturbances due to breathing, snoring and wheezing.    Hematologic/Lymphatic: Negative for bleeding problem. Does not  "bruise/bleed easily.   Skin: Negative for poor wound healing and rash.   Musculoskeletal: Positive for joint pain (knee). Negative for arthritis, muscle weakness and myalgias.   Gastrointestinal: Negative for bloating, abdominal pain, anorexia, constipation, diarrhea, hematemesis, hematochezia, jaundice, melena, nausea and vomiting.   Genitourinary: Negative for frequency and urgency.   Neurological: Negative for difficulty with concentration, excessive daytime sleepiness, dizziness, headaches and light-headedness.   Psychiatric/Behavioral: Negative for depression. The patient does not have insomnia and is not nervous/anxious.         Objective:    Physical Exam   Constitutional: She appears well-developed and well-nourished. No distress.   /62 (BP Location: Left arm, Patient Position: Sitting)   Pulse 60 Comment: radial  Ht 5' 5" (1.651 m)   Wt 91.9 kg (202 lb 9.6 oz)   BMI 33.71 kg/m²      HENT:   Head: Normocephalic and atraumatic. Head is without abrasion and without contusion.   Right Ear: External ear normal.   Left Ear: External ear normal.   Nose: Nose normal. No epistaxis.   Mouth/Throat: Oropharynx is clear and moist. Mucous membranes are not cyanotic.   Eyes: Conjunctivae and EOM are normal. Pupils are equal, round, and reactive to light.   Neck: Normal range of motion. Neck supple. No tracheal deviation present.   Cardiovascular: Normal rate, regular rhythm, normal heart sounds and normal pulses. Exam reveals no gallop.   No murmur heard.  Pulmonary/Chest: Effort normal and breath sounds normal. No stridor. No respiratory distress. She has no wheezes.   Abdominal: Soft. Normal appearance, normal aorta and bowel sounds are normal. She exhibits no distension. There is no tenderness.   Musculoskeletal: She exhibits no edema or tenderness.   Neurological: She is alert. She has normal strength and normal reflexes. She exhibits normal muscle tone.   Skin: Skin is warm. No rash noted. No erythema. "   Psychiatric: She has a normal mood and affect. Her speech is normal and behavior is normal. Judgment and thought content normal. Cognition and memory are normal.   Nursing note and vitals reviewed.      Lab Results   Component Value Date     (H) 07/24/2018     12/21/2018    K 4.1 12/21/2018    MG 1.8 07/24/2018     12/21/2018    CO2 31 (H) 12/21/2018    BUN 20 12/21/2018    CREATININE 1.3 12/21/2018     (H) 12/21/2018    AST 16 01/24/2018    ALT 13 01/24/2018    ALBUMIN 3.5 01/24/2018    PROT 7.6 01/24/2018    BILITOT 0.5 01/24/2018    CHOL 176 09/10/2014    HDL 63 09/10/2014    LDLCALC 100.2 09/10/2014    TRIG 64 09/10/2014       Magnesium   Date Value Ref Range Status   07/24/2018 1.8 1.6 - 2.6 mg/dL Final       Lab Results   Component Value Date    WBC 7.86 01/24/2018    HGB 11.8 (L) 01/24/2018    HCT 38.3 01/24/2018    MCV 88 01/24/2018     (L) 01/24/2018       Lab Results   Component Value Date    INR 1.0 04/18/2017    INR 1.0 03/18/2016    INR 0.9 06/08/2015       BNP   Date Value Ref Range Status   07/24/2018 225 (H) 0 - 99 pg/mL Final     Comment:     Values of less than 100 pg/ml are consistent with non-CHF populations.   01/24/2018 229 (H) 0 - 99 pg/mL Final     Comment:     Values of less than 100 pg/ml are consistent with non-CHF populations.   09/01/2017 71 0 - 99 pg/mL Final     Comment:     Values of less than 100 pg/ml are consistent with non-CHF populations.     Assessment:       1. Dilated cardiomyopathy    2. NYHA class 2 and LUDIN/AHA stage C chronic systolic congestive heart failure    3. Automatic implantable cardioverter-defibrillator in situ    4. Paroxysmal atrial fibrillation         Plan:       No changes today to her meds, is on highest entresto dose and hydralazine/isordil combination, in addition to other BP meds.  Will get repeat echo in 6 months, with labs, ekg. Otherwise, no changes, continue to work on exercise, weight loss. Patient is doing quite  well. Willing to alternate [facility] and [facility] for clinic visits.  RTC 6 months.   NYHA FC II.

## 2019-02-12 NOTE — TELEPHONE ENCOUNTER
Patient wanted to make sure she can send on Monday. I informed her that the monitor should automatically  her transmission and she should only have to make sure she is around her monitor. She stated understanding.   ----- Message from Micki Quintanilla sent at 2/12/2019  1:15 PM CST -----  Contact: pt  Pls call pt at [phone].  She has questions about her remote device appts that are scheduled on Monday's.    Thank you

## 2019-03-01 NOTE — PROGRESS NOTES
CARDIOVERSION EDUCATION CHECK LIST     3/8/19 @ 7 AM   Report to Cardiology Waiting Room on 3rd floor of the hospital  · Do not eat or drink anything after 12 mn on the night before your procedure.  · Please do not wear makeup (especially mascara) when arriving for your procedure.    Medications:  · You may take your usual morning medications with a sip of water      Directions to the Cardiology Waiting Room  If you park in the Parking Garage:  Take elevators to the 2nd floor  Walk up ramp and turn right by [address] Ele[address]  Take elevator to the 3rd floor  Upon exiting the elevator, turn away from the clinic areas  Walk [place] around to front of hospital to area with windows overlooking [facility]  Check in at Reception Desk  OR  If family is dropping you off:  Have them drop you off at the front of the Hospital  (Near the ER, where all the flags are hung).  Take the E elevators to the 3rd floor.  Check in at the Reception Desk in the waiting room.    · YOU WILL BE GOING HOME THE SAME DAY AS YOUR PROCEDURE  · YOU WILL NEED SOMEONE TO DRIVE YOU HOME AFTER YOUR PROCEDURE   · YOUR PAIN DURING YOUR PROCEDURE WILL BE MANAGED BY THE ANESTHESIA TEAM      Any need to reschedule or cancel procedures, or any questions regarding your procedure should be addressed directly with the Arrhythmia Department Nurses at the following phone number: [phone]    THE ABOVE INSTRUCTIONS WERE GIVEN TO THE PATIENT VERBALLY AND THEY VERBALIZED UNDERSTANDING. THEY DO NOT REQUIRE ANY SPECIAL NEEDS AND DO NOT HAVE ANY LEARNING BARRIERS.

## 2019-03-01 NOTE — PROGRESS NOTES
Subjective:    Patient ID:  Sherice Squires is a 68 y.o. female who presents for follow-up of Paroxysmal atrial fibrillation      Atrial Fibrillation   Symptoms are negative for chest pain, dizziness, palpitations, shortness of breath, syncope and weakness. Past medical history includes atrial fibrillation.     68 y.o. yo female  ([facility] in [facility])  NICM,   HTN, HLD, both on meds  ICD 2010  asymptomatic atrial fibrillation discovered on ICD interrogation.  hx VF 2011, rx'd with ICD shocks  RBBB/LAFB, present since 5/2011    Interrogation reveals stable lead function, overall AMS burden 1%.  Continues to feel well: NYHA I sx. No CP. No NAZARIO. Limited only by knee discomfort, never by NAZARIO.  She is feeling well and denies any palpitations, increased sob, fatigue, syncope, or edema. She denies any falls or issues with balance.    On 2/23/18, was at work seated, feeling fine. No palpitations, no LH. Shocked by ICD x 6.  ICD interrogation shows AT with RVR, as well as degeneration to AF, resulting in ATP and shocks.    In general she feels pretty well, but in the past 1-2 weeks she's felt more NAZARIO: gets SOB with walking in from car (up incline), which is unusual for her.  ICD shows that she's been in AF since mid February.    My interpretation of today's ECG is AF, RBBB/LAFB, some V pacing.  CHADS=3 on Eliquis    Review of Systems   Constitution: Negative. Negative for weakness and malaise/fatigue.   HENT: Negative.  Negative for ear pain and tinnitus.    Eyes: Negative for blurred vision.   Cardiovascular: Positive for dyspnea on exertion. Negative for chest pain, irregular heartbeat, leg swelling, near-syncope, palpitations and syncope.   Respiratory: Negative.  Negative for shortness of breath.    Endocrine: Negative.  Negative for polyuria.   Hematologic/Lymphatic: Does not bruise/bleed easily.   Skin: Negative.  Negative for rash.   Musculoskeletal: Positive for joint pain. Negative  for muscle weakness.   Gastrointestinal: Negative.  Negative for abdominal pain and change in bowel habit.   Genitourinary: Negative for frequency.   Neurological: Negative.  Negative for dizziness and light-headedness.   Psychiatric/Behavioral: Negative.  Negative for depression. The patient is not nervous/anxious.    Allergic/Immunologic: Negative for environmental allergies.        Objective:    Physical Exam   Constitutional: She is oriented to person, place, and time. Vital signs are normal. She appears well-developed and well-nourished. She is active and cooperative.   HENT:   Head: Normocephalic and atraumatic.   Eyes: Conjunctivae and EOM are normal.   Neck: Normal range of motion. Carotid bruit is not present. No tracheal deviation and no edema present. No thyroid mass and no thyromegaly present.   Cardiovascular: Normal rate, normal heart sounds, intact distal pulses and normal pulses. An irregularly irregular rhythm present.  No extrasystoles are present. PMI is not displaced. Exam reveals no gallop and no friction rub.   No murmur heard.  Pulmonary/Chest: Effort normal and breath sounds normal. No respiratory distress. She has no wheezes. She has no rales.   Abdominal: Soft. Normal appearance. She exhibits no distension. There is no hepatosplenomegaly.   Musculoskeletal: Normal range of motion.   Neurological: She is alert and oriented to person, place, and time. Coordination normal.   Skin: Skin is warm and dry. No rash noted.   Psychiatric: She has a normal mood and affect. Her speech is normal and behavior is normal. Thought content normal. Cognition and memory are normal.   Nursing note and vitals reviewed.        Assessment:       1. Dilated cardiomyopathy    2. Automatic implantable cardiac defibrillator in situ    3. Atrial fibrillation-paroxysmal, asymptomatic discovered on ICD interrogation    4. Hypertension    5. Hyperlipidemia         Plan:       AT/AF with RVR.  Continue BB.  If RVR recurs,  consider amio vs ablation.    Given increased NAZARIO, seems likely that her AF is contributing. Will evaluate with JOSE/DCCV and follow. At follow-up, if she feels better in NSR, will discuss AAD (likely amio) vs. ablation.  She'll follow her symptoms; keep a diary of sx.    If NAZARIO persists, we'd consider upgrade to biV ICD (pro: NICM, female; con: non-LBBB morphology, AF).    Continue f/u in device clinic.  Return in 1 mo after DCCV.    I discussed with patient risks, indications, benefits, and alternatives of the planned procedure. All questions were answered. Patient understands and wishes to proceed.

## 2019-03-07 NOTE — TELEPHONE ENCOUNTER
----- Message from Neena Tee RN sent at 3/7/2019  9:07 AM CST -----  Contact: Pt called       ----- Message -----  From: Nury Robles  Sent: 3/7/2019   8:58 AM  To: Neena Tee RN    Pt returning a call regarding a procedure with Dr. Reece. Please call pt @ [phone]. Thank you.

## 2019-03-08 NOTE — PLAN OF CARE
Problem: Adult Inpatient Plan of Care  Goal: Plan of Care Review  Outcome: Ongoing (interventions implemented as appropriate)  Received report from Sonia. Patient s/p DCCV, AAOx3. VSS, no c/o pain or discomfort at this time, resp even and unlabored. Post procedure protocol reviewed with patient and patient's family. Understanding verbalized. Family members at bedside. Nurse call bell within reach. Will continue to monitor per post procedure protocol.

## 2019-03-08 NOTE — ANESTHESIA PREPROCEDURE EVALUATION
03/08/2019  Sherice Squires is a 68 y.o., female.  Pre-operative evaluation for Procedure(s) (LRB):  CARDIOVERSION OR DEFIBRILLATION (N/A)  ECHOCARDIOGRAM,TRANSESOPHAGEAL (N/A)    Sherice Squires is a 68 y.o. female     Patient Active Problem List   Diagnosis    Dilated cardiomyopathy    Automatic implantable cardioverter-defibrillator in situ    Hypertension    Obesity    Hyperlipidemia    Degenerative joint disease of knee    Atrial fibrillation    NYHA class 2 and LUDIN/AHA stage C chronic systolic congestive heart failure    PSVT (paroxysmal supraventricular tachycardia)       Review of patient's allergies indicates:   Allergen Reactions    Augmentin [amoxicillin-pot clavulanate] Swelling     Lip swelling      Penicillins      Other reaction(s): Swelling  Lip swelling         No current facility-administered medications on file prior to encounter.      Current Outpatient Medications on File Prior to Encounter   Medication Sig Dispense Refill    apixaban (ELIQUIS) 5 mg Tab Take 1 tablet (5 mg total) by mouth 2 (two) times daily. 60 tablet 11    bumetanide (BUMEX) 1 MG tablet Take 1 tablet (1 mg total) by mouth once daily. 180 tablet 3    digoxin (DIGOX) 125 mcg tablet Take 1 tablet by mouth once daily 30 tablet 11    hydrALAZINE (APRESOLINE) 50 MG tablet TAKE 1 TABLET (50 MG TOTAL) BY MOUTH 3 (THREE) TIMES DAILY. 270 tablet 3    isosorbide mononitrate (IMDUR) 30 MG 24 hr tablet TAKE 1 TABLET (30 MG TOTAL) BY MOUTH ONCE DAILY. 90 tablet 3    metoprolol succinate (TOPROL-XL) 200 MG 24 hr tablet TAKE 1 TABLET (200 MG TOTAL) BY MOUTH 2 (TWO) TIMES DAILY. 180 tablet 3    sacubitril-valsartan (ENTRESTO)  mg per tablet Take 1 tablet by mouth 2 (two) times daily. 180 tablet 4    simvastatin (ZOCOR) 40 MG tablet Take 1 tablet (40 mg total) by mouth every evening. 90 tablet 3     spironolactone (ALDACTONE) 25 MG tablet TAKE 1 TABLET EVERY DAY 90 tablet 3       Past Surgical History:   Procedure Laterality Date    CARDIAC DEFIBRILLATOR PLACEMENT      CHOLECYSTECTOMY      HYSTERECTOMY      JOINT REPLACEMENT  2009    rt knee replacment    pace maker  12/2010       Social History     Socioeconomic History    Marital status: Single     Spouse name: Not on file    Number of children: Not on file    Years of education: Not on file    Highest education level: Not on file   Social Needs    Financial resource strain: Not on file    Food insecurity - worry: Not on file    Food insecurity - inability: Not on file    Transportation needs - medical: Not on file    Transportation needs - non-medical: Not on file   Occupational History    Not on file   Tobacco Use    Smoking status: Never Smoker    Smokeless tobacco: Never Used   Substance and Sexual Activity    Alcohol use: No     Comment: rarely    Drug use: No    Sexual activity: Not on file   Other Topics Concern    Not on file   Social History Narrative    Not on file           2D Echo:  Results for orders placed or performed in visit on 08/30/18   2D Echo w/ Color Flow Doppler   Result Value Ref Range    QEF 25 (A) 55 - 65    Mitral Valve Regurgitation MODERATE (A)     Est. PA Systolic Pressure 26.83      Anesthesia Evaluation    I have reviewed the Patient Summary Reports.     I have reviewed the Medications.     Review of Systems  Anesthesia Hx:  No problems with previous Anesthesia  History of prior surgery of interest to airway management or planning:  Denies Personal Hx of Anesthesia complications.   Cardiovascular:   Hypertension Dysrhythmias atrial fibrillation CHF Orthopnea PND NAZARIO    Pulmonary:   Shortness of breath    Hepatic/GI:   GERD    Musculoskeletal:   Arthritis     Neurological:  Neurology Normal    Endocrine:  Endocrine Normal        Physical Exam  General:  Well nourished    Airway/Jaw/Neck:  Airway Findings:  Mouth Opening: Normal Tongue: Normal  General Airway Assessment: Adult  Mallampati: II  TM Distance: Normal, at least 6 cm      Dental:  Dental Findings: upper partial dentures, lower partial dentures        Mental Status:  Mental Status Findings:  Cooperative, Alert and Oriented         Anesthesia Plan  Type of Anesthesia, risks & benefits discussed:  Anesthesia Type:  general  Patient's Preference:   Intra-op Monitoring Plan: standard ASA monitors  Intra-op Monitoring Plan Comments:   Post Op Pain Control Plan: per primary service following discharge from PACU  Post Op Pain Control Plan Comments:   Induction:   IV  Beta Blocker:  Patient is on a Beta-Blocker and has received one dose within the past 24 hours (No further documentation required).       Informed Consent: Patient understands risks and agrees with Anesthesia plan.  Questions answered. Anesthesia consent signed with patient.  ASA Score: 4     Day of Surgery Review of History & Physical:    H&P update referred to the surgeon.         Ready For Surgery From Anesthesia Perspective.

## 2019-03-08 NOTE — NURSING
Patient identified via spelling of name and date of birth. Arrived in echo lab per stretcher. NPO status verified. Consents signed and on chart. Family member available to drive patient home after procedure

## 2019-03-08 NOTE — NURSING
IV d/c'd with cath tip intact. Ambulated and voided.  Pt and pt family verbalized understanding of d/c instructions.

## 2019-03-08 NOTE — HPI
68 year old female with NICm, ICD, Atrial fibrillation here for JOSE and Cardioversion.      Dysphagia or odynophagia:  No  Liver Disease, esophageal disease, or known varices:  No  Upper GI Bleeding: No  Snoring:  No  Sleep Apnea:  No  Prior neck surgery or radiation:  No  History of anesthetic difficulties:  No  Family history of anesthetic difficulties:  No  Last oral intake:  12 hours ago  Able to move neck in all directions:  Yes

## 2019-03-08 NOTE — Clinical Note
Chief complaint long list of issues, checking in 9 minutes late    89-old white female here with her family members.  Here with her son and daughter apparently.  They begin by starting to have a long list of issues and have a long list.  Apparently she was seeing an outside pain management he getting her prescriptions and there was a call by one of the daughters stating that she is bedridden and if called to get her back to that pain management and wanted us to write her opiates.  We discussed that even here she will need to have appointments in order to get controlled substances and so they're here to discuss this issue.  It appears she takes about 2 pills per day.  We discussed all the issues regarding narcotics and all of these give them 1 refill.  A lot of her pain relates to her left knee which is bone on bone as well as bilateral shoulder pain.  She has received cortisone injections at the [facility] clinic and we discussed a referral to pain management as potentially things such as a genetic and that nerve injection etc. could help reduce her overall pain and pain management could also continue to potentially injection shoulders  and so forth.    Apparently somehow patient had a sleep study done at [place] because they did not want to bring her to the [facility].  No sure why was not done at the [facility].  Son is distressed because he has had somewhat difficulty trying to get the report.  I reviewed all the phone notes and eventually found the report and I sent a prescription to her insurance company for the BiPAP.    Numerous questions regarding oxygen and had the  also assess.  Apparently she cannot get a smaller device.  I explained to them how they can monitor oxygen and she may well not need to liters of continuous oxygen all the time.    Apparently she developed some skin breakdown on the sacrum.  Difficult to assess today even we get patient for but appears to  Defib pads placed on patient's chest.   be some superficial skin breakdown.  They had home health which apparently ended and we will restart that.  Apparently she is also getting palliative care nurse that comes to the house that are not sure how that got started in the  will look into that    She's had 2 different episodes of loose bowels and diarrhea buttocks complaint is probably related to her MiraLAX as well as diet and doesn't appear to be an infection.  Her family had numerous questions at needed other refills.  All the refills and all these questions were answered.  There is over age of questions and I had to leave the room and return again because they had more questions.Total time over 45 minutes with over 50% counseling.        ROS:   CONST: weight stable. EYES: no vision change. ENT: no sore throat. CV: no chest pain w/ exertion. RESP: no shortness of breath. GI: no nausea, vomiting, no persistent diarrhea, no recent constipation. No dysphagia. : no urinary issues. MUSCULOSKELETAL: no new myalgias or arthralgias. SKIN: no new changes. NEURO: no focal deficits. PSYCH: no new issues. ENDOCRINE: no polyuria. HEME: no lymph nodes. ALLERGY: no general pruritis.      PREVENTATIVE: Her last pneumovax was in 2009. She had a colonscopy in 4/2009. Normal.    PAST MEDICAL HISTORY:  HTN  Hyperlipidemia  Depression  H/o TIA 2009  Atrial Fibrillation, Followed by the heart clinic  tear in right rotator cuff clinically, Dr. Juan  History of peptic ulcer disease  Slight vitamin D deficiency-29  Chronic pain, outside pain management  Pneumovax 2009     PAST SURGICAL HISTORY:  Left TKR 2003  Partial Hysterectomy  Skin graft  Tonsillectomy  Hernia Repair    SOCIAL HISTORY:  She does not smoke. She has one drink a per week. She is .    FAMILY HISTORY:  Her brother had a stroke. There is also a family history of heart disease and hypertension    Vitals as above  Gen: no distress  EYES: conjunctiva clear, non-icteric, PERRL  ENT: nose clear,  nasal mucosa normal, oropharynx clear and moist, teeth good,   NECK:supple, thyroid non-palpable  RESP: effort is good, lungs clear  CV: heart RRR w/o murmur, gallops or rubs; no carotid bruits, trace edema left ankle but not the right  GI: abdomen soft, non-distended, non-tender, no hepatosplenomegaly  MS: in wheelchair, no clubbing or cyanosis of the digits, ankles full range of motion with no reproducible pain  SKIN: No rashes, warm to touch, some breakdown over the sacrum.  She has a bandage over a skin tear on the left forearm with some slight surrounding pink skin but does not appear to be cellulitis and family was advised what to watch for      Tona was seen today for medication refill and discuss health.    Diagnoses and all orders for this visit:    Other chronic pain, issues regarding medications discussed, refer to pain management for assessment of intervention such as geniculate nerve injection, shoulder injections and so forth  -     Ambulatory referral to Pain Clinic    Hyperlipidemia, unspecified hyperlipidemia type    Paroxysmal atrial fibrillation, apparently followed by an outside cardiologist and explained the son that I reviewed all the available reports we have received and we have not received anything from [place] or any recent records from the cardiologist and he was somewhat distressed about that.  I explained him I'll as expected get reports from specialist but they may not have us listed as primary care etc.  He was areas about what type of pacemaker she had put in as apparently there was some problems with one and I will have to defer that to his cardiologist    Vitamin D deficiency disease    Peptic ulcer disease    Cough, clear lungs    Decubitus ulcer of sacral region, unspecified ulcer stage, restart home care    Other orders  -     hydrocodone-acetaminophen 5-325mg (NORCO) 5-325 mg per tablet; Take 1 tablet by mouth every 12 (twelve) hours as needed for Pain.  -     metoprolol  "tartrate (LOPRESSOR) 25 MG tablet; Take 1 tablet (25 mg total) by mouth 2 (two) times daily.    Clinical note will be sensitive since I need to document the above issues as well as late arrival"This note will not be shared with the patient."  "

## 2019-03-08 NOTE — ASSESSMENT & PLAN NOTE
PLAN:  1. JOSE for evaluation of MARY to r/o thrombus prior to cardioversion    -The risks, benefits & alternatives of the procedure were explained to the patient.    -The risks of transesophageal echo include but are not limited to:  Dental trauma, esophageal trauma/perforation, bleeding, laryngospasm/brochospasm, aspiration, sore throat/hoarseness, & dislodgement of the endotracheal tube/nasogastric tube (where applicable).    -The risks of moderate sedation include hypotension, respiratory depression, arrhythmias, bronchospasm, & death.    -Informed consent was obtained & the patient is agreeable to proceed with the procedure.    I will discuss with the attending physician. Attending addendum is to follow.     Further recommendations per attending addendum

## 2019-03-08 NOTE — TRANSFER OF CARE
"Anesthesia Transfer of Care Note    Patient: Sherice Squires    Procedure(s) Performed: Procedure(s) (LRB):  CARDIOVERSION OR DEFIBRILLATION (N/A)  ECHOCARDIOGRAM,TRANSESOPHAGEAL (N/A)    Patient location: PACU    Anesthesia Type: general    Transport from OR: Transported from OR on 2-3 L/min O2 by NC with adequate spontaneous ventilation    Post pain: adequate analgesia    Post assessment: tolerated procedure well and no apparent anesthetic complications    Post vital signs: stable    Post-procedure mental status: sleeping.    Nausea/Vomiting: no nausea/vomiting    Complications: none    Transfer of care protocol was followed      Last vitals:   Visit Vitals  BP (!) 147/76 (BP Location: Right arm, Patient Position: Lying)   Pulse 81   Temp 36.2 °C (97.1 °F) (Oral)   Resp 18   Ht 5' 5.5" (1.664 m)   Wt 89.4 kg (197 lb)   SpO2 96%   Breastfeeding? No   BMI 32.28 kg/m²     "

## 2019-03-08 NOTE — ANESTHESIA POSTPROCEDURE EVALUATION
"Anesthesia Post Evaluation    Patient: Sherice Squires    Procedure(s) Performed: Procedure(s) (LRB):  CARDIOVERSION OR DEFIBRILLATION (N/A)  ECHOCARDIOGRAM,TRANSESOPHAGEAL (N/A)    Final Anesthesia Type: general  Patient location during evaluation: PACU  Patient participation: Yes- Able to Participate  Level of consciousness: awake and alert  Post-procedure vital signs: reviewed and stable  Pain management: adequate  Airway patency: patent  PONV status at discharge: No PONV  Anesthetic complications: no      Cardiovascular status: stable  Respiratory status: unassisted and spontaneous ventilation  Hydration status: euvolemic  Follow-up not needed.        Visit Vitals  BP 99/64 (BP Location: Right arm, Patient Position: Lying)   Pulse 60   Temp 35.9 °C (96.7 °F) (Oral)   Resp 16   Ht 5' 5.5" (1.664 m)   Wt 89.4 kg (197 lb)   SpO2 96%   Breastfeeding? No   BMI 32.28 kg/m²       Pain/Yaritza Score: Yaritza Score: 10 (3/8/2019  9:50 AM)        "

## 2019-03-08 NOTE — H&P
[facility]  Cardiology  History and Physical     Patient Name: Sherice Squires  MRN: [id number]  Admission Date: 3/8/2019  Code Status: No Order   Attending Provider: Jose Reece MD   Primary Care Physician: Annamarie Soria MD  Principal Problem:<principal problem not specified>    Patient information was obtained from patient and past medical records.     Subjective:     Chief Complaint:  SOB     HPI:  68 year old female with NICm, ICD, Atrial fibrillation here for JOSE and Cardioversion.      Dysphagia or odynophagia:  No  Liver Disease, esophageal disease, or known varices:  No  Upper GI Bleeding: No  Snoring:  No  Sleep Apnea:  No  Prior neck surgery or radiation:  No  History of anesthetic difficulties:  No  Family history of anesthetic difficulties:  No  Last oral intake:  12 hours ago  Able to move neck in all directions:  Yes    Past Medical History:   Diagnosis Date    Atrial fibrillation     Cardiomyopathy     CHF (congestive heart failure)     Hypertension     Ventricular tachycardia        Past Surgical History:   Procedure Laterality Date    CARDIAC DEFIBRILLATOR PLACEMENT      CHOLECYSTECTOMY      HYSTERECTOMY      JOINT REPLACEMENT  2009    rt knee replacment    pace maker  12/2010       Review of patient's allergies indicates:   Allergen Reactions    Augmentin [amoxicillin-pot clavulanate] Swelling     Lip swelling      Penicillins      Other reaction(s): Swelling  Lip swelling         No current facility-administered medications on file prior to encounter.      Current Outpatient Medications on File Prior to Encounter   Medication Sig    apixaban (ELIQUIS) 5 mg Tab Take 1 tablet (5 mg total) by mouth 2 (two) times daily.    bumetanide (BUMEX) 1 MG tablet Take 1 tablet (1 mg total) by mouth once daily.    digoxin (DIGOX) 125 mcg tablet Take 1 tablet by mouth once daily    hydrALAZINE (APRESOLINE) 50 MG tablet TAKE 1 TABLET (50 MG TOTAL) BY MOUTH 3 (THREE) TIMES  DAILY.    isosorbide mononitrate (IMDUR) 30 MG 24 hr tablet TAKE 1 TABLET (30 MG TOTAL) BY MOUTH ONCE DAILY.    metoprolol succinate (TOPROL-XL) 200 MG 24 hr tablet TAKE 1 TABLET (200 MG TOTAL) BY MOUTH 2 (TWO) TIMES DAILY.    sacubitril-valsartan (ENTRESTO)  mg per tablet Take 1 tablet by mouth 2 (two) times daily.    simvastatin (ZOCOR) 40 MG tablet Take 1 tablet (40 mg total) by mouth every evening.    spironolactone (ALDACTONE) 25 MG tablet TAKE 1 TABLET EVERY DAY     Family History     Problem Relation (Age of Onset)    Arthritis Father    COPD Brother    Cancer Father    Drug abuse Sister    Hypertension Maternal Grandmother        Tobacco Use    Smoking status: Never Smoker    Smokeless tobacco: Never Used   Substance and Sexual Activity    Alcohol use: No     Comment: rarely    Drug use: No    Sexual activity: Not on file     Review of Systems   All other systems reviewed and are negative.    Objective:     Vital Signs (Most Recent):  Temp: 97.1 °F (36.2 °C) (03/08/19 0715)  Pulse: 81 (03/08/19 0715)  Resp: 18 (03/08/19 0715)  BP: (!) 147/76 (03/08/19 0716)  SpO2: 96 % (03/08/19 0715) Vital Signs (24h Range):  Temp:  [97.1 °F (36.2 °C)] 97.1 °F (36.2 °C)  Pulse:  [81] 81  Resp:  [18] 18  SpO2:  [96 %] 96 %  BP: (106-147)/(62-76) 147/76     Weight: 89.4 kg (197 lb)  Body mass index is 32.28 kg/m².    SpO2: 96 %  O2 Device (Oxygen Therapy): room air    No intake or output data in the 24 hours ending 03/08/19 0750    Lines/Drains/Airways     Peripheral Intravenous Line                 Peripheral IV - Single Lumen 03/08/19 0734 Left Hand less than 1 day                Physical Exam  General: alert, awake and oriented x 3  Eyes:PERRL.   Neck:no JVD   Lungs:  clear to auscultation bilaterally   Cardiovascular: Heart: Irregular rate and rhythm, S1, S2 normal, no murmur, click, rub or gallop.   Chest Wall: no tenderness.   Pulses-2+ radial, femoral, DP, PT b/l  Extremities: no cyanosis or edema.    Abdomen/Rectal: Abdomen: soft, non-tender non-distented; bowel sounds normal  Neurologic: Normal strength and tone. No focal numbness or weakness  Significant Labs: CMP No results for input(s): NA, K, CL, CO2, GLU, BUN, CREATININE, CALCIUM, PROT, ALBUMIN, BILITOT, ALKPHOS, AST, ALT, ANIONGAP, ESTGFRAFRICA, EGFRNONAA in the last 48 hours., CBC No results for input(s): WBC, HGB, HCT, PLT in the last 48 hours. and INR No results for input(s): INR, PROTIME in the last 48 hours.    Significant Imaging: Echocardiogram:   2D echo with color flow doppler:   Results for orders placed or performed in visit on 08/30/18   2D Echo w/ Color Flow Doppler   Result Value Ref Range    QEF 25 (A) 55 - 65    Mitral Valve Regurgitation MODERATE (A)     Est. PA Systolic Pressure 26.83     and Transthoracic echo (TTE) complete (Cupid Only):   Results for orders placed or performed during the hospital encounter of 03/01/19   Transthoracic echo (TTE) complete   Result Value Ref Range    BSA 2.05 m2    TDI SEPTAL 0.06     LV LATERAL E/E' RATIO 18.67     LV SEPTAL E/E' RATIO 18.67     LA WIDTH 6.62 cm    TDI LATERAL 0.06     LVIDD 6.55 (A) 3.5 - 6.0 cm    IVS 1.04 0.6 - 1.1 cm    PW 0.75 0.6 - 1.1 cm    LVIDS 6.05 (A) 2.1 - 4.0 cm    FS 8 28 - 44 %    LA volume 219.00 cm3    Sinus 2.86 cm    STJ 2.77 cm    Ascending aorta 3.29 cm    LV mass 249.44 g    LA size 6.00 cm    RVDD 4.51 cm    TAPSE 1.07 cm    RV S' 8.87 m/s    Left Ventricle Relative Wall Thickness 0.23 cm    AV mean gradient 3.68 mmHg    AV valve area 1.36 cm2    AV Velocity Ratio 0.55     AV index (prosthetic) 0.49     Mean e' 0.06     LVOT diameter 1.88 cm    LVOT area 2.77 cm2    LVOT peak kevin [id number] m/s    LVOT peak VTI 10.34 cm    Ao peak kevin 1.35 m/s    Ao VTI 21.13 cm    LVOT stroke volume 28.69 cm3    AV peak gradient 7.29 mmHg    E/E' ratio 18.67     MV Peak E Kevin 1.12 m/s    TR Max Kevin 3.10 m/s    LV Systolic Volume 183.48 mL    LV Systolic Volume Index 92.4  mL/m2    LV Diastolic Volume 219.57 mL    LV Diastolic Volume Index 110.53 mL/m2    LA Volume Index 110.2 mL/m2    LV Mass Index 125.6 g/m2    RA Major Axis 5.63 cm    Left Atrium Minor Axis 6.34 cm    Left Atrium Major Axis 6.64 cm    Triscuspid Valve Regurgitation Peak Gradient 38.44 mmHg    RA Width 4.35 cm    Right Atrial Pressure (from IVC) 8 mmHg    TV rest pulmonary artery pressure 46 mmHg     Assessment and Plan:     Atrial fibrillation    PLAN:  1. JOSE for evaluation of MARY to r/o thrombus prior to cardioversion    -The risks, benefits & alternatives of the procedure were explained to the patient.    -The risks of transesophageal echo include but are not limited to:  Dental trauma, esophageal trauma/perforation, bleeding, laryngospasm/brochospasm, aspiration, sore throat/hoarseness, & dislodgement of the endotracheal tube/nasogastric tube (where applicable).    -The risks of moderate sedation include hypotension, respiratory depression, arrhythmias, bronchospasm, & death.    -Informed consent was obtained & the patient is agreeable to proceed with the procedure.    I will discuss with the attending physician. Attending addendum is to follow.     Further recommendations per attending addendum         VTE Risk Mitigation (From admission, onward)    None          Mac Taylor MD  Cardiology   [facility]

## 2019-03-08 NOTE — PLAN OF CARE
Problem: Adult Inpatient Plan of Care  Goal: Plan of Care Review  Outcome: Ongoing (interventions implemented as appropriate)  Patient arrived to room. PIV placed, labs sent. Admit assessment completed. Plan of care discussed with patient. Will monitor. Report called to Lisa

## 2019-03-08 NOTE — PROGRESS NOTES
Patient admitted to recovery see [facility] for complete assessment pacu bcg's maintained safety measures verified patient instructed on pain scale and patient verbalized understanding. Patient sedated at this time. Also called patient's son and updated on patient location ekg done and placed in patient's chart.

## 2019-03-08 NOTE — DISCHARGE SUMMARY
[facility]  Cardiac Electrophysiology  Discharge Summary      Patient Name: Sherice Squires  MRN: [id number]  Admission Date: 3/8/2019  Hospital Length of Stay: 0 days  Discharge Date and Time:  03/08/2019   Attending Physician: MD Jose Reece   Discharging Provider: Mahesh Singh NP  Primary Care Physician: Annamarie Soria MD    HPI: 68 y.o. yo female with PMH HTN,HL,   NICM s/p ICD implantation 2010  asymptomatic atrial fibrillation discovered on ICD interrogation.  hx VF 2011, rx'd with ICD shocks  RBBB/LAFB, present since 5/2011    Patient last seen in EP clinic on 3/1/19 > given AT/AF with RVR planned for JOSE/CV and plan to assess for symptomatic benefit in NSR.    Procedure(s) (LRB):  CARDIOVERSION OR DEFIBRILLATION (N/A)  ECHOCARDIOGRAM,TRANSESOPHAGEAL (N/A)     Hospital Course: JOSE done showed no MARY thrombus, hence proceeded to DCCV which converted patient from AF to sinus rhythm.   Patient tolerated procedure. Post procedure  EKG showed  apaced rhythm   Plan to continue home medications including eliquis. Pt instructed to keep a diary of symptoms/ and to assess if any difference in her symptoms now that her rhythm   is in NSR vs when she was in AF,  and bring  log  to follow up EP clinic visit.   Pt to follow up in  4 weeks with MD Jose Reece.     Discharge plans/instructions discussed with patient and  Her son who verbalized understanding  and agreement of plans of care. No further questions or concerns  voiced at this time.       Consults:    -Anesthesia       Significant Diagnostic Studies: JOSE       Final Active Diagnoses:    Diagnosis Date Noted POA    PRINCIPAL PROBLEM:  Atrial fibrillation [I48.91] 05/14/2014 Yes      Problems Resolved During this Admission:       Discharged Condition: good    Disposition: HOME    Follow Up:  Follow-up Information     Jose Reece MD In 4 weeks.    Specialties:  Cardiology, Electrophysiology  Contact information:  [address]  [facility] [address]  [phone]                 Patient Instructions:      Diet Cardiac     No driving until:   Order Comments: For 24 hours post procedure     Other restrictions (specify):   Order Comments:    Diet  -You may resume oral intake after you are discharged, as long you have no swallowing difficulties.     Side effects:  -You may be drowsy for the remainder of the day from the sedation.     Because you have received sedation for this procedure:  -Limit activity for the remainder of the day.  -Do not drive or operate any equipment for the remainder of the day ( No driving for 24 hours)  -Do not smoke for at least 6 hours and until you are fully awake and alert.  -Do not drink alcoholic beverage for 24 hours.  -Defer important decision making until the following day.     Go to the Emergency Department if you develop:   -Bleeding  -Weakness or numbness  -Visual, gait or speech disturbance  -New chest pain, palpitations, shortness of breath, rapid heart beat, or fainting  -Fever        Notify your health care provider if you experience any of the following:  temperature >100.4     Notify your health care provider if you experience any of the following:  persistent nausea and vomiting or diarrhea     Notify your health care provider if you experience any of the following:  severe uncontrolled pain     Notify your health care provider if you experience any of the following:  redness, tenderness, or signs of infection (pain, swelling, redness, odor or green/yellow discharge around incision site)     Notify your health care provider if you experience any of the following:  difficulty breathing or increased cough     Notify your health care provider if you experience any of the following:  severe persistent headache     Notify your health care provider if you experience any of the following:  worsening rash     Notify your health care provider if you experience any of the following:  persistent dizziness,  light-headedness, or visual disturbances     Notify your health care provider if you experience any of the following:  increased confusion or weakness     Notify your health care provider if you experience any of the following:   Order Comments: For any concerning medical symptoms     Medications:  Reconciled Home Medications:      Medication List      CONTINUE taking these medications    bumetanide 1 MG tablet  Commonly known as:  BUMEX  Take 1 tablet (1 mg total) by mouth once daily.     digoxin 125 mcg tablet  Commonly known as:  DIGOX  Take 1 tablet by mouth once daily     ELIQUIS 5 mg Tab  Generic drug:  apixaban  Take 1 tablet (5 mg total) by mouth 2 (two) times daily.     ENTRESTO  mg per tablet  Generic drug:  sacubitril-valsartan  Take 1 tablet by mouth 2 (two) times daily.     hydrALAZINE 50 MG tablet  Commonly known as:  APRESOLINE  TAKE 1 TABLET (50 MG TOTAL) BY MOUTH 3 (THREE) TIMES DAILY.     isosorbide mononitrate 30 MG 24 hr tablet  Commonly known as:  IMDUR  TAKE 1 TABLET (30 MG TOTAL) BY MOUTH ONCE DAILY.     metoprolol succinate 200 MG 24 hr tablet  Commonly known as:  TOPROL-XL  TAKE 1 TABLET (200 MG TOTAL) BY MOUTH 2 (TWO) TIMES DAILY.     simvastatin 40 MG tablet  Commonly known as:  ZOCOR  Take 1 tablet (40 mg total) by mouth every evening.     spironolactone 25 MG tablet  Commonly known as:  ALDACTONE  TAKE 1 TABLET EVERY DAY            Time spent on the discharge of patient: 20 minutes    Mahesh Singh NP  Cardiac Electrophysiology  [facility]  STAFF MD Jose Reece

## 2019-03-08 NOTE — SUBJECTIVE & OBJECTIVE
Past Medical History:   Diagnosis Date    Atrial fibrillation     Cardiomyopathy     CHF (congestive heart failure)     Hypertension     Ventricular tachycardia        Past Surgical History:   Procedure Laterality Date    CARDIAC DEFIBRILLATOR PLACEMENT      CHOLECYSTECTOMY      HYSTERECTOMY      JOINT REPLACEMENT  2009    rt knee replacment    pace maker  12/2010       Review of patient's allergies indicates:   Allergen Reactions    Augmentin [amoxicillin-pot clavulanate] Swelling     Lip swelling      Penicillins      Other reaction(s): Swelling  Lip swelling         No current facility-administered medications on file prior to encounter.      Current Outpatient Medications on File Prior to Encounter   Medication Sig    apixaban (ELIQUIS) 5 mg Tab Take 1 tablet (5 mg total) by mouth 2 (two) times daily.    bumetanide (BUMEX) 1 MG tablet Take 1 tablet (1 mg total) by mouth once daily.    digoxin (DIGOX) 125 mcg tablet Take 1 tablet by mouth once daily    hydrALAZINE (APRESOLINE) 50 MG tablet TAKE 1 TABLET (50 MG TOTAL) BY MOUTH 3 (THREE) TIMES DAILY.    isosorbide mononitrate (IMDUR) 30 MG 24 hr tablet TAKE 1 TABLET (30 MG TOTAL) BY MOUTH ONCE DAILY.    metoprolol succinate (TOPROL-XL) 200 MG 24 hr tablet TAKE 1 TABLET (200 MG TOTAL) BY MOUTH 2 (TWO) TIMES DAILY.    sacubitril-valsartan (ENTRESTO)  mg per tablet Take 1 tablet by mouth 2 (two) times daily.    simvastatin (ZOCOR) 40 MG tablet Take 1 tablet (40 mg total) by mouth every evening.    spironolactone (ALDACTONE) 25 MG tablet TAKE 1 TABLET EVERY DAY     Family History     Problem Relation (Age of Onset)    Arthritis Father    COPD Brother    Cancer Father    Drug abuse Sister    Hypertension Maternal Grandmother        Tobacco Use    Smoking status: Never Smoker    Smokeless tobacco: Never Used   Substance and Sexual Activity    Alcohol use: No     Comment: rarely    Drug use: No    Sexual activity: Not on file     Review of  Systems   All other systems reviewed and are negative.    Objective:     Vital Signs (Most Recent):  Temp: 97.1 °F (36.2 °C) (03/08/19 0715)  Pulse: 81 (03/08/19 0715)  Resp: 18 (03/08/19 0715)  BP: (!) 147/76 (03/08/19 0716)  SpO2: 96 % (03/08/19 0715) Vital Signs (24h Range):  Temp:  [97.1 °F (36.2 °C)] 97.1 °F (36.2 °C)  Pulse:  [81] 81  Resp:  [18] 18  SpO2:  [96 %] 96 %  BP: (106-147)/(62-76) 147/76     Weight: 89.4 kg (197 lb)  Body mass index is 32.28 kg/m².    SpO2: 96 %  O2 Device (Oxygen Therapy): room air    No intake or output data in the 24 hours ending 03/08/19 0750    Lines/Drains/Airways     Peripheral Intravenous Line                 Peripheral IV - Single Lumen 03/08/19 0734 Left Hand less than 1 day                Physical Exam  General: alert, awake and oriented x 3  Eyes:PERRL.   Neck:no JVD   Lungs:  clear to auscultation bilaterally   Cardiovascular: Heart: Irregular rate and rhythm, S1, S2 normal, no murmur, click, rub or gallop.   Chest Wall: no tenderness.   Pulses-2+ radial, femoral, DP, PT b/l  Extremities: no cyanosis or edema.   Abdomen/Rectal: Abdomen: soft, non-tender non-distented; bowel sounds normal  Neurologic: Normal strength and tone. No focal numbness or weakness  Significant Labs: CMP No results for input(s): NA, K, CL, CO2, GLU, BUN, CREATININE, CALCIUM, PROT, ALBUMIN, BILITOT, ALKPHOS, AST, ALT, ANIONGAP, ESTGFRAFRICA, EGFRNONAA in the last 48 hours., CBC No results for input(s): WBC, HGB, HCT, PLT in the last 48 hours. and INR No results for input(s): INR, PROTIME in the last 48 hours.    Significant Imaging: Echocardiogram:   2D echo with color flow doppler:   Results for orders placed or performed in visit on 08/30/18   2D Echo w/ Color Flow Doppler   Result Value Ref Range    QEF 25 (A) 55 - 65    Mitral Valve Regurgitation MODERATE (A)     Est. PA Systolic Pressure 26.83     and Transthoracic echo (TTE) complete (Cupid Only):   Results for orders placed or performed  during the hospital encounter of 03/01/19   Transthoracic echo (TTE) complete   Result Value Ref Range    BSA 2.05 m2    TDI SEPTAL 0.06     LV LATERAL E/E' RATIO 18.67     LV SEPTAL E/E' RATIO 18.67     LA WIDTH 6.62 cm    TDI LATERAL 0.06     LVIDD 6.55 (A) 3.5 - 6.0 cm    IVS 1.04 0.6 - 1.1 cm    PW 0.75 0.6 - 1.1 cm    LVIDS 6.05 (A) 2.1 - 4.0 cm    FS 8 28 - 44 %    LA volume 219.00 cm3    Sinus 2.86 cm    STJ 2.77 cm    Ascending aorta 3.29 cm    LV mass 249.44 g    LA size 6.00 cm    RVDD 4.51 cm    TAPSE 1.07 cm    RV S' 8.87 m/s    Left Ventricle Relative Wall Thickness 0.23 cm    AV mean gradient 3.68 mmHg    AV valve area 1.36 cm2    AV Velocity Ratio 0.55     AV index (prosthetic) 0.49     Mean e' 0.06     LVOT diameter 1.88 cm    LVOT area 2.77 cm2    LVOT peak kevin [id number] m/s    LVOT peak VTI 10.34 cm    Ao peak kevin 1.35 m/s    Ao VTI 21.13 cm    LVOT stroke volume 28.69 cm3    AV peak gradient 7.29 mmHg    E/E' ratio 18.67     MV Peak E Kevin 1.12 m/s    TR Max Kevin 3.10 m/s    LV Systolic Volume 183.48 mL    LV Systolic Volume Index 92.4 mL/m2    LV Diastolic Volume 219.57 mL    LV Diastolic Volume Index 110.53 mL/m2    LA Volume Index 110.2 mL/m2    LV Mass Index 125.6 g/m2    RA Major Axis 5.63 cm    Left Atrium Minor Axis 6.34 cm    Left Atrium Major Axis 6.64 cm    Triscuspid Valve Regurgitation Peak Gradient 38.44 mmHg    RA Width 4.35 cm    Right Atrial Pressure (from IVC) 8 mmHg    TV rest pulmonary artery pressure 46 mmHg

## 2019-03-27 NOTE — PROGRESS NOTES
Subjective:   Patient ID:  Sherice Squires is a 68 y.o. female who presents for follow up of Atrial Fibrillation; Hypertension; Hyperlipidemia; and Congestive Heart Failure      HPI     Ms. Squires's current medical conditions include NICM with LVEF 18% on JOSE on 3/1/19, moderate to severe MR, mild RV enlargement and dysfunction,   PAF on Eliquis,HTN, HLD,ICD implanted 2010,RBBB/LAFB, present since 5/2011  History of VF with successful shocks.  Successful cardioversion to NSR on 3/8/19  Is following with Dr. Soria in advanced HF clinic. Last seen in January 2019 and was doing well. No changes made to medical management.   Today, she states that she feels great, has no chest pain, SOB, orthopnea, PND, dizziness, near syncope, syncope, no ICD firing, no palpitations or other signs to suggest that she has converted back to A-fib. Has no CNS complaints to suggest TIA or CVA.    Plans in place to repeat echo in 6 months with Dr. Soria. Patient tolerating meds well including Entresto, Digoxin,Isordil, Hydralazine, Toprol and Aldactone.    Has no abnormal bleeding on Eliquis.   Currently takes Bumex 1mg daily. Has good med and diet compliance       Past Medical History:   Diagnosis Date    Atrial fibrillation     Cardiomyopathy     CHF (congestive heart failure)     Hyperlipidemia     Hypertension     Ventricular tachycardia        Past Surgical History:   Procedure Laterality Date    CARDIAC DEFIBRILLATOR PLACEMENT      CARDIOVERSION OR DEFIBRILLATION N/A 3/8/2019    Performed by Jose Reece MD at [facility] EP LAB    CHOLECYSTECTOMY      ECHOCARDIOGRAM,TRANSESOPHAGEAL N/A 3/8/2019    Performed by [facility] Diagnostic Provider at [facility] EP LAB    HYSTERECTOMY      JOINT REPLACEMENT  2009    rt knee replacment    pace maker  12/2010       Social History     Tobacco Use    Smoking status: Never Smoker    Smokeless tobacco: Never Used   Substance Use Topics    Alcohol use: No     Comment: rarely    Drug use: No        Family History   Problem Relation Age of Onset    Arthritis Father     Cancer Father     Drug abuse Sister     COPD Brother     Hypertension Maternal Grandmother        Current Outpatient Medications   Medication Sig    apixaban (ELIQUIS) 5 mg Tab Take 1 tablet (5 mg total) by mouth 2 (two) times daily.    bumetanide (BUMEX) 1 MG tablet Take 1 tablet (1 mg total) by mouth once daily.    digoxin (DIGOX) 125 mcg tablet Take 1 tablet by mouth once daily    hydrALAZINE (APRESOLINE) 50 MG tablet TAKE 1 TABLET (50 MG TOTAL) BY MOUTH 3 (THREE) TIMES DAILY.    isosorbide mononitrate (IMDUR) 30 MG 24 hr tablet TAKE 1 TABLET (30 MG TOTAL) BY MOUTH ONCE DAILY.    metoprolol succinate (TOPROL-XL) 200 MG 24 hr tablet TAKE 1 TABLET (200 MG TOTAL) BY MOUTH 2 (TWO) TIMES DAILY.    sacubitril-valsartan (ENTRESTO)  mg per tablet Take 1 tablet by mouth 2 (two) times daily.    simvastatin (ZOCOR) 40 MG tablet Take 1 tablet (40 mg total) by mouth every evening.    spironolactone (ALDACTONE) 25 MG tablet TAKE 1 TABLET EVERY DAY     No current facility-administered medications for this visit.      Current Outpatient Medications on File Prior to Visit   Medication Sig    apixaban (ELIQUIS) 5 mg Tab Take 1 tablet (5 mg total) by mouth 2 (two) times daily.    bumetanide (BUMEX) 1 MG tablet Take 1 tablet (1 mg total) by mouth once daily.    digoxin (DIGOX) 125 mcg tablet Take 1 tablet by mouth once daily    hydrALAZINE (APRESOLINE) 50 MG tablet TAKE 1 TABLET (50 MG TOTAL) BY MOUTH 3 (THREE) TIMES DAILY.    isosorbide mononitrate (IMDUR) 30 MG 24 hr tablet TAKE 1 TABLET (30 MG TOTAL) BY MOUTH ONCE DAILY.    metoprolol succinate (TOPROL-XL) 200 MG 24 hr tablet TAKE 1 TABLET (200 MG TOTAL) BY MOUTH 2 (TWO) TIMES DAILY.    sacubitril-valsartan (ENTRESTO)  mg per tablet Take 1 tablet by mouth 2 (two) times daily.    simvastatin (ZOCOR) 40 MG tablet Take 1 tablet (40 mg total) by mouth every evening.     spironolactone (ALDACTONE) 25 MG tablet TAKE 1 TABLET EVERY DAY     No current facility-administered medications on file prior to visit.        Review of Systems   Constitution: Negative for diaphoresis, malaise/fatigue, weight gain and weight loss.   HENT: Negative for congestion and nosebleeds.    Cardiovascular: Negative for chest pain, claudication, cyanosis, dyspnea on exertion, irregular heartbeat, leg swelling, near-syncope, orthopnea, palpitations, paroxysmal nocturnal dyspnea and syncope.   Respiratory: Negative for cough, hemoptysis, shortness of breath, sleep disturbances due to breathing, snoring, sputum production and wheezing.    Hematologic/Lymphatic: Negative for bleeding problem. Bruises/bleeds easily.   Skin: Negative for rash.   Musculoskeletal: Positive for arthritis. Negative for back pain, falls, joint pain, muscle cramps and muscle weakness.        Ortho boot    Gastrointestinal: Negative for abdominal pain, constipation, diarrhea, heartburn, hematemesis, hematochezia, melena and nausea.   Genitourinary: Negative for dysuria, hematuria and nocturia.   Neurological: Negative for excessive daytime sleepiness, dizziness, headaches, light-headedness, loss of balance, numbness, vertigo and weakness.       Objective:   Physical Exam   Constitutional: She is oriented to person, place, and time. She appears well-developed and well-nourished.   Neck: Neck supple. No JVD present.   Cardiovascular: Normal rate, regular rhythm, normal heart sounds and normal pulses. Exam reveals no friction rub.   No murmur heard.  Pulmonary/Chest: Effort normal and breath sounds normal. No respiratory distress. She has no wheezes. She has no rales.   Left ICD pocket WNL   Abdominal: Soft. Bowel sounds are normal. She exhibits no distension.   Musculoskeletal: She exhibits no edema or tenderness.   Left ortho boot    Neurological: She is alert and oriented to person, place, and time.   Skin: Skin is warm and dry. No rash  "noted.   Psychiatric: She has a normal mood and affect. Her behavior is normal.   Nursing note and vitals reviewed.    Vitals:    03/27/19 0826   BP: 100/78   Pulse: 85   SpO2: 98%   Weight: 93 kg (205 lb)   Height: 5' 5" (1.651 m)     Lab Results   Component Value Date    CHOL 176 09/10/2014    CHOL 176 01/10/2014    CHOL 174 03/04/2013     Lab Results   Component Value Date    HDL 63 09/10/2014    HDL 54 01/10/2014    HDL 48 03/04/2013     Lab Results   Component Value Date    LDLCALC 100.2 09/10/2014    LDLCALC 105.6 01/10/2014    LDLCALC 108.0 03/04/2013     Lab Results   Component Value Date    TRIG 64 09/10/2014    TRIG 82 01/10/2014    TRIG 89 03/04/2013     Lab Results   Component Value Date    CHOLHDL 35.8 09/10/2014    CHOLHDL 30.7 01/10/2014    CHOLHDL 27.6 03/04/2013       Chemistry        Component Value Date/Time     03/20/2019 0943    K 4.1 03/20/2019 0943     03/20/2019 0943    CO2 31 (H) 03/20/2019 0943    BUN 20 03/20/2019 0943    CREATININE 1.1 03/20/2019 0943     03/20/2019 0943        Component Value Date/Time    CALCIUM 9.6 03/20/2019 0943    ALKPHOS 66 03/20/2019 0943    AST 12 03/20/2019 0943    ALT 14 03/20/2019 0943    BILITOT 0.3 03/20/2019 0943    ESTGFRAFRICA 59.6 (A) 03/20/2019 0943    EGFRNONAA 51.7 (A) 03/20/2019 0943          Lab Results   Component Value Date    TSH 0.900 06/08/2015     Lab Results   Component Value Date    INR 1.1 03/01/2019    INR 1.0 04/18/2017    INR 1.0 03/18/2016     Lab Results   Component Value Date    WBC 9.33 03/01/2019    HGB 12.9 03/01/2019    HCT 40.9 03/01/2019    MCV 93 03/01/2019     (L) 03/01/2019     BMP  Sodium   Date Value Ref Range Status   03/20/2019 144 136 - 145 mmol/L Final     Potassium   Date Value Ref Range Status   03/20/2019 4.1 3.5 - 5.1 mmol/L Final     Chloride   Date Value Ref Range Status   03/20/2019 105 95 - 110 mmol/L Final     CO2   Date Value Ref Range Status   03/20/2019 31 (H) 23 - 29 mmol/L Final "     BUN, Bld   Date Value Ref Range Status   03/20/2019 20 8 - 23 mg/dL Final     Creatinine   Date Value Ref Range Status   03/20/2019 1.1 0.5 - 1.4 mg/dL Final     Calcium   Date Value Ref Range Status   03/20/2019 9.6 8.7 - 10.5 mg/dL Final     Anion Gap   Date Value Ref Range Status   03/20/2019 8 8 - 16 mmol/L Final     eGFR if    Date Value Ref Range Status   03/20/2019 59.6 (A) >60 mL/min/1.73 m^2 Final     eGFR if non    Date Value Ref Range Status   03/20/2019 51.7 (A) >60 mL/min/1.73 m^2 Final     Comment:     Calculation used to obtain the estimated glomerular filtration  rate (eGFR) is the CKD-EPI equation.        Estimated Creatinine Clearance: 55.2 mL/min (based on SCr of 1.1 mg/dL).    Assessment:     1. Dilated cardiomyopathy    2. NYHA class 2 and LUDIN/AHA stage C chronic systolic congestive heart failure    3. Essential hypertension    4. Hyperlipidemia, unspecified hyperlipidemia type    5. Automatic implantable cardioverter-defibrillator in situ    6. Paroxysmal atrial fibrillation        Plan:   Dilated cardiomyopathy  Continue Entresto, Aldactone, Toprol, Imdur/Hydralazine, Digoxin  Continue Bumex 1mg daily with extra tab PRN  Repeat Echo as scheduled with Dr. Soria  Heart healthy diet  Limit fluid intake 50-60 oz   Daily weights and to notify clinic if weight increases by more than 3 lbs in 1 day or 5 lbs in 1 week.   Exercise routine as tolerated  2D echo in July as scheduled     Essential hypertension  Continue current medical management   Heart healthy diet     Hyperlipidemia, unspecified hyperlipidemia type  Continue simvastatin     Automatic implantable cardioverter-defibrillator in situ  Continue device clinic     Paroxysmal atrial fibrillation  Continue Toprol, Digoxin and Eliquis    RTC in 3 months or sooner if needed. BMP and Mg before visit   Follow up with  as scheduled

## 2019-04-15 NOTE — TELEPHONE ENCOUNTER
----- Message from Miky Mcdonald sent at 4/15/2019  9:51 AM CDT -----  Contact: Pt   Pt request a call back from Aj regarding an appt, I tried to assist pt but she states it is personal, please contact the pt at [phone]

## 2019-04-16 NOTE — PROGRESS NOTES
Subjective:     Patient ID: Sherice Squires is a 68 y.o. female.    Chief Complaint: Pain of the Right Knee    Patient is a 68-year-old female presents clinic today complaining of bilateral knee pain.  Patient states that she has a history of right total knee replacement done by Dr. Middleton at [facility] approx 10 years prior.  Patient states that her left knee also is very painful.  States that she was told that eventually she would likely need her left knee replaced.  Patient states that she has been getting cortisone injections and viscosupplementation is in left knee without any improvement.  States that the right knee still gives her pain around the anterior portion of her knee.  Patient states that is difficult to kneel on the either knee.  Denies any new injuries or falls.  Patient states that she has not done any physical therapy in a while.  States she has never prescribed any topical compounding creams.  Patient states that overall, she just wanted a 2nd opinion to see if there was anything more that can be done for her knees besides the injections and possibly needing a knee replacement.      Past Medical History:   Diagnosis Date    Atrial fibrillation     Cardiomyopathy     CHF (congestive heart failure)     Hyperlipidemia     Hypertension     Ventricular tachycardia      Past Surgical History:   Procedure Laterality Date    CARDIAC DEFIBRILLATOR PLACEMENT      CARDIOVERSION OR DEFIBRILLATION N/A 3/8/2019    Performed by [facility] MD at [facility] EP LAB    CHOLECYSTECTOMY      ECHOCARDIOGRAM,TRANSESOPHAGEAL N/A 3/8/2019    Performed by [facility] Diagnostic Provider at [facility] EP LAB    HYSTERECTOMY      JOINT REPLACEMENT  2009    rt knee replacment    pace maker  12/2010     Family History   Problem Relation Age of Onset    Arthritis Father     Cancer Father     Drug abuse Sister     COPD Brother     Hypertension Maternal Grandmother      Social History     Socioeconomic History    Marital  status: Single     Spouse name: Not on file    Number of children: Not on file    Years of education: Not on file    Highest education level: Not on file   Occupational History    Not on file   Social Needs    Financial resource strain: Not on file    Food insecurity:     Worry: Not on file     Inability: Not on file    Transportation needs:     Medical: Not on file     Non-medical: Not on file   Tobacco Use    Smoking status: Never Smoker    Smokeless tobacco: Never Used   Substance and Sexual Activity    Alcohol use: No     Comment: rarely    Drug use: No    Sexual activity: Not on file   Lifestyle    Physical activity:     Days per week: Not on file     Minutes per session: Not on file    Stress: Not on file   Relationships    Social connections:     Talks on phone: Not on file     Gets together: Not on file     Attends [employer] service: Not on file     Active member of club or organization: Not on file     Attends meetings of clubs or organizations: Not on file     Relationship status: Not on file   Other Topics Concern    Not on file   Social History Narrative    Not on file     Medication List with Changes/Refills   Current Medications    APIXABAN (ELIQUIS) 5 MG TAB    Take 1 tablet (5 mg total) by mouth 2 (two) times daily.    BUMETANIDE (BUMEX) 1 MG TABLET    Take 1 tablet (1 mg total) by mouth once daily.    DIGOXIN (DIGOX) 125 MCG TABLET    Take 1 tablet by mouth once daily    HYDRALAZINE (APRESOLINE) 50 MG TABLET    TAKE 1 TABLET (50 MG TOTAL) BY MOUTH 3 (THREE) TIMES DAILY.    ISOSORBIDE MONONITRATE (IMDUR) 30 MG 24 HR TABLET    TAKE 1 TABLET (30 MG TOTAL) BY MOUTH ONCE DAILY.    METOPROLOL SUCCINATE (TOPROL-XL) 200 MG 24 HR TABLET    TAKE 1 TABLET (200 MG TOTAL) BY MOUTH 2 (TWO) TIMES DAILY.    SACUBITRIL-VALSARTAN (ENTRESTO)  MG PER TABLET    Take 1 tablet by mouth 2 (two) times daily.    SIMVASTATIN (ZOCOR) 40 MG TABLET    TAKE 1 TABLET EVERY EVENING    SPIRONOLACTONE  "(ALDACTONE) 25 MG TABLET    TAKE 1 TABLET EVERY DAY     Review of patient's allergies indicates:   Allergen Reactions    Augmentin [amoxicillin-pot clavulanate] Swelling     Lip swelling      Penicillins      Other reaction(s): Swelling  Lip swelling       Review of Systems   Constitutional: Negative for chills and fever.   HENT: Negative for hearing loss.    Cardiovascular: Negative for leg swelling.   Gastrointestinal: Negative for nausea and vomiting.   Musculoskeletal: Positive for joint pain. Negative for back pain, falls and myalgias.   Skin: Negative for rash.   Neurological: Negative for tingling, sensory change, focal weakness and weakness.        Objective:   Body mass index is 34.11 kg/m².  Vitals:    04/16/19 1536   BP: 104/62   Pulse: 71   Weight: 93 kg (205 lb)   Height: 5' 5" (1.651 m)   PainSc:   6   PainLoc: Knee           General    Nursing note and vitals reviewed.  Constitutional: She is oriented to person, place, and time. She appears well-developed and well-nourished. No distress.   Eyes: Conjunctivae are normal. No scleral icterus.   Pulmonary/Chest: Effort normal.   Neurological: She is alert and oriented to person, place, and time.   Psychiatric: She has a normal mood and affect. Her behavior is normal. Judgment and thought content normal.     General Musculoskeletal Exam   Gait: antalgic       Right Knee Exam     Inspection   Erythema: absent  Scars: present ( postsurgical scar is present and well healed)  Swelling: present (Patellar tendon)  Deformity: absent    Tenderness   The patient is tender to palpation of the condyle and patella.    Crepitus   The patient has crepitus of the patella.    Range of Motion   The patient has normal right knee ROM.    Other   Sensation: normal    Left Knee Exam     Inspection   Erythema: absent  Effusion: absent  Deformity: absent    Tenderness   The patient tender to palpation of the medial joint line.    Crepitus   The patient has crepitus of the " patella and medial joint line.    Range of Motion   The patient has normal left knee ROM.    Other   Sensation: normal    EXAMINATION:  XR KNEE ORTHO RIGHT WITH FLEXION    CLINICAL HISTORY:  Pain, unspecified    TECHNIQUE:  AP standing views of both knees, AP flexion views of both knees, lateral view of the right knee and Merchant views of both knees    COMPARISON:  None    FINDINGS:  A right total knee arthroplasty is noted.  No hardware failure or loosening.  No periprosthetic fracture.  No joint effusion.  Moderate to severe joint space narrowing and marginal osteophyte formation involving the medial compartment of the left knee.      Impression       1.  As above      Electronically signed by: El Joyce DO  Date: 04/16/2019  Time: 15:37           Sherice was seen today for pain.    Diagnoses and all orders for this visit:    Primary osteoarthritis of left knee  -     Ambulatory Referral to Physical/Occupational Therapy    Primary osteoarthritis of right knee  -     Ambulatory Referral to Physical/Occupational Therapy    H/O total knee replacement, right  -     Ambulatory Referral to Physical/Occupational Therapy    Essential hypertension    Hyperlipidemia, unspecified hyperlipidemia type    -discussed the clinical course and nature of osteoarthritis with patient.  At this time, I would continuing extra-strength Tylenol, starting a topical compounding cream, and sending the patient for a round of physical therapy.  If patient does not get significant relief, would recommend evaluation by either pain management or Orthopedic surgery.  Discussed with patient that the definitive treatment for knee osteoarthritis is total knee replacement.  -bilateral knee Xray images were independently viewed and read by me showing right knee previous total knee replacement hardware is present and does not appear to have shifted or moved.  Left knee shows severe degenerative changes in the medial compartment with significant  joint space loss and osteophyte formation.  -Formal read by radiologist is as described above  -Discussed findings with patient    -Chronic hypertension.  Managed by patient's PCP.  -Chronic hyperlipidemia.  Managed by patient's PCP.    -Treatment options and alternatives were discussed with the patient. Patient expressed understanding. Patient was given the opportunity to ask questions and be an active participant in their medical care. Patient had no further questions or concerns at this time.   -Patient is an overall moderate risk for health complications from their medical conditions.

## 2019-05-01 NOTE — PROGRESS NOTES
Subjective:    Patient ID:  Sherice Squires is a 68 y.o. female who presents for follow-up of No chief complaint on file.      Atrial Fibrillation   Symptoms are negative for chest pain, dizziness, palpitations, shortness of breath, syncope and weakness. Past medical history includes atrial fibrillation.     68 y.o. yo female  ([facility] facility in [facility])  NICM,   HTN, HLD, both on meds  ICD 2010  asymptomatic atrial fibrillation discovered on ICD interrogation.  hx VF 2011, rx'd with ICD shocks  RBBB/LAFB, present since 5/2011  persistent AF    Interrogation reveals stable lead function, overall AMS burden 1%.  Continues to feel well: NYHA I sx. No CP. No NAZARIO. Limited only by knee discomfort, never by NAZARIO.  She is feeling well and denies any palpitations, increased sob, fatigue, syncope, or edema. She denies any falls or issues with balance.    On 2/23/18, was at work seated, feeling fine. No palpitations, no LH. Shocked by ICD x 6.  ICD interrogation shows AT with RVR, as well as degeneration to AF, resulting in ATP and shocks.    After ICD showed that she'd been in AF since mid February, we did DCCV 3/8/19. Stayed in NSR until 3/22, then 100% AF since then.  She says she feels great now.  Only gets NYHA II sx -- e.g., able to walk up the long incline to clinic without NAZRAIO.  ICD shows 24% V pacing.    echo 18%, with 3-4+ MR, severe LAE    My interpretation of today's ECG is AF, RBBB/LAFB, some V pacing.  CHADS=3 on Eliquis    Review of Systems   Constitution: Negative. Negative for malaise/fatigue.   HENT: Negative.  Negative for ear pain and tinnitus.    Eyes: Negative for blurred vision.   Cardiovascular: Negative for chest pain, dyspnea on exertion, irregular heartbeat, leg swelling, near-syncope, palpitations and syncope.   Respiratory: Negative.  Negative for shortness of breath.    Endocrine: Negative.  Negative for polyuria.   Hematologic/Lymphatic: Does not bruise/bleed easily.    Skin: Negative.  Negative for rash.   Musculoskeletal: Positive for joint pain. Negative for muscle weakness.   Gastrointestinal: Negative.  Negative for abdominal pain and change in bowel habit.   Genitourinary: Negative for frequency.   Neurological: Negative.  Negative for dizziness, light-headedness and weakness.   Psychiatric/Behavioral: Negative.  Negative for depression. The patient is not nervous/anxious.    Allergic/Immunologic: Negative for environmental allergies.        Objective:    Physical Exam   Constitutional: She is oriented to person, place, and time. Vital signs are normal. She appears well-developed and well-nourished. She is active and cooperative.   HENT:   Head: Normocephalic and atraumatic.   Eyes: Conjunctivae and EOM are normal.   Neck: Normal range of motion. Carotid bruit is not present. No tracheal deviation and no edema present. No thyroid mass and no thyromegaly present.   Cardiovascular: Normal rate, normal heart sounds, intact distal pulses and normal pulses. An irregularly irregular rhythm present.  No extrasystoles are present. PMI is not displaced. Exam reveals no gallop and no friction rub.   No murmur heard.  Pulmonary/Chest: Effort normal and breath sounds normal. No respiratory distress. She has no wheezes. She has no rales.   Abdominal: Soft. Normal appearance. She exhibits no distension. There is no hepatosplenomegaly.   Musculoskeletal: Normal range of motion.   Neurological: She is alert and oriented to person, place, and time. Coordination normal.   Skin: Skin is warm and dry. No rash noted.   Psychiatric: She has a normal mood and affect. Her speech is normal and behavior is normal. Thought content normal. Cognition and memory are normal.   Nursing note and vitals reviewed.        Assessment:       1. Dilated cardiomyopathy    2. Automatic implantable cardiac defibrillator in situ    3. Atrial fibrillation-paroxysmal, asymptomatic discovered on ICD interrogation    4.  Hypertension    5. Hyperlipidemia         Plan:       AT/AF with good rate control. No change in sx between NSR and AF (feels great today).    Will continue with rate-control strategy.  ICD counters reset today (to follow % V paced, for consideration of upgrade to biV in future).    f/u 6 mos, or earlier prn.

## 2019-05-16 NOTE — PROGRESS NOTES
Subjective:   Patient ID:  Sherice Squires is a 68 y.o. female who presents for follow up of Congestive Heart Failure      HPI  Ms. Squires's current medical conditions include NICM with LVEF 13% on JOSE on 3/1/19, moderate to severe MR, mild RV enlargement and dysfunction,   Persistent A-fib on Eliquis,HTN, HLD, ICD implanted 2010, RBBB/LAFB, present since 5/2011  History of VF with successful shocks.  Successful cardioversion to NSR on 3/8/19  Follows with AIDA Maravilla. Considering patient for BiV upgrade in the future.   Is following with Dr. Soria in advanced HF clinic    Patient tolerating meds well including Entresto, Digoxin,Isordil, Hydralazine, Toprol and Aldactone.    Has no abnormal bleeding on Eliquis.   Denies any chest pain,, PND, dizziness, palpitations,  near syncope, syncope or palpitations. Has no symptoms concerning for angina or equivalent. No CNS Complaints to suggest TIA or CVA. Does well with limiting sodium and fluid intake.  Today, she complains of SOB, NAZARIO, orthopnea and edema.   Currently takes Bumex 1mg daily.  BP stable.   Scheduled for labs today     Past Medical History:   Diagnosis Date    Atrial fibrillation     Cardiomyopathy     CHF (congestive heart failure)     Hyperlipidemia     Hypertension     Ventricular tachycardia        Past Surgical History:   Procedure Laterality Date    CARDIAC DEFIBRILLATOR PLACEMENT      CARDIOVERSION OR DEFIBRILLATION N/A 3/8/2019    Performed by Jose Reece MD at [facility]    CHOLECYSTECTOMY      ECHOCARDIOGRAM,TRANSESOPHAGEAL N/A 3/8/2019    Performed by [facility] at [facility]    HYSTERECTOMY      JOINT REPLACEMENT  2009    rt knee replacment    pace maker  12/2010       Social History     Tobacco Use    Smoking status: Never Smoker    Smokeless tobacco: Never Used   Substance Use Topics    Alcohol use: No     Comment: rarely    Drug use: No       Family History   Problem Relation Age of Onset    Arthritis  Father     Cancer Father     Drug abuse Sister     COPD Brother     Hypertension Maternal Grandmother        Current Outpatient Medications   Medication Sig    apixaban (ELIQUIS) 5 mg Tab Take 1 tablet (5 mg total) by mouth 2 (two) times daily.    bumetanide (BUMEX) 1 MG tablet TAKE 1 TABLET (1 MG TOTAL) BY MOUTH ONCE DAILY.    digoxin (DIGOX) 125 mcg tablet Take 1 tablet by mouth once daily    esomeprazole (NEXIUM) 20 MG capsule Take 20 mg by mouth before breakfast.    hydrALAZINE (APRESOLINE) 50 MG tablet TAKE 1 TABLET (50 MG TOTAL) BY MOUTH 3 (THREE) TIMES DAILY.    isosorbide mononitrate (IMDUR) 30 MG 24 hr tablet TAKE 1 TABLET (30 MG TOTAL) BY MOUTH ONCE DAILY.    metoprolol succinate (TOPROL-XL) 200 MG 24 hr tablet TAKE 1 TABLET (200 MG TOTAL) BY MOUTH 2 (TWO) TIMES DAILY.    sacubitril-valsartan (ENTRESTO)  mg per tablet Take 1 tablet by mouth 2 (two) times daily.    simvastatin (ZOCOR) 40 MG tablet TAKE 1 TABLET EVERY EVENING    spironolactone (ALDACTONE) 25 MG tablet TAKE 1 TABLET EVERY DAY     No current facility-administered medications for this visit.      Current Outpatient Medications on File Prior to Visit   Medication Sig    apixaban (ELIQUIS) 5 mg Tab Take 1 tablet (5 mg total) by mouth 2 (two) times daily.    bumetanide (BUMEX) 1 MG tablet TAKE 1 TABLET (1 MG TOTAL) BY MOUTH ONCE DAILY.    digoxin (DIGOX) 125 mcg tablet Take 1 tablet by mouth once daily    esomeprazole (NEXIUM) 20 MG capsule Take 20 mg by mouth before breakfast.    hydrALAZINE (APRESOLINE) 50 MG tablet TAKE 1 TABLET (50 MG TOTAL) BY MOUTH 3 (THREE) TIMES DAILY.    isosorbide mononitrate (IMDUR) 30 MG 24 hr tablet TAKE 1 TABLET (30 MG TOTAL) BY MOUTH ONCE DAILY.    metoprolol succinate (TOPROL-XL) 200 MG 24 hr tablet TAKE 1 TABLET (200 MG TOTAL) BY MOUTH 2 (TWO) TIMES DAILY.    sacubitril-valsartan (ENTRESTO)  mg per tablet Take 1 tablet by mouth 2 (two) times daily.    simvastatin (ZOCOR) 40 MG  tablet TAKE 1 TABLET EVERY EVENING    spironolactone (ALDACTONE) 25 MG tablet TAKE 1 TABLET EVERY DAY     No current facility-administered medications on file prior to visit.        Review of Systems   Constitution: Negative for diaphoresis, malaise/fatigue, weight gain and weight loss.   HENT: Negative for congestion and nosebleeds.    Cardiovascular: Positive for dyspnea on exertion and leg swelling. Negative for chest pain, claudication, cyanosis, irregular heartbeat, near-syncope, orthopnea, palpitations, paroxysmal nocturnal dyspnea and syncope.   Respiratory: Positive for shortness of breath. Negative for cough, hemoptysis, sleep disturbances due to breathing, snoring, sputum production and wheezing.    Hematologic/Lymphatic: Negative for bleeding problem. Does not bruise/bleed easily.   Skin: Negative for rash.   Musculoskeletal: Negative for arthritis, back pain, falls, joint pain (Left knee), muscle cramps and muscle weakness.        Using walking cane    Gastrointestinal: Negative for abdominal pain, constipation, diarrhea, heartburn, hematemesis, hematochezia, melena and nausea.   Genitourinary: Negative for dysuria, hematuria and nocturia.   Neurological: Negative for excessive daytime sleepiness, dizziness, headaches, light-headedness, loss of balance, numbness, vertigo and weakness.       Objective:   Physical Exam   Constitutional: She is oriented to person, place, and time. She appears well-developed and well-nourished.   Neck: Neck supple. No JVD present.   Cardiovascular: Normal rate, regular rhythm, normal heart sounds and normal pulses. Exam reveals no friction rub.   No murmur heard.  Pulmonary/Chest: Effort normal. No respiratory distress. She has no wheezes. She has rales ( faint rales to base ).   Left ICD pocket WNL    Abdominal: Soft. Bowel sounds are normal. She exhibits no distension.   Musculoskeletal: She exhibits edema ( +2 BLE ). She exhibits no tenderness.   Neurological: She is  "alert and oriented to person, place, and time.   Skin: Skin is warm and dry. No rash noted.   Psychiatric: She has a normal mood and affect. Her behavior is normal.   Nursing note and vitals reviewed.    Vitals:    05/17/19 0828   BP: 102/64   Pulse: 63   SpO2: 97%   Weight: 88.5 kg (195 lb 1.7 oz)   Height: 5' 5" (1.651 m)     Lab Results   Component Value Date    CHOL 176 09/10/2014    CHOL 176 01/10/2014    CHOL 174 03/04/2013     Lab Results   Component Value Date    HDL 63 09/10/2014    HDL 54 01/10/2014    HDL 48 03/04/2013     Lab Results   Component Value Date    LDLCALC 100.2 09/10/2014    LDLCALC 105.6 01/10/2014    LDLCALC 108.0 03/04/2013     Lab Results   Component Value Date    TRIG 64 09/10/2014    TRIG 82 01/10/2014    TRIG 89 03/04/2013     Lab Results   Component Value Date    CHOLHDL 35.8 09/10/2014    CHOLHDL 30.7 01/10/2014    CHOLHDL 27.6 03/04/2013       Chemistry        Component Value Date/Time     03/20/2019 0943    K 4.1 03/20/2019 0943     03/20/2019 0943    CO2 31 (H) 03/20/2019 0943    BUN 20 03/20/2019 0943    CREATININE 1.1 03/20/2019 0943     03/20/2019 0943        Component Value Date/Time    CALCIUM 9.6 03/20/2019 0943    ALKPHOS 66 03/20/2019 0943    AST 12 03/20/2019 0943    ALT 14 03/20/2019 0943    BILITOT 0.3 03/20/2019 0943    ESTGFRAFRICA 59.6 (A) 03/20/2019 0943    EGFRNONAA 51.7 (A) 03/20/2019 0943          Lab Results   Component Value Date    TSH 0.900 06/08/2015     Lab Results   Component Value Date    INR 1.1 03/01/2019    INR 1.0 04/18/2017    INR 1.0 03/18/2016     Lab Results   Component Value Date    WBC 9.33 03/01/2019    HGB 12.9 03/01/2019    HCT 40.9 03/01/2019    MCV 93 03/01/2019     (L) 03/01/2019     BMP  Sodium   Date Value Ref Range Status   03/20/2019 144 136 - 145 mmol/L Final     Potassium   Date Value Ref Range Status   03/20/2019 4.1 3.5 - 5.1 mmol/L Final     Chloride   Date Value Ref Range Status   03/20/2019 105 95 - 110 " mmol/L Final     CO2   Date Value Ref Range Status   03/20/2019 31 (H) 23 - 29 mmol/L Final     BUN, Bld   Date Value Ref Range Status   03/20/2019 20 8 - 23 mg/dL Final     Creatinine   Date Value Ref Range Status   03/20/2019 1.1 0.5 - 1.4 mg/dL Final     Calcium   Date Value Ref Range Status   03/20/2019 9.6 8.7 - 10.5 mg/dL Final     Anion Gap   Date Value Ref Range Status   03/20/2019 8 8 - 16 mmol/L Final     eGFR if    Date Value Ref Range Status   03/20/2019 59.6 (A) >60 mL/min/1.73 m^2 Final     eGFR if non    Date Value Ref Range Status   03/20/2019 51.7 (A) >60 mL/min/1.73 m^2 Final     Comment:     Calculation used to obtain the estimated glomerular filtration  rate (eGFR) is the CKD-EPI equation.        CrCl cannot be calculated (Patient's most recent lab result is older than the maximum 7 days allowed.).    Assessment:     1. NYHA class 2 and LUDIN/AHA stage C chronic systolic congestive heart failure    2. Essential hypertension    3. Hyperlipidemia, unspecified hyperlipidemia type    4. Dilated cardiomyopathy    5. Automatic implantable cardioverter-defibrillator in situ    6. Persistent atrial fibrillation      No abnormal bleeding on Eliqius  Currently taking Bumex 1mg daily   No CNS complaints to suggest TIA or CVA     Plan:     Continue Toprol 200mg, Entresto, Imdur, Digoxin and Zocar  Continue Eliquis for CVA prophylaxis   Increase Bumex to 1mg BID x 3   Phone review on Monday for symptom check   Heart healthy diet  Limit fluid intake 50-60 oz   Daily weights and to notify clinic if weight increases by more than 3 lbs in 1 day or 5 lbs in 1 week.   Exercise routine as tolerated  Follow up in 2 months   Follow up with Dr. Reece as scheduled   BNP, CMP and Mg today   Follow up with device clinic

## 2019-05-17 NOTE — TELEPHONE ENCOUNTER
Please inform patient that her fluid marker number is significantly elevated. She should increase her Bumex as instructed in visit

## 2019-05-17 NOTE — PATIENT INSTRUCTIONS
Increase Bumex to 1mg twice daily starting today until Sunday    Will call you on Monday to check on you

## 2019-05-20 NOTE — TELEPHONE ENCOUNTER
I called pt at home # listed. No answer left message for her to call me back.   I called mobile # listed. No answer. Left radha rebollaror pt to call back

## 2019-05-21 NOTE — TELEPHONE ENCOUNTER
Pt returned my call. Pt is taking her bumex 1mg bid. She says she is still sob but it is a little better. She is able to lay down in bed and she was sitting up before. She is having some swelling in her right foot and has to loosen her shoe up due to it being tight and painful. Wt today at home was 195lb on her home scale. Please review labs and advise

## 2019-05-21 NOTE — TELEPHONE ENCOUNTER
The patient has been notified of this information and all questions answered.  Phone rev scheduled Friday 5/24 for f/u and donaler recommendations

## 2019-05-27 NOTE — TELEPHONE ENCOUNTER
Pt returned my call. She reports she has only been taking bumex 1 mg in am and 1 mg in pm, instead of 2 mg in am and 2 mg in pm. She is still having LE edema bialt, not much improved from last week.she fatigues easily but no worsening sob or pnd or orthopnea. I advised pt to take another bumex 1 mg now, for total of 2 mg this morning and take another 2 mg at 3 pm. And do 2 mg bid again tomorrow. I will call her wed AM to check in and for any further recommendations.

## 2019-05-29 NOTE — TELEPHONE ENCOUNTER
I called pt to follow up on her wt and symptoms since taking bumex 2mg bid x 2 days.   She reports her wt today is 186lb. Was 195lbs on 5/20.  Pt did not make med change before as she should have, see phone note 5/24.    She reports today she has been taking bumex 2mg bid x  2days since 5/24.   Her LE edema is a little improved. Still more swelling in her left foot. Rt foot has gone down.   She reports she did have PND last night and was having trouble getting comfortable.   She says her walking is off balance and having NAZARIO  No dizziness, no CP.

## 2019-05-29 NOTE — TELEPHONE ENCOUNTER
The patient has been notified of this information and all questions answered.  Phone review is scheduled for Friday to f/u

## 2019-05-29 NOTE — TELEPHONE ENCOUNTER
----- Message from Esther Bdaillo LPN sent at 5/27/2019 11:11 AM CDT -----  Sx check, bumex 2mg bid x 2 days

## 2019-06-03 NOTE — TELEPHONE ENCOUNTER
----- Message from Esther Badillo LPN sent at 5/29/2019  2:36 PM CDT -----  Wt, sx check on bumex 2mg bid  Does she need a refill?

## 2019-06-03 NOTE — TELEPHONE ENCOUNTER
I called and spoke with pt. She reports she has been taking bumex 2mg bid as instructed. Left foot still swollen. Having some pain and tenderness in the side and across the toe. She says edema is not much improved.   I advised pt to be seen in clinic to evaluate further. appt scheduled for tomorrow 6/4.

## 2019-06-04 NOTE — TELEPHONE ENCOUNTER
Pt called me this morning to say she has cancelled her appt for today. She says she has been elevating and icing the top of her foot and it is helping.   She is requesting a refill on her bumex as she has been taking increased dose and has ran out early.

## 2019-06-11 PROBLEM — I50.9 CONGESTIVE HEART FAILURE, NYHA CLASS 3 AND ACC/AHA STAGE C: Status: ACTIVE | Noted: 2019-01-01

## 2019-06-11 PROBLEM — I34.81 MITRAL ANNULAR CALCIFICATION: Status: ACTIVE | Noted: 2019-01-01

## 2019-06-11 NOTE — ASSESSMENT & PLAN NOTE
Take two mg of bumex in am and one mg of bumex at 3 PM as long as weight < 188 lbs  Take two mg of bumex in am and two mg of bumex at 3pm if weight > 188 lbs  BNP / BMP next week

## 2019-06-11 NOTE — PATIENT INSTRUCTIONS
Take two mg of bumex in am and one mg of bumex at 3 PM as long as weight < 188 lbs  Take two mg of bumex in am and two mg of bumex at 3pm if weight > 188 lbs

## 2019-06-11 NOTE — PROGRESS NOTES
Subjective:     Patient ID: Sherice Squires is a 68 y.o. female.    Chief Complaint: Pain of the Right Knee and Pain of the Left Knee    Patient is a 68-year-old female presents clinic today follow up of bilateral knee pain and complaining of left thumb.  Patient states that her knees have done somewhat better during physical therapy.  Patient states that the Synvisc-One injection that she had in her left knee did not seem to help.  States that she has not had a cortisone injection in left knee in a long time.  States she would like to do 1 today if possible.  States the topical compounding cream has also helped, but still has significant pain. Patient states that she would like to get a new cane that has for the legs and a Rollator walker if possible.  Patient states that she would be more stable and less likely to fall if she had a cane and walker.  Patient states she is also having left IP thumb pain.  States she has not had any injuries or falls.  States that is been painful and sore.  States his difficult to flex.  Denies any redness, swelling, fever, or chills.      Previous Note:  Patient states that she has a history of right total knee replacement done by Dr. Middleton at [facility] approx 10 years prior.  Patient states that her left knee also is very painful.  States that she was told that eventually she would likely need her left knee replaced.  Patient states that she has been getting cortisone injections and viscosupplementation is in left knee without any improvement.  States that the right knee still gives her pain around the anterior portion of her knee.  Patient states that is difficult to kneel on the either knee.  Denies any new injuries or falls.  Patient states that she has not done any physical therapy in a while.  States she has never prescribed any topical compounding creams.  Patient states that overall, she just wanted a 2nd opinion to see if there was anything more that can be done for her  knees besides the injections and possibly needing a knee replacement.      Past Medical History:   Diagnosis Date    Atrial fibrillation     Cardiomyopathy     CHF (congestive heart failure)     Hyperlipidemia     Hypertension     Ventricular tachycardia      Past Surgical History:   Procedure Laterality Date    CARDIAC DEFIBRILLATOR PLACEMENT      CARDIOVERSION OR DEFIBRILLATION N/A 3/8/2019    Performed by [facility]ece MD at [facility]    CHOLECYSTECTOMY      ECHOCARDIOGRAM,TRANSESOPHAGEAL N/A 3/8/2019    Performed by [facility] Diagnostic Provider at [facility]    HYSTERECTOMY      JOINT REPLACEMENT  2009    rt knee replacment    pace maker  12/2010     Family History   Problem Relation Age of Onset    Arthritis Father     Cancer Father     Drug abuse Sister     COPD Brother     Hypertension Maternal Grandmother      Social History     Socioeconomic History    Marital status: Single     Spouse name: Not on file    Number of children: Not on file    Years of education: Not on file    Highest education level: Not on file   Occupational History    Not on file   Social Needs    Financial resource strain: Not on file    Food insecurity:     Worry: Not on file     Inability: Not on file    Transportation needs:     Medical: Not on file     Non-medical: Not on file   Tobacco Use    Smoking status: Never Smoker    Smokeless tobacco: Never Used   Substance and Sexual Activity    Alcohol use: No     Comment: rarely    Drug use: No    Sexual activity: Not on file   Lifestyle    Physical activity:     Days per week: Not on file     Minutes per session: Not on file    Stress: Not on file   Relationships    Social connections:     Talks on phone: Not on file     Gets together: Not on file     Attends Christian service: Not on file     Active member of club or organization: Not on file     Attends meetings of clubs or organizations: Not on file     Relationship status: Not on file   Other  "Topics Concern    Not on file   Social History Narrative    Not on file     Medication List with Changes/Refills   Current Medications    APIXABAN (ELIQUIS) 5 MG TAB    Take 1 tablet (5 mg total) by mouth 2 (two) times daily.    BUMETANIDE (BUMEX) 1 MG TABLET    Take 2 tablets (2 mg total) by mouth 2 (two) times daily.    DIGOXIN (DIGOX) 125 MCG TABLET    Take 1 tablet by mouth once daily    ESOMEPRAZOLE (NEXIUM) 20 MG CAPSULE    Take 20 mg by mouth before breakfast.    HYDRALAZINE (APRESOLINE) 50 MG TABLET    TAKE 1 TABLET (50 MG TOTAL) BY MOUTH 3 (THREE) TIMES DAILY.    ISOSORBIDE MONONITRATE (IMDUR) 30 MG 24 HR TABLET    TAKE 1 TABLET (30 MG TOTAL) BY MOUTH ONCE DAILY.    METOPROLOL SUCCINATE (TOPROL-XL) 200 MG 24 HR TABLET    TAKE 1 TABLET (200 MG TOTAL) BY MOUTH 2 (TWO) TIMES DAILY.    SACUBITRIL-VALSARTAN (ENTRESTO)  MG PER TABLET    Take 1 tablet by mouth 2 (two) times daily.    SIMVASTATIN (ZOCOR) 40 MG TABLET    TAKE 1 TABLET EVERY EVENING    SPIRONOLACTONE (ALDACTONE) 25 MG TABLET    TAKE 1 TABLET EVERY DAY     Review of patient's allergies indicates:   Allergen Reactions    Augmentin [amoxicillin-pot clavulanate] Swelling     Lip swelling      Penicillins      Other reaction(s): Swelling  Lip swelling       Review of Systems   Constitutional: Negative for chills, fever and malaise/fatigue.   HENT: Negative for hearing loss.    Eyes: Negative for redness.   Cardiovascular: Negative for leg swelling.   Gastrointestinal: Negative for nausea and vomiting.   Musculoskeletal: Positive for joint pain. Negative for back pain, falls and myalgias.   Skin: Negative for rash.   Neurological: Negative for tingling, sensory change, focal weakness and weakness.        Objective:   Body mass index is 30.79 kg/m².  Vitals:    06/11/19 1029   BP: 106/76   Pulse: 70   Weight: 83.9 kg (185 lb)   Height: 5' 5" (1.651 m)   PainSc:   5   PainLoc: Knee           General    Nursing note and vitals " reviewed.  Constitutional: She is oriented to person, place, and time. She appears well-developed and well-nourished. No distress.   Eyes: Conjunctivae are normal. No scleral icterus.   Pulmonary/Chest: Effort normal.   Neurological: She is alert and oriented to person, place, and time.   Psychiatric: She has a normal mood and affect. Her behavior is normal. Judgment and thought content normal.     General Musculoskeletal Exam   Gait: antalgic       Right Knee Exam     Inspection   Erythema: absent  Scars: present ( postsurgical scar is present and well healed)  Swelling: present (Patellar tendon)  Deformity: absent    Tenderness   The patient is tender to palpation of the condyle and patella.    Crepitus   The patient has crepitus of the patella.    Range of Motion   The patient has normal right knee ROM.    Other   Sensation: normal    Left Knee Exam     Inspection   Erythema: absent  Effusion: absent  Deformity: absent    Tenderness   The patient tender to palpation of the medial joint line.    Crepitus   The patient has crepitus of the patella and medial joint line.    Range of Motion   The patient has normal left knee ROM.    Other   Sensation: normalLeft Hand/Wrist Exam     Inspection   Effusion: Wrist - absent Hand -  absent  Deformity: Wrist - absent Hand -  absent    Tenderness   The patient is tender to palpation of the radial area and headley area.     Range of Motion     Finger Flexion   DIP Thumb: abnormal     Finger Extension   DIP Thumb: abnormal    Other     Sensory Exam  Median Distribution: normal  Ulnar Distribution: normal  Radial Distribution: normal          EXAMINATION:  XR KNEE ORTHO RIGHT WITH FLEXION    CLINICAL HISTORY:  Pain, unspecified    TECHNIQUE:  AP standing views of both knees, AP flexion views of both knees, lateral view of the right knee and Merchant views of both knees    COMPARISON:  None    FINDINGS:  A right total knee arthroplasty is noted.  No hardware failure or loosening.   No periprosthetic fracture.  No joint effusion.  Moderate to severe joint space narrowing and marginal osteophyte formation involving the medial compartment of the left knee.      Impression       1.  As above      Electronically signed by: El Joyce DO  Date: 04/16/2019  Time: 15:37           Sherice was seen today for pain and pain.    Diagnoses and all orders for this visit:    Primary osteoarthritis of left knee  -     WALKER FOR HOME USE  -     CANE FOR HOME USE  -     Large Joint Aspiration/Injection: L knee    Primary osteoarthritis of right knee  -     WALKER FOR HOME USE  -     CANE FOR HOME USE    Osteoarthritis of left thumb  -     WALKER FOR HOME USE  -     CANE FOR HOME USE    H/O total knee replacement, right  -     WALKER FOR HOME USE  -     CANE FOR HOME USE    Essential hypertension  -     WALKER FOR HOME USE  -     CANE FOR HOME USE    Hyperlipidemia, unspecified hyperlipidemia type  -     WALKER FOR HOME USE  -     CANE FOR HOME USE    -discussed the clinical course and nature of osteoarthritis with patient.  Will do a cortisone injection in the left knee today.  Recommended continuing physical therapy.  At this time, I would continuing extra-strength Tylenol,  topical compounding cream, and physical therapy.  If patient does not get significant relief, would recommend evaluation by either pain management or Orthopedic surgery.  Discussed with patient that the definitive treatment for knee osteoarthritis is total knee replacement.  -bilateral knee Xray images were independently viewed and read by me showing right knee previous total knee replacement hardware is present and does not appear to have shifted or moved.  Left knee shows severe degenerative changes in the medial compartment with significant joint space loss and osteophyte formation.  -Formal read by radiologist is as described above  -Discussed findings with patient    -Chronic hypertension.  Managed by patient's PCP.  -Chronic  hyperlipidemia.  Managed by patient's PCP.    -Treatment options and alternatives were discussed with the patient. Patient expressed understanding. Patient was given the opportunity to ask questions and be an active participant in their medical care. Patient had no further questions or concerns at this time.   -Patient is an overall moderate risk for health complications from their medical conditions.

## 2019-06-11 NOTE — PROGRESS NOTES
"Subjective:    Patient ID:  Sherice Squires is a 68 y.o. female who presents for follow-up of Congestive Heart Failure and Leg Swelling      HPI NIDCM (LVEF 18  / LVEDD 6.6 -March 2019), PkVO2 16.1 (June 2015)  s/p ICD who comes in after pulse dose increase of bumex from 1 daily to 2 BID in later May 2019 due to a 10 lbs weight gain / edema / cough.  Seems like she is back to her baseline weight of around 185 lbs with resolution of her cough / edema.  Ambulation limited by her orthopedic issues (she walks with a cane since early 2019)       Review of Systems   Constitution: Negative for decreased appetite, weight gain and weight loss.   Cardiovascular: Negative for chest pain, dyspnea on exertion, leg swelling, near-syncope, orthopnea and palpitations.   Respiratory: Negative for cough and shortness of breath.    Musculoskeletal: Positive for joint pain (knee pains chronic ) and joint swelling (right thumb DIP swollen x one weekish to see orthopedics today). Negative for myalgias.   Gastrointestinal: Negative for jaundice.        Objective:    Physical Exam   Constitutional: She appears well-developed and well-nourished. No distress.   /70   Pulse 64 Comment: radial  Ht 5' 5" (1.651 m)   Wt 84.1 kg (185 lb 6.5 oz)   BMI 30.85 kg/m²      HENT:   Head: Normocephalic and atraumatic. Head is without abrasion and without contusion.   Right Ear: External ear normal.   Left Ear: External ear normal.   Nose: Nose normal. No epistaxis.   Mouth/Throat: Oropharynx is clear and moist. Mucous membranes are not cyanotic.   Eyes: Pupils are equal, round, and reactive to light. Conjunctivae and EOM are normal.   Neck: Normal range of motion. Neck supple. No tracheal deviation present.   Cardiovascular: Normal rate and normal pulses. An irregularly irregular rhythm present. Exam reveals no gallop.   Murmur heard.   Medium-pitched blowing holosystolic murmur is present with a grade of 3/6 at the apex. Consistent with mod " MR (No S3)   Pulmonary/Chest: Effort normal and breath sounds normal. No stridor. No respiratory distress. She has no wheezes.   Abdominal: Soft. Normal appearance, normal aorta and bowel sounds are normal. She exhibits no distension. There is no tenderness.   Musculoskeletal: She exhibits no edema or tenderness.   Neurological: She is alert. She has normal strength and normal reflexes. She exhibits normal muscle tone.   Skin: Skin is warm. No rash noted. No erythema.   Psychiatric: She has a normal mood and affect. Her speech is normal and behavior is normal. Judgment and thought content normal. Cognition and memory are normal.   no labs today       Assessment:       1. Dilated cardiomyopathy    2. Congestive heart failure, NYHA class 3 and ACC/AHA stage C    3. Mitral annular calcification         Plan:       Congestive heart failure, NYHA class 3 and ACC/AHA stage C  Take two mg of bumex in am and one mg of bumex at 3 PM as long as weight < 188 lbs  Take two mg of bumex in am and two mg of bumex at 3pm if weight > 188 lbs  BNP / BMP next week     severe Mitral regurg wih mild sclerosis  Consider referral to mitral valve clinic for mitral clip if severe Mitral regurg persists    Dilated cardiomyopathy  cardiomems maybe a good option if sliding scale duirectics ineffective    Follow up in about 2 months (around 8/11/2019) for with Jennifer .

## 2019-06-11 NOTE — PROCEDURES
Large Joint Aspiration/Injection: L knee  Date/Time: 6/11/2019 10:57 AM  Performed by: Jak Ewing MD  Authorized by: Jak Ewing MD     Consent Done?:  Yes (Verbal)  Indications:  Pain and diagnostic evaluation  Procedure site marked: Yes    Timeout: Prior to procedure the correct patient, procedure, and site was verified      Location:  Knee  Site:  L knee  Prep: Patient was prepped and draped in usual sterile fashion    Needle size:  25 G  Approach:  Anterolateral  Medications:  80 mg triamcinolone acetonide 40 mg/mL  Patient tolerance:  Patient tolerated the procedure well with no immediate complications    Additional Comments: Patient experienced minimal blood loss (< 3 cc) and clean bandage was applied. Post procedure care was provided to the patient verbally and with their AVS. Patient was instructed to take it easy for the next 24-48 hours and was informed that their pain may increase once the anaesthesia wears off and before the steroid begins to work. Patient was instructed to ice area for 15 minutes and may take Tylenol PRN if pain worsens. Patient was informed to contact clinic or go to the ED if they develop any fever, chills, redness, swelling, or other complications.

## 2019-06-20 NOTE — TELEPHONE ENCOUNTER
I called pt to follow up on weight and symptoms since last office visit. No answer. Left message for her to call me back.

## 2019-06-21 NOTE — TELEPHONE ENCOUNTER
"Pt returned my call. Notified of lab results.   She says her wt has been steady at 180lb. She has been taking 2bumex in am and 1tab in pm.   She says she feels "ok"  Denies any PND or orthopnea, she says she is sleeping well.   She says she still gets "winded and tired" when she does PT and runs errands.   Also notices marks on her ankles from her socks after PT, overall though feeling ok  "

## 2019-06-27 NOTE — TELEPHONE ENCOUNTER
"I called for June in N.[place].  That monitors her device, no answer. Left message for her to call me back.     Pt says she is having some sob, and she was up and down last night. LE edema ok , she says "its no like before"    Awaiting call back from Arrhythmia [facility].   "

## 2019-06-27 NOTE — TELEPHONE ENCOUNTER
I still have not heard back from N.O. Arrhythmia.   I called pt . She is still experiencing some chest discomfort. I advised she report to Er now for evaluation of cp and sob

## 2019-06-27 NOTE — TELEPHONE ENCOUNTER
"Pt reports having had a "bad" night. She says she has had some bad chest discomfort. She says she was "up and down" all night. Woke up and took her meds at 12pm to see if it got better. Also feeling "kind of shortwinded" and feeling the need to clear her throat a lot. She says her symptoms are a little better this morning chest discomfort about 5 of 10 now. Was "maybe an 8" earlier this morning. She has been taking her bumex 2 in am and 1 in pm based on sliding scale per Dr Gutierrez.   Please advise  "

## 2019-06-27 NOTE — TELEPHONE ENCOUNTER
Its possible that she could be having symptomatic A-fib. Can we get a remote interrogation. Also, is she having any of her  typical CHF symptoms.   Her appt needs to be moved up to next to next week

## 2019-06-28 NOTE — ED NOTES
Pt resting quietly in NAD. States pain is gone at this time.  Lab drawn for repeat Troponin.  Pt and family aware of POC.

## 2019-06-28 NOTE — ED PROVIDER NOTES
SCRIBE #1 NOTE: I, Kj Robertson/Corinne Mack, am scribing for, and in the presence of, Colleen Diaz MD. I have scribed the HPI, ROS and PEx.     SCRIBE #2 NOTE: I, Perlita Mitchell, am scribing for, and in the presence of,  Chevy Hernandez MD. I have scribed the remaining portions of the note not scribed by Scribe #1.      History     Chief Complaint   Patient presents with    Chest Pain     since last night; pt had defibrillator interrogated this evening but has not received report.      Review of patient's allergies indicates:   Allergen Reactions    Augmentin [amoxicillin-pot clavulanate] Swelling     Lip swelling      Penicillins      Other reaction(s): Swelling  Lip swelling           History of Present Illness     HPI    6/27/2019, 7:33 PM  History obtained from the patient      History of Present Illness: Sherice Squires is a 68 y.o. female patient with a PMHx of Afib, CHF, HTN who presents to the Emergency Department for evaluation of CP which onset gradually last night. Symptoms are intermittent and moderate in severity. No mitigating or exacerbating factors reported. Associated sxs include SOB. Patient denies any diaphoresis, palpitations, extremity weakness/numbness, leg swelling, dizziness, cough, N/V, and all other sxs at this time. No prior tx reported. No further complaints or concerns at this time.         Arrival mode: Personal vehicle    PCP: Annamarie Soria MD        Past Medical History:  Past Medical History:   Diagnosis Date    Atrial fibrillation     Cardiomyopathy     CHF (congestive heart failure)     Hyperlipidemia     Hypertension     Ventricular tachycardia        Past Surgical History:  Past Surgical History:   Procedure Laterality Date    CARDIAC DEFIBRILLATOR PLACEMENT      CARDIOVERSION OR DEFIBRILLATION N/A 3/8/2019    Performed by Jose Reece MD at [facility]    CHOLECYSTECTOMY      ECHOCARDIOGRAM,TRANSESOPHAGEAL N/A 3/8/2019    Performed by [facility] at [facility]    HYSTERECTOMY      JOINT REPLACEMENT  2009    rt knee replacment    pace maker  12/2010         Family History:  Family History   Problem Relation Age of Onset    Arthritis Father     Cancer Father     Drug abuse Sister     COPD Brother     Hypertension Maternal Grandmother        Social History:  Social History     Tobacco Use    Smoking status: Never Smoker    Smokeless tobacco: Never Used   Substance and Sexual Activity    Alcohol use: No     Comment: rarely    Drug use: No    Sexual activity: Unknown        Review of Systems     Review of Systems   Constitutional: Negative for diaphoresis and fever.   HENT: Negative for sore throat.    Respiratory: Positive for shortness of breath. Negative for cough.    Cardiovascular: Positive for chest pain. Negative for palpitations and leg swelling.   Gastrointestinal: Negative for nausea and vomiting.   Genitourinary: Negative for dysuria.   Musculoskeletal: Negative for back pain.   Skin: Negative for rash.   Neurological: Negative for weakness and numbness.   Hematological: Does not bruise/bleed easily.   All other systems reviewed and are negative.       Physical Exam     Initial Vitals [06/27/19 1808]   BP Pulse Resp Temp SpO2   94/70 (!) 111 20 99 °F (37.2 °C) (!) 94 %      MAP       --          Physical Exam  Nursing Notes and Vital Signs Reviewed.  Constitutional: Patient is in no acute distress. Well-developed and well-nourished.  Head: Atraumatic. Normocephalic.  Eyes: PERRL. EOM intact. Conjunctivae are not pale. No scleral icterus.  ENT: Mucous membranes are moist. Oropharynx is clear and symmetric.    Neck: Supple. Full ROM. No lymphadenopathy.  Cardiovascular: Tachycardic. Regular rhythm. No murmurs, rubs, or gallops. Distal pulses are 2+ and symmetric.  Pulmonary/Chest: No respiratory distress. Clear to auscultation bilaterally. No wheezing or rales.  Abdominal: Soft and non-distended.  There is no tenderness.  No  rebound, guarding, or rigidity. Good bowel sounds.  Genitourinary: No CVA tenderness  Musculoskeletal: Moves all extremities. No obvious deformities. No edema. No calf tenderness.  Skin: Warm and dry.  Neurological: Alert, awake, and appropriate.  Normal speech.  No acute focal neurological deficits are appreciated.  Psychiatric: Anxious. Good eye contact. Appropriate in content.     ED Course   Procedures  ED Vital Signs:  Vitals:    06/27/19 1808 06/27/19 1927 06/27/19 2001 06/27/19 2101   BP: 94/70 108/63 (!) 121/90 102/76   Pulse: (!) 111 93 85 82   Resp: 20 (!) 43 (!) 29 (!) 29   Temp: 99 °F (37.2 °C)      TempSrc: Oral      SpO2: (!) 94% 98% 98% 99%   Weight: 84.5 kg (186 lb 2.9 oz)       06/27/19 2200 06/27/19 2300   BP: 116/65 (!) 115/58   Pulse: 77 81   Resp: (!) 21 (!) 27   Temp:     TempSrc:     SpO2: 99% 96%   Weight:         Abnormal Lab Results:  Labs Reviewed   CBC W/ AUTO DIFFERENTIAL - Abnormal; Notable for the following components:       Result Value    RDW 16.1 (*)     Platelets 149 (*)     Gran% 74.8 (*)     Lymph% 16.5 (*)     All other components within normal limits   COMPREHENSIVE METABOLIC PANEL - Abnormal; Notable for the following components:    Glucose 119 (*)     BUN, Bld 24 (*)     Albumin 3.1 (*)     eGFR if  49 (*)     eGFR if non  42 (*)     All other components within normal limits   TROPONIN I - Abnormal; Notable for the following components:    Troponin I 0.037 (*)     All other components within normal limits   B-TYPE NATRIURETIC PEPTIDE - Abnormal; Notable for the following components:    BNP 2,901 (*)     All other components within normal limits   PROTIME-INR - Abnormal; Notable for the following components:    Prothrombin Time 13.0 (*)     All other components within normal limits   APTT - Abnormal; Notable for the following components:    aPTT 32.4 (*)     All other components within normal limits   DIGOXIN LEVEL - Abnormal; Notable for the  following components:    Digoxin Lvl 0.7 (*)     All other components within normal limits   TROPONIN I - Abnormal; Notable for the following components:    Troponin I 0.036 (*)     All other components within normal limits        All Lab Results:  Results for orders placed or performed during the hospital encounter of 06/27/19   CBC auto differential   Result Value Ref Range    WBC 9.54 3.90 - 12.70 K/uL    RBC 4.17 4.00 - 5.40 M/uL    Hemoglobin 12.3 12.0 - 16.0 g/dL    Hematocrit 37.5 37.0 - 48.5 %    Mean Corpuscular Volume 90 82 - 98 fL    Mean Corpuscular Hemoglobin 29.5 27.0 - 31.0 pg    Mean Corpuscular Hemoglobin Conc 32.8 32.0 - 36.0 g/dL    RDW 16.1 (H) 11.5 - 14.5 %    Platelets 149 (L) 150 - 350 K/uL    MPV 11.7 9.2 - 12.9 fL    Gran # (ANC) 7.1 1.8 - 7.7 K/uL    Lymph # 1.6 1.0 - 4.8 K/uL    Mono # 0.8 0.3 - 1.0 K/uL    Eos # 0.0 0.0 - 0.5 K/uL    Baso # 0.01 0.00 - 0.20 K/uL    Gran% 74.8 (H) 38.0 - 73.0 %    Lymph% 16.5 (L) 18.0 - 48.0 %    Mono% 8.6 4.0 - 15.0 %    Eosinophil% 0.2 0.0 - 8.0 %    Basophil% 0.1 0.0 - 1.9 %    Differential Method Automated    Comprehensive metabolic panel   Result Value Ref Range    Sodium 144 136 - 145 mmol/L    Potassium 3.5 3.5 - 5.1 mmol/L    Chloride 105 95 - 110 mmol/L    CO2 24 23 - 29 mmol/L    Glucose 119 (H) 70 - 110 mg/dL    BUN, Bld 24 (H) 8 - 23 mg/dL    Creatinine 1.3 0.5 - 1.4 mg/dL    Calcium 9.2 8.7 - 10.5 mg/dL    Total Protein 6.9 6.0 - 8.4 g/dL    Albumin 3.1 (L) 3.5 - 5.2 g/dL    Total Bilirubin 0.8 0.1 - 1.0 mg/dL    Alkaline Phosphatase 88 55 - 135 U/L    AST 15 10 - 40 U/L    ALT 15 10 - 44 U/L    Anion Gap 15 8 - 16 mmol/L    eGFR if African American 49 (A) >60 mL/min/1.73 m^2    eGFR if non African American 42 (A) >60 mL/min/1.73 m^2   Troponin I #1   Result Value Ref Range    Troponin I 0.037 (H) 0.000 - 0.026 ng/mL   B-Type natriuretic peptide (BNP)   Result Value Ref Range    BNP 2,901 (H) 0 - 99 pg/mL   Protime-INR   Result Value Ref Range     Prothrombin Time 13.0 (H) 9.0 - 12.5 sec    INR 1.2 0.8 - 1.2   APTT   Result Value Ref Range    aPTT 32.4 (H) 21.0 - 32.0 sec   Digoxin level   Result Value Ref Range    Digoxin Lvl 0.7 (L) 0.8 - 2.0 ng/mL   Troponin I #2   Result Value Ref Range    Troponin I 0.036 (H) 0.000 - 0.026 ng/mL         Imaging Results:  Imaging Results          X-Ray Chest AP Portable (Final result)  Result time 06/27/19 20:17:01    Final result by Godfrey Melendez MD (06/27/19 20:17:01)                 Impression:      No acute findings.      Electronically signed by: Godfrey Melendez MD  Date:    06/27/2019  Time:    20:17             Narrative:    EXAMINATION:  XR CHEST AP PORTABLE    CLINICAL HISTORY:  Chest Pain;    TECHNIQUE:  AP view of the chest was performed.    COMPARISON:  12/16/2010    FINDINGS:  Cardiomegaly.  Left-sided AICD device is present.    The lungs are clear bilaterally.  No acute osseous findings demonstrated.                                 The EKG was ordered, reviewed, and independently interpreted by the ED provider.  Interpretation time: 18:12  Rate: 85 BPM  Rhythm: Undetermined rhythm  Interpretation: RBBB. No STEMI.             The Emergency Provider reviewed the vital signs and test results, which are outlined above.     ED Discussion     8:06 PM: Dr. Diaz transfers care of pt to Dr. Hernandez pending imaging results.    10:37 PM: Nitza Meyer RN spoke with [employer] for defibrillator interrogation who states no episodes of out of the ordinary according to interrogation report.    11:35 PM Reassessed pt at this time.  Pt is awake, alert, and in NAD at this time. Discussed with pt all pertinent ED information and results. Discussed pt dx and plan of tx. Gave pt all f/u and return to the ED instructions. All questions and concerns were addressed at this time. Pt expresses understanding of information and instructions, and is comfortable with plan to discharge. Pt is stable for discharge.    I have  discussed with patient and/or family/caretaker chest pain precautions, specifically to return for worsening chest pain, shortness of breath, fever, or any concern.  I have low suspicion for cardiopulmonary, vascular, infectious, respiratory, or other emergent medical condition based on my evaluation in the ED.      ED Medication(s):  Medications   aspirin tablet 325 mg (325 mg Oral Given 6/27/19 1959)       Discharge Medication List as of 6/27/2019 11:36 PM        Medical Decision Making:   Clinical Tests:   Lab Tests: Ordered and Reviewed  Radiological Study: Ordered and Reviewed  Medical Tests: Ordered and Reviewed             Scribe Attestation:   Scribe #1: I performed the above scribed service and the documentation accurately describes the services I performed. I attest to the accuracy of the note.     Attending:   Physician Attestation Statement for Scribe #1: I, Colleen Diaz MD, personally performed the services described in this documentation, as scribed by Kj Robertson/Corinne Mack, in my presence, and it is both accurate and complete.       Scribe Attestation:   Scribe #2: I performed the above scribed service and the documentation accurately describes the services I performed. I attest to the accuracy of the note.    Attending Attestation:           Physician Attestation for Scribe:    Physician Attestation Statement for Scribe #2: I, Chevy Hernandez MD, reviewed documentation, as scribed by Perlita Mitchell in my presence, and it is both accurate and complete. I also acknowledge and confirm the content of the note done by Noelibe #1.           Clinical Impression       ICD-10-CM ICD-9-CM   1. Chest pain R07.9 786.50       Disposition:   Disposition: Discharged  Condition: Stable         Chevy Hernandez MD  06/28/19 [id number]

## 2019-06-28 NOTE — TELEPHONE ENCOUNTER
I called pt to follow up on her symptoms since er visit. She reports she is feeling much better than she was yesterday. No more cp at this time. Sob is improved.   Provider Aj HARRINGTONC notified.   Phone review is scheduled for next week to f/u again

## 2019-06-28 NOTE — ED NOTES
[employer] contacted for defibrillator interrogation from today.  [phone] States pt had no episodes according to interrogation report. States will send report per fax from last night to today. Spoke with Sy

## 2019-06-28 NOTE — ED NOTES
Armband checked, patient verified. Verified by spelling and stated name on armband along with .   LOC: The patient is awake, alert and aware of environment with an appropriate affect, the patient is oriented x 3 and speaking appropriately.  APPEARANCE: Patient resting comfortably and in no acute distress, patient is clean and well groomed  SKIN: The skin is warm and dry, color consistent with ethnicity, patient has normal skin turgor and moist mucus membranes, no breakdown or bruising noted. Pt has a pacemaker/defibrillator in place to left upper chest.  1+ edema noted to lower legs  MUSCULOSKELETAL: Patient moving all extremities to command  RESPIRATORY: Airway is open and patent, respirations are regular, even and non labored.  CARDIAC: Patient has a normal rate, no periphreal edema noted, capillary refill < 3 seconds.  ABDOMEN: Soft and non tender to palpation.  Pt states she had episodes of pain across upper chest to neck last night off and on until today. States did have some SOB with tightness. States pain is 4/10 at this time.  States spoke with PCP this am and had pacemaker/defibrillator interrogated today. Pt states did not feel any episodes of shock.   Pt with SR up x 2, call light in reach, son at bedside.  HOB elevated 75 to pt comfort.

## 2019-06-28 NOTE — ED NOTES
Discharge instructions explained to patient with verbalization of understanding of instructions.  Pt escorted to registration desk by RN. Discharge paperwork given to registration for completion of discharge.  No chest pain at this time.

## 2019-07-05 NOTE — TELEPHONE ENCOUNTER
Follow up on pts symptoms. She reports she has not had any more chest pains.. She has not checked her blood pressure.   Still some sob and weakness after walking for a while but it is still improved from what it was before.   She is scheduled for Echo Monday. appt confirmed and will see Aj ROSARIO on wed 7/17/19

## 2019-07-15 NOTE — TELEPHONE ENCOUNTER
----- Message from Jose Reece MD sent at 7/15/2019  2:11 PM CDT -----  no change  thanks    ----- Message -----  From: Misty Thrasher RN  Sent: 7/15/2019  11:46 AM  To: Jose Reece MD, [facility]    Dr. Reece,    Persistent AF on Eliquis.  Alert remote transmission received showing evidence of increased ventricular rates during AF about 15-20% >110 bpm.     Currently taking Toprol 200 mg BID and Digoxin 125 mcg QD.    Please advise  Thanks,  Alexandru

## 2019-07-17 NOTE — PROGRESS NOTES
"Subjective:   Patient ID:  Sherice Squires is a 68 y.o. female who presents for follow up of Shortness of Breath (x 2 weeks ); Hyperlipidemia; Hypertension; and Atrial Fibrillation      HPI  Ms. Squires's current medical conditions include NICM with LVEF 13% on JOSE on 3/1/19, moderate to severe MR, mild RV enlargement and dysfunction,   Persistent A-fib on Eliquis,HTN, HLD. ICD implanted 2010, RBBB/LAFB, present since 5/2011  History of VF with successful shocks.  Successful cardioversion to NSR on 3/8/19  Follows with AIDA Maravilla. Considering patient for BiV upgrade     Echo repeated in early July 2019 and showed LVF 20-25%, severe LAE, moderate to severe MR and moderate LVE.   Has no abnormal bleeding on Eliquis. Is following with advanced HF team. Plan to refer to mitral valve clinic if her severe MR persists.  Currently takes Bumex sliding scale based on her weight.   Per Dr. Rodriguez, "cardiomems maybe a good option if sliding scale duirectics ineffective".     Denies any chest pain, SOB, orthopnea, PND, dizziness, has occasional palpitations and NAZARIO, no near syncope, syncope or edema . Evaluated in the ED for chest pain. Noted to have mild troponin leak with no peak.  No CNS Complaints to suggest TIA or CVA. Does well with limiting sodium intake. Has no ICD firing.       Past Medical History:   Diagnosis Date    Atrial fibrillation     Cardiomyopathy     CHF (congestive heart failure)     Hyperlipidemia     Hypertension     Ventricular tachycardia        Past Surgical History:   Procedure Laterality Date    CARDIAC DEFIBRILLATOR PLACEMENT      CARDIOVERSION OR DEFIBRILLATION N/A 3/8/2019    Performed by Jose Reece MD at [facility]    CHOLECYSTECTOMY      ECHOCARDIOGRAM,TRANSESOPHAGEAL N/A 3/8/2019    Performed by [facility] Provider at [facility]    HYSTERECTOMY      JOINT REPLACEMENT  2009    rt knee replacment    pace maker  12/2010       Social History     Tobacco Use    Smoking " status: Never Smoker    Smokeless tobacco: Never Used   Substance Use Topics    Alcohol use: No     Comment: rarely    Drug use: No       Family History   Problem Relation Age of Onset    Arthritis Father     Cancer Father     Drug abuse Sister     COPD Brother     Hypertension Maternal Grandmother        Current Outpatient Medications   Medication Sig    apixaban (ELIQUIS) 5 mg Tab Take 1 tablet (5 mg total) by mouth 2 (two) times daily.    bumetanide (BUMEX) 1 MG tablet Take 2 tablets (2 mg total) by mouth 2 (two) times daily.    digoxin (DIGOX) 125 mcg tablet Take 1 tablet by mouth once daily    esomeprazole (NEXIUM) 20 MG capsule Take 20 mg by mouth before breakfast.    hydrALAZINE (APRESOLINE) 50 MG tablet TAKE 1 TABLET (50 MG TOTAL) BY MOUTH 3 (THREE) TIMES DAILY.    isosorbide mononitrate (IMDUR) 30 MG 24 hr tablet TAKE 1 TABLET (30 MG TOTAL) BY MOUTH ONCE DAILY.    metoprolol succinate (TOPROL-XL) 200 MG 24 hr tablet TAKE 1 TABLET (200 MG TOTAL) BY MOUTH 2 (TWO) TIMES DAILY.    sacubitril-valsartan (ENTRESTO)  mg per tablet Take 1 tablet by mouth 2 (two) times daily.    simvastatin (ZOCOR) 40 MG tablet TAKE 1 TABLET EVERY EVENING    spironolactone (ALDACTONE) 25 MG tablet TAKE 1 TABLET EVERY DAY     No current facility-administered medications for this visit.      Current Outpatient Medications on File Prior to Visit   Medication Sig    apixaban (ELIQUIS) 5 mg Tab Take 1 tablet (5 mg total) by mouth 2 (two) times daily.    bumetanide (BUMEX) 1 MG tablet Take 2 tablets (2 mg total) by mouth 2 (two) times daily.    digoxin (DIGOX) 125 mcg tablet Take 1 tablet by mouth once daily    esomeprazole (NEXIUM) 20 MG capsule Take 20 mg by mouth before breakfast.    hydrALAZINE (APRESOLINE) 50 MG tablet TAKE 1 TABLET (50 MG TOTAL) BY MOUTH 3 (THREE) TIMES DAILY.    isosorbide mononitrate (IMDUR) 30 MG 24 hr tablet TAKE 1 TABLET (30 MG TOTAL) BY MOUTH ONCE DAILY.    metoprolol succinate  (TOPROL-XL) 200 MG 24 hr tablet TAKE 1 TABLET (200 MG TOTAL) BY MOUTH 2 (TWO) TIMES DAILY.    sacubitril-valsartan (ENTRESTO)  mg per tablet Take 1 tablet by mouth 2 (two) times daily.    simvastatin (ZOCOR) 40 MG tablet TAKE 1 TABLET EVERY EVENING    spironolactone (ALDACTONE) 25 MG tablet TAKE 1 TABLET EVERY DAY     No current facility-administered medications on file prior to visit.        Review of Systems   Constitution: Negative for diaphoresis, malaise/fatigue, weight gain and weight loss.   HENT: Negative for congestion and nosebleeds.    Cardiovascular: Positive for dyspnea on exertion. Negative for chest pain, claudication, cyanosis, irregular heartbeat, leg swelling, near-syncope, orthopnea, palpitations, paroxysmal nocturnal dyspnea and syncope.   Respiratory: Negative for cough, hemoptysis, shortness of breath, sleep disturbances due to breathing, snoring, sputum production and wheezing.    Hematologic/Lymphatic: Negative for bleeding problem. Does not bruise/bleed easily.   Skin: Negative for rash.   Musculoskeletal: Positive for joint pain ( ). Negative for arthritis, back pain, falls, muscle cramps and muscle weakness.   Gastrointestinal: Negative for abdominal pain, constipation, diarrhea, heartburn, hematemesis, hematochezia, melena, nausea and vomiting.   Genitourinary: Negative for dysuria, hematuria and nocturia.   Neurological: Negative for excessive daytime sleepiness, dizziness, headaches, light-headedness, loss of balance, numbness, vertigo and weakness.       Objective:   Physical Exam   Constitutional: She is oriented to person, place, and time. She appears well-developed and well-nourished.   Neck: Neck supple. No JVD present.   Cardiovascular: Normal rate and normal pulses. An irregularly irregular rhythm present. Exam reveals no friction rub.   Murmur heard.  High-pitched blowing holosystolic murmur is present at the apex.  Pulmonary/Chest: Effort normal and breath sounds  "normal. No respiratory distress. She has no wheezes. She has no rales.   Left ICD pocket    Abdominal: Soft. Bowel sounds are normal. She exhibits no distension.   Musculoskeletal: She exhibits no edema or tenderness.   Neurological: She is alert and oriented to person, place, and time.   Skin: Skin is warm and dry. No rash noted.   Psychiatric: She has a normal mood and affect. Her behavior is normal.   Nursing note and vitals reviewed.    Vitals:    07/17/19 0824   BP: 116/76   Pulse: 98   Weight: 83.6 kg (184 lb 4.9 oz)   Height: 5' 5" (1.651 m)     Lab Results   Component Value Date    CHOL 176 09/10/2014    CHOL 176 01/10/2014    CHOL 174 03/04/2013     Lab Results   Component Value Date    HDL 63 09/10/2014    HDL 54 01/10/2014    HDL 48 03/04/2013     Lab Results   Component Value Date    LDLCALC 100.2 09/10/2014    LDLCALC 105.6 01/10/2014    LDLCALC 108.0 03/04/2013     Lab Results   Component Value Date    TRIG 64 09/10/2014    TRIG 82 01/10/2014    TRIG 89 03/04/2013     Lab Results   Component Value Date    CHOLHDL 35.8 09/10/2014    CHOLHDL 30.7 01/10/2014    CHOLHDL 27.6 03/04/2013       Chemistry        Component Value Date/Time     06/27/2019 1939    K 3.5 06/27/2019 1939     06/27/2019 1939    CO2 24 06/27/2019 1939    BUN 24 (H) 06/27/2019 1939    CREATININE 1.3 06/27/2019 1939     (H) 06/27/2019 1939        Component Value Date/Time    CALCIUM 9.2 06/27/2019 1939    ALKPHOS 88 06/27/2019 1939    AST 15 06/27/2019 1939    ALT 15 06/27/2019 1939    BILITOT 0.8 06/27/2019 1939    ESTGFRAFRICA 49 (A) 06/27/2019 1939    EGFRNONAA 42 (A) 06/27/2019 1939          Lab Results   Component Value Date    TSH 0.900 06/08/2015     Lab Results   Component Value Date    INR 1.2 06/27/2019    INR 1.1 03/01/2019    INR 1.0 04/18/2017     Lab Results   Component Value Date    WBC 9.54 06/27/2019    HGB 12.3 06/27/2019    HCT 37.5 06/27/2019    MCV 90 06/27/2019     (L) 06/27/2019 "     BMP  Sodium   Date Value Ref Range Status   06/27/2019 144 136 - 145 mmol/L Final     Potassium   Date Value Ref Range Status   06/27/2019 3.5 3.5 - 5.1 mmol/L Final     Chloride   Date Value Ref Range Status   06/27/2019 105 95 - 110 mmol/L Final     CO2   Date Value Ref Range Status   06/27/2019 24 23 - 29 mmol/L Final     BUN, Bld   Date Value Ref Range Status   06/27/2019 24 (H) 8 - 23 mg/dL Final     Creatinine   Date Value Ref Range Status   06/27/2019 1.3 0.5 - 1.4 mg/dL Final     Calcium   Date Value Ref Range Status   06/27/2019 9.2 8.7 - 10.5 mg/dL Final     Anion Gap   Date Value Ref Range Status   06/27/2019 15 8 - 16 mmol/L Final     eGFR if    Date Value Ref Range Status   06/27/2019 49 (A) >60 mL/min/1.73 m^2 Final     eGFR if non    Date Value Ref Range Status   06/27/2019 42 (A) >60 mL/min/1.73 m^2 Final     Comment:     Calculation used to obtain the estimated glomerular filtration  rate (eGFR) is the CKD-EPI equation.        CrCl cannot be calculated (Patient's most recent lab result is older than the maximum 7 days allowed.).    Assessment:     1. Congestive heart failure, NYHA class 3 and ACC/AHA stage C    2. NAZARIO (dyspnea on exertion)    3. Chest pain, unspecified type    4. Dilated cardiomyopathy    5. Automatic implantable cardioverter-defibrillator in situ    6. Persistent atrial fibrillation        Plan:   Continue Bumex sliding scale  Continue Aldactone, Entresto, Toprol, Imdur, Hydralazine, Digoxin  Continue Eliquis for CVA prophylaxis.   Keep scheduled follow up appt with Dr. Rodriguez. May need to discuss cardiomems implant. Will defer mitral clinic referral to him as well.   I think she is having symptomatic A-fib at times. Her rate is controlled today Needs to follow up with Dr. Reece regarding BiV pacing and A-fib   Will obtain MPI to rule out ischemia.   Heart healthy diet  Limit fluid intake 50-60 oz   Daily weights and to notify clinic if weight  increases by more than 3 lbs in 1 day or 5 lbs in 1 week.   Exercise routine as tolerated  RTC in 2-3 months or sooner if needed

## 2019-07-17 NOTE — TELEPHONE ENCOUNTER
Patient states that she is scheduled for a stress test tomorrow. Patient states that her test is scheduled for 1130am and was told that she could take all of her prescribed medication prior to the test. Patient states that she was recently prescribed Nexium and wants to know if she can take that too. I ensured the patient that she could since she was told that she could take all of her medication up to 4 hours before the test. Patient verbalized understanding.     Reason for Disposition   Health Information question, no triage required and triager able to answer question    Protocols used: INFORMATION ONLY CALL-A-AH

## 2019-07-19 NOTE — TELEPHONE ENCOUNTER
----- Message from FIONA George sent at 7/19/2019  9:17 AM CDT -----  Please inform patient that stress test reviewed and shows no evidence of ischemia. Continue current medical management .

## 2019-07-22 NOTE — TELEPHONE ENCOUNTER
----- Message from Laine Hinson sent at 7/22/2019  1:56 PM CDT -----  Contact: Ellie gustafson/[facility]   Caller request a call back from nurse regarding pts updated physical therapy order to continue therapy  [phone] (fax number )  Ph: [phone]

## 2019-08-01 NOTE — TELEPHONE ENCOUNTER
Left pt a message asking her why she was coming in so soon 8/21 b/c she was just seen in 5/2019 & DM stated to f/u in 11/2019. Instructed pt to call me back.

## 2019-08-02 NOTE — TELEPHONE ENCOUNTER
Spoke w/ pt regarding her appt on 8/21. Pt stated that she is having sob & thinks she is back in afib. Also informed pt that she needs to send a transmission prior to appt.  ----- Message from Joanna Bryant sent at 8/2/2019  8:18 AM CDT -----  Contact: Patient  The pt is retuning a call. Please call her back @ [phone]. Thanks, Joanna

## 2019-08-06 NOTE — PROGRESS NOTES
Subjective:     HPI:  Mrs. Squires is a very pleasant 68 year old black female with stage HFreF, NIDCM (LVEF 20-25%), s/p ICD who presents for routine follow up. Clinically reports NYHA class III symptoms. Occasional PND and orthopnea. Does report leg swelling. She has been on a stable HF regimen that includes; Entresto 97/103 mg BID, metoprolol succinate 200 mg daily, aldactone 25 mg daily, Digoxin 0.125 mg daily and Bumex 2 mg BID.     Past Medical History:   Diagnosis Date    Atrial fibrillation     Cardiomyopathy     CHF (congestive heart failure)     Hyperlipidemia     Hypertension     Ventricular tachycardia      Past Surgical History:   Procedure Laterality Date    CARDIAC DEFIBRILLATOR PLACEMENT      CARDIOVERSION OR DEFIBRILLATION N/A 3/8/2019    Performed by Jose Reece MD at [facility]    CHOLECYSTECTOMY      ECHOCARDIOGRAM,TRANSESOPHAGEAL N/A 3/8/2019    Performed by [facility] Diagnostic Provider at [facility]    HYSTERECTOMY      JOINT REPLACEMENT      rt knee replacment    pace maker  2010     OB History        1    Para   1    Term                AB        Living   1       SAB        TAB        Ectopic        Multiple        Live Births                   Review of Systems   Constitution: Positive for weight gain. Negative for chills, decreased appetite, diaphoresis, fever, malaise/fatigue, night sweats and weight loss.   Eyes: Negative.    Cardiovascular: Positive for dyspnea on exertion, leg swelling, orthopnea and paroxysmal nocturnal dyspnea. Negative for chest pain, claudication, cyanosis, irregular heartbeat, near-syncope, palpitations and syncope.   Respiratory: Negative for cough, hemoptysis and shortness of breath.    Endocrine: Negative.    Hematologic/Lymphatic: Negative.    Skin: Negative for color change, dry skin and nail changes.   Musculoskeletal: Negative.    Gastrointestinal: Negative.    Genitourinary: Negative.    Neurological: Negative for  "weakness.       Objective:   Blood pressure 94/68, pulse 68, height 5' 5.5" (1.664 m), weight 82.2 kg (181 lb 3.5 oz).body mass index is 29.7 kg/m².  Physical Exam   Constitutional: She is oriented to person, place, and time. Vital signs are normal. She appears well-developed and well-nourished.   HENT:   Head: Normocephalic.   Eyes: Pupils are equal, round, and reactive to light.   Neck: Neck supple. JVD present.   Cardiovascular: Normal rate, regular rhythm and normal heart sounds. PMI is displaced. Exam reveals no gallop and no friction rub.   No murmur heard.  Pulmonary/Chest: Effort normal and breath sounds normal. No respiratory distress. She has no wheezes. She has no rales.   Abdominal: Soft. Bowel sounds are normal. She exhibits no distension. There is no tenderness. There is no rebound.   Musculoskeletal: She exhibits edema.   Neurological: She is alert and oriented to person, place, and time.   Nursing note and vitals reviewed.      Labs:    Chemistry        Component Value Date/Time     06/27/2019 1939    K 3.5 06/27/2019 1939     06/27/2019 1939    CO2 24 06/27/2019 1939    BUN 24 (H) 06/27/2019 1939    CREATININE 1.3 06/27/2019 1939     (H) 06/27/2019 1939        Component Value Date/Time    CALCIUM 9.2 06/27/2019 1939    ALKPHOS 88 06/27/2019 1939    AST 15 06/27/2019 1939    ALT 15 06/27/2019 1939    BILITOT 0.8 06/27/2019 1939    ESTGFRAFRICA 49 (A) 06/27/2019 1939    EGFRNONAA 42 (A) 06/27/2019 1939          Magnesium   Date Value Ref Range Status   05/17/2019 1.5 (L) 1.6 - 2.6 mg/dL Final     Lab Results   Component Value Date    WBC 9.54 06/27/2019    HGB 12.3 06/27/2019    HCT 37.5 06/27/2019     (L) 06/27/2019     Lab Results   Component Value Date    INR 1.2 06/27/2019    INR 1.1 03/01/2019    INR 1.0 04/18/2017     BNP   Date Value Ref Range Status   06/27/2019 2,901 (H) 0 - 99 pg/mL Final     Comment:     Values of less than 100 pg/ml are consistent with non-CHF " populations.   06/19/2019 1,822 (H) 0 - 99 pg/mL Final     Comment:     Values of less than 100 pg/ml are consistent with non-CHF populations.   05/17/2019 2,783 (H) 0 - 99 pg/mL Final     Comment:     Values of less than 100 pg/ml are consistent with non-CHF populations.         Assessment:      1. Congestive heart failure, NYHA class 3 and ACC/AHA stage C    2. Dilated cardiomyopathy    3. Essential hypertension    4. Mixed hyperlipidemia    5. Automatic implantable cardioverter-defibrillator in situ        Plan:   NYHA class III symptoms with elevated BNP.  Add Metolazone 2.5 mg tonight 30 min prior to her evening dose of Bumex.  Also take 60 meq of potassium with Metolazone.   In view of her stage C HFrEF and NYHA class III symptoms and elevated BNP, patient meets criteria for the GUIDE-HF study. She has been of very stable HF regimen and does not need advanced HF options at this time.   Patient will be contacted by our CRC Amber Akins to consent her for the GUIDE-HF study.   Recommend 2 gram sodium restriction and 1500cc fluid restriction.  Encourage physical activity with graded exercise program.  Requested patient to weigh themselves daily, and to notify us if their weight increases by more than 3 lbs in 1 day or 5 lbs in 1 week.   RTC in 2 months     Kenny Rodriguez MD

## 2019-08-06 NOTE — PATIENT INSTRUCTIONS
Take metolazone 2.5 mg tonight 30 min before your Bumex  Take 60 meq of Potassium x1 with your Metolazone tonight.

## 2019-08-08 NOTE — TELEPHONE ENCOUNTER
8/8/19 - Received call from patient stating she feels like her stomach is getting full with fluid and she is not urinating as much as she thinks she should be. Patient was seen by RICHARD Rodriguez M.D., Tuesday and she was prescribed Metolazone 2.5 mg and Potassium 60 meq x one dose.  Patient stated she did take the Metolazone and Potassium x 2 days and her belly is still distended.  Patient's weight today is 179 which is down 2 lbs from visit (181 lb). Patient takes Bumex 2 mg twice a day.  Discussed/Reviewed with RICHARD Rodriguez M.D.  VORB: RICHARD Rodriguez M.D./LONNY Sanches RN: Don't take Metolazone or Potassium, continue with Bumex 2 mg twice a day and draw labs BMP, Pro BNP tomorrow.  Review lab results with me.  Instructed patient of above orders.  Patient verbalized understanding to all instructions given and request labs at [facility].  Lab work scheduled and phone review entered.

## 2019-08-09 NOTE — TELEPHONE ENCOUNTER
1115 am:  F/U on today's nurse lab phone review:  Cr up to 2.7    From 1.3    K+:  3.3  BNP:  2967  See NN 8/8  Reviewed lab results with Dr. Jennifer COHEN: Dr. Drummond/Sanjiv, RN:  Pt to decrease Bumex to 2 mg every am only.  Stop the 2 mg pm dose for now.  No k+ supplement at this time. Repeat labs Monday 8/12: BMP and PROBNP ( not BNP)  1120 pm: Called pt . She told me she takes Bumex 2 mg every am and either 1-2 mg every pm depending on her wt . Pt said if her wt is <184 pds she only takes (1) bumex tab in the evening.  She reported the last time she took 2 tablets in the evening was several days ago.staed wt today is: 179 pds  She still has some abdominal bloating and sob with activity-dr rodriguez is aware of this.  Also informed Dr. Rodriguez of how pt is taking Bumex per her report above.    Pt in agreement to repeat lab work this Monday 8/12/19-will ask HF MA to schedule this and be certain to order proBNP per Dr. Rodriguez.

## 2019-08-13 NOTE — TELEPHONE ENCOUNTER
8/13/19 - Received lab results BUN - 58, Cr - 3.1 (8/9/19 BUN - 38, Cr - 2.7).  See note 8/9/19.  Called and spoke with patient who states she is feeling better and has lost 11 lbs since last Thursday, weight today is 168 lbs.  Patient confirmed she has been taking Bumex 2 mg daily and Aldactone 25 mg daily since last Friday as instructed. Discussed/Reviewed with RICHARD Rodriguez M.D.  VORB: RICHARD Rodriguez M.D./LONNY Sanches RN: Decrease Bumex to 1 mg daily and repeat BMP and Anti Pro BNP Thursday.  Scheduled a BNP next Wednesday for 9:00, same day as she is scheduled to see Dr. Reece.  Above instructions given to patient and patient repeated all instructions back correctly and verbalzied understanding to all instructions given.

## 2019-08-15 NOTE — TELEPHONE ENCOUNTER
1245 pm : F/U on today's nurse lab phone review  Also see NN 8/13/19 by Sheree, ZOFIA  BMP today:   BUN/Cr:  71/3.1     K+:   3.6      ProBNP in process  Called pt at this time for assessment/med review etc. NA at either number listed I LVM on both asking pt to call me with my name, office with, day, date time , call back number and reason for call.  Will follow.   3:15 pm:  No return call from pt . Called both phone numbers listed for pt again.  Both with NA and I LVM's on both stating calling to review abnormal lab work and to see how she was feeling. Left my name, office with , day, date, time and office contact phone number asked pt to return my call today.   5:40 pm:  No return call from pt . Called both phone numbers listed for pt again.  Both with NA and I LVM's on both stating calling to review abnormal lab work and to see how she was feeling. Left my name, office with , day, date, time and office contact phone number asked pt to return my call today. BNP still in process

## 2019-08-16 NOTE — TELEPHONE ENCOUNTER
1140 am: Returned call to pt .  Pt reported wt today and yesterday 172 pds  On 8/14 was 168 pds.  Stated she is taking Bumex 1 mg one tablet once daily for the past 2 days and Aldactone 25 mg once daily.  Pt reports still has swelling in her rt foot to ankle, sob with most activity ( not at rest)  Abdomen a little swollen  No chest pain.    1150 am:  While pt on hold reviewed BMP results ( proBNP still showing in process) as well as assessment above. VORB: Dr. Drummond/Sanjiv, RN:  D/C Bumex, Start Torsemide 20 mg bid and Potassium 20 meq once daily,  Continue Aldactone 25 mg once daily.  Repeat BMP only this Monday 8/19  1155 am:  Reviewed all of above with pt.  Emphasized to pt to stop taking Bumex and NOT to take it with Torsemide.  Spelled and gave instructions of Torsemide and Potassium to pt, she wrote it down and repeated back to me correctly.  Pt aware of need for lab work this Monday.  Wants to go to [facility] 9:00 am.  I have entered lab order and messaged DAVONTE DARDEN to schedule this.         ----- Message from Micki Quintanilla sent at 8/16/2019  9:58 AM CDT -----  Contact: BETO Lee Pt is returning your call and can be reached at [phone].    Thank you

## 2019-08-19 NOTE — ED NOTES
Dr Petersen notified of decrease in BP. Patient is asymptomatic and placed in modified [place]. MD aware.

## 2019-08-19 NOTE — TELEPHONE ENCOUNTER
Left pt a message instructed her to send a manual transmission prior to her appt on 8/21. Instructed her to call back once she did it to confirm we received it.

## 2019-08-19 NOTE — ED PROVIDER NOTES
"SCRIBE #1 NOTE: I, Kandis Márquez, am scribing for, and in the presence of, Gladis Sy Do, MD. I have scribed the entire note.      History      Chief Complaint   Patient presents with    Nausea     denies vomiting. nausea for 2 days- pt states that she came to the ER to have her labs drawn because she missed her lab appt. explained that she could still have these labs done in outpatient lab but patient wanted to check into ER for nausea    Diarrhea     "I got a touch of diarrhea" has had 3 episodes today       Review of patient's allergies indicates:   Allergen Reactions    Augmentin [amoxicillin-pot clavulanate] Swelling     Lip swelling      Penicillins      Other reaction(s): Swelling  Lip swelling          HPI   HPI    8/19/2019, 3:17 PM   History obtained from the patient      History of Present Illness: Sherice Squires is a 68 y.o. female patient with PMHx of HTN< HLD, cardiomyopathy, and Afib who presents to the Emergency Department for n/v/d and a lab work up which onset gradually 2 days ago. Pt states she had 3 episodes of diarrhea today. Pt reports she missed her lab appt due to sxs. Symptoms are episodic and moderate in severity. No mitigating or exacerbating factors reported. Associated sxs include SOB and fatigue. Patient denies any fever, chills, diaphoresis, CP, cough, wheezing, dysuria, back pain, HA, dizziness, lightheadedness, and extremity weakness/numbness, and all other sxs at this time. No prior txs reported. Pt states she took her medications today. No further complaints or concerns at this time.         Arrival mode: Personal vehicle      PCP: Annamarie Soria MD       Past Medical History:  Past Medical History:   Diagnosis Date    Atrial fibrillation     Cardiomyopathy     CHF (congestive heart failure)     Hyperlipidemia     Hypertension     Ventricular tachycardia        Past Surgical History:  Past Surgical History:   Procedure Laterality Date    CARDIAC DEFIBRILLATOR " PLACEMENT      CARDIOVERSION OR DEFIBRILLATION N/A 3/8/2019    Performed by Jose Reece MD at [facility] EP LAB    CHOLECYSTECTOMY      ECHOCARDIOGRAM,TRANSESOPHAGEAL N/A 3/8/2019    Performed by [facility] Diagnostic Provider at [facility]    HYSTERECTOMY      JOINT REPLACEMENT  2009    rt knee replacment    pace maker  12/2010         Family History:  Family History   Problem Relation Age of Onset    Arthritis Father     Cancer Father     Drug abuse Sister     COPD Brother     Hypertension Maternal Grandmother        Social History:  Social History     Tobacco Use    Smoking status: Never Smoker    Smokeless tobacco: Never Used   Substance and Sexual Activity    Alcohol use: No     Comment: rarely    Drug use: No    Sexual activity: Unknown       ROS   Review of Systems   Constitutional: Positive for fatigue. Negative for chills, diaphoresis and fever.   HENT: Negative for sore throat.    Respiratory: Positive for shortness of breath. Negative for cough and wheezing.    Cardiovascular: Negative for chest pain.   Gastrointestinal: Positive for diarrhea, nausea and vomiting. Negative for abdominal distention, abdominal pain and constipation.   Genitourinary: Negative for dysuria.   Musculoskeletal: Negative for back pain.   Skin: Negative for rash.   Neurological: Negative for dizziness, weakness, light-headedness, numbness and headaches.   Hematological: Does not bruise/bleed easily.   All other systems reviewed and are negative.    Physical Exam      Initial Vitals   BP Pulse Resp Temp SpO2   08/19/19 1527 08/19/19 1446 08/19/19 1446 08/19/19 1443 08/19/19 1446   (!) 111/55 103 20 97.5 °F (36.4 °C) 98 %      MAP       --                 Physical Exam  Nursing Notes and Vital Signs Reviewed.  Constitutional: Patient is in no acute distress. Well-developed and well-nourished.  Head: Atraumatic. Normocephalic.  Eyes: PERRL. EOM intact. Conjunctivae are not pale. No scleral icterus.  ENT: Mucous membranes  are moist. Oropharynx is clear and symmetric.    Neck: Supple. Full ROM. No lymphadenopathy.  Cardiovascular: Tachycardic. Regular rhythm. No murmurs, rubs, or gallops. Distal pulses are 2+ and symmetric. No JVD.  Pulmonary/Chest: No respiratory distress. Clear to auscultation bilaterally. No wheezing or rales.  Abdominal: Soft and non-distended.  There is no tenderness.  No rebound, guarding, or rigidity. Good bowel sounds.  Genitourinary: No CVA tenderness  Musculoskeletal: Moves all extremities. No obvious deformities. No pedal edema. No calf tenderness.  Skin: Warm and dry.  Neurological:  Alert, awake, and appropriate.  Normal speech.  No acute focal neurological deficits are appreciated.  Psychiatric: Normal affect. Good eye contact. Appropriate in content.    ED Course    Critical Care  Date/Time: 8/19/2019 4:35 PM  Performed by: Gladis Sy Do, MD  Authorized by: Gladis Sy Do, MD   Direct patient critical care time: 20 minutes  Additional history critical care time: 5 minutes  Ordering / reviewing critical care time: 5 minutes  Documentation critical care time: 5 minutes  Consulting other physicians critical care time: 5 minutes  Total critical care time (exclusive of procedural time) : 40 minutes  Critical care time was exclusive of separately billable procedures and treating other patients and teaching time.  Critical care was necessary to treat or prevent imminent or life-threatening deterioration of the following conditions: A-fibrillation.  Critical care was time spent personally by me on the following activities: blood draw for specimens, discussions with consultants, development of treatment plan with patient or surrogate, interpretation of cardiac output measurements, evaluation of patient's response to treatment, examination of patient, obtaining history from patient or surrogate, ordering and performing treatments and interventions, ordering and review of laboratory studies, ordering and  "review of radiographic studies, pulse oximetry, re-evaluation of patient's condition and review of old charts.        ED Vital Signs:  Vitals:    08/19/19 1446 08/19/19 1522 08/19/19 1527 08/19/19 1532   BP:   (!) 111/55 (!) 91/52   Pulse: 103 (!) 122 (!) 137 (!) 129   Resp: 20 18 17 17   Temp: 97.5 °F (36.4 °C)      TempSrc:       SpO2: 98% 100% (!) 92% 99%   Weight: 79.8 kg (176 lb)      Height: 5' 5" (1.651 m)       08/19/19 1602 08/19/19 1620 08/19/19 1625 08/19/19 1632   BP: (!) 86/57   (!) 74/50   Pulse: (!) 133 (!) 139 74 67   Resp: 18 (!) 21 (!) 22 20   Temp:       TempSrc:       SpO2: 96% 96% (!) 82% 100%   Weight:       Height:        08/19/19 1638 08/19/19 1640 08/19/19 1708 08/19/19 1732   BP: (!) 76/50 (!) 84/57 (!) 89/49 (!) 91/56   Pulse: 69 68 69 69   Resp: 17 19 20 19   Temp:   98.4 °F (36.9 °C)    TempSrc:   Oral    SpO2:  100% 99% 100%   Weight:       Height:        08/19/19 1819 08/19/19 1902 08/19/19 1933   BP: 110/61 97/65 (!) 81/57   Pulse: 74 81 94   Resp: 20 19 20   Temp:   97.5 °F (36.4 °C)   TempSrc:   Oral   SpO2: 100%  100%   Weight:      Height:          Abnormal Lab Results:  Labs Reviewed   CBC W/ AUTO DIFFERENTIAL - Abnormal; Notable for the following components:       Result Value    RDW 18.3 (*)     Platelets 148 (*)     All other components within normal limits   COMPREHENSIVE METABOLIC PANEL - Abnormal; Notable for the following components:    CO2 16 (*)     Glucose 68 (*)     BUN, Bld 84 (*)     Creatinine 3.8 (*)     Albumin 3.1 (*)     Total Bilirubin 2.3 (*)     Alkaline Phosphatase 308 (*)     Anion Gap 23 (*)     eGFR if  13 (*)     eGFR if non  12 (*)     All other components within normal limits   URINALYSIS, REFLEX TO URINE CULTURE - Abnormal; Notable for the following components:    Protein, UA 2+ (*)     Bilirubin (UA) 1+ (*)     Leukocytes, UA 3+ (*)     All other components within normal limits    Narrative:     Preferred Collection " Type->Urine, Clean Catch   B-TYPE NATRIURETIC PEPTIDE - Abnormal; Notable for the following components:    BNP 3,234 (*)     All other components within normal limits   DIGOXIN LEVEL - Abnormal; Notable for the following components:    Digoxin Lvl 0.1 (*)     All other components within normal limits   URINALYSIS MICROSCOPIC - Abnormal; Notable for the following components:    WBC, UA >100 (*)     Bacteria Many (*)     Hyaline Casts, UA 15 (*)     All other components within normal limits    Narrative:     Preferred Collection Type->Urine, Clean Catch   CULTURE, URINE   HEPATITIS C ANTIBODY        All Lab Results:  Results for orders placed or performed during the hospital encounter of 08/19/19   CBC W/ AUTO DIFFERENTIAL   Result Value Ref Range    WBC 6.34 3.90 - 12.70 K/uL    RBC 4.37 4.00 - 5.40 M/uL    Hemoglobin 13.2 12.0 - 16.0 g/dL    Hematocrit 39.4 37.0 - 48.5 %    Mean Corpuscular Volume 90 82 - 98 fL    Mean Corpuscular Hemoglobin 30.2 27.0 - 31.0 pg    Mean Corpuscular Hemoglobin Conc 33.5 32.0 - 36.0 g/dL    RDW 18.3 (H) 11.5 - 14.5 %    Platelets 148 (L) 150 - 350 K/uL    MPV 12.7 9.2 - 12.9 fL    Gran # (ANC) 4.1 1.8 - 7.7 K/uL    Lymph # 1.8 1.0 - 4.8 K/uL    Mono # 0.5 0.3 - 1.0 K/uL    Eos # 0.0 0.0 - 0.5 K/uL    Baso # 0.01 0.00 - 0.20 K/uL    Gran% 64.3 38.0 - 73.0 %    Lymph% 27.8 18.0 - 48.0 %    Mono% 7.7 4.0 - 15.0 %    Eosinophil% 0.0 0.0 - 8.0 %    Basophil% 0.2 0.0 - 1.9 %    Platelet Estimate Appears normal     Aniso Slight     Poik Slight     Poly Occasional     Hypo CANCELED     Ovalocytes Occasional     Target Cells Moderate     Tear Drop Cells Occasional     Mariah Cells Occasional     Differential Method Automated    Comp. Metabolic Panel   Result Value Ref Range    Sodium 139 136 - 145 mmol/L    Potassium 4.8 3.5 - 5.1 mmol/L    Chloride 100 95 - 110 mmol/L    CO2 16 (L) 23 - 29 mmol/L    Glucose 68 (L) 70 - 110 mg/dL    BUN, Bld 84 (H) 8 - 23 mg/dL    Creatinine 3.8 (H) 0.5 - 1.4  mg/dL    Calcium 9.7 8.7 - 10.5 mg/dL    Total Protein 7.4 6.0 - 8.4 g/dL    Albumin 3.1 (L) 3.5 - 5.2 g/dL    Total Bilirubin 2.3 (H) 0.1 - 1.0 mg/dL    Alkaline Phosphatase 308 (H) 55 - 135 U/L    AST 35 10 - 40 U/L    ALT 27 10 - 44 U/L    Anion Gap 23 (H) 8 - 16 mmol/L    eGFR if African American 13 (A) >60 mL/min/1.73 m^2    eGFR if non African American 12 (A) >60 mL/min/1.73 m^2   Urinalysis, Reflex to Urine Culture Urine, Clean Catch   Result Value Ref Range    Specimen UA Urine, Clean Catch     Color, UA Yellow Yellow, Straw, Aleida    Appearance, UA Clear Clear    pH, UA 5.0 5.0 - 8.0    Specific Gravity, UA 1.025 1.005 - 1.030    Protein, UA 2+ (A) Negative    Glucose, UA Negative Negative    Ketones, UA Negative Negative    Bilirubin (UA) 1+ (A) Negative    Occult Blood UA Negative Negative    Nitrite, UA Negative Negative    Urobilinogen, UA 1.0 <2.0 EU/dL    Leukocytes, UA 3+ (A) Negative   B-Type natriuretic peptide (BNP)   Result Value Ref Range    BNP 3,234 (H) 0 - 99 pg/mL   Digoxin level   Result Value Ref Range    Digoxin Lvl 0.1 (L) 0.8 - 2.0 ng/mL   Hepatitis C antibody   Result Value Ref Range    Hepatitis C Ab Negative    Urinalysis Microscopic   Result Value Ref Range    WBC, UA >100 (H) 0 - 5 /hpf    Bacteria Many (A) None-Occ /hpf    Squam Epithel, UA 10 /hpf    Hyaline Casts, UA 15 (A) 0-1/lpf /lpf    Microscopic Comment SEE COMMENT          Imaging Results:  Imaging Results          X-Ray Chest AP Portable (Final result)  Result time 08/19/19 16:03:27    Final result by Murtaza Monzon MD (08/19/19 16:03:27)                 Impression:      No acute abnormality.      Electronically signed by: Murtaza Monzon  Date:    08/19/2019  Time:    16:03             Narrative:    EXAMINATION:  XR CHEST AP PORTABLE    CLINICAL HISTORY:  Chest Pain;.    TECHNIQUE:  Single frontal portable view of the chest was performed.    COMPARISON:  06/27/2019    FINDINGS:  Support devices: Left chest dual lead AICD.   Telemetry leads.    The lungs are clear, with normal appearance of pulmonary vasculature and no pleural effusion or pneumothorax.    Unchanged significant cardiomegaly.    Bones are intact.                                    The EKG was ordered, reviewed, and independently interpreted by the ED provider.  Interpretation time: 1533  Rate: 125 BPM  Rhythm: atrial fibrillation with ventricular response with premature aberrantly conducted complexes.  Interpretation: LAD. RBBB. No STEMI.      The Emergency Provider reviewed the vital signs and test results, which are outlined above.    ED Discussion     6:30 PM: Re-evaluated pt. Pt is stabilized. Vitals are stable. Labs are WNL for her.    7:36 PM: Consult with Dr. Soria (Transplant team) at [facility] concerning pt. There are no  services, which the patient requires, offered at [facility] at this time. Dr. Soria expresses understanding and wants her admitted to [facility] for worsening renal failure and acute decompensated heart failure.  Accepting Facility: [facility]  Accepting Physician: Dr. Soria    7:40 PM: Re-evaluated pt. Informed pt and family that there are no transplant services available at this time. I have discussed test results, shared treatment plan, and the need for transfer with patient and family at bedside. All historical, clinical, radiographic, and laboratory findings were reviewed with the patient/family in detail. [facility]  care required en route. Patient understands that there could be unforeseen motor vehicle accidents or loss of vital signs that could result in potential death or permanent disability. Pt and family express understanding at this time and agree with all information. All questions answered. Pt and family have no further questions or concerns at this time. Pt is ready for transfer.         ED Medication(s):  Medications   doxycycline (VIBRAMYCIN)  100mg in dextrose 5% 250 mL IVPB (ready to mix) (100 mg Intravenous New Bag 8/19/19 1910)   ondansetron injection 4 mg (4 mg Intravenous Given 8/19/19 1620)   diltiaZEM injection 20 mg (20 mg Intravenous Given 8/19/19 1620)          New Prescriptions    No medications on file             Medical Decision Making    Medical Decision Making:   Clinical Tests:   Lab Tests: Ordered and Reviewed  Radiological Study: Ordered and Reviewed  Medical Tests: Ordered and Reviewed           Scribe Attestation:   Scribe #1: I performed the above scribed service and the documentation accurately describes the services I performed. I attest to the accuracy of the note.    Attending:   Physician Attestation Statement for Scribe #1: I, Gladis Sy Do, MD, personally performed the services described in this documentation, as scribed by Kandis Márquez, in my presence, and it is both accurate and complete.          Clinical Impression       ICD-10-CM ICD-9-CM   1. Acute decompensated heart failure I50.9 428.0   2. Atrial fibrillation I48.91 427.31   3. Atrial fibrillation with RVR I48.91 427.31   4. Acute renal failure superimposed on chronic kidney disease, unspecified CKD stage, unspecified acute renal failure type N17.9 584.9    N18.9 585.9       Disposition:   Disposition: Transferred  Condition: Stable         Gladis Sy Do, MD  08/19/19 1948

## 2019-08-20 PROBLEM — N18.9 ACUTE ON CHRONIC KIDNEY FAILURE: Status: ACTIVE | Noted: 2019-01-01

## 2019-08-20 PROBLEM — I50.43 ACUTE ON CHRONIC COMBINED SYSTOLIC AND DIASTOLIC HEART FAILURE: Status: ACTIVE | Noted: 2019-01-01

## 2019-08-20 PROBLEM — I48.91 ATRIAL FIBRILLATION WITH RVR: Status: ACTIVE | Noted: 2019-01-01

## 2019-08-20 PROBLEM — N17.9 ACUTE ON CHRONIC KIDNEY FAILURE: Status: ACTIVE | Noted: 2019-01-01

## 2019-08-20 NOTE — PROCEDURES
"Sherice Squires is a 68 y.o. female patient.    Temp: 97.7 °F (36.5 °C) (08/20/19 0103)  Pulse: 103 (08/20/19 0210)  Resp: 18 (08/20/19 0103)  BP: 99/60 (08/20/19 0103)  SpO2: 96 % (08/20/19 0103)  Weight: 82.9 kg (182 lb 12.2 oz) (08/20/19 0103)  Height: 5' 5" (165.1 cm) (08/20/19 0103)    Central Line  Date/Time: 8/20/2019 2:47 AM  Location procedure was performed: [facility] CARDIOLOGY STEPDOWN UNIT ([facility])  Performed by: Marce Savage MD  Time out: Immediately prior to procedure a "time out" was called to verify the correct patient, procedure, equipment, support staff and site/side marked as required.  Indications: med administration, vascular access and hemodynamic monitoring  Preparation: skin prepped with ChloraPrep  Skin prep agent dried: skin prep agent completely dried prior to procedure  Sterile barriers: all five maximum sterile barriers used - cap, mask, sterile gown, sterile gloves, and large sterile sheet  Hand hygiene: hand hygiene performed prior to central venous catheter insertion  Location details: right internal jugular  Catheter type: triple lumen  Catheter size: 7 Fr  Catheter Length: 16cm    Ultrasound guidance: yes  Vessel Caliber: medium, patentNeedle advanced into vessel with real time Ultrasound guidance.  Guidewire confirmed in vessel.  Sterile sheath used.  Manometry: Yes  Number of attempts: 1  Assessment: placement verified by x-ray  Specimens: No  Implants: No  Post-procedure: line sutured,  chlorhexidine patch,  sterile dressing applied and blood return through all ports  Complications: No          No flowsheet data found.    Marce Savage  8/20/2019    "

## 2019-08-20 NOTE — TRANSFER OF CARE
"Anesthesia Transfer of Care Note    Patient: Sherice Squires    Procedure(s) Performed: Procedure(s) (LRB):  CARDIOVERSION (N/A)  ECHOCARDIOGRAM,TRANSESOPHAGEAL (N/A)    Patient location: ICU    Anesthesia Type: general    Transport from OR: Transported from OR on 2-3 L/min O2 by NC with adequate spontaneous ventilation    Post pain: adequate analgesia    Post assessment: no apparent anesthetic complications and tolerated procedure well    Post vital signs: stable    Level of consciousness: lethargic    Nausea/Vomiting: no nausea/vomiting    Complications: none    Transfer of care protocol was followed      Last vitals:   Visit Vitals  BP (!) 151/107 (BP Location: Left arm, Patient Position: Lying)   Pulse 94   Temp 36.8 °C (98.2 °F) (Oral)   Resp (!) 32   Ht 5' 5" (1.651 m)   Wt 80.4 kg (177 lb 4 oz)   SpO2 (!) 91%   Breastfeeding? No   BMI 29.50 kg/m²     "

## 2019-08-20 NOTE — LETTER
[facility] ON CALL    [facility]  [address] [place] [address] [id number]  [place] [address]  PHONE [phone] / FAX [phone] [address] [place] [address]  PHONE [phone] / FAX [phone]    E-MAIL: [email or web address]    EXCUSE FROM SCHOOL/WORK    PATIENT'S NAME: Sherice Squires      PATIENT'S FAMILY MEMBER, LADAN OROZCO, WAS UNABLE TO ATTEND WORK ON: 8/22/19 and 8/26/19        REASON: PATIENT IN HOSPITAL    _________________________________________  NURSE PRACTITIONER'S SIGNATURE & DATE  HALLEY DAVE

## 2019-08-20 NOTE — Clinical Note
A dressing is applied to the incision. Dressing applied 30 minutes after dermabond dried. Pressure dressing applied over foam dressing.

## 2019-08-20 NOTE — CONSULTS
[facility]  Cardiac Electrophysiology  Consult Note    Admission Date: 8/20/2019  Code Status: Full Code   Attending Provider: Annamarie Soria MD  Consulting Provider: Bay Holliday MD  Principal Problem:Acute on chronic combined systolic and diastolic heart failure    Inpatient consult to Electrophysiology  Consult performed by: Bay Holliday MD  Consult ordered by: Fanny Clemens NP        Subjective:     Chief Complaint:  afib w RVR     HPI:   69 y/o woman history of NICM 20-25%, persistent atrial fibrillation on apixaban, [employer] ICD, CKD presenting with cardiogenic shock and acute decompensated heart failure. Noted to be in afib w RVR to 140s with perstent drops in BP for which IABP placed. Ultimately started on amio gtt with improvement in rates to 80s and improvement in BP. Reports adherence with apixaban. Denies chest pain, palpitations or shortness of breath.     Past Medical History:   Diagnosis Date    Atrial fibrillation     Cardiomyopathy     CHF (congestive heart failure)     Hyperlipidemia     Hypertension     Ventricular tachycardia        Past Surgical History:   Procedure Laterality Date    CARDIAC DEFIBRILLATOR PLACEMENT      CARDIOVERSION OR DEFIBRILLATION N/A 3/8/2019    Performed by Jose Reece MD at [facility] EP LAB    CHOLECYSTECTOMY      ECHOCARDIOGRAM,TRANSESOPHAGEAL N/A 3/8/2019    Performed by [facility] Provider at [facility] EP LAB    HYSTERECTOMY      JOINT REPLACEMENT  2009    rt knee replacment    pace maker  12/2010       Review of patient's allergies indicates:   Allergen Reactions    Augmentin [amoxicillin-pot clavulanate] Swelling     Lip swelling      Penicillins      Other reaction(s): Swelling  Lip swelling         Current Facility-Administered Medications on File Prior to Encounter   Medication    [COMPLETED] doxycycline (VIBRAMYCIN) 100mg in dextrose 5% 250 mL IVPB (ready to mix)    [DISCONTINUED] cefTRIAXone  (ROCEPHIN) 1 g in dextrose 5 % 50 mL IVPB     Current Outpatient Medications on File Prior to Encounter   Medication Sig    apixaban (ELIQUIS) 5 mg Tab Take 1 tablet (5 mg total) by mouth 2 (two) times daily.    digoxin (DIGOX) 125 mcg tablet Take 1 tablet by mouth once daily    esomeprazole (NEXIUM) 20 MG capsule Take 20 mg by mouth before breakfast.    hydrALAZINE (APRESOLINE) 50 MG tablet TAKE 1 TABLET (50 MG TOTAL) BY MOUTH 3 (THREE) TIMES DAILY.    isosorbide mononitrate (IMDUR) 30 MG 24 hr tablet TAKE 1 TABLET (30 MG TOTAL) BY MOUTH ONCE DAILY.    metoprolol succinate (TOPROL-XL) 200 MG 24 hr tablet TAKE 1 TABLET (200 MG TOTAL) BY MOUTH 2 (TWO) TIMES DAILY.    potassium chloride SA (K-DUR,KLOR-CON) 20 MEQ tablet Take 1 tablet (20 mEq total) by mouth once daily.    sacubitril-valsartan (ENTRESTO)  mg per tablet Take 1 tablet by mouth 2 (two) times daily.    simvastatin (ZOCOR) 40 MG tablet TAKE 1 TABLET EVERY EVENING    spironolactone (ALDACTONE) 25 MG tablet TAKE 1 TABLET EVERY DAY    torsemide (DEMADEX) 20 MG Tab Take 1 tablet (20 mg total) by mouth 2 (two) times daily.    metOLazone (ZAROXOLYN) 2.5 MG tablet Take it once today 30 min before your evening dose of Bumex.     Family History     Problem Relation (Age of Onset)    Arthritis Father    COPD Brother    Cancer Father    Drug abuse Sister    Hypertension Maternal Grandmother        Tobacco Use    Smoking status: Never Smoker    Smokeless tobacco: Never Used   Substance and Sexual Activity    Alcohol use: No     Comment: rarely    Drug use: No    Sexual activity: Not on file     Review of Systems   Constitution: Negative. Negative for chills and fever.   HENT: Negative.    Eyes: Negative.    Cardiovascular: Positive for dyspnea on exertion. Negative for chest pain, claudication and paroxysmal nocturnal dyspnea.   Respiratory: Negative for cough, shortness of breath and wheezing.    Endocrine: Negative.    Hematologic/Lymphatic:  Does not bruise/bleed easily.   Skin: Negative for nail changes and rash.   Musculoskeletal: Negative.  Negative for back pain.   Gastrointestinal: Negative for abdominal pain, melena, nausea and vomiting.   Neurological: Negative for dizziness and headaches.   Psychiatric/Behavioral: Negative for altered mental status, depression and substance abuse.   Allergic/Immunologic: Negative.      Objective:     Vital Signs (Most Recent):  Temp: 98.2 °F (36.8 °C) (08/20/19 1500)  Pulse: 94 (08/20/19 1600)  Resp: (!) 32 (08/20/19 1600)  BP: (!) 151/107 (08/20/19 1600)  SpO2: (!) 91 % (08/20/19 1600) Vital Signs (24h Range):  Temp:  [97.5 °F (36.4 °C)-98.3 °F (36.8 °C)] 98.2 °F (36.8 °C)  Pulse:  [] 94  Resp:  [16-39] 32  SpO2:  [89 %-100 %] 91 %  BP: ()/() 151/107       Weight: 80.4 kg (177 lb 4 oz)  Body mass index is 29.5 kg/m².    SpO2: (!) 91 %  O2 Device (Oxygen Therapy): nasal cannula w/ humidification    Physical Exam   Constitutional: She is oriented to person, place, and time. She appears well-developed and well-nourished.   HENT:   Head: Normocephalic and atraumatic.   Eyes: Pupils are equal, round, and reactive to light. Conjunctivae and EOM are normal.   Neck: No JVD present.   Cardiovascular: Normal rate, regular rhythm, normal heart sounds and intact distal pulses. Exam reveals no gallop and no friction rub.   No murmur heard.  Pulmonary/Chest: Effort normal. No stridor. She has no wheezes. She has no rales. She exhibits no tenderness.   Abdominal: Soft. Bowel sounds are normal. She exhibits no distension and no mass. There is no tenderness. There is no guarding.   Musculoskeletal: She exhibits no edema, tenderness or deformity.   Neurological: She is alert and oriented to person, place, and time.   Skin: Skin is warm and dry. No rash noted. No erythema.   Psychiatric: She has a normal mood and affect.       Significant Labs:   CMP:   Recent Labs   Lab 08/20/19  0432 08/20/19  0886  08/20/19  1534    138 139   K 4.5 4.0 4.0    102 102   CO2 17* 18* 21*   GLU 64* 74 109   BUN 87* 90* 87*   CREATININE 3.8* 3.7* 3.7*   CALCIUM 9.5 9.5 8.8   PROT 6.6 6.3 6.0   ALBUMIN 2.9* 2.8* 2.6*   BILITOT 1.9* 1.9* 1.2*   ALKPHOS 297* 280* 265*   AST 31 26 26   ALT 28 23 24   ANIONGAP 20* 18* 16   ESTGFRAFRICA 13.3* 13.8* 13.8*   EGFRNONAA 11.6* 11.9* 11.9*    and CBC:   Recent Labs   Lab 08/19/19  1541 08/20/19  0147 08/20/19  0432   WBC 6.34 6.68 6.76   HGB 13.2 13.0 13.0   HCT 39.4 39.4 40.1   * 132* 132*       Significant Imaging: Echocardiogram:   Transthoracic echo (TTE) complete (Cupid Only):   Results for orders placed or performed during the hospital encounter of 08/20/19   Transthoracic echo (TTE) 2D with Color Flow   Result Value Ref Range    Ascending aorta 3.07 cm    STJ 3.25 cm    AV mean gradient 4 mmHg    Ao peak kevin 1.44 m/s    Ao VTI 17.27 cm    IVS 0.78 0.6 - 1.1 cm    LA size 4.90 cm    Left Atrium Major Axis 7.30 cm    Left Atrium Minor Axis 7.20 cm    LVIDD 6.51 (A) 3.5 - 6.0 cm    LVIDS 6.03 (A) 2.1 - 4.0 cm    LVOT diameter 2.09 cm    LVOT peak VTI 8.95 cm    PW 0.90 0.6 - 1.1 cm    MV Peak E Kevin 1.12 m/s    RA Major Axis 5.35 cm    RA Width 4.31 cm    RVDD 5.12 cm    Sinus 3.00 cm    TAPSE 0.92 cm    TR Max Kevin 2.98 m/s    TDI LATERAL 0.07 m/s    TDI SEPTAL 0.07 m/s    LA WIDTH 5.68 cm    LV Diastolic Volume 216.83 mL    LV Systolic Volume 181.73 mL    LVOT peak kevin 0.75 m/s    LV LATERAL E/E' RATIO 16.00 m/s    LV SEPTAL E/E' RATIO 16.00 m/s    FS 7 %    LA volume 171.51 cm3    LV mass 228.12 g    Left Ventricle Relative Wall Thickness 0.28 cm    AV valve area 1.78 cm2    AV Velocity Ratio 0.52     AV index (prosthetic) 0.52     Mean e' 0.07 m/s    LVOT area 3.4 cm2    LVOT stroke volume 30.69 cm3    AV peak gradient 8 mmHg    E/E' ratio 16.00 m/s    LV Systolic Volume Index 96.7 mL/m2    LV Diastolic Volume Index 115.39 mL/m2    LA Volume Index 91.3 mL/m2    LV Mass  Index 121 g/m2    Triscuspid Valve Regurgitation Peak Gradient 36 mmHg    BSA 1.95 m2    Right Atrial Pressure (from IVC) 15 mmHg    TV rest pulmonary artery pressure 51 mmHg    Narrative    · Moderate left ventricular enlargement.  · Severely decreased left ventricular systolic function. The estimated   ejection fraction is 15%  · Severe global hypokinetic wall motion.  · Septal wall has abnormal motion. Systolic and diastolic flattening of   the interventricular septum consistent with right ventricle pressure and   volume overload.  · Eccentric left ventricular hypertrophy.  · Indeterminate left ventricular diastolic function.  · Mildly reduced right ventricular systolic function.  · Severe left atrial enlargement.  · Moderate mitral regurgitation.  · Moderate tricuspid regurgitation.  · The estimated PA systolic pressure is 51 mm Hg. Pulmonary hypertension   present.  · Elevated central venous pressure (15 mm Hg).                  Assessment and Plan:     Atrial fibrillation with RVR  69 y/o woman history of NICM 20-25%, persistent atrial fibrillation on apixaban, [employer] ICD, CKD presenting with cardiogenic shock and acute decompensated heart failure in setting of atrial fibrillation w RVR.    - device interrogation reveals several months of high afib burden  - NPO for JOSE/DCCV (reports adherence but unclear last dose and EF acutely down)  - Would avoid amiodarone or rhythm control agents until negative JOSE        Thank you for your consult. I will follow-up with patient. Please contact us if you have any additional questions.    Bay Holliday MD  Cardiac Electrophysiology  [facility]

## 2019-08-20 NOTE — ASSESSMENT & PLAN NOTE
- Cr up to 4.0 from baseline 1.3-1.4 in June 2019, downtrending with improvement in cardiogenic shock

## 2019-08-20 NOTE — ASSESSMENT & PLAN NOTE
69 y/o woman history of NICM 20-25%, persistent atrial fibrillation on apixaban, [employer] ICD, CKD presenting with cardiogenic shock and acute decompensated heart failure in setting of atrial fibrillation w RVR.    - device interrogation reveals several months of high afib burden  - NPO for JOSE/DCCV (reports adherence but unclear last dose and EF acutely down)  - Would avoid amiodarone or rhythm control agents until negative JOSE

## 2019-08-20 NOTE — PROGRESS NOTES
08/20/19 0314   Invasive Hemodynamic Monitoring   CVP (mean) 18 mmHg   SVO2 (%) 55 %     CVP performed 3 times with good wave form noted. Dr. Savage aware. Will continue to monitor.

## 2019-08-20 NOTE — HPI
Sherice Squires is a 69 yo AAF with PMHx significant for NIDCM / stage D HFrEF (LVEF 20-25%, LVEDD 5.9 cm) s/p dc ICD, AF, HTN, DLD, CKD who is admitted as a transfer from  ED for management of ADHF +/- possible cardiogenic shock.     Patient reports she presented to OSH with progressive/worsening shortness of breath on exertion, orthopnea, and peripheral swelling of approx 5 days duration associated with nausea and fatigue. Denies fever/chills/sweats, chest pain, diaphoresis, presyncope/syncope. Does report intermittent palpitations.      Patient was afebrile and normotensive on arrival (/55 mmHg) to OSH ED with pulses in the 120-130s in AF with RVR. Initial labs showed worsening FAMILIA on CKD (with Cr 3.8 from 2.7-3.1 this past week from previous baseline of 1.3-1.4 in June 2019) as well as elevated T bili 2.3 and BNP >3000. CXR obtained without acute cardiopulmonary process. Patient ultimately transferred to [facility] for higher level of care.      Patient follows with [facility] clinic - currently on Entresto 97/103 mg BID, Toprol  mg daily, Aldactone 25 mg daily, and digoxin 0.125 mg daily. She was last seen by Dr Rodriguez on 8/6/19 who added metolazone 2.5 mg QHS before evening dose of Bumex to augment diuresis due to complaints of SOB / peripheral swelling at the time. She did not respond to this regimen and reports she was ultimately switched to Torsemide instead.      Patient is fully complaint with her medicines. Also adherent to fluid / salt restrictions with her hx of congestive heart failure.      States she was hospitalized only once before for ADHF within the past year.      Of note patient follows with Dr Reece of EP for her AF hx, last seen by him in 5/2019

## 2019-08-20 NOTE — NURSING
Patient had 15 beat run of VT. Patient asymptomatic. Dr. Hussein MD aware. Will replace magnesium of 1.8. Will continue to monitor.

## 2019-08-20 NOTE — ASSESSMENT & PLAN NOTE
- 67 yo F with NIDCM , EF 15-20% with LVEDD 5.9 cm admitted with ADHF +/- cardiogenic shock.  - bolus with Lasix 120 mg IV x 1 and start infusion at 10 mg/hr.   - s/p central line placement - will follow up CVP and SVO2 to determine need for inotropic support  - strict I/Os and daily STANDING weights.  - fluid / sodium restricted diet.  - telemetry monitoring.   - GDMT: hold Entresto, Aldactone, BB due to decompensated state and ARF.

## 2019-08-20 NOTE — ASSESSMENT & PLAN NOTE
- Cr up to 4.0 from 3.1 about one week ago , previous baseline 1.3-1.4 in June 2019  - secondary to cardiorenal syndrome +/- cardiogenic shock as above.  - diuresis as above.   - strict I/Os.  - avoid nephrotoxins (holding Entresto, Aldactone).  - monitor renal function closely.

## 2019-08-20 NOTE — Clinical Note
Existing generator Teligen 100 E110 SN [id number] removed from pocket. Generator and existing leads wrapped in antibiotic soaked gauze

## 2019-08-20 NOTE — Clinical Note
Existing device information  Existing generator Teligen 100 E110 SN [id number] implanted 12/2010  RA lead Guidant [id number] SN [id number] implanted 12/2010   RV lead Guidant [id number]  [id number] implanted 12/2010

## 2019-08-20 NOTE — NURSING
Dr. Savage at bedside to place central line in Right IJ. MD verified to perform procedure. Consents form verified. Time out performed.  Will continue to monitor.

## 2019-08-20 NOTE — HPI
67 y/o woman history of NICM 20-25%, persistent atrial fibrillation on apixaban, [employer] ICD, CKD presenting with cardiogenic shock and acute decompensated heart failure. Noted to be in afib w RVR to 140s with perstent drops in BP for which IABP placed. Ultimately started on amio gtt with improvement in rates to 80s and improvement in BP. Reports adherence with apixaban. Denies chest pain, palpitations or shortness of breath.

## 2019-08-20 NOTE — HPI
67 y/o woman history of NICM 20-25%, persistent atrial fibrillation on apixaban, [employer] ICD, CKD presenting with cardiogenic shock and acute decompensated heart failure in setting of atrial fibrillation w RVR, and high atrial fibrillation burden on device interrogation. Failed DCCV x 2.     - Given improved volume status, may have improved response to repeat DCCV.  - NPO for JOSE/DCCV today  - Please continue warfarin (remains subtherapeutic) with heparin bridging.    Dysphagia or odynophagia:  No  Liver Disease, esophageal disease, or known varices:  No  Upper GI Bleeding: No  Snoring:  No  Sleep Apnea:  No  Prior neck surgery or radiation:  No  History of anesthetic difficulties:  No  Family history of anesthetic difficulties:  No  Last oral intake:  12 hours ago  Able to move neck in all directions:  Yes     JOSE 8/20    · Severely decreased left ventricular systolic function. The estimated ejection fraction is 13%  · Moderately reduced right ventricular systolic function.  · Severe left atrial enlargement.  · Severe right atrial enlargement.  · Mild mitral sclerosis.  · Severe mitral regurgitation.  · Moderate tricuspid regurgitation.  · Normal appearing left atrial appendage. No thrombus is present in the appendage, visualized with echo contrasat.  · Transgastric views not performed due to patient instability

## 2019-08-20 NOTE — PROGRESS NOTES
Procedure explained.   optison given ivp via right ij tlc. Denies transfusion rxn. Tolerated well. Flushed before and after with 10 cc ns.

## 2019-08-20 NOTE — Clinical Note
The site was marked. Prepped: chest. Prepped with: ChloraPrep. The site was clipped. The patient was draped.

## 2019-08-20 NOTE — ASSESSMENT & PLAN NOTE
1. JOSE for evaluation of Atrial fibrillation  -No absolute contraindications of esophageal stricture, tumor, perforation, laceration,or diverticulum and/or active GI bleed  -The risks, benefits & alternatives of the procedure were explained to the patient.   -The risks of transesophageal echo include but are not limited to:  Dental trauma, esophageal trauma/perforation, bleeding, laryngospasm/brochospasm, aspiration, sore throat/hoarseness, & dislodgement of the endotracheal tube/nasogastric tube (where applicable).    -The risks of moderate sedation include hypotension, respiratory depression, arrhythmias, bronchospasm, & death.    -Informed consent was obtained. The patient is agreeable to proceed with the procedure and all questions and concerns addressed.    Case discussed with an attending in echocardiography lab.     Further recommendations per attending addendum

## 2019-08-20 NOTE — SUBJECTIVE & OBJECTIVE
Past Medical History:   Diagnosis Date    Atrial fibrillation     Cardiomyopathy     CHF (congestive heart failure)     Hyperlipidemia     Hypertension     Ventricular tachycardia        Past Surgical History:   Procedure Laterality Date    CARDIAC DEFIBRILLATOR PLACEMENT      CARDIOVERSION OR DEFIBRILLATION N/A 3/8/2019    Performed by Jose Reece MD at [facility] EP LAB    CHOLECYSTECTOMY      ECHOCARDIOGRAM,TRANSESOPHAGEAL N/A 3/8/2019    Performed by [facility] Diagnostic Provider at [facility] EP LAB    HYSTERECTOMY      JOINT REPLACEMENT  2009    rt knee replacment    pace maker  12/2010       Review of patient's allergies indicates:   Allergen Reactions    Augmentin [amoxicillin-pot clavulanate] Swelling     Lip swelling      Penicillins      Other reaction(s): Swelling  Lip swelling         No current facility-administered medications for this encounter.      Current Outpatient Medications   Medication Sig    apixaban (ELIQUIS) 5 mg Tab Take 1 tablet (5 mg total) by mouth 2 (two) times daily.    digoxin (DIGOX) 125 mcg tablet Take 1 tablet by mouth once daily    esomeprazole (NEXIUM) 20 MG capsule Take 20 mg by mouth before breakfast.    hydrALAZINE (APRESOLINE) 50 MG tablet TAKE 1 TABLET (50 MG TOTAL) BY MOUTH 3 (THREE) TIMES DAILY.    isosorbide mononitrate (IMDUR) 30 MG 24 hr tablet TAKE 1 TABLET (30 MG TOTAL) BY MOUTH ONCE DAILY.    metOLazone (ZAROXOLYN) 2.5 MG tablet Take it once today 30 min before your evening dose of Bumex.    metoprolol succinate (TOPROL-XL) 200 MG 24 hr tablet TAKE 1 TABLET (200 MG TOTAL) BY MOUTH 2 (TWO) TIMES DAILY.    potassium chloride SA (K-DUR,KLOR-CON) 20 MEQ tablet Take 1 tablet (20 mEq total) by mouth once daily.    sacubitril-valsartan (ENTRESTO)  mg per tablet Take 1 tablet by mouth 2 (two) times daily.    simvastatin (ZOCOR) 40 MG tablet TAKE 1 TABLET EVERY EVENING    spironolactone (ALDACTONE) 25 MG tablet TAKE 1 TABLET EVERY DAY    torsemide  (DEMADEX) 20 MG Tab Take 1 tablet (20 mg total) by mouth 2 (two) times daily.     Family History     Problem Relation (Age of Onset)    Arthritis Father    COPD Brother    Cancer Father    Drug abuse Sister    Hypertension Maternal Grandmother        Tobacco Use    Smoking status: Never Smoker    Smokeless tobacco: Never Used   Substance and Sexual Activity    Alcohol use: No     Comment: rarely    Drug use: No    Sexual activity: Not on file     Review of Systems   Constitutional: Positive for fatigue. Negative for activity change, appetite change, fever and unexpected weight change.   HENT: Negative.    Eyes: Negative.    Respiratory: Positive for cough and shortness of breath. Negative for chest tightness and wheezing.         Orthopnea   Cardiovascular: Positive for leg swelling. Negative for chest pain and palpitations.   Gastrointestinal: Positive for abdominal distention, diarrhea and nausea. Negative for abdominal pain, constipation and vomiting.   Endocrine: Negative.    Genitourinary: Positive for decreased urine volume. Negative for difficulty urinating and urgency.   Musculoskeletal: Negative.    Skin: Negative.    Allergic/Immunologic: Negative.    Neurological: Negative for dizziness, seizures, light-headedness and numbness.   Hematological: Negative.    Psychiatric/Behavioral: Negative.      Objective:     Vital Signs (Most Recent):    Vital Signs (24h Range):  Temp:  [97.5 °F (36.4 °C)-98.4 °F (36.9 °C)] 98.3 °F (36.8 °C)  Pulse:  [] 109  Resp:  [16-22] 20  SpO2:  [82 %-100 %] 100 %  BP: ()/(48-67) 104/54     No data found.  There is no height or weight on file to calculate BMI.    No intake or output data in the 24 hours ending 08/19/19 2301    Physical Exam   Constitutional: She is oriented to person, place, and time. She appears well-developed and well-nourished. No distress.   HENT:   Head: Normocephalic and atraumatic.   Eyes: EOM are normal.   Neck: Neck supple. JVD (with +HJR)  present.   Cardiovascular: An irregularly irregular rhythm present.   Murmur heard.  Pulmonary/Chest: Effort normal and breath sounds normal.   Abdominal: Soft. Bowel sounds are normal. She exhibits no distension.   Musculoskeletal: Normal range of motion. She exhibits edema.   Neurological: She is alert and oriented to person, place, and time.   Skin: Skin is warm and dry. Capillary refill takes 2 to 3 seconds. No erythema.   Psychiatric: She has a normal mood and affect. Her behavior is normal. Judgment and thought content normal.       Significant Labs:  CBC:  Recent Labs   Lab 08/19/19  1541   WBC 6.34   RBC 4.37   HGB 13.2   HCT 39.4   *   MCV 90   MCH 30.2   MCHC 33.5     BNP:  Recent Labs   Lab 08/15/19  1143 08/19/19  1541   BNP 3,347*  3,347* 3,234*     CMP:  Recent Labs   Lab 08/15/19  1143 08/19/19  1541   *  164* 68*   CALCIUM 9.4  9.4 9.7   ALBUMIN  --  3.1*   PROT  --  7.4     140 139   K 3.6  3.6 4.8   CO2 26  26 16*   CL 99  99 100   BUN 71*  71* 84*   CREATININE 3.1*  3.1* 3.8*   ALKPHOS  --  308*   ALT  --  27   AST  --  35   BILITOT  --  2.3*      Coagulation:   No results for input(s): PT, INR, APTT in the last 168 hours.  LDH:  No results for input(s): LDH in the last 72 hours.  Microbiology:  Microbiology Results (last 7 days)     ** No results found for the last 168 hours. **          I have reviewed all pertinent labs within the past 24 hours.    Diagnostic Results:  I have reviewed and interpreted all pertinent imaging results/findings within the past 24 hours.

## 2019-08-20 NOTE — ASSESSMENT & PLAN NOTE
- known hx AF (on Eliquis and Toprol), CHADS2-VaSc = 4.   - in AF with RVR 120s on arrival.  - diuresis as above.   - consider EP consult for restoration of SR this admission.

## 2019-08-20 NOTE — H&P
[facility]  Heart Transplant  H&P    Patient Name: Sherice Squires  MRN: [id number]  Admission Date: 8/20/2019  Attending Physician: Annamarie Soria MD  Primary Care Provider: Annamarie Soria MD  Principal Problem:Acute on chronic combined systolic and diastolic heart failure    Subjective:     HPI: Sherice Squires is a 69 yo AAF with PMHx significant for NIDCM / stage D HFrEF (LVEF 20-25%, LVEDD 5.9 cm) s/p dc ICD, AF, HTN, DLD, CKD who is admitted as a transfer from  ED for management of ADHF +/- possible cardiogenic shock.    Patient reports she presented to OSH with progressive/worsening shortness of breath on exertion, orthopnea, and peripheral swelling of approx 5 days duration associated with nausea and fatigue. Denies fever/chills/sweats, chest pain, diaphoresis, presyncope/syncope. Does report intermittent palpitations.     Patient was afebrile and normotensive on arrival (/55 mmHg) to OSH ED with pulses in the 120-130s in AF with RVR. Initial labs showed worsening FAMILIA on CKD (with Cr 3.8 from 2.7-3.1 this past week from previous baseline of 1.3-1.4 in June 2019) as well as elevated T bili 2.3 and BNP >3000. CXR obtained without acute cardiopulmonary process. [place] [facility] [place].      Patient follows with [facility] - currently on Entresto 97/103 mg BID, Toprol  mg daily, Aldactone 25 mg daily, and digoxin 0.125 mg daily. She was last seen by Dr Rodriguez on 8/6/19 who added metolazone 2.5 mg QHS before evening dose of Bumex to augment diuresis due to complaints of SOB / peripheral swelling at the time. She did not respond to this regimen and reports she was ultimately switched to Torsemide instead.     Patient is fully complaint with her medicines. Also adherent to fluid / salt restrictions with her hx of congestive heart failure.     States she was hospitalized only once before for ADHF within the past year.     Of note patient follows  with Dr Reece of EP for her AF hx, last seen by him in 5/2019 at which point recommendations made to continue with rate controlling strategy of her AF.      Cardiac work up    2D echo 7/8/2019: PER REPORT:    1 - Severe left atrial enlargement.     2 - Moderate left ventricular enlargement.     3 - Eccentric hypertrophy.     4 - Severely depressed left ventricular systolic function (EF 20-25%).     5 - Normal right ventricular systolic function .     6 - Pulmonary hypertension. The estimated PA systolic pressure is 54 mmHg.     7 - Mild aortic regurgitation.     8 - Moderate to severe mitral regurgitation.     9 - Moderate tricuspid regurgitation.     10 - Mild to moderate pulmonic regurgitation.     11 - Increased central venous pressure.      Past Medical History:   Diagnosis Date    Atrial fibrillation     Cardiomyopathy     CHF (congestive heart failure)     Hyperlipidemia     Hypertension     Ventricular tachycardia        Past Surgical History:   Procedure Laterality Date    CARDIAC DEFIBRILLATOR PLACEMENT      CARDIOVERSION OR DEFIBRILLATION N/A 3/8/2019    Performed by Jose Reece MD at [facility]    CHOLECYSTECTOMY      ECHOCARDIOGRAM,TRANSESOPHAGEAL N/A 3/8/2019    Performed by [facility] Diagnostic Provider at [facility]    HYSTERECTOMY      JOINT REPLACEMENT  2009    rt knee replacment    pace maker  12/2010       Review of patient's allergies indicates:   Allergen Reactions    Augmentin [amoxicillin-pot clavulanate] Swelling     Lip swelling      Penicillins      Other reaction(s): Swelling  Lip swelling         No current facility-administered medications for this encounter.      Current Outpatient Medications   Medication Sig    apixaban (ELIQUIS) 5 mg Tab Take 1 tablet (5 mg total) by mouth 2 (two) times daily.    digoxin (DIGOX) 125 mcg tablet Take 1 tablet by mouth once daily    esomeprazole (NEXIUM) 20 MG capsule Take 20 mg by mouth before breakfast.    hydrALAZINE (APRESOLINE)  50 MG tablet TAKE 1 TABLET (50 MG TOTAL) BY MOUTH 3 (THREE) TIMES DAILY.    isosorbide mononitrate (IMDUR) 30 MG 24 hr tablet TAKE 1 TABLET (30 MG TOTAL) BY MOUTH ONCE DAILY.    metOLazone (ZAROXOLYN) 2.5 MG tablet Take it once today 30 min before your evening dose of Bumex.    metoprolol succinate (TOPROL-XL) 200 MG 24 hr tablet TAKE 1 TABLET (200 MG TOTAL) BY MOUTH 2 (TWO) TIMES DAILY.    potassium chloride SA (K-DUR,KLOR-CON) 20 MEQ tablet Take 1 tablet (20 mEq total) by mouth once daily.    sacubitril-valsartan (ENTRESTO)  mg per tablet Take 1 tablet by mouth 2 (two) times daily.    simvastatin (ZOCOR) 40 MG tablet TAKE 1 TABLET EVERY EVENING    spironolactone (ALDACTONE) 25 MG tablet TAKE 1 TABLET EVERY DAY    torsemide (DEMADEX) 20 MG Tab Take 1 tablet (20 mg total) by mouth 2 (two) times daily.     Family History     Problem Relation (Age of Onset)    Arthritis Father    COPD Brother    Cancer Father    Drug abuse Sister    Hypertension Maternal Grandmother        Tobacco Use    Smoking status: Never Smoker    Smokeless tobacco: Never Used   Substance and Sexual Activity    Alcohol use: No     Comment: rarely    Drug use: No    Sexual activity: Not on file     Review of Systems   Constitutional: Positive for fatigue. Negative for activity change, appetite change, fever and unexpected weight change.   HENT: Negative.    Eyes: Negative.    Respiratory: Positive for cough and shortness of breath. Negative for chest tightness and wheezing.         Orthopnea   Cardiovascular: Positive for leg swelling. Negative for chest pain and palpitations.   Gastrointestinal: Positive for abdominal distention, diarrhea and nausea. Negative for abdominal pain, constipation and vomiting.   Endocrine: Negative.    Genitourinary: Positive for decreased urine volume. Negative for difficulty urinating and urgency.   Musculoskeletal: Negative.    Skin: Negative.    Allergic/Immunologic: Negative.    Neurological:  Negative for dizziness, seizures, light-headedness and numbness.   Hematological: Negative.    Psychiatric/Behavioral: Negative.      Objective:     Vital Signs (Most Recent):    Vital Signs (24h Range):  Temp:  [97.5 °F (36.4 °C)-98.4 °F (36.9 °C)] 98.3 °F (36.8 °C)  Pulse:  [] 109  Resp:  [16-22] 20  SpO2:  [82 %-100 %] 100 %  BP: ()/(48-67) 104/54     No data found.  There is no height or weight on file to calculate BMI.    No intake or output data in the 24 hours ending 08/19/19 2301    Physical Exam   Constitutional: She is oriented to person, place, and time. She appears well-developed and well-nourished. No distress.   HENT:   Head: Normocephalic and atraumatic.   Eyes: EOM are normal.   Neck: Neck supple. JVD present.   Cardiovascular: An irregularly irregular rhythm present.   Pulmonary/Chest: Effort normal and breath sounds normal.   Abdominal: Soft. Bowel sounds are normal. She exhibits no distension.   Musculoskeletal: Normal range of motion. She exhibits edema.   Neurological: She is alert and oriented to person, place, and time.   Skin: Skin is warm and dry. No erythema.   Psychiatric: She has a normal mood and affect. Her behavior is normal. Judgment and thought content normal.       Significant Labs:  CBC:  Recent Labs   Lab 08/19/19  1541   WBC 6.34   RBC 4.37   HGB 13.2   HCT 39.4   *   MCV 90   MCH 30.2   MCHC 33.5     BNP:  Recent Labs   Lab 08/15/19  1143 08/19/19  1541   BNP 3,347*  3,347* 3,234*     CMP:  Recent Labs   Lab 08/15/19  1143 08/19/19  1541   *  164* 68*   CALCIUM 9.4  9.4 9.7   ALBUMIN  --  3.1*   PROT  --  7.4     140 139   K 3.6  3.6 4.8   CO2 26  26 16*   CL 99  99 100   BUN 71*  71* 84*   CREATININE 3.1*  3.1* 3.8*   ALKPHOS  --  308*   ALT  --  27   AST  --  35   BILITOT  --  2.3*      Coagulation:   No results for input(s): PT, INR, APTT in the last 168 hours.  LDH:  No results for input(s): LDH in the last 72  hours.  Microbiology:  Microbiology Results (last 7 days)     ** No results found for the last 168 hours. **          I have reviewed all pertinent labs within the past 24 hours.    Diagnostic Results:  I have reviewed and interpreted all pertinent imaging results/findings within the past 24 hours.    Assessment/Plan:     * Acute on chronic combined systolic and diastolic heart failure  - 69 yo F with NIDCM , NYHA FC III with EF 20-25% and LVEDD 5.9 cm by echo 7/2019 who is admitted with ADHF +/- cardiogenic shock.  - bolus with Lasix 80 mg IV x 1 and start infusion at 20 mg/hr.   - s/p central line placement - CVP 18, SVO2 55 --> calculated CO 4, CI 2, SVR 1000 by Charo.  - due to AF burden and episode NSVT on tele, initiation of  not ideal ; therefore if patient does not respond to diuresis will need to consult Interventional Cardiology for IABP insertion.    - strict I/Os and daily standing weights.  - tong inserted.  - fluid / sodium restricted diet.  - telemetry monitoring.   - GDMT: hold Entresto, Aldactone, BB due to decompensated state and ARF.     Atrial fibrillation with RVR  - known hx AF (on Eliquis and Toprol), CHADS2-VaSc = 4.   - in AF with RVR 120s on arrival.  - holding home Eliquis , will initiate heparin gtt in AM.   - diuresis as above.   - consider EP consult for restoration of SR this admission.     FAMILIA on CKD  - Cr up to 4.0 from 3.1 about one week ago , previous baseline 1.3-1.4 in June 2019  - secondary to cardiorenal syndrome +/- cardiogenic shock as above.  - diuresis as above.   - strict I/Os, tong inserted.   - avoid nephrotoxins (holding Entresto, Aldactone).  - monitor renal function closely.    Hypertension  - SBP marginal on arrival here, in 100s.  - holding home antihypertensives due to decompensated heart failure and possible component of cardiogenic shock.     Automatic implantable cardioverter-defibrillator in situ  - hx VT/VF per chart review.  - telemetry monitoring and  repletion of electrolytes PRN.        Marce Savage MD  Heart Transplant  [facility]

## 2019-08-20 NOTE — SUBJECTIVE & OBJECTIVE
Past Medical History:   Diagnosis Date    Atrial fibrillation     Cardiomyopathy     CHF (congestive heart failure)     Hyperlipidemia     Hypertension     Ventricular tachycardia        Past Surgical History:   Procedure Laterality Date    CARDIAC DEFIBRILLATOR PLACEMENT      CARDIOVERSION OR DEFIBRILLATION N/A 3/8/2019    Performed by Jose Reece MD at [facility] EP LAB    CHOLECYSTECTOMY      ECHOCARDIOGRAM,TRANSESOPHAGEAL N/A 3/8/2019    Performed by [facility] Diagnostic Provider at [facility]    HYSTERECTOMY      JOINT REPLACEMENT  2009    rt knee replacment    pace maker  12/2010       Review of patient's allergies indicates:   Allergen Reactions    Augmentin [amoxicillin-pot clavulanate] Swelling     Lip swelling      Penicillins      Other reaction(s): Swelling  Lip swelling         Current Facility-Administered Medications on File Prior to Encounter   Medication    [COMPLETED] doxycycline (VIBRAMYCIN) 100mg in dextrose 5% 250 mL IVPB (ready to mix)    [DISCONTINUED] cefTRIAXone (ROCEPHIN) 1 g in dextrose 5 % 50 mL IVPB     Current Outpatient Medications on File Prior to Encounter   Medication Sig    apixaban (ELIQUIS) 5 mg Tab Take 1 tablet (5 mg total) by mouth 2 (two) times daily.    digoxin (DIGOX) 125 mcg tablet Take 1 tablet by mouth once daily    esomeprazole (NEXIUM) 20 MG capsule Take 20 mg by mouth before breakfast.    hydrALAZINE (APRESOLINE) 50 MG tablet TAKE 1 TABLET (50 MG TOTAL) BY MOUTH 3 (THREE) TIMES DAILY.    isosorbide mononitrate (IMDUR) 30 MG 24 hr tablet TAKE 1 TABLET (30 MG TOTAL) BY MOUTH ONCE DAILY.    metoprolol succinate (TOPROL-XL) 200 MG 24 hr tablet TAKE 1 TABLET (200 MG TOTAL) BY MOUTH 2 (TWO) TIMES DAILY.    potassium chloride SA (K-DUR,KLOR-CON) 20 MEQ tablet Take 1 tablet (20 mEq total) by mouth once daily.    sacubitril-valsartan (ENTRESTO)  mg per tablet Take 1 tablet by mouth 2 (two) times daily.    simvastatin (ZOCOR) 40 MG tablet TAKE 1  TABLET EVERY EVENING    spironolactone (ALDACTONE) 25 MG tablet TAKE 1 TABLET EVERY DAY    torsemide (DEMADEX) 20 MG Tab Take 1 tablet (20 mg total) by mouth 2 (two) times daily.    metOLazone (ZAROXOLYN) 2.5 MG tablet Take it once today 30 min before your evening dose of Bumex.     Family History     Problem Relation (Age of Onset)    Arthritis Father    COPD Brother    Cancer Father    Drug abuse Sister    Hypertension Maternal Grandmother        Tobacco Use    Smoking status: Never Smoker    Smokeless tobacco: Never Used   Substance and Sexual Activity    Alcohol use: No     Comment: rarely    Drug use: No    Sexual activity: Not on file     Review of Systems   All other systems reviewed and are negative.    Objective:     Vital Signs (Most Recent):  Temp: 98.2 °F (36.8 °C) (08/20/19 1500)  Pulse: 94 (08/20/19 1600)  Resp: (!) 32 (08/20/19 1600)  BP: (!) 151/107 (08/20/19 1600)  SpO2: (!) 91 % (08/20/19 1600) Vital Signs (24h Range):  Temp:  [97.5 °F (36.4 °C)-98.4 °F (36.9 °C)] 98.2 °F (36.8 °C)  Pulse:  [] 94  Resp:  [16-39] 32  SpO2:  [89 %-100 %] 91 %  BP: ()/() 151/107     Weight: 80.4 kg (177 lb 4 oz)  Body mass index is 29.5 kg/m².    SpO2: (!) 91 %  O2 Device (Oxygen Therapy): nasal cannula w/ humidification      Intake/Output Summary (Last 24 hours) at 8/20/2019 1640  Last data filed at 8/20/2019 1600  Gross per 24 hour   Intake 623.73 ml   Output 695 ml   Net -71.27 ml       Lines/Drains/Airways     Central Venous Catheter Line                 Percutaneous Central Line Insertion/Assessment - triple lumen  08/20/19 0300 right internal jugular less than 1 day          Drain                 Urethral Catheter 08/20/19 0400 less than 1 day          Line                 IABP 08/20/19 0630 7.5 Fr. 30 mL less than 1 day          Peripheral Intravenous Line                 Peripheral IV - Single Lumen 08/19/19 1542 20 G Left Antecubital 1 day                Physical Exam    Constitutional: She is oriented to person, place, and time. She appears well-developed and well-nourished. No distress.   HENT:   Head: Normocephalic and atraumatic.   Neck: JVD present.   Cardiovascular: Normal heart sounds and intact distal pulses. Exam reveals no gallop and no friction rub.   No murmur heard.  Tachycardic, irregularly irregular rhythm   Pulmonary/Chest: Effort normal. No respiratory distress. She has no wheezes. She has rales.   Abdominal: Soft. Bowel sounds are normal. She exhibits no distension. There is no tenderness.   Musculoskeletal: She exhibits edema (1+ lower extremity edema).   Neurological: She is alert and oriented to person, place, and time.   Skin: She is not diaphoretic.       Significant Labs:     Recent Results (from the past 24 hour(s))   Urinalysis, Reflex to Urine Culture Urine, Clean Catch    Collection Time: 08/19/19  4:45 PM   Result Value Ref Range    Specimen UA Urine, Clean Catch     Color, UA Yellow Yellow, Straw, Aleida    Appearance, UA Clear Clear    pH, UA 5.0 5.0 - 8.0    Specific Gravity, UA 1.025 1.005 - 1.030    Protein, UA 2+ (A) Negative    Glucose, UA Negative Negative    Ketones, UA Negative Negative    Bilirubin (UA) 1+ (A) Negative    Occult Blood UA Negative Negative    Nitrite, UA Negative Negative    Urobilinogen, UA 1.0 <2.0 EU/dL    Leukocytes, UA 3+ (A) Negative   Urinalysis Microscopic    Collection Time: 08/19/19  4:45 PM   Result Value Ref Range    WBC, UA >100 (H) 0 - 5 /hpf    Bacteria Many (A) None-Occ /hpf    Squam Epithel, UA 10 /hpf    Hyaline Casts, UA 15 (A) 0-1/lpf /lpf    Microscopic Comment SEE COMMENT    Urine culture    Collection Time: 08/19/19  4:45 PM   Result Value Ref Range    Urine Culture, Routine (A)      PRESUMPTIVE E COLI  >100,000 cfu/ml  Identification and susceptibility pending     Protime-INR    Collection Time: 08/20/19  1:47 AM   Result Value Ref Range    Prothrombin Time 17.1 (H) 9.0 - 12.5 sec    INR 1.7 (H) 0.8 -  1.2   APTT    Collection Time: 08/20/19  1:47 AM   Result Value Ref Range    aPTT 26.5 21.0 - 32.0 sec   Type & Screen    Collection Time: 08/20/19  1:47 AM   Result Value Ref Range    Group & Rh A POS     Indirect Donna NEG    Comprehensive metabolic panel    Collection Time: 08/20/19  1:47 AM   Result Value Ref Range    Sodium 140 136 - 145 mmol/L    Potassium 4.2 3.5 - 5.1 mmol/L    Chloride 101 95 - 110 mmol/L    CO2 19 (L) 23 - 29 mmol/L    Glucose 69 (L) 70 - 110 mg/dL    BUN, Bld 88 (H) 8 - 23 mg/dL    Creatinine 4.0 (H) 0.5 - 1.4 mg/dL    Calcium 9.5 8.7 - 10.5 mg/dL    Total Protein 7.0 6.0 - 8.4 g/dL    Albumin 3.1 (L) 3.5 - 5.2 g/dL    Total Bilirubin 1.9 (H) 0.1 - 1.0 mg/dL    Alkaline Phosphatase 312 (H) 55 - 135 U/L    AST 31 10 - 40 U/L    ALT 27 10 - 44 U/L    Anion Gap 20 (H) 8 - 16 mmol/L    eGFR if African American 12.5 (A) >60 mL/min/1.73 m^2    eGFR if non African American 10.9 (A) >60 mL/min/1.73 m^2   Lactic acid, plasma    Collection Time: 08/20/19  1:47 AM   Result Value Ref Range    Lactate (Lactic Acid) 4.2 (HH) 0.5 - 2.2 mmol/L   Comprehensive metabolic panel    Collection Time: 08/20/19  1:47 AM   Result Value Ref Range    Sodium 140 136 - 145 mmol/L    Potassium 4.1 3.5 - 5.1 mmol/L    Chloride 101 95 - 110 mmol/L    CO2 19 (L) 23 - 29 mmol/L    Glucose 68 (L) 70 - 110 mg/dL    BUN, Bld 88 (H) 8 - 23 mg/dL    Creatinine 4.0 (H) 0.5 - 1.4 mg/dL    Calcium 9.7 8.7 - 10.5 mg/dL    Total Protein 7.1 6.0 - 8.4 g/dL    Albumin 3.1 (L) 3.5 - 5.2 g/dL    Total Bilirubin 1.9 (H) 0.1 - 1.0 mg/dL    Alkaline Phosphatase 314 (H) 55 - 135 U/L    AST 30 10 - 40 U/L    ALT 28 10 - 44 U/L    Anion Gap 20 (H) 8 - 16 mmol/L    eGFR if African American 12.5 (A) >60 mL/min/1.73 m^2    eGFR if non African American 10.9 (A) >60 mL/min/1.73 m^2   Magnesium    Collection Time: 08/20/19  1:47 AM   Result Value Ref Range    Magnesium 1.8 1.6 - 2.6 mg/dL   CBC auto differential    Collection Time: 08/20/19  1:47  AM   Result Value Ref Range    WBC 6.68 3.90 - 12.70 K/uL    RBC 4.36 4.00 - 5.40 M/uL    Hemoglobin 13.0 12.0 - 16.0 g/dL    Hematocrit 39.4 37.0 - 48.5 %    Mean Corpuscular Volume 90 82 - 98 fL    Mean Corpuscular Hemoglobin 29.8 27.0 - 31.0 pg    Mean Corpuscular Hemoglobin Conc 33.0 32.0 - 36.0 g/dL    RDW 18.2 (H) 11.5 - 14.5 %    Platelets 132 (L) 150 - 350 K/uL    MPV 12.8 9.2 - 12.9 fL    Immature Granulocytes 0.4 0.0 - 0.5 %    Gran # (ANC) 3.5 1.8 - 7.7 K/uL    Immature Grans (Abs) 0.03 0.00 - 0.04 K/uL    Lymph # 2.5 1.0 - 4.8 K/uL    Mono # 0.6 0.3 - 1.0 K/uL    Eos # 0.0 0.0 - 0.5 K/uL    Baso # 0.01 0.00 - 0.20 K/uL    nRBC 4 (A) 0 /100 WBC    Gran% 52.8 38.0 - 73.0 %    Lymph% 37.4 18.0 - 48.0 %    Mono% 9.0 4.0 - 15.0 %    Eosinophil% 0.3 0.0 - 8.0 %    Basophil% 0.1 0.0 - 1.9 %    Differential Method Automated    ISTAT PROCEDURE    Collection Time: 08/20/19  3:09 AM   Result Value Ref Range    POC PH 7.372 7.35 - 7.45    POC PCO2 40.7 35 - 45 mmHg    POC PO2 30 (LL) 40 - 60 mmHg    POC HCO3 23.7 (L) 24 - 28 mmol/L    POC BE -2 -2 to 2 mmol/L    POC SATURATED O2 55 (L) 95 - 100 %    POC TCO2 25 24 - 29 mmol/L    Sample VENOUS     Site Other     Allens Test N/A     DelSys Room Air     Mode SPONT     Sp02 95    Urinalysis, Reflex to Urine Culture Urine, Clean Catch    Collection Time: 08/20/19  4:22 AM   Result Value Ref Range    Specimen UA Urine, Catheterized     Color, UA Aleida Yellow, Straw, Aleida    Appearance, UA Cloudy (A) Clear    pH, UA 5.0 5.0 - 8.0    Specific Gravity, UA 1.020 1.005 - 1.030    Protein, UA 2+ (A) Negative    Glucose, UA Negative Negative    Ketones, UA Negative Negative    Bilirubin (UA) Negative Negative    Occult Blood UA 1+ (A) Negative    Nitrite, UA Negative Negative    Leukocytes, UA 2+ (A) Negative   Urinalysis Microscopic    Collection Time: 08/20/19  4:22 AM   Result Value Ref Range    RBC, UA 6 (H) 0 - 4 /hpf    WBC, UA >100 (H) 0 - 5 /hpf    Bacteria Many (A)  None-Occ /hpf    Squam Epithel, UA 3 /hpf    Hyaline Casts, UA 75 (A) 0-1/lpf /lpf    Microscopic Comment SEE COMMENT    Lactic acid, plasma    Collection Time: 08/20/19  4:32 AM   Result Value Ref Range    Lactate (Lactic Acid) 4.0 (HH) 0.5 - 2.2 mmol/L   CBC auto differential    Collection Time: 08/20/19  4:32 AM   Result Value Ref Range    WBC 6.76 3.90 - 12.70 K/uL    RBC 4.30 4.00 - 5.40 M/uL    Hemoglobin 13.0 12.0 - 16.0 g/dL    Hematocrit 40.1 37.0 - 48.5 %    Mean Corpuscular Volume 93 82 - 98 fL    Mean Corpuscular Hemoglobin 30.2 27.0 - 31.0 pg    Mean Corpuscular Hemoglobin Conc 32.4 32.0 - 36.0 g/dL    RDW 19.3 (H) 11.5 - 14.5 %    Platelets 132 (L) 150 - 350 K/uL    MPV 12.6 9.2 - 12.9 fL    Immature Granulocytes 0.7 (H) 0.0 - 0.5 %    Gran # (ANC) 4.0 1.8 - 7.7 K/uL    Immature Grans (Abs) 0.05 (H) 0.00 - 0.04 K/uL    Lymph # 2.2 1.0 - 4.8 K/uL    Mono # 0.5 0.3 - 1.0 K/uL    Eos # 0.0 0.0 - 0.5 K/uL    Baso # 0.03 0.00 - 0.20 K/uL    nRBC 5 (A) 0 /100 WBC    Gran% 58.6 38.0 - 73.0 %    Lymph% 32.2 18.0 - 48.0 %    Mono% 8.0 4.0 - 15.0 %    Eosinophil% 0.1 0.0 - 8.0 %    Basophil% 0.4 0.0 - 1.9 %    Differential Method Automated    Comprehensive metabolic panel    Collection Time: 08/20/19  4:32 AM   Result Value Ref Range    Sodium 139 136 - 145 mmol/L    Potassium 4.5 3.5 - 5.1 mmol/L    Chloride 102 95 - 110 mmol/L    CO2 17 (L) 23 - 29 mmol/L    Glucose 64 (L) 70 - 110 mg/dL    BUN, Bld 87 (H) 8 - 23 mg/dL    Creatinine 3.8 (H) 0.5 - 1.4 mg/dL    Calcium 9.5 8.7 - 10.5 mg/dL    Total Protein 6.6 6.0 - 8.4 g/dL    Albumin 2.9 (L) 3.5 - 5.2 g/dL    Total Bilirubin 1.9 (H) 0.1 - 1.0 mg/dL    Alkaline Phosphatase 297 (H) 55 - 135 U/L    AST 31 10 - 40 U/L    ALT 28 10 - 44 U/L    Anion Gap 20 (H) 8 - 16 mmol/L    eGFR if African American 13.3 (A) >60 mL/min/1.73 m^2    eGFR if non African American 11.6 (A) >60 mL/min/1.73 m^2   Magnesium    Collection Time: 08/20/19  4:32 AM   Result Value Ref Range     Magnesium 2.1 1.6 - 2.6 mg/dL   ISTAT PROCEDURE    Collection Time: 08/20/19  5:35 AM   Result Value Ref Range    POC PH 7.371 7.35 - 7.45    POC PCO2 41.1 35 - 45 mmHg    POC PO2 31 (LL) 40 - 60 mmHg    POC HCO3 23.8 (L) 24 - 28 mmol/L    POC BE -2 -2 to 2 mmol/L    POC SATURATED O2 57 (L) 95 - 100 %    POC TCO2 25 24 - 29 mmol/L    Rate 18     Sample VENOUS     Site Other     Allens Test N/A     DelSys Room Air     Mode SPONT     FiO2 21     Sp02 97    APTT    Collection Time: 08/20/19  8:49 AM   Result Value Ref Range    aPTT 26.4 21.0 - 32.0 sec   Protime-INR    Collection Time: 08/20/19  8:49 AM   Result Value Ref Range    Prothrombin Time 18.7 (H) 9.0 - 12.5 sec    INR 1.9 (H) 0.8 - 1.2   Comprehensive metabolic panel    Collection Time: 08/20/19  8:49 AM   Result Value Ref Range    Sodium 138 136 - 145 mmol/L    Potassium 4.0 3.5 - 5.1 mmol/L    Chloride 102 95 - 110 mmol/L    CO2 18 (L) 23 - 29 mmol/L    Glucose 74 70 - 110 mg/dL    BUN, Bld 90 (H) 8 - 23 mg/dL    Creatinine 3.7 (H) 0.5 - 1.4 mg/dL    Calcium 9.5 8.7 - 10.5 mg/dL    Total Protein 6.3 6.0 - 8.4 g/dL    Albumin 2.8 (L) 3.5 - 5.2 g/dL    Total Bilirubin 1.9 (H) 0.1 - 1.0 mg/dL    Alkaline Phosphatase 280 (H) 55 - 135 U/L    AST 26 10 - 40 U/L    ALT 23 10 - 44 U/L    Anion Gap 18 (H) 8 - 16 mmol/L    eGFR if African American 13.8 (A) >60 mL/min/1.73 m^2    eGFR if non African American 11.9 (A) >60 mL/min/1.73 m^2   Transthoracic echo (TTE) 2D with Color Flow    Collection Time: 08/20/19 10:00 AM   Result Value Ref Range    Ascending aorta 3.07 cm    STJ 3.25 cm    AV mean gradient 4 mmHg    Ao peak kevin 1.44 m/s    Ao VTI 17.27 cm    IVS 0.78 0.6 - 1.1 cm    LA size 4.90 cm    Left Atrium Major Axis 7.30 cm    Left Atrium Minor Axis 7.20 cm    LVIDD 6.51 (A) 3.5 - 6.0 cm    LVIDS 6.03 (A) 2.1 - 4.0 cm    LVOT diameter 2.09 cm    LVOT peak VTI 8.95 cm    PW 0.90 0.6 - 1.1 cm    MV Peak E Kevin 1.12 m/s    RA Major Axis 5.35 cm    RA Width 4.31  cm    RVDD 5.12 cm    Sinus 3.00 cm    TAPSE 0.92 cm    TR Max Kevin 2.98 m/s    TDI LATERAL 0.07 m/s    TDI SEPTAL 0.07 m/s    LA WIDTH 5.68 cm    LV Diastolic Volume 216.83 mL    LV Systolic Volume 181.73 mL    LVOT peak kevin 0.75 m/s    LV LATERAL E/E' RATIO 16.00 m/s    LV SEPTAL E/E' RATIO 16.00 m/s    FS 7 %    LA volume 171.51 cm3    LV mass 228.12 g    Left Ventricle Relative Wall Thickness 0.28 cm    AV valve area 1.78 cm2    AV Velocity Ratio 0.52     AV index (prosthetic) 0.52     Mean e' 0.07 m/s    LVOT area 3.4 cm2    LVOT stroke volume 30.69 cm3    AV peak gradient 8 mmHg    E/E' ratio 16.00 m/s    LV Systolic Volume Index 96.7 mL/m2    LV Diastolic Volume Index 115.39 mL/m2    LA Volume Index 91.3 mL/m2    LV Mass Index 121 g/m2    Triscuspid Valve Regurgitation Peak Gradient 36 mmHg    BSA 1.95 m2    Right Atrial Pressure (from IVC) 15 mmHg    TV rest pulmonary artery pressure 51 mmHg   Lactic acid, plasma    Collection Time: 08/20/19 10:23 AM   Result Value Ref Range    Lactate (Lactic Acid) 2.7 (H) 0.5 - 2.2 mmol/L   ISTAT PROCEDURE    Collection Time: 08/20/19  3:31 PM   Result Value Ref Range    POC PH 7.343 (L) 7.35 - 7.45    POC PCO2 42.0 35 - 45 mmHg    POC PO2 33 (LL) 40 - 60 mmHg    POC HCO3 22.8 (L) 24 - 28 mmol/L    POC BE -3 -2 to 2 mmol/L    POC SATURATED O2 59 (L) 95 - 100 %    POC TCO2 24 24 - 29 mmol/L    Sample VENOUS     Site Other     Allens Test N/A     DelSys Nasal Can    Lactic acid, plasma    Collection Time: 08/20/19  3:34 PM   Result Value Ref Range    Lactate (Lactic Acid) 2.1 0.5 - 2.2 mmol/L   APTT    Collection Time: 08/20/19  3:34 PM   Result Value Ref Range    aPTT 74.9 (H) 21.0 - 32.0 sec   POCT METHEMOGLOBIN Q4H    Collection Time: 08/20/19  3:35 PM   Result Value Ref Range    Methemoglobin 0.3 0 - 3 %   Transesophageal echo (JOSE) with possible cardioversion    Collection Time: 08/20/19  4:12 PM   Result Value Ref Range    BSA 1.95 m2         Significant Imaging:     I  have reviewed all pertinent imaging studies from the last 24 hours

## 2019-08-20 NOTE — PROGRESS NOTES
HTS update plan of care      Patient did not respond to medical therapy overnight, only 50 cc's urine output and lactate remains elevated / did not clear.     Consulted Interventional Cardiology for bedside IABP placement.     Procedure completed by Dr Gonsales.     Daily CXR, q1h neurovasc checks, and bedrest while balloon in place.       Marce Savage MD   Cardiology Fellow, PGY-5        Discussed with Dr Soria

## 2019-08-20 NOTE — PLAN OF CARE
Problem: Adult Inpatient Plan of Care  Goal: Plan of Care Review  Outcome: Ongoing (interventions implemented as appropriate)    No acute events throughout day. See vital signs and assessments in flowsheets. See below for updates on today's progress.     Pulmonary: Nitric 10ppm     Cardiovascular: A-fib, -130, SBP , Map > 65, CVP 20    Neurological: AAOX4, Afebrile     Gastrointestinal: NPO, LBM 8/19/19    Genitourinary: Grant placed    Skin/Bath: CDI    Infusions: Epi, Lasix     Patient progressing towards goals as tolerated, plan of care communicated and reviewed with Sherice Squires and family. All concerns addressed. Will continue to monitor.

## 2019-08-20 NOTE — PROGRESS NOTES
HTS update plan of care      Transfer to ICU for initiation of low dose Epi / Cesar / Lasix gtt. Close hemodynamic monitoring and possible need for IABP insertion if response to therapy not observed.     NPO except meds. Recheck CVP, SVO2 ~ 5:30 AM.    Marce Savage MD   Cardiology Fellow, PGY-5      Discussed with Dr Soria.

## 2019-08-20 NOTE — PROGRESS NOTES
Procedure:  IABP  Indication:  Cardiogenic shock    Risks, benefits, and indications for the procedure were reviewed with patient. Consent was signed and placed in chart. Pt prepped and draped in sterile fashion. The following sterile precautions were followed: cap and mask, hand hygiene, sterile gown and sterile gloves, large sterile sheet and sterile field and 2% Chlorhexidine for cutaneous antisepsis. Time out was performed with nursing staff. Lidocaine 1% injected at site. Ultrasound guidance utilized to visualize anatomy R CFA. Access obtained without difficulty, position confirmed with C xray. Discussed with staff Dr Boyer.    Thank you for your consult, please call Interventional cariology for any question.     Regis Gonsales MD  Interventional Cardiovascular Fellow  Pager: [phone]

## 2019-08-20 NOTE — H&P
[facility]  Cardiology  History and Physical     Patient Name: Sherice Squires  MRN: [id number]  Admission Date: 8/20/2019  Code Status: Full Code   Attending Provider: Annamarie Soria MD   Primary Care Physician: Annamarie Soria MD  Principal Problem:Acute on chronic combined systolic and diastolic heart failure    Patient information was obtained from patient and ER records.     Subjective:     Chief Complaint:  Atrial fibrillation     HPI:  68 year old female with PMH NIDCM / stage D HFrEF (LVEF 20-25%, LVEDD 5.9 cm) s/p dc ICD, AF, HTN, DLD, CKD admitted with cardiogenic shock. She now has an IABP in place and is on 0.02 of Epi with decreasing urine output. She continues to be in AF with RVR. She denies medication noncompliance or symptoms of angina. No contraindications to JOSE.    Dysphagia or odynophagia:  No  Liver Disease, esophageal disease, or known varices:  No  Upper GI Bleeding: No  Snoring:  No  Sleep Apnea:  No  Prior neck surgery or radiation:  No  History of anesthetic difficulties:  No  Family history of anesthetic difficulties:  No  Last oral intake:  12 hours ago  Able to move neck in all directions:  Yes        Conclusion     · Moderate left ventricular enlargement.  · Severely decreased left ventricular systolic function. The estimated ejection fraction is 15%  · Severe global hypokinetic wall motion.  · Septal wall has abnormal motion. Systolic and diastolic flattening of the interventricular septum consistent with right ventricle pressure and volume overload.  · Eccentric left ventricular hypertrophy.  · Indeterminate left ventricular diastolic function.  · Mildly reduced right ventricular systolic function.  · Severe left atrial enlargement.  · Moderate mitral regurgitation.  · Moderate tricuspid regurgitation.  · The estimated PA systolic pressure is 51 mm Hg. Pulmonary hypertension present.  · Elevated central venous pressure (15 mm Hg).     JOSE 3/8/19    Conclusion      · Severely decreased left ventricular systolic function. The estimated ejection fraction is 13%  · Severe global hypokinetic wall motion.  · Left ventricular diastolic dysfunction.  · Moderately reduced right ventricular systolic function.  · Severe left atrial enlargement.  · Severe right atrial enlargement.  · Moderate-to-severe mitral regurgitation.  · Mild mitral sclerosis.  · Normal appearing left atrial appendage. No thrombus is present in the appendage. Emptying velocity 0.4 m/s       Past Medical History:   Diagnosis Date    Atrial fibrillation     Cardiomyopathy     CHF (congestive heart failure)     Hyperlipidemia     Hypertension     Ventricular tachycardia        Past Surgical History:   Procedure Laterality Date    CARDIAC DEFIBRILLATOR PLACEMENT      CARDIOVERSION OR DEFIBRILLATION N/A 3/8/2019    Performed by Jose Reece MD at [facility]    CHOLECYSTECTOMY      ECHOCARDIOGRAM,TRANSESOPHAGEAL N/A 3/8/2019    Performed by [facility] Diagnostic Provider at [facility]    HYSTERECTOMY      JOINT REPLACEMENT  2009    rt knee replacment    pace maker  12/2010       Review of patient's allergies indicates:   Allergen Reactions    Augmentin [amoxicillin-pot clavulanate] Swelling     Lip swelling      Penicillins      Other reaction(s): Swelling  Lip swelling         Current Facility-Administered Medications on File Prior to Encounter   Medication    [COMPLETED] doxycycline (VIBRAMYCIN) 100mg in dextrose 5% 250 mL IVPB (ready to mix)    [DISCONTINUED] cefTRIAXone (ROCEPHIN) 1 g in dextrose 5 % 50 mL IVPB     Current Outpatient Medications on File Prior to Encounter   Medication Sig    apixaban (ELIQUIS) 5 mg Tab Take 1 tablet (5 mg total) by mouth 2 (two) times daily.    digoxin (DIGOX) 125 mcg tablet Take 1 tablet by mouth once daily    esomeprazole (NEXIUM) 20 MG capsule Take 20 mg by mouth before breakfast.    hydrALAZINE (APRESOLINE) 50 MG tablet TAKE 1 TABLET (50 MG TOTAL) BY  MOUTH 3 (THREE) TIMES DAILY.    isosorbide mononitrate (IMDUR) 30 MG 24 hr tablet TAKE 1 TABLET (30 MG TOTAL) BY MOUTH ONCE DAILY.    metoprolol succinate (TOPROL-XL) 200 MG 24 hr tablet TAKE 1 TABLET (200 MG TOTAL) BY MOUTH 2 (TWO) TIMES DAILY.    potassium chloride SA (K-DUR,KLOR-CON) 20 MEQ tablet Take 1 tablet (20 mEq total) by mouth once daily.    sacubitril-valsartan (ENTRESTO)  mg per tablet Take 1 tablet by mouth 2 (two) times daily.    simvastatin (ZOCOR) 40 MG tablet TAKE 1 TABLET EVERY EVENING    spironolactone (ALDACTONE) 25 MG tablet TAKE 1 TABLET EVERY DAY    torsemide (DEMADEX) 20 MG Tab Take 1 tablet (20 mg total) by mouth 2 (two) times daily.    metOLazone (ZAROXOLYN) 2.5 MG tablet Take it once today 30 min before your evening dose of Bumex.     Family History     Problem Relation (Age of Onset)    Arthritis Father    COPD Brother    Cancer Father    Drug abuse Sister    Hypertension Maternal Grandmother        Tobacco Use    Smoking status: Never Smoker    Smokeless tobacco: Never Used   Substance and Sexual Activity    Alcohol use: No     Comment: rarely    Drug use: No    Sexual activity: Not on file     Review of Systems   All other systems reviewed and are negative.    Objective:     Vital Signs (Most Recent):  Temp: 98.2 °F (36.8 °C) (08/20/19 1500)  Pulse: 94 (08/20/19 1600)  Resp: (!) 32 (08/20/19 1600)  BP: (!) 151/107 (08/20/19 1600)  SpO2: (!) 91 % (08/20/19 1600) Vital Signs (24h Range):  Temp:  [97.5 °F (36.4 °C)-98.4 °F (36.9 °C)] 98.2 °F (36.8 °C)  Pulse:  [] 94  Resp:  [16-39] 32  SpO2:  [89 %-100 %] 91 %  BP: ()/() 151/107     Weight: 80.4 kg (177 lb 4 oz)  Body mass index is 29.5 kg/m².    SpO2: (!) 91 %  O2 Device (Oxygen Therapy): nasal cannula w/ humidification      Intake/Output Summary (Last 24 hours) at 8/20/2019 1640  Last data filed at 8/20/2019 1600  Gross per 24 hour   Intake 623.73 ml   Output 695 ml   Net -71.27 ml        Lines/Drains/Airways     Central Venous Catheter Line                 Percutaneous Central Line Insertion/Assessment - triple lumen  08/20/19 0300 right internal jugular less than 1 day          Drain                 Urethral Catheter 08/20/19 0400 less than 1 day          Line                 IABP 08/20/19 0630 7.5 Fr. 30 mL less than 1 day          Peripheral Intravenous Line                 Peripheral IV - Single Lumen 08/19/19 1542 20 G Left Antecubital 1 day                Physical Exam   Constitutional: She is oriented to person, place, and time. She appears well-developed and well-nourished. No distress.   HENT:   Head: Normocephalic and atraumatic.   Neck: JVD present.   Cardiovascular: Normal heart sounds and intact distal pulses. Exam reveals no gallop and no friction rub.   No murmur heard.  Tachycardic, irregularly irregular rhythm   Pulmonary/Chest: Effort normal. No respiratory distress. She has no wheezes. She has rales.   Abdominal: Soft. Bowel sounds are normal. She exhibits no distension. There is no tenderness.   Musculoskeletal: She exhibits edema (1+ lower extremity edema).   Neurological: She is alert and oriented to person, place, and time.   Skin: She is not diaphoretic.       Significant Labs:     Recent Results (from the past 24 hour(s))   Urinalysis, Reflex to Urine Culture Urine, Clean Catch    Collection Time: 08/19/19  4:45 PM   Result Value Ref Range    Specimen UA Urine, Clean Catch     Color, UA Yellow Yellow, Straw, Aleida    Appearance, UA Clear Clear    pH, UA 5.0 5.0 - 8.0    Specific Gravity, UA 1.025 1.005 - 1.030    Protein, UA 2+ (A) Negative    Glucose, UA Negative Negative    Ketones, UA Negative Negative    Bilirubin (UA) 1+ (A) Negative    Occult Blood UA Negative Negative    Nitrite, UA Negative Negative    Urobilinogen, UA 1.0 <2.0 EU/dL    Leukocytes, UA 3+ (A) Negative   Urinalysis Microscopic    Collection Time: 08/19/19  4:45 PM   Result Value Ref Range    WBC,  UA >100 (H) 0 - 5 /hpf    Bacteria Many (A) None-Occ /hpf    Squam Epithel, UA 10 /hpf    Hyaline Casts, UA 15 (A) 0-1/lpf /lpf    Microscopic Comment SEE COMMENT    Urine culture    Collection Time: 08/19/19  4:45 PM   Result Value Ref Range    Urine Culture, Routine (A)      PRESUMPTIVE E COLI  >100,000 cfu/ml  Identification and susceptibility pending     Protime-INR    Collection Time: 08/20/19  1:47 AM   Result Value Ref Range    Prothrombin Time 17.1 (H) 9.0 - 12.5 sec    INR 1.7 (H) 0.8 - 1.2   APTT    Collection Time: 08/20/19  1:47 AM   Result Value Ref Range    aPTT 26.5 21.0 - 32.0 sec   Type & Screen    Collection Time: 08/20/19  1:47 AM   Result Value Ref Range    Group & Rh A POS     Indirect Donna NEG    Comprehensive metabolic panel    Collection Time: 08/20/19  1:47 AM   Result Value Ref Range    Sodium 140 136 - 145 mmol/L    Potassium 4.2 3.5 - 5.1 mmol/L    Chloride 101 95 - 110 mmol/L    CO2 19 (L) 23 - 29 mmol/L    Glucose 69 (L) 70 - 110 mg/dL    BUN, Bld 88 (H) 8 - 23 mg/dL    Creatinine 4.0 (H) 0.5 - 1.4 mg/dL    Calcium 9.5 8.7 - 10.5 mg/dL    Total Protein 7.0 6.0 - 8.4 g/dL    Albumin 3.1 (L) 3.5 - 5.2 g/dL    Total Bilirubin 1.9 (H) 0.1 - 1.0 mg/dL    Alkaline Phosphatase 312 (H) 55 - 135 U/L    AST 31 10 - 40 U/L    ALT 27 10 - 44 U/L    Anion Gap 20 (H) 8 - 16 mmol/L    eGFR if African American 12.5 (A) >60 mL/min/1.73 m^2    eGFR if non African American 10.9 (A) >60 mL/min/1.73 m^2   Lactic acid, plasma    Collection Time: 08/20/19  1:47 AM   Result Value Ref Range    Lactate (Lactic Acid) 4.2 (HH) 0.5 - 2.2 mmol/L   Comprehensive metabolic panel    Collection Time: 08/20/19  1:47 AM   Result Value Ref Range    Sodium 140 136 - 145 mmol/L    Potassium 4.1 3.5 - 5.1 mmol/L    Chloride 101 95 - 110 mmol/L    CO2 19 (L) 23 - 29 mmol/L    Glucose 68 (L) 70 - 110 mg/dL    BUN, Bld 88 (H) 8 - 23 mg/dL    Creatinine 4.0 (H) 0.5 - 1.4 mg/dL    Calcium 9.7 8.7 - 10.5 mg/dL    Total Protein  7.1 6.0 - 8.4 g/dL    Albumin 3.1 (L) 3.5 - 5.2 g/dL    Total Bilirubin 1.9 (H) 0.1 - 1.0 mg/dL    Alkaline Phosphatase 314 (H) 55 - 135 U/L    AST 30 10 - 40 U/L    ALT 28 10 - 44 U/L    Anion Gap 20 (H) 8 - 16 mmol/L    eGFR if African American 12.5 (A) >60 mL/min/1.73 m^2    eGFR if non African American 10.9 (A) >60 mL/min/1.73 m^2   Magnesium    Collection Time: 08/20/19  1:47 AM   Result Value Ref Range    Magnesium 1.8 1.6 - 2.6 mg/dL   CBC auto differential    Collection Time: 08/20/19  1:47 AM   Result Value Ref Range    WBC 6.68 3.90 - 12.70 K/uL    RBC 4.36 4.00 - 5.40 M/uL    Hemoglobin 13.0 12.0 - 16.0 g/dL    Hematocrit 39.4 37.0 - 48.5 %    Mean Corpuscular Volume 90 82 - 98 fL    Mean Corpuscular Hemoglobin 29.8 27.0 - 31.0 pg    Mean Corpuscular Hemoglobin Conc 33.0 32.0 - 36.0 g/dL    RDW 18.2 (H) 11.5 - 14.5 %    Platelets 132 (L) 150 - 350 K/uL    MPV 12.8 9.2 - 12.9 fL    Immature Granulocytes 0.4 0.0 - 0.5 %    Gran # (ANC) 3.5 1.8 - 7.7 K/uL    Immature Grans (Abs) 0.03 0.00 - 0.04 K/uL    Lymph # 2.5 1.0 - 4.8 K/uL    Mono # 0.6 0.3 - 1.0 K/uL    Eos # 0.0 0.0 - 0.5 K/uL    Baso # 0.01 0.00 - 0.20 K/uL    nRBC 4 (A) 0 /100 WBC    Gran% 52.8 38.0 - 73.0 %    Lymph% 37.4 18.0 - 48.0 %    Mono% 9.0 4.0 - 15.0 %    Eosinophil% 0.3 0.0 - 8.0 %    Basophil% 0.1 0.0 - 1.9 %    Differential Method Automated    ISTAT PROCEDURE    Collection Time: 08/20/19  3:09 AM   Result Value Ref Range    POC PH 7.372 7.35 - 7.45    POC PCO2 40.7 35 - 45 mmHg    POC PO2 30 (LL) 40 - 60 mmHg    POC HCO3 23.7 (L) 24 - 28 mmol/L    POC BE -2 -2 to 2 mmol/L    POC SATURATED O2 55 (L) 95 - 100 %    POC TCO2 25 24 - 29 mmol/L    Sample VENOUS     Site Other     Allens Test N/A     DelSys Room Air     Mode SPONT     Sp02 95    Urinalysis, Reflex to Urine Culture Urine, Clean Catch    Collection Time: 08/20/19  4:22 AM   Result Value Ref Range    Specimen UA Urine, Catheterized     Color, UA Aleida Yellow, Straw, Aelida     Appearance, UA Cloudy (A) Clear    pH, UA 5.0 5.0 - 8.0    Specific Gravity, UA 1.020 1.005 - 1.030    Protein, UA 2+ (A) Negative    Glucose, UA Negative Negative    Ketones, UA Negative Negative    Bilirubin (UA) Negative Negative    Occult Blood UA 1+ (A) Negative    Nitrite, UA Negative Negative    Leukocytes, UA 2+ (A) Negative   Urinalysis Microscopic    Collection Time: 08/20/19  4:22 AM   Result Value Ref Range    RBC, UA 6 (H) 0 - 4 /hpf    WBC, UA >100 (H) 0 - 5 /hpf    Bacteria Many (A) None-Occ /hpf    Squam Epithel, UA 3 /hpf    Hyaline Casts, UA 75 (A) 0-1/lpf /lpf    Microscopic Comment SEE COMMENT    Lactic acid, plasma    Collection Time: 08/20/19  4:32 AM   Result Value Ref Range    Lactate (Lactic Acid) 4.0 (HH) 0.5 - 2.2 mmol/L   CBC auto differential    Collection Time: 08/20/19  4:32 AM   Result Value Ref Range    WBC 6.76 3.90 - 12.70 K/uL    RBC 4.30 4.00 - 5.40 M/uL    Hemoglobin 13.0 12.0 - 16.0 g/dL    Hematocrit 40.1 37.0 - 48.5 %    Mean Corpuscular Volume 93 82 - 98 fL    Mean Corpuscular Hemoglobin 30.2 27.0 - 31.0 pg    Mean Corpuscular Hemoglobin Conc 32.4 32.0 - 36.0 g/dL    RDW 19.3 (H) 11.5 - 14.5 %    Platelets 132 (L) 150 - 350 K/uL    MPV 12.6 9.2 - 12.9 fL    Immature Granulocytes 0.7 (H) 0.0 - 0.5 %    Gran # (ANC) 4.0 1.8 - 7.7 K/uL    Immature Grans (Abs) 0.05 (H) 0.00 - 0.04 K/uL    Lymph # 2.2 1.0 - 4.8 K/uL    Mono # 0.5 0.3 - 1.0 K/uL    Eos # 0.0 0.0 - 0.5 K/uL    Baso # 0.03 0.00 - 0.20 K/uL    nRBC 5 (A) 0 /100 WBC    Gran% 58.6 38.0 - 73.0 %    Lymph% 32.2 18.0 - 48.0 %    Mono% 8.0 4.0 - 15.0 %    Eosinophil% 0.1 0.0 - 8.0 %    Basophil% 0.4 0.0 - 1.9 %    Differential Method Automated    Comprehensive metabolic panel    Collection Time: 08/20/19  4:32 AM   Result Value Ref Range    Sodium 139 136 - 145 mmol/L    Potassium 4.5 3.5 - 5.1 mmol/L    Chloride 102 95 - 110 mmol/L    CO2 17 (L) 23 - 29 mmol/L    Glucose 64 (L) 70 - 110 mg/dL    BUN, Bld 87 (H) 8 - 23  mg/dL    Creatinine 3.8 (H) 0.5 - 1.4 mg/dL    Calcium 9.5 8.7 - 10.5 mg/dL    Total Protein 6.6 6.0 - 8.4 g/dL    Albumin 2.9 (L) 3.5 - 5.2 g/dL    Total Bilirubin 1.9 (H) 0.1 - 1.0 mg/dL    Alkaline Phosphatase 297 (H) 55 - 135 U/L    AST 31 10 - 40 U/L    ALT 28 10 - 44 U/L    Anion Gap 20 (H) 8 - 16 mmol/L    eGFR if African American 13.3 (A) >60 mL/min/1.73 m^2    eGFR if non African American 11.6 (A) >60 mL/min/1.73 m^2   Magnesium    Collection Time: 08/20/19  4:32 AM   Result Value Ref Range    Magnesium 2.1 1.6 - 2.6 mg/dL   ISTAT PROCEDURE    Collection Time: 08/20/19  5:35 AM   Result Value Ref Range    POC PH 7.371 7.35 - 7.45    POC PCO2 41.1 35 - 45 mmHg    POC PO2 31 (LL) 40 - 60 mmHg    POC HCO3 23.8 (L) 24 - 28 mmol/L    POC BE -2 -2 to 2 mmol/L    POC SATURATED O2 57 (L) 95 - 100 %    POC TCO2 25 24 - 29 mmol/L    Rate 18     Sample VENOUS     Site Other     Allens Test N/A     DelSys Room Air     Mode SPONT     FiO2 21     Sp02 97    APTT    Collection Time: 08/20/19  8:49 AM   Result Value Ref Range    aPTT 26.4 21.0 - 32.0 sec   Protime-INR    Collection Time: 08/20/19  8:49 AM   Result Value Ref Range    Prothrombin Time 18.7 (H) 9.0 - 12.5 sec    INR 1.9 (H) 0.8 - 1.2   Comprehensive metabolic panel    Collection Time: 08/20/19  8:49 AM   Result Value Ref Range    Sodium 138 136 - 145 mmol/L    Potassium 4.0 3.5 - 5.1 mmol/L    Chloride 102 95 - 110 mmol/L    CO2 18 (L) 23 - 29 mmol/L    Glucose 74 70 - 110 mg/dL    BUN, Bld 90 (H) 8 - 23 mg/dL    Creatinine 3.7 (H) 0.5 - 1.4 mg/dL    Calcium 9.5 8.7 - 10.5 mg/dL    Total Protein 6.3 6.0 - 8.4 g/dL    Albumin 2.8 (L) 3.5 - 5.2 g/dL    Total Bilirubin 1.9 (H) 0.1 - 1.0 mg/dL    Alkaline Phosphatase 280 (H) 55 - 135 U/L    AST 26 10 - 40 U/L    ALT 23 10 - 44 U/L    Anion Gap 18 (H) 8 - 16 mmol/L    eGFR if African American 13.8 (A) >60 mL/min/1.73 m^2    eGFR if non African American 11.9 (A) >60 mL/min/1.73 m^2   Transthoracic echo (TTE) 2D  with Color Flow    Collection Time: 08/20/19 10:00 AM   Result Value Ref Range    Ascending aorta 3.07 cm    STJ 3.25 cm    AV mean gradient 4 mmHg    Ao peak kevin 1.44 m/s    Ao VTI 17.27 cm    IVS 0.78 0.6 - 1.1 cm    LA size 4.90 cm    Left Atrium Major Axis 7.30 cm    Left Atrium Minor Axis 7.20 cm    LVIDD 6.51 (A) 3.5 - 6.0 cm    LVIDS 6.03 (A) 2.1 - 4.0 cm    LVOT diameter 2.09 cm    LVOT peak VTI 8.95 cm    PW 0.90 0.6 - 1.1 cm    MV Peak E Kevin 1.12 m/s    RA Major Axis 5.35 cm    RA Width 4.31 cm    RVDD 5.12 cm    Sinus 3.00 cm    TAPSE 0.92 cm    TR Max Kevin 2.98 m/s    TDI LATERAL 0.07 m/s    TDI SEPTAL 0.07 m/s    LA WIDTH 5.68 cm    LV Diastolic Volume 216.83 mL    LV Systolic Volume 181.73 mL    LVOT peak kevin 0.75 m/s    LV LATERAL E/E' RATIO 16.00 m/s    LV SEPTAL E/E' RATIO 16.00 m/s    FS 7 %    LA volume 171.51 cm3    LV mass 228.12 g    Left Ventricle Relative Wall Thickness 0.28 cm    AV valve area 1.78 cm2    AV Velocity Ratio 0.52     AV index (prosthetic) 0.52     Mean e' 0.07 m/s    LVOT area 3.4 cm2    LVOT stroke volume 30.69 cm3    AV peak gradient 8 mmHg    E/E' ratio 16.00 m/s    LV Systolic Volume Index 96.7 mL/m2    LV Diastolic Volume Index 115.39 mL/m2    LA Volume Index 91.3 mL/m2    LV Mass Index 121 g/m2    Triscuspid Valve Regurgitation Peak Gradient 36 mmHg    BSA 1.95 m2    Right Atrial Pressure (from IVC) 15 mmHg    TV rest pulmonary artery pressure 51 mmHg   Lactic acid, plasma    Collection Time: 08/20/19 10:23 AM   Result Value Ref Range    Lactate (Lactic Acid) 2.7 (H) 0.5 - 2.2 mmol/L   ISTAT PROCEDURE    Collection Time: 08/20/19  3:31 PM   Result Value Ref Range    POC PH 7.343 (L) 7.35 - 7.45    POC PCO2 42.0 35 - 45 mmHg    POC PO2 33 (LL) 40 - 60 mmHg    POC HCO3 22.8 (L) 24 - 28 mmol/L    POC BE -3 -2 to 2 mmol/L    POC SATURATED O2 59 (L) 95 - 100 %    POC TCO2 24 24 - 29 mmol/L    Sample VENOUS     Site Other     Allens Test N/A     DelSys Nasal Can    Lactic acid,  plasma    Collection Time: 08/20/19  3:34 PM   Result Value Ref Range    Lactate (Lactic Acid) 2.1 0.5 - 2.2 mmol/L   APTT    Collection Time: 08/20/19  3:34 PM   Result Value Ref Range    aPTT 74.9 (H) 21.0 - 32.0 sec   POCT METHEMOGLOBIN Q4H    Collection Time: 08/20/19  3:35 PM   Result Value Ref Range    Methemoglobin 0.3 0 - 3 %   Transesophageal echo (JOSE) with possible cardioversion    Collection Time: 08/20/19  4:12 PM   Result Value Ref Range    BSA 1.95 m2         Significant Imaging:     I have reviewed all pertinent imaging studies from the last 24 hours      Assessment and Plan:     Atrial fibrillation with RVR  1. JOSE for evaluation of Atrial fibrillation  -No absolute contraindications of esophageal stricture, tumor, perforation, laceration,or diverticulum and/or active GI bleed  -The risks, benefits & alternatives of the procedure were explained to the patient.   -The risks of transesophageal echo include but are not limited to:  Dental trauma, esophageal trauma/perforation, bleeding, laryngospasm/brochospasm, aspiration, sore throat/hoarseness, & dislodgement of the endotracheal tube/nasogastric tube (where applicable).    -The risks of moderate sedation include hypotension, respiratory depression, arrhythmias, bronchospasm, & death.    -Informed consent was obtained. The patient is agreeable to proceed with the procedure and all questions and concerns addressed.    Case discussed with an attending in echocardiography lab.     Further recommendations per attending addendum        VTE Risk Mitigation (From admission, onward)        Ordered     heparin 25,000 units in dextrose 5% 250 mL (100 units/mL) infusion LOW INTENSITY nomogram - [facility]  Continuous      08/20/19 0706     heparin 25,000 units in dextrose 5% (100 units/ml) IV bolus from bag - ADDITIONAL PRN BOLUS - 60 units/kg  As needed (PRN)      08/20/19 0706     heparin 25,000 units in dextrose 5% (100 units/ml) IV bolus from bag - ADDITIONAL PRN  BOLUS - 30 units/kg  As needed (PRN)      08/20/19 0706     IP VTE HIGH RISK PATIENT  Once      08/20/19 0049          Elliot Vásquez MD  Cardiology   [facility]

## 2019-08-20 NOTE — ASSESSMENT & PLAN NOTE
- SBP marginal on arrival here, in 90-100s.  - holding home antihypertensives due to decompensated heart failure and possible component of cardiogenic shock.

## 2019-08-20 NOTE — NURSING TRANSFER
Nursing Transfer Note      8/20/2019     Transfer From: [facility]  Transfer To: [id number]    Transfer via stretcher    Transfer with cardiac monitoring, patient belongings    Transported by: EMT's     Medicines sent: N/A    Chart send with patient: Yes    Notified: charge nurse, staff nurse, unit secretary    Patient reassessed at:  (8/20/19, 0045)    Upon arrival to floor: cardiac monitor applied, patient oriented to room, call bell in reach and bed in lowest position

## 2019-08-20 NOTE — Clinical Note
A venogram was performed in the coronary sinus. The vessel was injected with hand injection  with 10 mL of contrast.

## 2019-08-20 NOTE — LETTER
August 26, 2019         Jad Grissom  [facility] [address]  Phone: [phone]  Fax: [phone]       Patient: Sherice Squires   YOB: 1950  Date of Visit: 08/26/2019    To Whom It May Concern:    Jana Squires  was at [facility] from 8/20/19 to present. PLEASE EXCUSE HER FAMILY MEMBER, LADAN OROZCO, from work on 8/22/19 and 8/26/19. If you have any questions or concerns, or if I can be of further assistance, please do not hesitate to contact me.    Sincerely,    Fanny Clemens NP

## 2019-08-20 NOTE — NURSING TRANSFER
Nursing Transfer Note      8/20/2019     Transfer To: [facility] [id number]    Transfer via bed    Transfer with cardiac monitoring, patient belongings, IV pole    Transported by: RN     Medicines sent: Yes magnesium sulfate    Chart send with patient: Yes    Notified: staff nurse Eula    Upon arrival to floor: cardiac monitor applied, patient oriented to room, call bell in reach and bed in lowest position

## 2019-08-20 NOTE — NURSING
Notified MD Savage of repeat svo2 57. Epi at .02mcg/kg/min, lasix at 20mg/hr. Pt not with good urine output. Total UO since tong placement 50cc. SBP since transferring to ICU 80-90, Map > 65.     Nitric not in place d/t unable to obtain. Spoke to respiratory therapist Dru and stated that they have ordered more equipment for nitric to be placed on patient in AM hours.

## 2019-08-20 NOTE — ASSESSMENT & PLAN NOTE
- 69 yo F with NIDCM , NYHA FC III with EF 20-25% and LVEDD 5.9 cm by echo 7/2019 who is admitted with afib with rvr and cardiogenic shock  -cvp still elevated this am at 15, continue lasix 40mg/hr. Making good urine this afternoon at >200/hr  -weaned balloon pump to 1:2 today, will keep at that rate on hep gtt today and try to remove tomorrow if   -continue dobutamine at mcg/kg/min and low dose epinephrine at 0.02mcg/kg/min  -will wean off nitric oxide today as tolerated

## 2019-08-20 NOTE — SUBJECTIVE & OBJECTIVE
Past Medical History:   Diagnosis Date    Atrial fibrillation     Cardiomyopathy     CHF (congestive heart failure)     Hyperlipidemia     Hypertension     Ventricular tachycardia        Past Surgical History:   Procedure Laterality Date    CARDIAC DEFIBRILLATOR PLACEMENT      CARDIOVERSION OR DEFIBRILLATION N/A 3/8/2019    Performed by Jose Reece MD at [facility] EP LAB    CHOLECYSTECTOMY      ECHOCARDIOGRAM,TRANSESOPHAGEAL N/A 3/8/2019    Performed by [facility] Diagnostic Provider at [facility]    HYSTERECTOMY      JOINT REPLACEMENT  2009    rt knee replacment    pace maker  12/2010       Review of patient's allergies indicates:   Allergen Reactions    Augmentin [amoxicillin-pot clavulanate] Swelling     Lip swelling      Penicillins      Other reaction(s): Swelling  Lip swelling         Current Facility-Administered Medications on File Prior to Encounter   Medication    [COMPLETED] doxycycline (VIBRAMYCIN) 100mg in dextrose 5% 250 mL IVPB (ready to mix)    [DISCONTINUED] cefTRIAXone (ROCEPHIN) 1 g in dextrose 5 % 50 mL IVPB     Current Outpatient Medications on File Prior to Encounter   Medication Sig    apixaban (ELIQUIS) 5 mg Tab Take 1 tablet (5 mg total) by mouth 2 (two) times daily.    digoxin (DIGOX) 125 mcg tablet Take 1 tablet by mouth once daily    esomeprazole (NEXIUM) 20 MG capsule Take 20 mg by mouth before breakfast.    hydrALAZINE (APRESOLINE) 50 MG tablet TAKE 1 TABLET (50 MG TOTAL) BY MOUTH 3 (THREE) TIMES DAILY.    isosorbide mononitrate (IMDUR) 30 MG 24 hr tablet TAKE 1 TABLET (30 MG TOTAL) BY MOUTH ONCE DAILY.    metoprolol succinate (TOPROL-XL) 200 MG 24 hr tablet TAKE 1 TABLET (200 MG TOTAL) BY MOUTH 2 (TWO) TIMES DAILY.    potassium chloride SA (K-DUR,KLOR-CON) 20 MEQ tablet Take 1 tablet (20 mEq total) by mouth once daily.    sacubitril-valsartan (ENTRESTO)  mg per tablet Take 1 tablet by mouth 2 (two) times daily.    simvastatin (ZOCOR) 40 MG tablet TAKE 1  TABLET EVERY EVENING    spironolactone (ALDACTONE) 25 MG tablet TAKE 1 TABLET EVERY DAY    torsemide (DEMADEX) 20 MG Tab Take 1 tablet (20 mg total) by mouth 2 (two) times daily.    metOLazone (ZAROXOLYN) 2.5 MG tablet Take it once today 30 min before your evening dose of Bumex.     Family History     Problem Relation (Age of Onset)    Arthritis Father    COPD Brother    Cancer Father    Drug abuse Sister    Hypertension Maternal Grandmother        Tobacco Use    Smoking status: Never Smoker    Smokeless tobacco: Never Used   Substance and Sexual Activity    Alcohol use: No     Comment: rarely    Drug use: No    Sexual activity: Not on file     Review of Systems   Constitution: Negative. Negative for chills and fever.   HENT: Negative.    Eyes: Negative.    Cardiovascular: Positive for dyspnea on exertion. Negative for chest pain, claudication and paroxysmal nocturnal dyspnea.   Respiratory: Negative for cough, shortness of breath and wheezing.    Endocrine: Negative.    Hematologic/Lymphatic: Does not bruise/bleed easily.   Skin: Negative for nail changes and rash.   Musculoskeletal: Negative.  Negative for back pain.   Gastrointestinal: Negative for abdominal pain, melena, nausea and vomiting.   Neurological: Negative for dizziness and headaches.   Psychiatric/Behavioral: Negative for altered mental status, depression and substance abuse.   Allergic/Immunologic: Negative.      Objective:     Vital Signs (Most Recent):  Temp: 98.2 °F (36.8 °C) (08/20/19 1500)  Pulse: 94 (08/20/19 1600)  Resp: (!) 32 (08/20/19 1600)  BP: (!) 151/107 (08/20/19 1600)  SpO2: (!) 91 % (08/20/19 1600) Vital Signs (24h Range):  Temp:  [97.5 °F (36.4 °C)-98.3 °F (36.8 °C)] 98.2 °F (36.8 °C)  Pulse:  [] 94  Resp:  [16-39] 32  SpO2:  [89 %-100 %] 91 %  BP: ()/() 151/107       Weight: 80.4 kg (177 lb 4 oz)  Body mass index is 29.5 kg/m².    SpO2: (!) 91 %  O2 Device (Oxygen Therapy): nasal cannula w/  humidification    Physical Exam   Constitutional: She is oriented to person, place, and time. She appears well-developed and well-nourished.   HENT:   Head: Normocephalic and atraumatic.   Eyes: Pupils are equal, round, and reactive to light. Conjunctivae and EOM are normal.   Neck: No JVD present.   Cardiovascular: Normal rate, regular rhythm, normal heart sounds and intact distal pulses. Exam reveals no gallop and no friction rub.   No murmur heard.  Pulmonary/Chest: Effort normal. No stridor. She has no wheezes. She has no rales. She exhibits no tenderness.   Abdominal: Soft. Bowel sounds are normal. She exhibits no distension and no mass. There is no tenderness. There is no guarding.   Musculoskeletal: She exhibits no edema, tenderness or deformity.   Neurological: She is alert and oriented to person, place, and time.   Skin: Skin is warm and dry. No rash noted. No erythema.   Psychiatric: She has a normal mood and affect.       Significant Labs:   CMP:   Recent Labs   Lab 08/20/19  0432 08/20/19  0849 08/20/19  1534    138 139   K 4.5 4.0 4.0    102 102   CO2 17* 18* 21*   GLU 64* 74 109   BUN 87* 90* 87*   CREATININE 3.8* 3.7* 3.7*   CALCIUM 9.5 9.5 8.8   PROT 6.6 6.3 6.0   ALBUMIN 2.9* 2.8* 2.6*   BILITOT 1.9* 1.9* 1.2*   ALKPHOS 297* 280* 265*   AST 31 26 26   ALT 28 23 24   ANIONGAP 20* 18* 16   ESTGFRAFRICA 13.3* 13.8* 13.8*   EGFRNONAA 11.6* 11.9* 11.9*    and CBC:   Recent Labs   Lab 08/19/19  1541 08/20/19  0147 08/20/19  0432   WBC 6.34 6.68 6.76   HGB 13.2 13.0 13.0   HCT 39.4 39.4 40.1   * 132* 132*       Significant Imaging: Echocardiogram:   Transthoracic echo (TTE) complete (Cupid Only):   Results for orders placed or performed during the hospital encounter of 08/20/19   Transthoracic echo (TTE) 2D with Color Flow   Result Value Ref Range    Ascending aorta 3.07 cm    STJ 3.25 cm    AV mean gradient 4 mmHg    Ao peak natasha 1.44 m/s    Ao VTI 17.27 cm    IVS 0.78 0.6 - 1.1 cm    LA  size 4.90 cm    Left Atrium Major Axis 7.30 cm    Left Atrium Minor Axis 7.20 cm    LVIDD 6.51 (A) 3.5 - 6.0 cm    LVIDS 6.03 (A) 2.1 - 4.0 cm    LVOT diameter 2.09 cm    LVOT peak VTI 8.95 cm    PW 0.90 0.6 - 1.1 cm    MV Peak E Kevin 1.12 m/s    RA Major Axis 5.35 cm    RA Width 4.31 cm    RVDD 5.12 cm    Sinus 3.00 cm    TAPSE 0.92 cm    TR Max Kevin 2.98 m/s    TDI LATERAL 0.07 m/s    TDI SEPTAL 0.07 m/s    LA WIDTH 5.68 cm    LV Diastolic Volume 216.83 mL    LV Systolic Volume 181.73 mL    LVOT peak kevin 0.75 m/s    LV LATERAL E/E' RATIO 16.00 m/s    LV SEPTAL E/E' RATIO 16.00 m/s    FS 7 %    LA volume 171.51 cm3    LV mass 228.12 g    Left Ventricle Relative Wall Thickness 0.28 cm    AV valve area 1.78 cm2    AV Velocity Ratio 0.52     AV index (prosthetic) 0.52     Mean e' 0.07 m/s    LVOT area 3.4 cm2    LVOT stroke volume 30.69 cm3    AV peak gradient 8 mmHg    E/E' ratio 16.00 m/s    LV Systolic Volume Index 96.7 mL/m2    LV Diastolic Volume Index 115.39 mL/m2    LA Volume Index 91.3 mL/m2    LV Mass Index 121 g/m2    Triscuspid Valve Regurgitation Peak Gradient 36 mmHg    BSA 1.95 m2    Right Atrial Pressure (from IVC) 15 mmHg    TV rest pulmonary artery pressure 51 mmHg    Narrative    · Moderate left ventricular enlargement.  · Severely decreased left ventricular systolic function. The estimated   ejection fraction is 15%  · Severe global hypokinetic wall motion.  · Septal wall has abnormal motion. Systolic and diastolic flattening of   the interventricular septum consistent with right ventricle pressure and   volume overload.  · Eccentric left ventricular hypertrophy.  · Indeterminate left ventricular diastolic function.  · Mildly reduced right ventricular systolic function.  · Severe left atrial enlargement.  · Moderate mitral regurgitation.  · Moderate tricuspid regurgitation.  · The estimated PA systolic pressure is 51 mm Hg. Pulmonary hypertension   present.  · Elevated central venous pressure (15 mm  Hg).

## 2019-08-20 NOTE — PROGRESS NOTES
Admit Note     Met with patient and two sisters to assess needs. Patient is a 68 y.o. single female, admitted for heart failure, increased SOB, atrial fibrillation with RVR, FAMILIA on CKD and hypertension.      Patient admitted to [facility] on 8/20/2019 .  At this time, patient presents as alert and oriented x 4, good eye contact, calm and communicative.  At this time, patients caregiver presents as alert and oriented x 4, good eye contact, calm and communicative.    Household/Family Systems     Patient resides alone at     [address] [place] [id number].      Support system includes son and siblings.     Patient does not have dependents that are need of being cared for.     Patients primary caregiver is self.   Pt's cell:  [phone]  [place]:  [phone]    Emergency contacts:  Ousmane ( 44 yr old son, lives in [place] and drives) [phone]  Ivory Parker (sister, lives in [place], [place]-33 miles from , [facility]) [phone]    During admission, patient's caregiver plans to visit.  Confirmed patient and patients caregivers do have access to reliable transportation.    Cognitive Status/Learning     Patient reports reading ability as 12th grade and states patient does not have difficulty with reading, writing, seeing, hearing, comprehension, learning and memory.  Patient reports patient learns best by one on one.     Needed: No.   Highest education level: High School (9-12) or GED    Vocation/Disability   .  Working for Income: No  If no, reason not working: Patient Choice - Retired  Patient was a  until she retired in 2018.     Adherence     Patient reports a high level of adherence to patients health care regimen.  Adherence counseling and education provided. Patient verbalizes understanding.    Substance Use    Patient reports the following substance usage.    Tobacco: none, patient denies any use.  Alcohol: none, patient denies any use.  Illicit  Drugs/Non-prescribed Medications: none, patient denies any use.  Patient states clear understanding of the potential impact of substance use.  Substance abstinence/cessation counseling, education and resources provided and reviewed.     Services Utilizing/ADLS    Infusion Service: Prior to admission, patient utilizing? no  Home Health: Prior to admission, patient utilizing? no  DME: Prior to admission, yes, cane and walker  Pulmonary/Cardiac Rehab: Prior to admission, no   Pt reports she was going to out pt physical therapy but stopped due to leg weakness.  Pt has not gone to physical therapy x one month.   Dialysis:  Prior to admission, no  Transplant Specialty Pharmacy:  Prior to admission, no.    Prior to admission, patient reports patient mostly  independent with ADLS.  Pt reports some difficulty with mobility especially when pt is experiencing SOB.  Pt reports she uses her cane and walker around her home, however pt reports she will hold onto the walls for support when her hand starts shaking.  Pt reports hand shaking is due to anxiety.  Pt reports she is fearful she will fall.    Pt  was driving.  Patient reports patient is not able to care for self at this time due to compromised medical condition (as documented in medical record) and physical weakness..  Patient indicates a willingness to care for self once medically cleared to do so.    Insurance/Medications    Insured by   Payor/Plan Subscr  Sex Relation Sub. Ins. ID Effective Group Num   1. [employer] MANAGE* CRISTIANA OVERTON 1950 Female  [id number] 4/[birth year] [id number]                                   [address] [address]      Primary Insurance (for UNOS reporting): Public Insurance - Medicare FFS (Fee For Service)  Secondary Insurance (for UNOS reporting): None    Patient reports patient is able to obtain and afford medications at this time and at time of discharge.    Living Will/Healthcare Power of     Patient states patient does not have a LW and/or  HCPA.   provided education regarding LW and HCPA and the completion of forms.    Coping/Mental Health    Patient is coping adequately with the aid of  family members.   Patient indicates mental health difficulties.   Pt reports increased anxiety due to mobility issues and fear of falling.  Pt reports she is not sleeping well at home due to her medical needs.   Pt reports she does not take any medication for mental health needs. Pt reports she has a supportive family. Pt's goal is to return to her own home. Pt reports her son usually checks on her once a day and her daughter in law will assist with food for pt to eat.  Worker provided support.     Discharge Planning    At time of discharge, patient plans to return to patient's home alone under the care of self.  Patients son will transport patient.  Per rounds today, expected discharge date has not been medically determined at this time. Patient and patients caregiver  verbalize understanding and are involved in treatment planning and discharge process.    Additional Concerns    Patient is being followed for needs, education, resources, information, emotional support, supportive counseling, and for supportive and skilled discharge plan of care.  providing ongoing psychosocial support, education, resources and d/c planning as needed.  SW remains available. Patient's caregiver verbalizes understanding and agreement with information reviewed,  availability and how to access available resources as needed. Patient verbalizes understanding and agreement with information reviewed, social work availability, and how to access available resources as needed.

## 2019-08-21 PROBLEM — N39.0 SEPSIS DUE TO GRAM-NEGATIVE URINARY TRACT INFECTION: Status: ACTIVE | Noted: 2019-01-01

## 2019-08-21 PROBLEM — A41.50 SEPSIS DUE TO GRAM-NEGATIVE URINARY TRACT INFECTION: Status: ACTIVE | Noted: 2019-01-01

## 2019-08-21 NOTE — ASSESSMENT & PLAN NOTE
-had hesitancy on history prior to arrival and growing e. Coli in urine  -aztreonam started 8/21 given pcn allergy  -Plan to D/C Grant once diuretics decreased and will then stop Astreonam   21

## 2019-08-21 NOTE — PLAN OF CARE
Problem: Adult Inpatient Plan of Care  Goal: Plan of Care Review  Outcome: Ongoing (interventions implemented as appropriate)  See flowsheets for vital signs and assessments. See below for updates on progress made.       Neuro: AAOx4. Afebrile. Pupils 3mm brisk/equal.     Cardiovascular: Hx of A fib. CVPs 21, 18, 18. IABP in place with MAPs in 70s and augmentation in 90s. TTE and cardioversion performed, pt shocked back into paced sinus rhythm.       Pulmonary: Nitric 10ppm. SpO2 % throughout shift.     Gastrointestinal: No BM during shift. BS hypoactive in all quadrants.     Genitourinary: Grant in place with urine output approx 60cc/hr despite increases in Lasix, Diuril, and start of Metolozone. MD notified.     Integumentary/other: HAPI prevention bundle in place; Last CHG bath: 8/20/19 am    Infusions: Lasix 40, Epi 0.02, Amio 1, Heparin 10.     POC: Continue to monitor kidney function for possible CRRT need.     Patient progressing towards goals as tolerated, plan of care communicated and reviewed with Sherice Squires. All concerns addressed. Will continue to monitor.     A: Awake    RASS: Goal - 0  alert and calm Actual - 0  alert and calm   Restraint necessity: no  B: Breath   SBT: NA  C: Coordinate A & B, analgesics/sedatives   Pain: managed   SAT: Not intubated  D: Delirium   CAM-ICU: negative   E: Early Mobility   MOVE Screen: Fail   Activity order: bedrest  FAS: Feeding/Nutrition   Diet order: Cardiac Fluid restriction: 1500  T: Thrombus   DVT prophylaxis: pharmacologic  H: HOB Elevation   30 degrees: Contraindicated  U: Ulcer Prophylaxis   GI: no  G: Glucose control   managed  S: Skin   Bundle compliance: yes  B: Bowel Function   no issues  I: Indwelling Catheters   Grant necessity: Yes   CVC necessity: Yes   IPAD offered: Not appropriate  D: De-escalation Antibx   No  Plan for the day   Monitor kidney function.   Family/Goals of care/Code Status   Code Status: Full Code   GOC: Monitor kidney  function for possible need for dialysis.

## 2019-08-21 NOTE — PROGRESS NOTES
HTS update plan of care  8/20, 8:16 pm     Patient is s/p successful cardioversion to sinus rhythm ~5 pm. Started on amio gtt per EP recs post procedure.    Hemodynamic parameters rechecked ~ 7:45 pm.    SVO2 improved to 65% and CVP remains at 15.     Patient making ~130 cc/hr urine improved from ~ 60 cc/hr prior to cardioversion.     CMP pending.     Calculated CO = 5.8, CI = 3.1, SVR = 1035.       Patient on Epi 0.02, Cesar 10, Lasix 40/hr and IABP 1:1 support with augmentation in 100s-110s.     Will continue current management, follow hemodynamics closely.       Marce Savage MD   Cardiology Fellow, PGY-5        Discussed with Dr Soria.

## 2019-08-21 NOTE — SUBJECTIVE & OBJECTIVE
Interval History: yestrday evening converted to nsr with candice cardioversion, now much improved, feels much less short of breath. CVP still high this am.     Continuous Infusions:   amiodarone in dextrose 5% 0.5 mg/min (08/21/19 1511)    epinephrine 0.02 mcg/kg/min (08/21/19 1511)    furosemide (LASIX) 2 mg/mL infusion (non-titrating) 40 mg/hr (08/21/19 1511)    heparin (porcine) in D5W 12 Units/kg/hr (08/21/19 1511)    nitric oxide gas       Scheduled Meds:   aztreonam  1,000 mg Intravenous Q8H    magnesium sulfate IVPB  3 g Intravenous Once    potassium chloride  40 mEq Oral Q6H    senna  8.6 mg Oral BID     PRN Meds:heparin (PORCINE), heparin (PORCINE), polyethylene glycol, silver sulfADIAZINE 1%, sodium chloride 0.9%    Review of patient's allergies indicates:   Allergen Reactions    Augmentin [amoxicillin-pot clavulanate] Swelling     Lip swelling      Penicillins      Other reaction(s): Swelling  Lip swelling       Objective:     Vital Signs (Most Recent):  Temp: 98.6 °F (37 °C) (08/21/19 1105)  Pulse: 93(Simultaneous filing. User may not have seen previous data.) (08/21/19 1547)  Resp: (!) 22(Simultaneous filing. User may not have seen previous data.) (08/21/19 1547)  BP: 122/71 (08/21/19 1500)  SpO2: 96 % (08/21/19 1547) Vital Signs (24h Range):  Temp:  [96.7 °F (35.9 °C)-98.6 °F (37 °C)] 98.6 °F (37 °C)  Pulse:  [] 93  Resp:  [12-32] 22  SpO2:  [82 %-100 %] 96 %  BP: ()/() 122/71     Patient Vitals for the past 72 hrs (Last 3 readings):   Weight   08/21/19 0737 79.8 kg (175 lb 14.8 oz)   08/20/19 0400 80.4 kg (177 lb 4 oz)   08/20/19 0103 82.9 kg (182 lb 12.2 oz)     Body mass index is 29.28 kg/m².      Intake/Output Summary (Last 24 hours) at 8/21/2019 1612  Last data filed at 8/21/2019 1511  Gross per 24 hour   Intake 2029.29 ml   Output 5685 ml   Net -3655.71 ml       Hemodynamic Parameters:       Telemetry: reviewed    Physical Exam   Constitutional: She is oriented to person,  place, and time. She appears well-developed and well-nourished. No distress.   HENT:   Head: Normocephalic and atraumatic.   Eyes: EOM are normal.   Neck: Neck supple. JVD (with +HJR) present.   Cardiovascular: Normal rate and regular rhythm.   Murmur heard.  Pulmonary/Chest: Effort normal and breath sounds normal.   Abdominal: Soft. Bowel sounds are normal. She exhibits no distension.   Musculoskeletal: Normal range of motion. She exhibits edema.   Neurological: She is alert and oriented to person, place, and time.   Skin: Skin is warm and dry. Capillary refill takes 2 to 3 seconds. No erythema.   Psychiatric: She has a normal mood and affect. Her behavior is normal. Judgment and thought content normal.       Significant Labs:  CBC:  Recent Labs   Lab 08/20/19  0147 08/20/19  0432 08/21/19  0352   WBC 6.68 6.76 8.28   RBC 4.36 4.30 4.10   HGB 13.0 13.0 12.3   HCT 39.4 40.1 37.6   * 132* 118*   MCV 90 93 92   MCH 29.8 30.2 30.0   MCHC 33.0 32.4 32.7     BNP:  Recent Labs   Lab 08/15/19  1143 08/19/19  1541   BNP 3,347*  3,347* 3,234*     CMP:  Recent Labs   Lab 08/21/19  0352 08/21/19  1200 08/21/19  1500   * 178* 230*   CALCIUM 9.1 9.2 9.8   ALBUMIN 2.7* 2.8* 2.7*   PROT 6.3 6.7 6.7   * 140 140   K 3.5 3.4* 3.5   CO2 18* 27 31*   CL 99 98 94*   BUN 85* 79* 76*   CREATININE 3.3* 2.9* 2.7*   ALKPHOS 262* 265* 274*   ALT 24 24 24   AST 29 23 20   BILITOT 0.7 0.7 0.8      Coagulation:   Recent Labs   Lab 08/20/19  0147 08/20/19  0849 08/20/19  1534 08/21/19  0002 08/21/19  0617   INR 1.7* 1.9*  --   --   --    APTT 26.5 26.4 74.9* 37.5* 42.2*     LDH:  No results for input(s): LDH in the last 72 hours.  Microbiology:  Microbiology Results (last 7 days)     Procedure Component Value Units Date/Time    Urine culture [[id number]]  (Abnormal) Collected:  08/20/19 0422    Order Status:  Completed Specimen:  Urine Updated:  08/21/19 0055     Urine Culture, Routine GRAM NEGATIVE ABHISHEK  >100,000  cfu/ml  Identification and susceptibility pending      Narrative:       Preferred Collection Type->Urine, Clean Catch          I have reviewed all pertinent labs within the past 24 hours.    Estimated Creatinine Clearance: 20.8 mL/min (A) (based on SCr of 2.7 mg/dL (H)).    Diagnostic Results:  I have reviewed and interpreted all pertinent imaging results/findings within the past 24 hours.

## 2019-08-21 NOTE — NURSING
Spoke with MD Savage regarding repeat labs. Repeat SVO2 65 (up from 59). CVP remains 15, hourly UO ~ 150cc since 1900. No new orders at this time, WCTM.

## 2019-08-21 NOTE — PROGRESS NOTES
[facility]  Cardiac Electrophysiology  Progress Note    Admission Date: 8/20/2019  Code Status: Full Code   Attending Physician: nAnamarie Soria MD   Expected Discharge Date: 8/28/2019  Principal Problem:Acute on chronic combined systolic and diastolic heart failure    Subjective:     Interval History: s/p JOSE negative for thrombus and successful DCCV with significant improvement in UOP, hemodynamics. Continues to have IABP in place. Now in NSR w frequent PACs. Denies chest pain, palpitations or shortness of breath.       Review of Systems   Constitution: Negative. Negative for chills and fever.   HENT: Negative.    Eyes: Negative.    Cardiovascular: Negative for chest pain, claudication and paroxysmal nocturnal dyspnea.   Respiratory: Negative for cough, shortness of breath and wheezing.    Endocrine: Negative.    Hematologic/Lymphatic: Does not bruise/bleed easily.   Skin: Negative for nail changes and rash.   Musculoskeletal: Negative.  Negative for back pain.   Gastrointestinal: Negative for abdominal pain, melena, nausea and vomiting.   Neurological: Negative for dizziness and headaches.   Psychiatric/Behavioral: Negative for altered mental status, depression and substance abuse.   Allergic/Immunologic: Negative.      Objective:     Vital Signs (Most Recent):  Temp: 98.6 °F (37 °C) (08/21/19 1105)  Pulse: 89 (08/21/19 1105)  Resp: (!) 25 (08/21/19 1105)  BP: 123/74 (08/21/19 1105)  SpO2: 100 % (08/21/19 1105) Vital Signs (24h Range):  Temp:  [96.7 °F (35.9 °C)-98.6 °F (37 °C)] 98.6 °F (37 °C)  Pulse:  [] 89  Resp:  [12-34] 25  SpO2:  [82 %-100 %] 100 %  BP: ()/() 123/74     Weight: 79.8 kg (175 lb 14.8 oz)([facility])  Body mass index is 29.28 kg/m².     SpO2: 100 %  O2 Device (Oxygen Therapy): nasal cannula(10 nitric)    Physical Exam   Constitutional: She is oriented to person, place, and time. She appears well-developed and well-nourished.   HENT:   Head: Normocephalic and  atraumatic.   Eyes: Pupils are equal, round, and reactive to light. Conjunctivae and EOM are normal.   Neck: JVD present.   Cardiovascular: Normal rate, regular rhythm, normal heart sounds and intact distal pulses. Exam reveals no gallop and no friction rub.   No murmur heard.  Pulmonary/Chest: Effort normal. No stridor. She has no wheezes. She has no rales. She exhibits no tenderness.   Abdominal: Soft. Bowel sounds are normal. She exhibits no distension and no mass. There is no tenderness. There is no guarding.   Musculoskeletal: She exhibits no edema, tenderness or deformity.   Neurological: She is alert and oriented to person, place, and time.   Skin: Skin is warm and dry. No rash noted. No erythema.   Psychiatric: She has a normal mood and affect.       Significant Labs:   CMP:   Recent Labs   Lab 08/20/19  1942 08/21/19  0002 08/21/19  0352    136 134*   K 3.8 3.7 3.5    101 99   CO2 22* 19* 18*   * 211* 195*   BUN 88* 86* 85*   CREATININE 3.7* 3.4* 3.3*   CALCIUM 9.3 9.1 9.1   PROT 6.4 6.2 6.3   ALBUMIN 2.8* 2.6* 2.7*   BILITOT 1.2* 0.9 0.7   ALKPHOS 277* 251* 262*   AST 28 30 29   ALT 27 23 24   ANIONGAP 15 16 17*   ESTGFRAFRICA 13.8* 15.2* 15.8*   EGFRNONAA 11.9* 13.2* 13.7*    and CBC:   Recent Labs   Lab 08/20/19  0147 08/20/19  0432 08/21/19  0352   WBC 6.68 6.76 8.28   HGB 13.0 13.0 12.3   HCT 39.4 40.1 37.6   * 132* 118*       Significant Imaging: Echocardiogram:   Transthoracic echo (TTE) complete (Cupid Only):   Results for orders placed or performed during the hospital encounter of 08/20/19   Transthoracic echo (TTE) 2D with Color Flow   Result Value Ref Range    Ascending aorta 3.07 cm    STJ 3.25 cm    AV mean gradient 4 mmHg    Ao peak natasha 1.44 m/s    Ao VTI 17.27 cm    IVS 0.78 0.6 - 1.1 cm    LA size 4.90 cm    Left Atrium Major Axis 7.30 cm    Left Atrium Minor Axis 7.20 cm    LVIDD 6.51 (A) 3.5 - 6.0 cm    LVIDS 6.03 (A) 2.1 - 4.0 cm    LVOT diameter 2.09 cm    LVOT  peak VTI 8.95 cm    PW 0.90 0.6 - 1.1 cm    MV Peak E Kevin 1.12 m/s    RA Major Axis 5.35 cm    RA Width 4.31 cm    RVDD 5.12 cm    Sinus 3.00 cm    TAPSE 0.92 cm    TR Max Kevin 2.98 m/s    TDI LATERAL 0.07 m/s    TDI SEPTAL 0.07 m/s    LA WIDTH 5.68 cm    LV Diastolic Volume 216.83 mL    LV Systolic Volume 181.73 mL    LVOT peak kevin 0.75 m/s    LV LATERAL E/E' RATIO 16.00 m/s    LV SEPTAL E/E' RATIO 16.00 m/s    FS 7 %    LA volume 171.51 cm3    LV mass 228.12 g    Left Ventricle Relative Wall Thickness 0.28 cm    AV valve area 1.78 cm2    AV Velocity Ratio 0.52     AV index (prosthetic) 0.52     Mean e' 0.07 m/s    LVOT area 3.4 cm2    LVOT stroke volume 30.69 cm3    AV peak gradient 8 mmHg    E/E' ratio 16.00 m/s    LV Systolic Volume Index 96.7 mL/m2    LV Diastolic Volume Index 115.39 mL/m2    LA Volume Index 91.3 mL/m2    LV Mass Index 121 g/m2    Triscuspid Valve Regurgitation Peak Gradient 36 mmHg    BSA 1.95 m2    Right Atrial Pressure (from IVC) 15 mmHg    TV rest pulmonary artery pressure 51 mmHg    Narrative    · Moderate left ventricular enlargement.  · Severely decreased left ventricular systolic function. The estimated   ejection fraction is 15%  · Severe global hypokinetic wall motion.  · Septal wall has abnormal motion. Systolic and diastolic flattening of   the interventricular septum consistent with right ventricle pressure and   volume overload.  · Eccentric left ventricular hypertrophy.  · Indeterminate left ventricular diastolic function.  · Mildly reduced right ventricular systolic function.  · Severe left atrial enlargement.  · Moderate mitral regurgitation.  · Moderate tricuspid regurgitation.  · The estimated PA systolic pressure is 51 mm Hg. Pulmonary hypertension   present.  · Elevated central venous pressure (15 mm Hg).        Assessment and Plan:     Atrial fibrillation with RVR  67 y/o woman history of NICM 20-25%, persistent atrial fibrillation on apixaban, [employer] ICD, CKD  presenting with cardiogenic shock and acute decompensated heart failure in setting of atrial fibrillation w RVR.    - device interrogation reveals several months of high afib burden  - s/p JOSE/DCCV with significant improvement in hemodynamics  - Would complete 24 hour IV amio load and the transition to PO amiodarone 200mg daily  - Will need follow up with Dr. Reece in EP clinic in 3-4 weeks following discharge.      Case discussed with attending, Dr. Reece.    Thank you for this interesting consult. EP consult will sign off. Please call with questions as needed.      Bay Holliday MD  Cardiac Electrophysiology  [facility]

## 2019-08-21 NOTE — PLAN OF CARE
Problem: Adult Inpatient Plan of Care  Goal: Plan of Care Review  Outcome: Ongoing (interventions implemented as appropriate)    No acute events throughout day. See vital signs and assessments in flowsheets. See below for updates on today's progress.     Pulmonary: 4L NC with 10ppm nitric     Cardiovascular: Paced rhythm, HR 60-65, SBP cuff , MAP > 65, CVP 13, SVO2 74; IABP right fem 1:1 ecg sbp 70-90, aug 90-100s    Neurological: AAOX4, Afebrile     Gastrointestinal: Cardiac diet with 1500cc FR, No BM this shift     Genitourinary: Grant, hourly uo ~ 150-250cc     Skin/Bath: CDI  Date of last CHG bath given: 8/20/19    Infusions: Amio, lasix, epi, heparin     Patient progressing towards goals as tolerated, plan of care communicated and reviewed with Sherice Squires and family. All concerns addressed. Will continue to monitor.

## 2019-08-21 NOTE — PROGRESS NOTES
08/21/2019  Fan Johns    Current provider:  Annamarie Soria MD      I, Fan Johns, rounded on Sherice Squires to ensure all mechanical assist device settings (IABP or VAD) were appropriate and all parameters were within limits.  I was able to ensure all back up equipment was present, the staff had no issues, and the Perfusion Department daily rounding was complete.    12:07 PM

## 2019-08-21 NOTE — PROGRESS NOTES
[facility]  Heart Transplant  Progress Note    Patient Name: Sherice Squires  MRN: [id number]  Admission Date: 8/20/2019  Hospital Length of Stay: 1 days  Attending Physician: Annamarie Soria MD  Primary Care Provider: Annamarie Soria MD  Principal Problem:Acute on chronic combined systolic and diastolic heart failure    Subjective:     Interval History: yestrday evening converted to nsr with candice cardioversion, now much improved, feels much less short of breath. CVP still high this am.     Continuous Infusions:   amiodarone in dextrose 5% 0.5 mg/min (08/21/19 1511)    epinephrine 0.02 mcg/kg/min (08/21/19 1511)    furosemide (LASIX) 2 mg/mL infusion (non-titrating) 40 mg/hr (08/21/19 1511)    heparin (porcine) in D5W 12 Units/kg/hr (08/21/19 1511)    nitric oxide gas       Scheduled Meds:   aztreonam  1,000 mg Intravenous Q8H    magnesium sulfate IVPB  3 g Intravenous Once    potassium chloride  40 mEq Oral Q6H    senna  8.6 mg Oral BID     PRN Meds:heparin (PORCINE), heparin (PORCINE), polyethylene glycol, silver sulfADIAZINE 1%, sodium chloride 0.9%    Review of patient's allergies indicates:   Allergen Reactions    Augmentin [amoxicillin-pot clavulanate] Swelling     Lip swelling      Penicillins      Other reaction(s): Swelling  Lip swelling       Objective:     Vital Signs (Most Recent):  Temp: 98.6 °F (37 °C) (08/21/19 1105)  Pulse: 93(Simultaneous filing. User may not have seen previous data.) (08/21/19 1547)  Resp: (!) 22(Simultaneous filing. User may not have seen previous data.) (08/21/19 1547)  BP: 122/71 (08/21/19 1500)  SpO2: 96 % (08/21/19 1547) Vital Signs (24h Range):  Temp:  [96.7 °F (35.9 °C)-98.6 °F (37 °C)] 98.6 °F (37 °C)  Pulse:  [] 93  Resp:  [12-32] 22  SpO2:  [82 %-100 %] 96 %  BP: ()/() 122/71     Patient Vitals for the past 72 hrs (Last 3 readings):   Weight   08/21/19 0737 79.8 kg (175 lb 14.8 oz)   08/20/19 0400 80.4 kg (177 lb 4 oz)    08/20/19 0103 82.9 kg (182 lb 12.2 oz)     Body mass index is 29.28 kg/m².      Intake/Output Summary (Last 24 hours) at 8/21/2019 1612  Last data filed at 8/21/2019 1511  Gross per 24 hour   Intake 2029.29 ml   Output 5685 ml   Net -3655.71 ml       Hemodynamic Parameters:       Telemetry: reviewed    Physical Exam   Constitutional: She is oriented to person, place, and time. She appears well-developed and well-nourished. No distress.   HENT:   Head: Normocephalic and atraumatic.   Eyes: EOM are normal.   Neck: Neck supple. JVD (with +HJR) present.   Cardiovascular: Normal rate and regular rhythm.   Murmur heard.  Pulmonary/Chest: Effort normal and breath sounds normal.   Abdominal: Soft. Bowel sounds are normal. She exhibits no distension.   Musculoskeletal: Normal range of motion. She exhibits edema.   Neurological: She is alert and oriented to person, place, and time.   Skin: Skin is warm and dry. Capillary refill takes 2 to 3 seconds. No erythema.   Psychiatric: She has a normal mood and affect. Her behavior is normal. Judgment and thought content normal.       Significant Labs:  CBC:  Recent Labs   Lab 08/20/19  0147 08/20/19  0432 08/21/19  0352   WBC 6.68 6.76 8.28   RBC 4.36 4.30 4.10   HGB 13.0 13.0 12.3   HCT 39.4 40.1 37.6   * 132* 118*   MCV 90 93 92   MCH 29.8 30.2 30.0   MCHC 33.0 32.4 32.7     BNP:  Recent Labs   Lab 08/15/19  1143 08/19/19  1541   BNP 3,347*  3,347* 3,234*     CMP:  Recent Labs   Lab 08/21/19  0352 08/21/19  1200 08/21/19  1500   * 178* 230*   CALCIUM 9.1 9.2 9.8   ALBUMIN 2.7* 2.8* 2.7*   PROT 6.3 6.7 6.7   * 140 140   K 3.5 3.4* 3.5   CO2 18* 27 31*   CL 99 98 94*   BUN 85* 79* 76*   CREATININE 3.3* 2.9* 2.7*   ALKPHOS 262* 265* 274*   ALT 24 24 24   AST 29 23 20   BILITOT 0.7 0.7 0.8      Coagulation:   Recent Labs   Lab 08/20/19  0147 08/20/19  0849 08/20/19  1534 08/21/19  0002 08/21/19  0617   INR 1.7* 1.9*  --   --   --    APTT 26.5 26.4 74.9* 37.5*  42.2*     LDH:  No results for input(s): LDH in the last 72 hours.  Microbiology:  Microbiology Results (last 7 days)     Procedure Component Value Units Date/Time    Urine culture [[id number]]  (Abnormal) Collected:  08/20/19 0422    Order Status:  Completed Specimen:  Urine Updated:  08/21/19 0055     Urine Culture, Routine GRAM NEGATIVE ABHISHEK  >100,000 cfu/ml  Identification and susceptibility pending      Narrative:       Preferred Collection Type->Urine, Clean Catch          I have reviewed all pertinent labs within the past 24 hours.    Estimated Creatinine Clearance: 20.8 mL/min (A) (based on SCr of 2.7 mg/dL (H)).    Diagnostic Results:  I have reviewed and interpreted all pertinent imaging results/findings within the past 24 hours.    Assessment and Plan:     Sherice Squires is a 67 yo AAF with PMHx significant for NIDCM / stage D HFrEF (LVEF 20-25%, LVEDD 5.9 cm) s/p dc ICD, AF, HTN, DLD, CKD who is admitted as a transfer from  ED for management of ADHF +/- possible cardiogenic shock.     Patient reports she presented to OSH with progressive/worsening shortness of breath on exertion, orthopnea, and peripheral swelling of approx 5 days duration associated with nausea and fatigue. Denies fever/chills/sweats, chest pain, diaphoresis, presyncope/syncope. Does report intermittent palpitations.      Patient was afebrile and normotensive on arrival (/55 mmHg) to OSH ED with pulses in the 120-130s in AF with RVR. Initial labs showed worsening FAMILIA on CKD (with Cr 3.8 from 2.7-3.1 this past week from previous baseline of 1.3-1.4 in June 2019) as well as elevated T bili 2.3 and BNP >3000. CXR obtained without acute cardiopulmonary process. [facility] higher level of care.      Patient follows with [facility] clinic - currently on Entresto 97/103 mg BID, Toprol  mg daily, Aldactone 25 mg daily, and digoxin 0.125 mg daily. She was last seen by Dr Rodriguez on 8/6/19 who added metolazone 2.5  mg QHS before evening dose of Bumex to augment diuresis due to complaints of SOB / peripheral swelling at the time. She did not respond to this regimen and reports she was ultimately switched to Torsemide instead.      Patient is fully complaint with her medicines. Also adherent to fluid / salt restrictions with her hx of congestive heart failure.      States she was hospitalized only once before for ADHF within the past year.      Of note patient follows with Dr Reece of EP for her AF hx, last seen by him in 5/2019    * Acute on chronic combined systolic and diastolic heart failure  - 67 yo F with NIDCM , NYHA FC III with EF 20-25% and LVEDD 5.9 cm by echo 7/2019 who is admitted with afib with rvr and cardiogenic shock  -cvp still elevated this am at 15, continue lasix 40mg/hr. Making good urine this afternoon at >200/hr  -weaned balloon pump to 1:2 today, will keep at that rate on hep gtt today and try to remove tomorrow if   -continue dobutamine at mcg/kg/min and low dose epinephrine at 0.02mcg/kg/min  -will wean off nitric oxide today as tolerated    Acute on chronic kidney failure  - Cr up to 4.0 from baseline 1.3-1.4 in June 2019, downtrending with improvement in cardiogenic shock    Atrial fibrillation with RVR  - return to sinus rhythm after dccv/candice 8/20/19  -on amio gtt and heparin gtt    Hypertension  - holding home antihypertensives due to decompensated heart failure and possible component of cardiogenic shock.     Automatic implantable cardioverter-defibrillator in situ  - hx VT/VF per chart review.  - telemetry monitoring and repletion of electrolytes PRN.    Sepsis secondary to Urinary Tract infection  -had hesitancy on history prior to arrival and growing e. Pete in urine  -on aztreonam awaiting sensitivies given pcn allergy    Migel Singleton MD  Heart Transplant  [facility]

## 2019-08-21 NOTE — ASSESSMENT & PLAN NOTE
- holding home antihypertensives due to decompensated heart failure and possible component of cardiogenic shock.

## 2019-08-21 NOTE — PROGRESS NOTES
Pharmacist Renal Dose Adjustment Note    Sherice Squires is a 68 y.o. female being treated with the medication aztreonam    Patient Data:    Vital Signs (Most Recent):  Temp: 98.6 °F (37 °C) (08/21/19 1105)  Pulse: 83 (08/21/19 1200)  Resp: (!) 24 (08/21/19 1200)  BP: 116/82 (08/21/19 1200)  SpO2: 97 % (08/21/19 1151)   Vital Signs (72h Range):  Temp:  [96.7 °F (35.9 °C)-98.6 °F (37 °C)]   Pulse:  []   Resp:  [12-39]   BP: ()/()   SpO2:  [82 %-100 %]      Recent Labs   Lab 08/20/19  1942 08/21/19  0002 08/21/19  0352   CREATININE 3.7* 3.4* 3.3*     Serum creatinine: 3.3 mg/dL (H) 08/21/19 0352  Estimated creatinine clearance: 17 mL/min (A)    Patient's renal function qualifies for higher dose. Therefore, will change aztreonam from 500mg Q8h to 1000mg Q8h     Pharmacist's Name: Ori Mcconnell  Pharmacist's Extension: [phone]

## 2019-08-21 NOTE — SUBJECTIVE & OBJECTIVE
Interval History: s/p JOSE negative for thrombus and successful DCCV with significant improvement in UOP, hemodynamics. Continues to have IABP in place. Now in NSR w frequent PACs. Denies chest pain, palpitations or shortness of breath.       Review of Systems   Constitution: Negative. Negative for chills and fever.   HENT: Negative.    Eyes: Negative.    Cardiovascular: Negative for chest pain, claudication and paroxysmal nocturnal dyspnea.   Respiratory: Negative for cough, shortness of breath and wheezing.    Endocrine: Negative.    Hematologic/Lymphatic: Does not bruise/bleed easily.   Skin: Negative for nail changes and rash.   Musculoskeletal: Negative.  Negative for back pain.   Gastrointestinal: Negative for abdominal pain, melena, nausea and vomiting.   Neurological: Negative for dizziness and headaches.   Psychiatric/Behavioral: Negative for altered mental status, depression and substance abuse.   Allergic/Immunologic: Negative.      Objective:     Vital Signs (Most Recent):  Temp: 98.6 °F (37 °C) (08/21/19 1105)  Pulse: 89 (08/21/19 1105)  Resp: (!) 25 (08/21/19 1105)  BP: 123/74 (08/21/19 1105)  SpO2: 100 % (08/21/19 1105) Vital Signs (24h Range):  Temp:  [96.7 °F (35.9 °C)-98.6 °F (37 °C)] 98.6 °F (37 °C)  Pulse:  [] 89  Resp:  [12-34] 25  SpO2:  [82 %-100 %] 100 %  BP: ()/() 123/74     Weight: 79.8 kg (175 lb 14.8 oz)([facility])  Body mass index is 29.28 kg/m².     SpO2: 100 %  O2 Device (Oxygen Therapy): nasal cannula(10 nitric)    Physical Exam   Constitutional: She is oriented to person, place, and time. She appears well-developed and well-nourished.   HENT:   Head: Normocephalic and atraumatic.   Eyes: Pupils are equal, round, and reactive to light. Conjunctivae and EOM are normal.   Neck: JVD present.   Cardiovascular: Normal rate, regular rhythm, normal heart sounds and intact distal pulses. Exam reveals no gallop and no friction rub.   No murmur heard.  Pulmonary/Chest: Effort  normal. No stridor. She has no wheezes. She has no rales. She exhibits no tenderness.   Abdominal: Soft. Bowel sounds are normal. She exhibits no distension and no mass. There is no tenderness. There is no guarding.   Musculoskeletal: She exhibits no edema, tenderness or deformity.   Neurological: She is alert and oriented to person, place, and time.   Skin: Skin is warm and dry. No rash noted. No erythema.   Psychiatric: She has a normal mood and affect.       Significant Labs:   CMP:   Recent Labs   Lab 08/20/19  1942 08/21/19  0002 08/21/19  0352    136 134*   K 3.8 3.7 3.5    101 99   CO2 22* 19* 18*   * 211* 195*   BUN 88* 86* 85*   CREATININE 3.7* 3.4* 3.3*   CALCIUM 9.3 9.1 9.1   PROT 6.4 6.2 6.3   ALBUMIN 2.8* 2.6* 2.7*   BILITOT 1.2* 0.9 0.7   ALKPHOS 277* 251* 262*   AST 28 30 29   ALT 27 23 24   ANIONGAP 15 16 17*   ESTGFRAFRICA 13.8* 15.2* 15.8*   EGFRNONAA 11.9* 13.2* 13.7*    and CBC:   Recent Labs   Lab 08/20/19  0147 08/20/19  0432 08/21/19  0352   WBC 6.68 6.76 8.28   HGB 13.0 13.0 12.3   HCT 39.4 40.1 37.6   * 132* 118*       Significant Imaging: Echocardiogram:   Transthoracic echo (TTE) complete (Cupid Only):   Results for orders placed or performed during the hospital encounter of 08/20/19   Transthoracic echo (TTE) 2D with Color Flow   Result Value Ref Range    Ascending aorta 3.07 cm    STJ 3.25 cm    AV mean gradient 4 mmHg    Ao peak kevin 1.44 m/s    Ao VTI 17.27 cm    IVS 0.78 0.6 - 1.1 cm    LA size 4.90 cm    Left Atrium Major Axis 7.30 cm    Left Atrium Minor Axis 7.20 cm    LVIDD 6.51 (A) 3.5 - 6.0 cm    LVIDS 6.03 (A) 2.1 - 4.0 cm    LVOT diameter 2.09 cm    LVOT peak VTI 8.95 cm    PW 0.90 0.6 - 1.1 cm    MV Peak E Kevin 1.12 m/s    RA Major Axis 5.35 cm    RA Width 4.31 cm    RVDD 5.12 cm    Sinus 3.00 cm    TAPSE 0.92 cm    TR Max Kevin 2.98 m/s    TDI LATERAL 0.07 m/s    TDI SEPTAL 0.07 m/s    LA WIDTH 5.68 cm    LV Diastolic Volume 216.83 mL    LV Systolic  Volume 181.73 mL    LVOT peak natasha 0.75 m/s    LV LATERAL E/E' RATIO 16.00 m/s    LV SEPTAL E/E' RATIO 16.00 m/s    FS 7 %    LA volume 171.51 cm3    LV mass 228.12 g    Left Ventricle Relative Wall Thickness 0.28 cm    AV valve area 1.78 cm2    AV Velocity Ratio 0.52     AV index (prosthetic) 0.52     Mean e' 0.07 m/s    LVOT area 3.4 cm2    LVOT stroke volume 30.69 cm3    AV peak gradient 8 mmHg    E/E' ratio 16.00 m/s    LV Systolic Volume Index 96.7 mL/m2    LV Diastolic Volume Index 115.39 mL/m2    LA Volume Index 91.3 mL/m2    LV Mass Index 121 g/m2    Triscuspid Valve Regurgitation Peak Gradient 36 mmHg    BSA 1.95 m2    Right Atrial Pressure (from IVC) 15 mmHg    TV rest pulmonary artery pressure 51 mmHg    Narrative    · Moderate left ventricular enlargement.  · Severely decreased left ventricular systolic function. The estimated   ejection fraction is 15%  · Severe global hypokinetic wall motion.  · Septal wall has abnormal motion. Systolic and diastolic flattening of   the interventricular septum consistent with right ventricle pressure and   volume overload.  · Eccentric left ventricular hypertrophy.  · Indeterminate left ventricular diastolic function.  · Mildly reduced right ventricular systolic function.  · Severe left atrial enlargement.  · Moderate mitral regurgitation.  · Moderate tricuspid regurgitation.  · The estimated PA systolic pressure is 51 mm Hg. Pulmonary hypertension   present.  · Elevated central venous pressure (15 mm Hg).

## 2019-08-21 NOTE — ASSESSMENT & PLAN NOTE
69 y/o woman history of NICM 20-25%, persistent atrial fibrillation on apixaban, [employer] ICD, CKD presenting with cardiogenic shock and acute decompensated heart failure in setting of atrial fibrillation w RVR.    - device interrogation reveals several months of high afib burden  - s/p JOSE/DCCV with significant improvement in hemodynamics  - Would complete 24 hour IV amio load and the transition to PO amiodarone 200mg daily  - Will need follow up with Dr. Reece in EP clinic in 3-4 weeks following discharge.

## 2019-08-22 PROBLEM — K59.00 CONSTIPATION: Status: ACTIVE | Noted: 2019-01-01

## 2019-08-22 PROBLEM — G89.29 CHRONIC PAIN OF LEFT KNEE: Status: ACTIVE | Noted: 2019-01-01

## 2019-08-22 PROBLEM — M25.562 CHRONIC PAIN OF LEFT KNEE: Status: ACTIVE | Noted: 2019-01-01

## 2019-08-22 PROBLEM — D69.6 THROMBOCYTOPENIA, UNSPECIFIED: Status: ACTIVE | Noted: 2019-01-01

## 2019-08-22 NOTE — NURSING
Notified MD Savage of SVO2 63 (down from 72). Pt remains in a-fib, rate 130-140s. NPO since MN. UO remains ~ 200-250cc per hour. CVP 10. Nitric off and IABP switched to pressure support on 1:2 settings per MD Savage. No new orders at this time, WCTM.

## 2019-08-22 NOTE — PROGRESS NOTES
HTS update plan of care   8/21, 9:13 pm       Patient remains in AF 120s-130s on amio gtt.       Hemodynamics repeated as follow:    CVP 9 <-- 13 <-- 7  SVO2 72 <-- 73    Calculated CO 5.96, CI 3.12, SVR 1127 by Charo.     UOP ~ 300 cc/hr.     Cr now 2.3 from 2.6.     Patient on Epi 0.02, Cesar 10 ppm, Lasix 40/hr. IABP 1:2 support.       Discontinued Cesar orders. Continue other medical therapy / MCS support unchanged.       Continue to monitor patient closely while in AF for drop in UOP, etc. as per previous interval update note. May need emergent DCCV if clinical worsening observed overnight. EP to see in AM otherwise. Made NPO > MN.    Marce Savage MD   Cardiology Fellow, PGY-5        Discussed with Dr Soria.       Addendum:  Delayed entry note  8/22, 3:50 am       Hemodynamics rechecked ~ 1 AM as follow      CVP 9, SVO2 61    Calculated CO 4.5, CI 2.4, SVR 1300 by Charo.     UOP ~200 cc/hr per nursing.     Remains in AF RVR.     No changes to current management, continue on Epi, Lasix gtt, IABP 1:2.     Discussed above with Dr Soria.

## 2019-08-22 NOTE — PROGRESS NOTES
"[facility]  Heart Transplant  Progress Note    Patient Name: Sherice Squires  MRN: [id number]  Admission Date: 8/20/2019  Hospital Length of Stay: 2 days  Attending Physician: Annamarie Soria MD  Primary Care Provider: Annamarie Soria MD  Principal Problem:Acute on chronic combined systolic and diastolic heart failure    Subjective:     **Interval History: Has been back in A fib with RVR since ~ 5 pm yesterday. Rebolused with Amio, and gtt increased to 1 mg/min. D/C'd low dose Epi and gave IV Dig load of 250 mcg. Plan to cardiovert again today. CVP has trended down to 11, sVO2 65. IABP is at 1:2. Resting comfortably, but doesn't feel "quite right" since back in A fib. No BM in 4 days    Continuous Infusions:   amiodarone in dextrose 5% 1 mg/min (08/22/19 1000)    furosemide (LASIX) 2 mg/mL infusion (non-titrating) 40 mg/hr (08/22/19 1000)    heparin (porcine) in D5W 14 Units/kg/hr (08/22/19 1000)     Scheduled Meds:   aztreonam  1,000 mg Intravenous Q8H    senna  8.6 mg Oral BID     PRN Meds:heparin (PORCINE), heparin (PORCINE), polyethylene glycol, ramelteon, silver sulfADIAZINE 1%, sodium chloride 0.9%    Review of patient's allergies indicates:   Allergen Reactions    Augmentin [amoxicillin-pot clavulanate] Swelling     Lip swelling      Penicillins      Other reaction(s): Swelling  Lip swelling       Objective:     Vital Signs (Most Recent):  Temp: 98.6 °F (37 °C) (08/22/19 0700)  Pulse: (!) 146 (08/22/19 1012)  Resp: (!) 33 (08/22/19 1012)  BP: 94/61 (08/22/19 1012)  SpO2: 96 % (08/22/19 1012) Vital Signs (24h Range):  Temp:  [97.9 °F (36.6 °C)-98.8 °F (37.1 °C)] 98.6 °F (37 °C)  Pulse:  [] 146  Resp:  [16-40] 33  SpO2:  [63 %-100 %] 96 %  BP: ()/(55-82) 94/61     Patient Vitals for the past 72 hrs (Last 3 readings):   Weight   08/22/19 0413 80.2 kg (176 lb 12.9 oz)   08/21/19 0737 79.8 kg (175 lb 14.8 oz)   08/20/19 0400 80.4 kg (177 lb 4 oz)     Body mass index is 29.42 " kg/m².      Intake/Output Summary (Last 24 hours) at 8/22/2019 1055  Last data filed at 8/22/2019 1000  Gross per 24 hour   Intake 2519.58 ml   Output 7265 ml   Net -4745.42 ml       Hemodynamic Parameters:       Telemetry: A fib with RVR    Physical Exam   Constitutional: She is oriented to person, place, and time. She appears well-developed and well-nourished.   HENT:   Head: Normocephalic and atraumatic.   Eyes: Pupils are equal, round, and reactive to light. Conjunctivae and EOM are normal.   Neck: Normal range of motion. Neck supple. No JVD present. No thyromegaly present.   RIJ TLC   Cardiovascular: Intact distal pulses.   Tachycardic, irregularly irregular   Pulmonary/Chest: Effort normal and breath sounds normal.   Abdominal: Soft. Bowel sounds are normal.   Musculoskeletal: Normal range of motion. She exhibits no edema.   Neurological: She is alert and oriented to person, place, and time.   Skin: Skin is warm and dry. Capillary refill takes 2 to 3 seconds.   Psychiatric: She has a normal mood and affect. Her behavior is normal. Judgment and thought content normal.       Significant Labs:  CBC:  Recent Labs   Lab 08/20/19  0432 08/21/19  0352 08/22/19  0413   WBC 6.76 8.28 10.08   RBC 4.30 4.10 4.35   HGB 13.0 12.3 13.3   HCT 40.1 37.6 39.8   * 118* 98*   MCV 93 92 92   MCH 30.2 30.0 30.6   MCHC 32.4 32.7 33.4     BNP:  Recent Labs   Lab 08/15/19  1143 08/19/19  1541   BNP 3,347*  3,347* 3,234*     CMP:  Recent Labs   Lab 08/21/19  1500  08/21/19  2009 08/22/19  0413 08/22/19  0551   *   < > 171* 154* 147*   CALCIUM 9.8   < > 9.8 9.6 9.6   ALBUMIN 2.7*  --   --  2.6* 2.6*   PROT 6.7  --   --  6.6 6.7      < > 140 139 135*   K 3.5   < > 4.6 5.1 5.3*   CO2 31*   < > 30* 31* 27   CL 94*   < > 97 96 94*   BUN 76*   < > 69* 60* 63*   CREATININE 2.7*   < > 2.3* 2.0* 2.0*   ALKPHOS 274*  --   --  245* 242*   ALT 24  --   --  23 21   AST 20  --   --  19 24   BILITOT 0.8  --   --  0.6 0.6    < >  = values in this interval not displayed.      Coagulation:   Recent Labs   Lab 08/20/19  0147 08/20/19  0849  08/21/19  0002 08/21/19  0617 08/22/19  0413   INR 1.7* 1.9*  --   --   --   --    APTT 26.5 26.4   < > 37.5* 42.2* 38.2*    < > = values in this interval not displayed.     LDH:  No results for input(s): LDH in the last 72 hours.  Microbiology:  Microbiology Results (last 7 days)     Procedure Component Value Units Date/Time    Urine culture [[id number]]  (Abnormal)  (Susceptibility) Collected:  08/20/19 0422    Order Status:  Completed Specimen:  Urine Updated:  08/22/19 0451     Urine Culture, Routine ESCHERICHIA COLI  >100,000 cfu/ml      Narrative:       Preferred Collection Type->Urine, Clean Catch          I have reviewed all pertinent labs wit  hin the past 24 hours.    Estimated Creatinine Clearance: 28.2 mL/min (A) (based on SCr of 2 mg/dL (H)).    Diagnostic Results:  I have reviewed all pertinent imaging results/findings within the past 24 hours.       Assessment and Plan:     Sherice Squires is a 67 yo AAF with PMHx significant for NIDCM / stage D HFrEF (LVEF 20-25%, LVEDD 5.9 cm) s/p dc ICD, AF, HTN, DLD, CKD who is admitted as a transfer from  ED for management of ADHF +/- possible cardiogenic shock.     Patient reports she presented to OSH with progressive/worsening shortness of breath on exertion, orthopnea, and peripheral swelling of approx 5 days duration associated with nausea and fatigue. Denies fever/chills/sweats, chest pain, diaphoresis, presyncope/syncope. Does report intermittent palpitations.      Patient was afebrile and normotensive on arrival (/55 mmHg) to OSH ED with pulses in the 120-130s in AF with RVR. Initial labs showed worsening FAMILIA on CKD (with Cr 3.8 from 2.7-3.1 this past week from previous baseline of 1.3-1.4 in June 2019) as well as elevated T bili 2.3 and BNP >3000. CXR obtained without acute cardiopulmonary process. Patient ultimately transferred to [facility] for  higher level of care.      Patient follows with HTS clinic - currently on Entresto 97/103 mg BID, Toprol  mg daily, Aldactone 25 mg daily, and digoxin 0.125 mg daily. She was last seen by Dr Rodriguez on 8/6/19 who added metolazone 2.5 mg QHS before evening dose of Bumex to augment diuresis due to complaints of SOB / peripheral swelling at the time. She did not respond to this regimen and reports she was ultimately switched to Torsemide instead.      Patient is fully complaint with her medicines. Also adherent to fluid / salt restrictions with her hx of congestive heart failure.      States she was hospitalized only once before for ADHF within the past year.      Of note patient follows with Dr Reece of EP for her AF hx, last seen by him in 5/2019    * Acute on chronic combined systolic and diastolic heart failure  - 69 yo F with NIDCM , NYHA FC III with EF 20-25% and LVEDD 5.9 cm by echo 7/2019 who is admitted with afib with rvr and cardiogenic shock  -cvp has trended down to 11, and she is net -ve 5L past 24 hours. Continue Lasix at 40 mg/hr, and likely decrease after cardioversion  -sVO2 65 with calculated CO 6.06 and . Low dose Epi D/C'd this morning 2/2 recurrence of A fib with RVR  -nitric oxide weaned off yesterday  -TTE with contrast 8/20: LVEDD 6.51, LVEF `5%, indeterminate diastolic function, severe LAE, trace AI, mod MR and TR, PAS 51 and IVC 15    Constipation  -Increase bowel program    Thrombocytopenia, unspecified  -Platelet count trending down likely 2/2 IABP, but will check HIT/BELGICA    Chronic pain of left knee  -Long h/o of this for which she uses a cane, and more recently, a walker  -Consult PT/OT    Sepsis due to gram-negative urinary tract infection  -had hesitancy on history prior to arrival and growing e. Coli in urine  -aztreonam started 8/21 given pcn allergy  -Plan to D/C [place] once diuretics decreased and will then stop Astreonam    Acute on chronic kidney failure  - Cr up to 4.0  from baseline 1.3-1.4 in June 2019, downtrending with improvement in cardiogenic shock    Atrial fibrillation with RVR  - return to sinus rhythm after dccv/candice 8/20/19, but is nor back in A fib with RVR. Low dose Epi D/C'd, and given IV Dig load of 250 mcg. Plan repeat cardioversion today  -on amio gtt (rebolused and increased back to 1 mg/min) and heparin gtt    Hypertension  - holding home antihypertensives due to decompensated heart failure and possible component of cardiogenic shock.     Automatic implantable cardioverter-defibrillator in situ  -S/P [employer] dual chamber ICD  - hx VT/VF per chart review.  - telemetry monitoring and repletion of electrolytes PRN.      Uninterrupted Critical Care/Counseling Time (not including procedures): 45 minutes      Fanny Clemens NP [id number]  Heart Transplant  [facility]

## 2019-08-22 NOTE — SUBJECTIVE & OBJECTIVE
Interval History: Patient states she feels fatigued and has palpitations, no chest pain.    ROS  Objective:     Vital Signs (Most Recent):  Temp: 97.8 °F (36.6 °C) (08/22/19 1108)  Pulse: (!) 129 (08/22/19 1300)  Resp: (!) 21 (08/22/19 1300)  BP: 97/72 (08/22/19 1300)  SpO2: (!) 87 % (08/22/19 1300) Vital Signs (24h Range):  Temp:  [97.8 °F (36.6 °C)-98.8 °F (37.1 °C)] 97.8 °F (36.6 °C)  Pulse:  [] 129  Resp:  [16-40] 21  SpO2:  [63 %-100 %] 87 %  BP: ()/(40-82) 97/72     Weight: 80.2 kg (176 lb 12.9 oz)  Body mass index is 29.42 kg/m².     SpO2: (!) 87 %  O2 Device (Oxygen Therapy): room air    Physical Exam   Constitutional: She is oriented to person, place, and time. She appears well-developed and well-nourished.   HENT:   Head: Normocephalic and atraumatic.   Eyes: Pupils are equal, round, and reactive to light. Conjunctivae and EOM are normal.   Neck: JVD present.   Cardiovascular: Normal heart sounds and intact distal pulses. An irregularly irregular rhythm present. Tachycardia present. Exam reveals no gallop and no friction rub.   No murmur heard.  Pulmonary/Chest: Effort normal. No stridor. She has no wheezes. She has no rales. She exhibits no tenderness.   Abdominal: Soft. Bowel sounds are normal. She exhibits no distension and no mass. There is no tenderness. There is no guarding.   Musculoskeletal: She exhibits no edema, tenderness or deformity.   IABP in the groin, no oozing   Neurological: She is alert and oriented to person, place, and time.   Skin: Skin is warm and dry. No rash noted. No erythema.   Psychiatric: She has a normal mood and affect.       Significant Labs:   EP:   Recent Labs   Lab 08/21/19  0352  08/21/19  1500  08/21/19 2009 08/22/19  0413 08/22/19  0551   *   < > 140   < > 140 139 135*   K 3.5   < > 3.5   < > 4.6 5.1 5.3*   CL 99   < > 94*   < > 97 96 94*   CO2 18*   < > 31*   < > 30* 31* 27   *   < > 230*   < > 171* 154* 147*   BUN 85*   < > 76*   < > 69* 60*  63*   CREATININE 3.3*   < > 2.7*   < > 2.3* 2.0* 2.0*   CALCIUM 9.1   < > 9.8   < > 9.8 9.6 9.6   PROT 6.3   < > 6.7  --   --  6.6 6.7   ALBUMIN 2.7*   < > 2.7*  --   --  2.6* 2.6*   BILITOT 0.7   < > 0.8  --   --  0.6 0.6   ALKPHOS 262*   < > 274*  --   --  245* 242*   AST 29   < > 20  --   --  19 24   ALT 24   < > 24  --   --  23 21   ANIONGAP 17*   < > 15   < > 13 12 14   ESTGFRAFRICA 15.8*   < > 20.1*   < > 24.4* 28.9* 28.9*   EGFRNONAA 13.7*   < > 17.5*   < > 21.2* 25.1* 25.1*   WBC 8.28  --   --   --   --  10.08  --    HGB 12.3  --   --   --   --  13.3  --    HCT 37.6  --   --   --   --  39.8  --    *  --   --   --   --  98*  --     < > = values in this interval not displayed.       TELE: SR with PACs up until 5pm, then paroxysms of AF that transitioned to continuous AF with rates in the 130-140 range overnight.

## 2019-08-22 NOTE — SUBJECTIVE & OBJECTIVE
"**Interval History: Has been back in A fib with RVR since ~ 5 pm yesterday. Rebolused with Amio, and gtt increased to 1 mg/min. D/C'd low dose Epi and gave IV Dig load of 250 mcg. Plan to cardiovert again today. CVP has trended down to 11, sVO2 65. IABP is at 1:2. Resting comfortably, but doesn't feel "quite right" since back in A fib. No BM in 4 days    Continuous Infusions:   amiodarone in dextrose 5% 1 mg/min (08/22/19 1000)    furosemide (LASIX) 2 mg/mL infusion (non-titrating) 40 mg/hr (08/22/19 1000)    heparin (porcine) in D5W 14 Units/kg/hr (08/22/19 1000)     Scheduled Meds:   aztreonam  1,000 mg Intravenous Q8H    senna  8.6 mg Oral BID     PRN Meds:heparin (PORCINE), heparin (PORCINE), polyethylene glycol, ramelteon, silver sulfADIAZINE 1%, sodium chloride 0.9%    Review of patient's allergies indicates:   Allergen Reactions    Augmentin [amoxicillin-pot clavulanate] Swelling     Lip swelling      Penicillins      Other reaction(s): Swelling  Lip swelling       Objective:     Vital Signs (Most Recent):  Temp: 98.6 °F (37 °C) (08/22/19 0700)  Pulse: (!) 146 (08/22/19 1012)  Resp: (!) 33 (08/22/19 1012)  BP: 94/61 (08/22/19 1012)  SpO2: 96 % (08/22/19 1012) Vital Signs (24h Range):  Temp:  [97.9 °F (36.6 °C)-98.8 °F (37.1 °C)] 98.6 °F (37 °C)  Pulse:  [] 146  Resp:  [16-40] 33  SpO2:  [63 %-100 %] 96 %  BP: ()/(55-82) 94/61     Patient Vitals for the past 72 hrs (Last 3 readings):   Weight   08/22/19 0413 80.2 kg (176 lb 12.9 oz)   08/21/19 0737 79.8 kg (175 lb 14.8 oz)   08/20/19 0400 80.4 kg (177 lb 4 oz)     Body mass index is 29.42 kg/m².      Intake/Output Summary (Last 24 hours) at 8/22/2019 1055  Last data filed at 8/22/2019 1000  Gross per 24 hour   Intake 2519.58 ml   Output 7265 ml   Net -4745.42 ml       Hemodynamic Parameters:       Telemetry: A fib with RVR    Physical Exam   Constitutional: She is oriented to person, place, and time. She appears well-developed and " well-nourished.   HENT:   Head: Normocephalic and atraumatic.   Eyes: Pupils are equal, round, and reactive to light. Conjunctivae and EOM are normal.   Neck: Normal range of motion. Neck supple. No JVD present. No thyromegaly present.   RIJ TLC   Cardiovascular: Intact distal pulses.   Tachycardic, irregularly irregular   Pulmonary/Chest: Effort normal and breath sounds normal.   Abdominal: Soft. Bowel sounds are normal.   Musculoskeletal: Normal range of motion. She exhibits no edema.   Neurological: She is alert and oriented to person, place, and time.   Skin: Skin is warm and dry. Capillary refill takes 2 to 3 seconds.   Psychiatric: She has a normal mood and affect. Her behavior is normal. Judgment and thought content normal.       Significant Labs:  CBC:  Recent Labs   Lab 08/20/19  0432 08/21/19  0352 08/22/19  0413   WBC 6.76 8.28 10.08   RBC 4.30 4.10 4.35   HGB 13.0 12.3 13.3   HCT 40.1 37.6 39.8   * 118* 98*   MCV 93 92 92   MCH 30.2 30.0 30.6   MCHC 32.4 32.7 33.4     BNP:  Recent Labs   Lab 08/15/19  1143 08/19/19  1541   BNP 3,347*  3,347* 3,234*     CMP:  Recent Labs   Lab 08/21/19  1500  08/21/19  2009 08/22/19  0413 08/22/19  0551   *   < > 171* 154* 147*   CALCIUM 9.8   < > 9.8 9.6 9.6   ALBUMIN 2.7*  --   --  2.6* 2.6*   PROT 6.7  --   --  6.6 6.7      < > 140 139 135*   K 3.5   < > 4.6 5.1 5.3*   CO2 31*   < > 30* 31* 27   CL 94*   < > 97 96 94*   BUN 76*   < > 69* 60* 63*   CREATININE 2.7*   < > 2.3* 2.0* 2.0*   ALKPHOS 274*  --   --  245* 242*   ALT 24  --   --  23 21   AST 20  --   --  19 24   BILITOT 0.8  --   --  0.6 0.6    < > = values in this interval not displayed.      Coagulation:   Recent Labs   Lab 08/20/19  0147 08/20/19  0849  08/21/19  0002 08/21/19  0617 08/22/19  0413   INR 1.7* 1.9*  --   --   --   --    APTT 26.5 26.4   < > 37.5* 42.2* 38.2*    < > = values in this interval not displayed.     LDH:  No results for input(s): LDH in the last 72  hours.  Microbiology:  Microbiology Results (last 7 days)     Procedure Component Value Units Date/Time    Urine culture [[id number]]  (Abnormal)  (Susceptibility) Collected:  08/20/19 0422    Order Status:  Completed Specimen:  Urine Updated:  08/22/19 0451     Urine Culture, Routine ESCHERICHIA COLI  >100,000 cfu/ml      Narrative:       Preferred Collection Type->Urine, Clean Catch          I have reviewed all pertinent labs wit  hin the past 24 hours.    Estimated Creatinine Clearance: 28.2 mL/min (A) (based on SCr of 2 mg/dL (H)).    Diagnostic Results:  I have reviewed all pertinent imaging results/findings within the past 24 hours.

## 2019-08-22 NOTE — ANESTHESIA POSTPROCEDURE EVALUATION
Anesthesia Post Evaluation    Patient: Sherice Squires    Procedure(s) Performed: Procedure(s) (LRB):  CARDIOVERSION (N/A)  ECHOCARDIOGRAM,TRANSESOPHAGEAL (N/A)    Final Anesthesia Type: general  Patient location during evaluation: PACU  Patient participation: Yes- Able to Participate  Level of consciousness: awake and alert and oriented  Post-procedure vital signs: reviewed and stable  Pain management: adequate  Airway patency: patent  PONV status at discharge: No PONV  Anesthetic complications: no      Cardiovascular status: blood pressure returned to baseline and hemodynamically stable  Respiratory status: unassisted  Hydration status: euvolemic  Follow-up not needed.          Vitals Value Taken Time   BP 94/61 8/22/2019 10:12 AM   Temp 37 °C (98.6 °F) 8/22/2019  7:00 AM   Pulse 142 8/22/2019 10:43 AM   Resp 27 8/22/2019 10:43 AM   SpO2 83 % 8/22/2019 10:43 AM   Vitals shown include unvalidated device data.      No case tracking events are documented in the log.      Pain/Yaritza Score: No data recorded

## 2019-08-22 NOTE — ANESTHESIA PREPROCEDURE EVALUATION
08/22/2019  Sherice Squires is a 68 y.o., female.    Anesthesia Evaluation    I have reviewed the Patient Summary Reports.    I have reviewed the Nursing Notes.      Review of Systems  Anesthesia Hx:  No problems with previous Anesthesia    Hematology/Oncology:  Hematology Normal   Oncology Normal     EENT/Dental:EENT/Dental Normal   Cardiovascular:   Hypertension CHF Dilated cardiomyopathy  Automatic implantable cardioverter-defibrillator in situ  PSVT (paroxysmal supraventricular tachycardia)  Congestive heart failure, NYHA class 3 and ACC/AHA stage C  severe Mitral regurg wih mild sclerosis  Atrial fibrillation with RVR  Acute on chronic combined systolic and diastolic heart failure         Pulmonary:  Pulmonary Normal    Renal/:   Chronic Renal Disease    Hepatic/GI:  Hepatic/GI Normal    Musculoskeletal:   Arthritis     Neurological:  Neurology Normal    Endocrine:  Endocrine Normal    Dermatological:  Skin Normal    Psych:  Psychiatric Normal           Physical Exam  General:  Well nourished    Airway/Jaw/Neck:  Airway Findings: Mouth Opening: Normal Tongue: Normal  General Airway Assessment: Adult  Mallampati: II  TM Distance: Normal, at least 6 cm        Eyes/Ears/Nose:  EYES/EARS/NOSE FINDINGS: Normal   Dental:  Dental Findings: Periodontal disease, Severe    Chest/Lungs:  Chest/Lungs Clear    Heart/Vascular:  Heart Findings: Normal Heart murmur: negative Vascular Findings: Normal    Abdomen:  Abdomen Findings: Normal    Musculoskeletal:  Musculoskeletal Findings: Normal   Skin:  Skin Findings: Normal    Mental Status:  Mental Status Findings: Normal        Anesthesia Plan  Type of Anesthesia, risks & benefits discussed:  Anesthesia Type:  general  Patient's Preference:   Intra-op Monitoring Plan:   Intra-op Monitoring Plan Comments:   Post Op Pain Control Plan:   Post Op Pain Control Plan  Comments:   Induction:   IV  Beta Blocker:  Patient is not currently on a Beta-Blocker (No further documentation required).       Informed Consent: Patient understands risks and agrees with Anesthesia plan.  Questions answered. Anesthesia consent signed with patient.  ASA Score: 4     Day of Surgery Review of History & Physical:    H&P update referred to the surgeon.         Ready For Surgery From Anesthesia Perspective.

## 2019-08-22 NOTE — ASSESSMENT & PLAN NOTE
69 y/o woman history of NICM 20-25%, persistent atrial fibrillation on apixaban, [employer] ICD, CKD presenting with cardiogenic shock and acute decompensated heart failure in setting of atrial fibrillation w RVR, and high atrial fibrillation burden on device interrogation. Failed DCCV on 08/21    - repeat DCCV today  - Continue amiodarone IV 1mg/min, decrease to 0.5mg/min after DCCV, then transition to PO when euvolemic (200mg daily)  - recommend digoxin load to help with rate control in the interim, given renal dysfunction, would not continue thereafter unless persistent problems with rate control  - Will need follow up with Dr. Reece in EP clinic in 3-4 weeks following discharge  - Will need to continue anticoagulation, defer anticoagulant choice to primary team given her labile renal function  - will sign off if DCCV is successful and hemodynamics improve, please call back with further rhythm issues

## 2019-08-22 NOTE — NURSING
At the request of Dr. Soria, I have been asked to meet patient and provide VAD education. Introduced self and reason for visit. Pt AAAO. No family at bedside. Provided phase 1 written VAD education. Included in Phase 1 folder is the following:     Evaluation Eval for [facility]  VAD support flyer  [place]: Living a more active life  Living with hVAD system  [place] pamphlet  Picture of 3 VADs offered at [facility]    Explained that we use 3 different types of pumps here and information on pumps is in the black folder. I explained the work up process as well.     Explained to look over the entire contents and read Evaluation Eval for [facility] acknowledgement form.  Also explained that they should bring this folder with them to all clinic visits and if they are admitted to the hospital so that we can continue education as needed. Should there be any questions, please write them down and bring with you or feel free to call and we can talk on the phone. All questions answered to patient's satisfaction as evidence by verbal acknowledgement.

## 2019-08-22 NOTE — ANESTHESIA POSTPROCEDURE EVALUATION
Anesthesia Post Evaluation    Patient: Sherice Squires    Procedure(s) Performed: Procedure(s) (LRB):  CARDIOVERSION (N/A)    Final Anesthesia Type: general  Patient location during evaluation: ICU  Note status: arousable.  Post-procedure vital signs: reviewed and stable  Pain management: adequate  Airway patency: patent  PONV status at discharge: No PONV  Anesthetic complications: no      Cardiovascular status: blood pressure returned to baseline  Respiratory status: spontaneous ventilation and nasal cannula  Hydration status: euvolemic  Follow-up not needed.          Vitals Value Taken Time   BP 94/72 8/22/2019  3:11 PM   Temp 36.8 °C (98.2 °F) 8/22/2019  3:00 PM   Pulse 246 8/22/2019  3:12 PM   Resp 28 8/22/2019  3:12 PM   SpO2 82 % 8/22/2019  3:12 PM   Vitals shown include unvalidated device data.      No case tracking events are documented in the log.      Pain/Yaritza Score: No data recorded

## 2019-08-22 NOTE — TELEPHONE ENCOUNTER
Per Dr. Soria, care of pt is to be transferred to pre heart section from CHF section of AHF clinic.  HF coordinators and FC notified electronically.      Attempted to see patient in hospital; bedside procedure in progress.  Will try back in am.

## 2019-08-22 NOTE — ASSESSMENT & PLAN NOTE
- 69 yo F with NIDCM , NYHA FC III with EF 20-25% and LVEDD 5.9 cm by echo 7/2019 who is admitted with afib with rvr and cardiogenic shock  -cvp has trended down to 11, and she is net -ve 5L past 24 hours. Continue Lasix at 40 mg/hr, and likely decrease after cardioversion  -sVO2 65 with calculated CO 6.06 and . Low dose Epi D/C'd this morning 2/2 recurrence of A fib with RVR  -nitric oxide weaned off yesterday  -TTE with contrast 8/20: LVEDD 6.51, LVEF `5%, indeterminate diastolic function, severe LAE, trace AI, mod MR and TR, PAS 51 and IVC 15

## 2019-08-22 NOTE — PROGRESS NOTES
[facility]  Cardiac Electrophysiology  Progress Note    Admission Date: 8/20/2019  Code Status: Full Code   Attending Physician: Annamarie Soria MD   Expected Discharge Date: 8/28/2019  Principal Problem:Acute on chronic combined systolic and diastolic heart failure    Subjective:     Interval History: Patient states she feels fatigued and has palpitations, no chest pain.    ROS  Objective:     Vital Signs (Most Recent):  Temp: 97.8 °F (36.6 °C) (08/22/19 1108)  Pulse: (!) 129 (08/22/19 1300)  Resp: (!) 21 (08/22/19 1300)  BP: 97/72 (08/22/19 1300)  SpO2: (!) 87 % (08/22/19 1300) Vital Signs (24h Range):  Temp:  [97.8 °F (36.6 °C)-98.8 °F (37.1 °C)] 97.8 °F (36.6 °C)  Pulse:  [] 129  Resp:  [16-40] 21  SpO2:  [63 %-100 %] 87 %  BP: ()/(40-82) 97/72     Weight: 80.2 kg (176 lb 12.9 oz)  Body mass index is 29.42 kg/m².     SpO2: (!) 87 %  O2 Device (Oxygen Therapy): room air    Physical Exam   Constitutional: She is oriented to person, place, and time. She appears well-developed and well-nourished.   HENT:   Head: Normocephalic and atraumatic.   Eyes: Pupils are equal, round, and reactive to light. Conjunctivae and EOM are normal.   Neck: JVD present.   Cardiovascular: Normal heart sounds and intact distal pulses. An irregularly irregular rhythm present. Tachycardia present. Exam reveals no gallop and no friction rub.   No murmur heard.  Pulmonary/Chest: Effort normal. No stridor. She has no wheezes. She has no rales. She exhibits no tenderness.   Abdominal: Soft. Bowel sounds are normal. She exhibits no distension and no mass. There is no tenderness. There is no guarding.   Musculoskeletal: She exhibits no edema, tenderness or deformity.   IABP in the groin, no oozing   Neurological: She is alert and oriented to person, place, and time.   Skin: Skin is warm and dry. No rash noted. No erythema.   Psychiatric: She has a normal mood and affect.       Significant Labs:   EP:   Recent Labs    Lab 08/21/19  0352  08/21/19  1500  08/21/19 2009 08/22/19  0413 08/22/19  0551   *   < > 140   < > 140 139 135*   K 3.5   < > 3.5   < > 4.6 5.1 5.3*   CL 99   < > 94*   < > 97 96 94*   CO2 18*   < > 31*   < > 30* 31* 27   *   < > 230*   < > 171* 154* 147*   BUN 85*   < > 76*   < > 69* 60* 63*   CREATININE 3.3*   < > 2.7*   < > 2.3* 2.0* 2.0*   CALCIUM 9.1   < > 9.8   < > 9.8 9.6 9.6   PROT 6.3   < > 6.7  --   --  6.6 6.7   ALBUMIN 2.7*   < > 2.7*  --   --  2.6* 2.6*   BILITOT 0.7   < > 0.8  --   --  0.6 0.6   ALKPHOS 262*   < > 274*  --   --  245* 242*   AST 29   < > 20  --   --  19 24   ALT 24   < > 24  --   --  23 21   ANIONGAP 17*   < > 15   < > 13 12 14   ESTGFRAFRICA 15.8*   < > 20.1*   < > 24.4* 28.9* 28.9*   EGFRNONAA 13.7*   < > 17.5*   < > 21.2* 25.1* 25.1*   WBC 8.28  --   --   --   --  10.08  --    HGB 12.3  --   --   --   --  13.3  --    HCT 37.6  --   --   --   --  39.8  --    *  --   --   --   --  98*  --     < > = values in this interval not displayed.       TELE: SR with PACs up until 5pm, then paroxysms of AF that transitioned to continuous AF with rates in the 130-140 range overnight.    Assessment and Plan:     Atrial fibrillation with RVR  69 y/o woman history of NICM 20-25%, persistent atrial fibrillation on apixaban, [employer] ICD, CKD presenting with cardiogenic shock and acute decompensated heart failure in setting of atrial fibrillation w RVR, and high atrial fibrillation burden on device interrogation. Failed DCCV on 08/21 and 08/22.    - Can amio to 0.5mg/min after DCCV, then transition to PO 200mg daily if in SR for >12 hours  - recommend digoxin load to help with rate control in the interim, given renal dysfunction, would not continue thereafter unless persistent problems with rate control  - Will need to continue anticoagulation, defer anticoagulant choice to primary team given her labile renal function  - if unable to get either rate or rhythm control  over the weekend, will sign up on Sunday for AV ken ablation and device upgrade to Bi-V  - in the interim, if arrhythmia causes hemodynamic instability, would proceed with additional attempts at emergent cardioversion as per ACLS protocol. Elective cardioversion is unlikely to give any further benefit.      Kailash Lizarraga MD  Cardiac Electrophysiology  [facility]

## 2019-08-22 NOTE — TRANSFER OF CARE
"Anesthesia Transfer of Care Note    Patient: Sherice Squires    Procedure(s) Performed: Procedure(s) (LRB):  CARDIOVERSION (N/A)    Patient location: ICU    Anesthesia Type: general    Transport from OR: Transported from OR on 2-3 L/min O2 by NC with adequate spontaneous ventilation    Post pain: adequate analgesia    Post assessment: no apparent anesthetic complications    Post vital signs: stable    Level of consciousness: lethargic and responds to stimulation    Nausea/Vomiting: no nausea/vomiting    Complications: none    Transfer of care protocol was followed      Last vitals:   Visit Vitals  BP 97/72   Pulse (!) 129   Temp 36.6 °C (97.8 °F) (Oral)   Resp (!) 21   Ht 5' 5" (1.651 m)   Wt 80.2 kg (176 lb 12.9 oz)   SpO2 (!) 87%   Breastfeeding? No   BMI 29.42 kg/m²     "

## 2019-08-22 NOTE — NURSING
Notified MD Savage of repeat SVO2, 73. CVP 9. Remains with good UO ~ 300cc per hour. Remains A-fib RVR, rate 120-130. No new orders at this time, WCTM.

## 2019-08-22 NOTE — ASSESSMENT & PLAN NOTE
-S/P [employer] dual chamber ICD  - hx VT/VF per chart review.  - telemetry monitoring and repletion of electrolytes PRN.

## 2019-08-22 NOTE — ASSESSMENT & PLAN NOTE
- return to sinus rhythm after dccv/candice 8/20/19, but is nor back in A fib with RVR. Low dose Epi D/C'd, and given IV Dig load of 250 mcg. Plan repeat cardioversion today  -on amio gtt (rebolused and increased back to 1 mg/min) and heparin gtt

## 2019-08-22 NOTE — ASSESSMENT & PLAN NOTE
67 y/o woman history of NICM 20-25%, persistent atrial fibrillation on apixaban, [employer] ICD, CKD presenting with cardiogenic shock and acute decompensated heart failure in setting of atrial fibrillation w RVR, and high atrial fibrillation burden on device interrogation. Failed DCCV on 08/21 and 08/22.    - Can continue IV amio, will plan for transition to PO 200mg daily if in SR for >12 hours  - s/p digoxin load to help with rate control in the interim, given renal dysfunction, would not continue thereafter unless persistent problems with rate control  - Will need to continue anticoagulation, defer anticoagulant choice to primary team given her labile renal function  - if unable to get either rate or rhythm control over the weekend, will sign up on Sunday for AV ken ablation and device upgrade to Bi-V  - in the interim, if arrhythmia causes hemodynamic instability, would proceed with additional attempts at emergent cardioversion as per ACLS protocol. Elective cardioversion is unlikely to give any further benefit.

## 2019-08-22 NOTE — PROGRESS NOTES
HTS update plan of care, delayed entry   8/21, 7:54 pm       Patient went back into AF with RVR around 5 pm.     S/p amio bolus 150 mg IV x1 and increased infusion rate to 1 mg/min.    CVP 13 from 7 (prior to going into AF)    UOP has been stable over past 1-2 hours, voiding ~  500 cc/h.     Electrolytes stable on CMP (K4.1, Mg 2.4). Cr improved to 2.6.      EP made aware --> they will revisit patient tomorrow AM.     If patient's hemodynamics worsen / UOP drops low threshold for emergent DCCV. Will continue to monitor closely tonight.     Marce Savage MD   Cardiology Fellow, PGY-5        Discussed with Dr Soria.

## 2019-08-22 NOTE — ANESTHESIA PREPROCEDURE EVALUATION
08/22/2019  Sherice Squires is a 68 y.o., female.    Anesthesia Evaluation    I have reviewed the Patient Summary Reports.     I have reviewed the Medications.     Review of Systems  Anesthesia Hx:  History of prior surgery of interest to airway management or planning: Denies Family Hx of Anesthesia complications.   Denies Personal Hx of Anesthesia complications.   Social:  No Alcohol Use, Non-Smoker    Hematology/Oncology:  Hematology Normal   Oncology Normal     EENT/Dental:EENT/Dental Normal   Cardiovascular:   Hypertension Dysrhythmias CHF Conclusion     · Moderate left ventricular enlargement.  · Severely decreased left ventricular systolic function. The estimated ejection fraction is 15%  · Severe global hypokinetic wall motion.  · Septal wall has abnormal motion. Systolic and diastolic flattening of the interventricular septum consistent with right ventricle pressure and volume overload.  · Eccentric left ventricular hypertrophy.  · Indeterminate left ventricular diastolic function.  · Mildly reduced right ventricular systolic function.  · Severe left atrial enlargement.  · Moderate mitral regurgitation.  · Moderate tricuspid regurgitation.  · The estimated PA systolic pressure is 51 mm Hg. Pulmonary hypertension present.  · Elevated central venous pressure (15 mm Hg).        Pulmonary:   Pulmonary HTN   Renal/:   Chronic Renal Disease    Hepatic/GI:  Hepatic/GI Normal    Musculoskeletal:   Arthritis     Neurological:  Neurology Normal    Endocrine:  Endocrine Normal    Dermatological:  Skin Normal    Psych:  Psychiatric Normal           Physical Exam  General:  Well nourished    Airway/Jaw/Neck:  Airway Findings: Mouth Opening: Normal Tongue: Normal  General Airway Assessment: Adult  Mallampati: III  Improves to III, II with phonation.  TM Distance: Normal, at least 6 cm        Chest/Lungs:  Chest/Lungs Findings: Clear to auscultation, Normal Respiratory Rate     Heart/Vascular:  Heart Findings: Rate: Normal  Rhythm: Frequent Prematures     Abdomen:  Abdomen Findings:  Normal, Soft, Nontender       Mental Status:  Mental Status Findings:  Cooperative, Alert and Oriented         Anesthesia Plan  Type of Anesthesia, risks & benefits discussed:  Anesthesia Type:  general  Patient's Preference:   Intra-op Monitoring Plan: standard ASA monitors  Intra-op Monitoring Plan Comments:   Post Op Pain Control Plan: multimodal analgesia  Post Op Pain Control Plan Comments:   Induction:   IV  Beta Blocker:         Informed Consent: Patient understands risks and agrees with Anesthesia plan.  Questions answered. Anesthesia consent signed with patient.  ASA Score: 3     Day of Surgery Review of History & Physical:    H&P update referred to the surgeon.         Ready For Surgery From Anesthesia Perspective.

## 2019-08-23 NOTE — PROGRESS NOTES
[facility]  Heart Transplant  Progress Note    Patient Name: Sherice Squires  MRN: [id number]  Admission Date: 8/20/2019  Hospital Length of Stay: 3 days  Attending Physician: Annamarie Soria MD  Primary Care Provider: Annamarie Soria MD  Principal Problem:Acute on chronic combined systolic and diastolic heart failure    Subjective:     Interval History: CVP flat around 12 today despite 1.5L net diuresis, feels well, in afib with rvr still but rates in the 110s-120s so better controlled, not much sob, on room air, IABP still in at 1:2    Continuous Infusions:   amiodarone in dextrose 5% 1 mg/min (08/23/19 1206)    furosemide (LASIX) 2 mg/mL infusion (non-titrating) 40 mg/hr (08/23/19 1255)    heparin (porcine) in D5W 14 Units/kg/hr (08/23/19 1206)    nitric oxide gas       Scheduled Meds:   aztreonam  1,000 mg Intravenous Q8H    [START ON 8/24/2019] digoxin  0.125 mg Oral Every Mon, Wed, Fri    metOLazone  5 mg Oral Daily    polyethylene glycol  17 g Oral Daily    senna-docusate 8.6-50 mg  2 tablet Oral BID     PRN Meds:heparin (PORCINE), heparin (PORCINE), ramelteon, silver sulfADIAZINE 1%, sodium chloride 0.9%    Review of patient's allergies indicates:   Allergen Reactions    Augmentin [amoxicillin-pot clavulanate] Swelling     Lip swelling      Penicillins      Other reaction(s): Swelling  Lip swelling       Objective:     Vital Signs (Most Recent):  Temp: 98.3 °F (36.8 °C) (08/23/19 1100)  Pulse: (!) 111 (08/23/19 1205)  Resp: (!) 26 (08/23/19 1205)  BP: 92/68 (08/23/19 1205)  SpO2: 99 % (08/23/19 1205) Vital Signs (24h Range):  Temp:  [97.7 °F (36.5 °C)-98.5 °F (36.9 °C)] 98.3 °F (36.8 °C)  Pulse:  [] 111  Resp:  [14-31] 26  SpO2:  [83 %-100 %] 99 %  BP: ()/(51-88) 92/68     Patient Vitals for the past 72 hrs (Last 3 readings):   Weight   08/23/19 0439 73.4 kg (161 lb 13.1 oz)   08/22/19 0413 80.2 kg (176 lb 12.9 oz)   08/21/19 0737 79.8 kg (175 lb 14.8 oz)     Body mass  index is 26.93 kg/m².      Intake/Output Summary (Last 24 hours) at 8/23/2019 1258  Last data filed at 8/23/2019 1206  Gross per 24 hour   Intake 2555.41 ml   Output 3256 ml   Net -700.59 ml       Hemodynamic Parameters:       Telemetry: A fib with RVR    Physical Exam   Constitutional: She is oriented to person, place, and time. She appears well-developed and well-nourished.   HENT:   Head: Normocephalic and atraumatic.   Eyes: Pupils are equal, round, and reactive to light. Conjunctivae and EOM are normal.   Neck: Normal range of motion. Neck supple. No JVD present. No thyromegaly present.   RIJ TLC   Cardiovascular: Intact distal pulses.   Tachycardic, irregularly irregular   Pulmonary/Chest: Effort normal and breath sounds normal.   Abdominal: Soft. Bowel sounds are normal.   Musculoskeletal: Normal range of motion. She exhibits no edema. Right femoral IABP  Neurological: She is alert and oriented to person, place, and time.   Skin: Skin is warm and dry. Capillary refill takes 2 to 3 seconds.   Psychiatric: She has a normal mood and affect. Her behavior is normal. Judgment and thought content normal.       Significant Labs:  CBC:  Recent Labs   Lab 08/21/19  0352 08/22/19  0413 08/23/19  0248   WBC 8.28 10.08 8.94   RBC 4.10 4.35 4.35   HGB 12.3 13.3 13.0   HCT 37.6 39.8 39.9   * 98* 87*   MCV 92 92 92   MCH 30.0 30.6 29.9   MCHC 32.7 33.4 32.6     BNP:  Recent Labs   Lab 08/19/19  1541   BNP 3,234*     CMP:  Recent Labs   Lab 08/22/19  0551 08/22/19  1416 08/23/19  0248   * 120* 119*   CALCIUM 9.6 9.7 9.5   ALBUMIN 2.6* 2.6* 2.4*   PROT 6.7 6.7 6.6   * 140 138   K 5.3* 5.2* 4.7   CO2 27 35* 35*   CL 94* 94* 91*   BUN 63* 57* 54*   CREATININE 2.0* 1.8* 1.8*   ALKPHOS 242* 242* 225*   ALT 21 22 18   AST 24 19 18   BILITOT 0.6 0.6 0.6      Coagulation:   Recent Labs   Lab 08/20/19  0147 08/20/19  0849  08/22/19  1416 08/22/19  2035 08/23/19  0248   INR 1.7* 1.9*  --   --   --   --    APTT 26.5  26.4   < > 43.9* 47.8* 45.7*    < > = values in this interval not displayed.     LDH:  No results for input(s): LDH in the last 72 hours.  Microbiology:  Microbiology Results (last 7 days)     Procedure Component Value Units Date/Time    Urine culture [[id number]]  (Abnormal)  (Susceptibility) Collected:  08/20/19 0422    Order Status:  Completed Specimen:  Urine Updated:  08/22/19 0451     Urine Culture, Routine ESCHERICHIA COLI  >100,000 cfu/ml      Narrative:       Preferred Collection Type->Urine, Clean Catch          I have reviewed all pertinent labs wit  hin the past 24 hours.    Estimated Creatinine Clearance: 30 mL/min (A) (based on SCr of 1.8 mg/dL (H)).    Diagnostic Results:  I have reviewed all pertinent imaging results/findings within the past 24 hours.    Assessment and Plan:     Shercie Squires is a 69 yo AAF with PMHx significant for NIDCM / stage D HFrEF (LVEF 20-25%, LVEDD 5.9 cm) s/p dc ICD, AF, HTN, DLD, CKD who is admitted as a transfer from  ED for management of ADHF +/- possible cardiogenic shock.     Patient reports she presented to OSH with progressive/worsening shortness of breath on exertion, orthopnea, and peripheral swelling of approx 5 days duration associated with nausea and fatigue. Denies fever/chills/sweats, chest pain, diaphoresis, presyncope/syncope. Does report intermittent palpitations.      Patient was afebrile and normotensive on arrival (/55 mmHg) to OSH ED with pulses in the 120-130s in AF with RVR. Initial labs showed worsening FAMILIA on CKD (with Cr 3.8 from 2.7-3.1 this past week from previous baseline of 1.3-1.4 in June 2019) as well as elevated T bili 2.3 and BNP >3000. CXR obtained without acute cardiopulmonary process. Patient ultimately transferred to [facility] for higher level of care.      Patient follows with [facility] clinic - currently on Entresto 97/103 mg BID, Toprol  mg daily, Aldactone 25 mg daily, and digoxin 0.125 mg daily. She was last seen by Dr Rodriguez on  8/6/19 who added metolazone 2.5 mg QHS before evening dose of Bumex to augment diuresis due to complaints of SOB / peripheral swelling at the time. She did not respond to this regimen and reports she was ultimately switched to Torsemide instead.      Patient is fully complaint with her medicines. Also adherent to fluid / salt restrictions with her hx of congestive heart failure.      States she was hospitalized only once before for ADHF within the past year.      Of note patient follows with Dr Reece of EP for her AF hx, last seen by him in 5/2019    * Acute on chronic combined systolic and diastolic heart failure  - 69 yo F with NIDCM EF 20% , NYHA FC III with admitted with afib with rvr and cardiogenic shock  -TTE with contrast 8/20: LVEDD 6.51, LVEF `5%, indeterminate diastolic function, severe LAE, trace AI, mod MR and TR, PAS 51 and IVC 15  -cvp flat around 12 despite net negative 1.5L yesterday, will add metolazone 5mg today to lasix 40/hr   -CO 3.3 and CI 1.8 with SVR 1200 today off all pressors with IABP 1:2  -will add 10ppm nitric oxide to help bridge as we try to remove IABP today. Reduced to 1:3 and will recheck sat and try to take out today given dropping platelets.   -CTs done off pathway for possible LVAD    Constipation  -on bowel regimen    Thrombocytopenia, unspecified  -Platelet count trending down likely 2/2 IABP, low suspicion for HIT given 4Ts=2, HIT/BELGICA pending    Chronic pain of left knee  -Long h/o of this for which she uses a cane, and more recently, a walker  -Consult PT/OT    Sepsis due to gram-negative urinary tract infection  -had hesitancy on history prior to arrival and growing e. Coli in urine  -aztreonam started 8/21 given pcn allergy  -Plan to D/C [place] once diuretics decreased and will then stop Aztreonam    Acute on chronic kidney failure  - Cr up to 4.0 from baseline 1.3-1.4 in June 2019, downtrending with improvement in cardiogenic shock    Atrial fibrillation with RVR  - return  to sinus rhythm after dccv/candice 8/20/19, but is nor back in A fib with RVR and failed repeat cardioversion 12/22.  -on amio gtt (rebolused and increased back to 1 mg/min) and heparin gtt  -digoxin load today to get heart rates <110    Hypertension  - holding home antihypertensives due to decompensated heart failure and possible component of cardiogenic shock.     Automatic implantable cardioverter-defibrillator in situ  -S/P [employer] dual chamber ICD  - hx VT/VF per chart review.  - telemetry monitoring and repletion of electrolytes PRN.        Migel Singleton MD  Heart Transplant  [facility]

## 2019-08-23 NOTE — NURSING
Pt lying in bed with IABP in place and sister at bedside. Pt states that she has not looked over preVAD folder. I urged patient to review folder this weekend and begin thinking about a caregiver for LVAD. Pt states that her family, including her DIL, will be visiting this weekend. She plans to speak with her DIL about being her caregiver in the event that she does receive a LVAD. Informed pt  That if her DIL has questions, please have her call or email me so that we can be in communication.

## 2019-08-23 NOTE — NURSING
Patient transported to CT and back with IV pump, IABP, and nurse assist. No adverse events. Patient back in room resting comfortably, hooked up to continuous monitoring.

## 2019-08-23 NOTE — ASSESSMENT & PLAN NOTE
- 69 yo F with NIDCM EF 20% , NYHA FC III with admitted with afib with rvr and cardiogenic shock  -TTE with contrast 8/20: LVEDD 6.51, LVEF `5%, indeterminate diastolic function, severe LAE, trace AI, mod MR and TR, PAS 51 and IVC 15  -cvp flat around 12 despite net negative 1.5L yesterday, will add metolazone 5mg today to lasix 40/hr   -CO 3.3 and CI 1.8 with SVR 1200 today off all pressors with IABP 1:2  -will add 10ppm nitric oxide to help bridge as we try to remove IABP today. Reduced to 1:3 and will recheck sat and try to take out today given dropping platelets.   -CTs done off pathway for possible LVAD

## 2019-08-23 NOTE — PLAN OF CARE
Problem: Occupational Therapy Goal  Goal: Occupational Therapy Goal  Outcome: Outcome(s) achieved Date Met: 08/23/19  OT eval completed. Pt has met the following goal:  Pt will demonstrate understanding of HEP - MET  No further skilled OT services warranted until IABP d/c'd  KATERINA Yeung  8/23/2019

## 2019-08-23 NOTE — PROGRESS NOTES
08/23/2019  Tobias Celaya    Current provider:  Annamarie Soria MD      I, Tobias Celaya, rounded on Sherice Squires to ensure all mechanical assist device settings (IABP or VAD) were appropriate and all parameters were within limits.  I was able to ensure all back up equipment was present, the staff had no issues, and the Perfusion Department daily rounding was complete.    8:01 AM

## 2019-08-23 NOTE — ASSESSMENT & PLAN NOTE
- 67 yo F with NIDCM EF 20% , NYHA FC III with admitted with afib with rvr and cardiogenic shock  -TTE with contrast 8/20: LVEDD 6.51, LVEF `5%, indeterminate diastolic function, severe LAE, trace AI, mod MR and TR, PAS 51 and IVC 15.   - Today: CVP: 9; SVO2: 69 ; CO: 4.6 CI: 2.5 SVR: 1035 MAP: 75.   - Nitric oxide decreased from 10 ppm to 5 ppm.   - Will continue with diuresis. Today I/O: 1.2 liters / 24hrs, On lasix @ 40 with metolazone daily.   -CTs done off pathway for possible LVAD/transplant evaluation after clinical stabilization    - Will need PM+R consult regarding knee pain to assess ability to rehab after potential advanced options.

## 2019-08-23 NOTE — SUBJECTIVE & OBJECTIVE
Interval History: CVP flat around 12 today despite 1.5L net diuresis, feels well, in afib with rvr still but rates in the 110s-120s so better controlled, not much sob, on room air, IABP still in at 1:2    Continuous Infusions:   amiodarone in dextrose 5% 1 mg/min (08/23/19 1206)    furosemide (LASIX) 2 mg/mL infusion (non-titrating) 40 mg/hr (08/23/19 1255)    heparin (porcine) in D5W 14 Units/kg/hr (08/23/19 1206)    nitric oxide gas       Scheduled Meds:   aztreonam  1,000 mg Intravenous Q8H    [START ON 8/24/2019] digoxin  0.125 mg Oral Every Mon, Wed, Fri    metOLazone  5 mg Oral Daily    polyethylene glycol  17 g Oral Daily    senna-docusate 8.6-50 mg  2 tablet Oral BID     PRN Meds:heparin (PORCINE), heparin (PORCINE), ramelteon, silver sulfADIAZINE 1%, sodium chloride 0.9%    Review of patient's allergies indicates:   Allergen Reactions    Augmentin [amoxicillin-pot clavulanate] Swelling     Lip swelling      Penicillins      Other reaction(s): Swelling  Lip swelling       Objective:     Vital Signs (Most Recent):  Temp: 98.3 °F (36.8 °C) (08/23/19 1100)  Pulse: (!) 111 (08/23/19 1205)  Resp: (!) 26 (08/23/19 1205)  BP: 92/68 (08/23/19 1205)  SpO2: 99 % (08/23/19 1205) Vital Signs (24h Range):  Temp:  [97.7 °F (36.5 °C)-98.5 °F (36.9 °C)] 98.3 °F (36.8 °C)  Pulse:  [] 111  Resp:  [14-31] 26  SpO2:  [83 %-100 %] 99 %  BP: ()/(51-88) 92/68     Patient Vitals for the past 72 hrs (Last 3 readings):   Weight   08/23/19 0439 73.4 kg (161 lb 13.1 oz)   08/22/19 0413 80.2 kg (176 lb 12.9 oz)   08/21/19 0737 79.8 kg (175 lb 14.8 oz)     Body mass index is 26.93 kg/m².      Intake/Output Summary (Last 24 hours) at 8/23/2019 1258  Last data filed at 8/23/2019 1206  Gross per 24 hour   Intake 2555.41 ml   Output 3256 ml   Net -700.59 ml       Hemodynamic Parameters:       Telemetry: A fib with RVR    Physical Exam   Constitutional: She is oriented to person, place, and time. She appears  well-developed and well-nourished.   HENT:   Head: Normocephalic and atraumatic.   Eyes: Pupils are equal, round, and reactive to light. Conjunctivae and EOM are normal.   Neck: Normal range of motion. Neck supple. No JVD present. No thyromegaly present.   RIJ TLC   Cardiovascular: Intact distal pulses.   Tachycardic, irregularly irregular   Pulmonary/Chest: Effort normal and breath sounds normal.   Abdominal: Soft. Bowel sounds are normal.   Musculoskeletal: Normal range of motion. She exhibits no edema. Right femoral IABP  Neurological: She is alert and oriented to person, place, and time.   Skin: Skin is warm and dry. Capillary refill takes 2 to 3 seconds.   Psychiatric: She has a normal mood and affect. Her behavior is normal. Judgment and thought content normal.       Significant Labs:  CBC:  Recent Labs   Lab 08/21/19  0352 08/22/19  0413 08/23/19  0248   WBC 8.28 10.08 8.94   RBC 4.10 4.35 4.35   HGB 12.3 13.3 13.0   HCT 37.6 39.8 39.9   * 98* 87*   MCV 92 92 92   MCH 30.0 30.6 29.9   MCHC 32.7 33.4 32.6     BNP:  Recent Labs   Lab 08/19/19  1541   BNP 3,234*     CMP:  Recent Labs   Lab 08/22/19  0551 08/22/19  1416 08/23/19  0248   * 120* 119*   CALCIUM 9.6 9.7 9.5   ALBUMIN 2.6* 2.6* 2.4*   PROT 6.7 6.7 6.6   * 140 138   K 5.3* 5.2* 4.7   CO2 27 35* 35*   CL 94* 94* 91*   BUN 63* 57* 54*   CREATININE 2.0* 1.8* 1.8*   ALKPHOS 242* 242* 225*   ALT 21 22 18   AST 24 19 18   BILITOT 0.6 0.6 0.6      Coagulation:   Recent Labs   Lab 08/20/19  0147 08/20/19  0849  08/22/19  1416 08/22/19  2035 08/23/19  0248   INR 1.7* 1.9*  --   --   --   --    APTT 26.5 26.4   < > 43.9* 47.8* 45.7*    < > = values in this interval not displayed.     LDH:  No results for input(s): LDH in the last 72 hours.  Microbiology:  Microbiology Results (last 7 days)     Procedure Component Value Units Date/Time    Urine culture [[id number]]  (Abnormal)  (Susceptibility) Collected:  08/20/19 0422    Order Status:   Completed Specimen:  Urine Updated:  08/22/19 0451     Urine Culture, Routine ESCHERICHIA COLI  >100,000 cfu/ml      Narrative:       Preferred Collection Type->Urine, Clean Catch          I have reviewed all pertinent labs wit  hin the past 24 hours.    Estimated Creatinine Clearance: 30 mL/min (A) (based on SCr of 1.8 mg/dL (H)).    Diagnostic Results:  I have reviewed all pertinent imaging results/findings within the past 24 hours.

## 2019-08-23 NOTE — ASSESSMENT & PLAN NOTE
- return to sinus rhythm after dccv/candice 8/20/19, but is nor back in A fib with RVR and failed repeat cardioversion 12/22.  -on amio gtt (rebolused and increased back to 1 mg/min) and heparin gtt  -digoxin load today to get heart rates <110

## 2019-08-23 NOTE — ASSESSMENT & PLAN NOTE
-Long h/o of this for which she uses a cane, and more recently, a walker  -Consult PT/OT  -will need PM+R consult regarding knee pain to assess ability to rehab after potential advanced options

## 2019-08-23 NOTE — PROGRESS NOTES
[facility]  Cardiac Electrophysiology  Progress Note    Admission Date: 8/20/2019  Code Status: Full Code   Attending Physician: Annamarie Soria MD   Expected Discharge Date: 8/28/2019  Principal Problem:Acute on chronic combined systolic and diastolic heart failure    Subjective:     Interval History: Failed to maintain NSR following second DCCV. Remained in afib w RVR to 120s, IABP in place. Denies chest pain, palpitations or shortness of breath.       Review of Systems   Constitution: Negative. Negative for chills and fever.   HENT: Negative.    Eyes: Negative.    Cardiovascular: Negative for chest pain, claudication and paroxysmal nocturnal dyspnea.   Respiratory: Negative for cough, shortness of breath and wheezing.    Endocrine: Negative.    Hematologic/Lymphatic: Does not bruise/bleed easily.   Skin: Negative for nail changes and rash.   Musculoskeletal: Negative.  Negative for back pain.   Gastrointestinal: Negative for abdominal pain, melena, nausea and vomiting.   Neurological: Negative for dizziness and headaches.   Psychiatric/Behavioral: Negative for altered mental status, depression and substance abuse.   Allergic/Immunologic: Negative.      Objective:     Vital Signs (Most Recent):  Temp: 97.7 °F (36.5 °C) (08/23/19 0700)  Pulse: (!) 115 (08/23/19 1000)  Resp: 17 (08/23/19 1000)  BP: 94/66 (08/23/19 1000)  SpO2: 95 % (08/23/19 1000) Vital Signs (24h Range):  Temp:  [97.7 °F (36.5 °C)-98.5 °F (36.9 °C)] 97.7 °F (36.5 °C)  Pulse:  [] 115  Resp:  [14-31] 17  SpO2:  [83 %-100 %] 95 %  BP: ()/(40-88) 94/66     Weight: 73.4 kg (161 lb 13.1 oz)  Body mass index is 26.93 kg/m².     SpO2: 95 %  O2 Device (Oxygen Therapy): room air    Physical Exam   Constitutional: She is oriented to person, place, and time. She appears well-developed and well-nourished.   In NAD, supine, IABP in place   HENT:   Head: Normocephalic and atraumatic.   Eyes: Pupils are equal, round, and reactive to  light. Conjunctivae and EOM are normal.   Neck: No JVD present.   Cardiovascular: Normal rate, regular rhythm, normal heart sounds and intact distal pulses. Exam reveals no gallop and no friction rub.   No murmur heard.  Pulmonary/Chest: Effort normal. No stridor. She has no wheezes. She has no rales. She exhibits no tenderness.   Abdominal: Soft. Bowel sounds are normal. She exhibits no distension and no mass. There is no tenderness. There is no guarding.   Musculoskeletal: She exhibits no edema, tenderness or deformity.   Neurological: She is alert and oriented to person, place, and time.   Skin: Skin is warm and dry. No rash noted. No erythema.   Psychiatric: She has a normal mood and affect.       Significant Labs:   CMP:   Recent Labs   Lab 08/22/19  0551 08/22/19  1416 08/23/19  0248   * 140 138   K 5.3* 5.2* 4.7   CL 94* 94* 91*   CO2 27 35* 35*   * 120* 119*   BUN 63* 57* 54*   CREATININE 2.0* 1.8* 1.8*   CALCIUM 9.6 9.7 9.5   PROT 6.7 6.7 6.6   ALBUMIN 2.6* 2.6* 2.4*   BILITOT 0.6 0.6 0.6   ALKPHOS 242* 242* 225*   AST 24 19 18   ALT 21 22 18   ANIONGAP 14 11 12   ESTGFRAFRICA 28.9* 32.9* 32.9*   EGFRNONAA 25.1* 28.5* 28.5*    and CBC:   Recent Labs   Lab 08/22/19  0413 08/23/19  0248   WBC 10.08 8.94   HGB 13.3 13.0   HCT 39.8 39.9   PLT 98* 87*       Significant Imaging: Echocardiogram:   Transthoracic echo (TTE) complete (Cupid Only):   Results for orders placed or performed during the hospital encounter of 08/20/19   Transthoracic echo (TTE) 2D with Color Flow   Result Value Ref Range    Ascending aorta 3.07 cm    STJ 3.25 cm    AV mean gradient 4 mmHg    Ao peak kevin 1.44 m/s    Ao VTI 17.27 cm    IVS 0.78 0.6 - 1.1 cm    LA size 4.90 cm    Left Atrium Major Axis 7.30 cm    Left Atrium Minor Axis 7.20 cm    LVIDD 6.51 (A) 3.5 - 6.0 cm    LVIDS 6.03 (A) 2.1 - 4.0 cm    LVOT diameter 2.09 cm    LVOT peak VTI 8.95 cm    PW 0.90 0.6 - 1.1 cm    MV Peak E Kevin 1.12 m/s    RA Major Axis 5.35 cm     RA Width 4.31 cm    RVDD 5.12 cm    Sinus 3.00 cm    TAPSE 0.92 cm    TR Max Kevin 2.98 m/s    TDI LATERAL 0.07 m/s    TDI SEPTAL 0.07 m/s    LA WIDTH 5.68 cm    LV Diastolic Volume 216.83 mL    LV Systolic Volume 181.73 mL    LVOT peak kevin 0.75 m/s    LV LATERAL E/E' RATIO 16.00 m/s    LV SEPTAL E/E' RATIO 16.00 m/s    FS 7 %    LA volume 171.51 cm3    LV mass 228.12 g    Left Ventricle Relative Wall Thickness 0.28 cm    AV valve area 1.78 cm2    AV Velocity Ratio 0.52     AV index (prosthetic) 0.52     Mean e' 0.07 m/s    LVOT area 3.4 cm2    LVOT stroke volume 30.69 cm3    AV peak gradient 8 mmHg    E/E' ratio 16.00 m/s    LV Systolic Volume Index 96.7 mL/m2    LV Diastolic Volume Index 115.39 mL/m2    LA Volume Index 91.3 mL/m2    LV Mass Index 121 g/m2    Triscuspid Valve Regurgitation Peak Gradient 36 mmHg    BSA 1.95 m2    Right Atrial Pressure (from IVC) 15 mmHg    TV rest pulmonary artery pressure 51 mmHg    Narrative    · Moderate left ventricular enlargement.  · Severely decreased left ventricular systolic function. The estimated   ejection fraction is 15%  · Severe global hypokinetic wall motion.  · Septal wall has abnormal motion. Systolic and diastolic flattening of   the interventricular septum consistent with right ventricle pressure and   volume overload.  · Eccentric left ventricular hypertrophy.  · Indeterminate left ventricular diastolic function.  · Mildly reduced right ventricular systolic function.  · Severe left atrial enlargement.  · Moderate mitral regurgitation.  · Moderate tricuspid regurgitation.  · The estimated PA systolic pressure is 51 mm Hg. Pulmonary hypertension   present.  · Elevated central venous pressure (15 mm Hg).        Assessment and Plan:     Atrial fibrillation with RVR  67 y/o woman history of NICM 20-25%, persistent atrial fibrillation on apixaban, [employer] ICD, CKD presenting with cardiogenic shock and acute decompensated heart failure in setting of atrial  fibrillation w RVR, and high atrial fibrillation burden on device interrogation. Failed DCCV on 08/21 and 08/22.    - Can continue IV amio, will plan for transition to PO 200mg daily if in SR for >12 hours  - s/p digoxin load to help with rate control in the interim, given renal dysfunction, would not continue thereafter unless persistent problems with rate control  - Will need to continue anticoagulation, defer anticoagulant choice to primary team given her labile renal function  - if unable to get either rate or rhythm control over the weekend, will sign up on Sunday for AV ken ablation and device upgrade to Bi-V  - in the interim, if arrhythmia causes hemodynamic instability, would proceed with additional attempts at emergent cardioversion as per ACLS protocol. Elective cardioversion is unlikely to give any further benefit.      Case to be discussed with attending, Dr. Reece.    Thank you for this interesting consult. Cardiology consult service will continue to follow.      Bay Holliday MD  Cardiac Electrophysiology  [facility]

## 2019-08-23 NOTE — ASSESSMENT & PLAN NOTE
-Platelet count trending down likely 2/2 IABP, low suspicion for HIT given 4Ts=2, HIT/BELGICA pending

## 2019-08-23 NOTE — PT/OT/SLP PROGRESS
Physical Therapy      Patient Name:  Sherice Squires   MRN:  [id number]    PT orders received and pt chart review performed. Pt with femoral IABP in place. Appropriate for one discipline only at this time. OT to follow for supine HEP. PT orders discontinued at this time. Please re-consult when pt appropriate for OOB activity.     Ariadna Herman, PT, DPT   8/23/2019  [phone]

## 2019-08-23 NOTE — NURSING
Patient c/o 7/10 pain on top of right foot with pressure. 2/10 with movement. No pain at rest. Team notified, will round on patient.

## 2019-08-23 NOTE — ASSESSMENT & PLAN NOTE
-had hesitancy on history prior to arrival and growing e. Coli in urine  -aztreonam started 8/21 given pcn allergy  -Plan to D/C Grant once diuretics decreased and will then stop Aztreonam

## 2019-08-23 NOTE — SUBJECTIVE & OBJECTIVE
Interval History: Failed to maintain NSR following second DCCV. Remained in afib w RVR to 120s, IABP in place. Denies chest pain, palpitations or shortness of breath.       Review of Systems   Constitution: Negative. Negative for chills and fever.   HENT: Negative.    Eyes: Negative.    Cardiovascular: Negative for chest pain, claudication and paroxysmal nocturnal dyspnea.   Respiratory: Negative for cough, shortness of breath and wheezing.    Endocrine: Negative.    Hematologic/Lymphatic: Does not bruise/bleed easily.   Skin: Negative for nail changes and rash.   Musculoskeletal: Negative.  Negative for back pain.   Gastrointestinal: Negative for abdominal pain, melena, nausea and vomiting.   Neurological: Negative for dizziness and headaches.   Psychiatric/Behavioral: Negative for altered mental status, depression and substance abuse.   Allergic/Immunologic: Negative.      Objective:     Vital Signs (Most Recent):  Temp: 97.7 °F (36.5 °C) (08/23/19 0700)  Pulse: (!) 115 (08/23/19 1000)  Resp: 17 (08/23/19 1000)  BP: 94/66 (08/23/19 1000)  SpO2: 95 % (08/23/19 1000) Vital Signs (24h Range):  Temp:  [97.7 °F (36.5 °C)-98.5 °F (36.9 °C)] 97.7 °F (36.5 °C)  Pulse:  [] 115  Resp:  [14-31] 17  SpO2:  [83 %-100 %] 95 %  BP: ()/(40-88) 94/66     Weight: 73.4 kg (161 lb 13.1 oz)  Body mass index is 26.93 kg/m².     SpO2: 95 %  O2 Device (Oxygen Therapy): room air    Physical Exam   Constitutional: She is oriented to person, place, and time. She appears well-developed and well-nourished.   In NAD, supine, IABP in place   HENT:   Head: Normocephalic and atraumatic.   Eyes: Pupils are equal, round, and reactive to light. Conjunctivae and EOM are normal.   Neck: No JVD present.   Cardiovascular: Normal rate, regular rhythm, normal heart sounds and intact distal pulses. Exam reveals no gallop and no friction rub.   No murmur heard.  Pulmonary/Chest: Effort normal. No stridor. She has no wheezes. She has no rales.  She exhibits no tenderness.   Abdominal: Soft. Bowel sounds are normal. She exhibits no distension and no mass. There is no tenderness. There is no guarding.   Musculoskeletal: She exhibits no edema, tenderness or deformity.   Neurological: She is alert and oriented to person, place, and time.   Skin: Skin is warm and dry. No rash noted. No erythema.   Psychiatric: She has a normal mood and affect.       Significant Labs:   CMP:   Recent Labs   Lab 08/22/19  0551 08/22/19  1416 08/23/19  0248   * 140 138   K 5.3* 5.2* 4.7   CL 94* 94* 91*   CO2 27 35* 35*   * 120* 119*   BUN 63* 57* 54*   CREATININE 2.0* 1.8* 1.8*   CALCIUM 9.6 9.7 9.5   PROT 6.7 6.7 6.6   ALBUMIN 2.6* 2.6* 2.4*   BILITOT 0.6 0.6 0.6   ALKPHOS 242* 242* 225*   AST 24 19 18   ALT 21 22 18   ANIONGAP 14 11 12   ESTGFRAFRICA 28.9* 32.9* 32.9*   EGFRNONAA 25.1* 28.5* 28.5*    and CBC:   Recent Labs   Lab 08/22/19  0413 08/23/19  0248   WBC 10.08 8.94   HGB 13.3 13.0   HCT 39.8 39.9   PLT 98* 87*       Significant Imaging: Echocardiogram:   Transthoracic echo (TTE) complete (Cupid Only):   Results for orders placed or performed during the hospital encounter of 08/20/19   Transthoracic echo (TTE) 2D with Color Flow   Result Value Ref Range    Ascending aorta 3.07 cm    STJ 3.25 cm    AV mean gradient 4 mmHg    Ao peak kevin 1.44 m/s    Ao VTI 17.27 cm    IVS 0.78 0.6 - 1.1 cm    LA size 4.90 cm    Left Atrium Major Axis 7.30 cm    Left Atrium Minor Axis 7.20 cm    LVIDD 6.51 (A) 3.5 - 6.0 cm    LVIDS 6.03 (A) 2.1 - 4.0 cm    LVOT diameter 2.09 cm    LVOT peak VTI 8.95 cm    PW 0.90 0.6 - 1.1 cm    MV Peak E Kevin 1.12 m/s    RA Major Axis 5.35 cm    RA Width 4.31 cm    RVDD 5.12 cm    Sinus 3.00 cm    TAPSE 0.92 cm    TR Max Kevin 2.98 m/s    TDI LATERAL 0.07 m/s    TDI SEPTAL 0.07 m/s    LA WIDTH 5.68 cm    LV Diastolic Volume 216.83 mL    LV Systolic Volume 181.73 mL    LVOT peak kevin 0.75 m/s    LV LATERAL E/E' RATIO 16.00 m/s    LV SEPTAL E/E'  RATIO 16.00 m/s    FS 7 %    LA volume 171.51 cm3    LV mass 228.12 g    Left Ventricle Relative Wall Thickness 0.28 cm    AV valve area 1.78 cm2    AV Velocity Ratio 0.52     AV index (prosthetic) 0.52     Mean e' 0.07 m/s    LVOT area 3.4 cm2    LVOT stroke volume 30.69 cm3    AV peak gradient 8 mmHg    E/E' ratio 16.00 m/s    LV Systolic Volume Index 96.7 mL/m2    LV Diastolic Volume Index 115.39 mL/m2    LA Volume Index 91.3 mL/m2    LV Mass Index 121 g/m2    Triscuspid Valve Regurgitation Peak Gradient 36 mmHg    BSA 1.95 m2    Right Atrial Pressure (from IVC) 15 mmHg    TV rest pulmonary artery pressure 51 mmHg    Narrative    · Moderate left ventricular enlargement.  · Severely decreased left ventricular systolic function. The estimated   ejection fraction is 15%  · Severe global hypokinetic wall motion.  · Septal wall has abnormal motion. Systolic and diastolic flattening of   the interventricular septum consistent with right ventricle pressure and   volume overload.  · Eccentric left ventricular hypertrophy.  · Indeterminate left ventricular diastolic function.  · Mildly reduced right ventricular systolic function.  · Severe left atrial enlargement.  · Moderate mitral regurgitation.  · Moderate tricuspid regurgitation.  · The estimated PA systolic pressure is 51 mm Hg. Pulmonary hypertension   present.  · Elevated central venous pressure (15 mm Hg).

## 2019-08-23 NOTE — PLAN OF CARE
Problem: Device-Related Complication Risk (Intra-Aortic Balloon Pump)  Goal: Effective, Safe Device Function    Intervention: Prevent Peripheral Vascular Complications  Feet monitored for capillary refill, numbness, tingling, and pulses.     NAEON. See flowsheets for vital signs and assessments. See below for updates on progress made.       Neuro: AAOx4, Afebrile, Pupils SINDI    Cardiovascular: -130s, Cuff pressure MAPs > 73. Doppler PTs. CVP 9/9/11  IABP in R groin: 1:2, EKG, Auto, Augmented Diastolic 86-99, Assisted 68-80/47-60.      Pulmonary: Infrequent nonproductive cough, poor. SpO2 > 97% on RA. Breath sounds diminished.    Gastrointestinal: Cardiac Diet, 1500cc FR. Last BM 8/23.     Genitourinary: Grant in place, catheter care complete. UOP 1500cc/shift.    Integumentary/other: HAPI prevention bundle in place; Last CHG bath: Day Bath 8/2    Infusions: Heparin, Amiodarone, Lasix per eMAR. APTT is therapeutic at 45.7    POC: LVAD workup. Possible AV ken ablation Monday 8/26 if rate or rhythm not controlled.    Patient progressing towards goals as tolerated, plan of care communicated and reviewed with Sherice Squires. All concerns addressed. Will continue to monitor.   A: Awake    RASS: Goal - 0  alert and calm Actual - 0  alert and calm   Restraint necessity: no  B: Breath   SBT: NA  C: Coordinate A & B, analgesics/sedatives   Pain: managed   SAT: NA  D: Delirium   CAM-ICU: negative  E: Early Mobility   MOVE Screen: Pass   Activity order: Progressive Mobility  FAS: Feeding/Nutrition   Diet order: Cardiac Fluid restriction: 1500  T: Thrombus   DVT prophylaxis: pharmacologic  H: HOB Elevation   30 degrees: Contraindicated  U: Ulcer Prophylaxis   GI: no  G: Glucose control   managed  S: Skin   Bundle compliance: yes  B: Bowel Function   no issues  I: Indwelling Catheters   Grant necessity: Yes   CVC necessity: Yes   IPAD offered: No  D: De-escalation Antibx   No  Plan for the day   Monitor Cardiac status,  Urine output, IABP, circulation.  Family/Goals of care/Code Status   Code Status: Full Code   GOC: LVAD and possible AV ken ablation.

## 2019-08-23 NOTE — ASSESSMENT & PLAN NOTE
-S/P [employer] dual chamber ICD  - hx VT/VF per chart review.  - telemetry monitoring and repletion of electrolytes PRN.

## 2019-08-23 NOTE — PT/OT/SLP EVAL
Occupational Therapy   Evaluation and Discharge Note    Name: Sherice Squires  MRN: [id number]  Admitting Diagnosis:  Acute on chronic combined systolic and diastolic heart failure 1 Day Post-Op s/p IABP    Recommendations:     Discharge Recommendations: home  Discharge Equipment Recommendations:  none  Barriers to discharge:  None    Assessment:     Sherice Squires is a 68 y.o. female with a medical diagnosis of Acute on chronic combined systolic and diastolic heart failure. At this time, patient is on bedrest 2* femoral IABP. Pt provided with HEP to perform while limited to bed level activity. Pt demonstrated (I) with HEP, and not in need of further skilled OT services.     Plan:     At this time, patient does not require further acute OT services.  Please re-consult if situation changes and IABP removed    · Plan of Care Reviewed with: patient    Subjective     Chief Complaint: stiffness  Patient/Family Comments/goals: to get better and go home    Occupational Profile:  Living Environment: lives alone in [facility]  Previous level of function: (I) with ADL and ambulation  Roles and Routines: homemaker  Equipment Used at home:  cane, quad, rollator(walk-in tub)  Assistance upon Discharge: family/friends    Pain/Comfort:  · Pain Rating 1: 0/10(denies pain, but endorses stiffness overall)  · Pain Rating Post-Intervention 1: 0/10    Patients cultural, spiritual, Restorationist conflicts given the current situation: no    Objective:     Communicated with: RN prior to session.  Patient found supine with blood pressure cuff, central line, pulse ox (continuous), telemetry, tong catheter(Femoral IABP) upon OT entry to room.    General Precautions: Standard, fall(bedrest 2* femoral IABP)   Orthopedic Precautions:    Braces:       Occupational Performance:    Bed Mobility:    · NT     Functional Mobility/Transfers:  NT 2* IABP    Activities of Daily Living:  · Feeding:  set-up A    · Grooming: set-up A    · Upper Body Dressing:  minimum assistance    · Lower Body Dressing: total assistance    · Toileting: total assistance 2* IABP    Cognitive/Visual Perceptual:  Oriented to: Person, Place, Time and Situation  Follows Commands/attention: Follows multistep  commands  Communication: clear/fluent  Memory:  No Deficits noted  Safety awareness/insight to disability: intact  Coping skills/emotional control: Appropriate to situation    Physical Exam:  Postural examination/scapula alignment:    -       No postural abnormalities identified  Sensation:    -       Intact  Upper Extremity Range of Motion:     -       Right Upper Extremity: WFL  -       Left Upper Extremity: WFL  Upper Extremity Strength:    -       Right Upper Extremity: WFL  -       Left Upper Extremity: WFL   Strength:    -       Right Upper Extremity: WFL  -       Left Upper Extremity: WFL  Fine Motor Coordination:    -       Intact  Gross motor coordination:   WFL    AMPAC 6 Click ADL:  AMPAC Total Score: 16    Treatment & Education:  Pt ed on OT POC  Pt provided with Theraband HEP, however unable to complete 2* weakness  Pt provided with AROM ex's to be completed 10 reps 3x daily for B UE; B ankle pumps and L LE  Pt demonstrated all ex's initially with difficulty 2* stiffness, but improving with repetitions  Education:    Patient left supine with all lines intact, call button in reach, RN notified and family present    GOALS:   Multidisciplinary Problems     Occupational Therapy Goals     Not on file          Multidisciplinary Problems (Resolved)        Problem: Occupational Therapy Goal    Goal Priority Disciplines Outcome Interventions   Occupational Therapy Goal   (Resolved)     OT, PT/OT Outcome(s) achieved                    History:     Past Medical History:   Diagnosis Date    Atrial fibrillation     Cardiomyopathy     CHF (congestive heart failure)     Hyperlipidemia     Hypertension     Ventricular tachycardia        Past Surgical History:   Procedure Laterality  Date    CARDIAC DEFIBRILLATOR PLACEMENT      CARDIOVERSION N/A 8/22/2019    Performed by Jose Reece MD at [facility] EP [facility]    CARDIOVERSION N/A 8/20/2019    Performed by Jose Reece MD at [facility] EP [facility]    CARDIOVERSION OR DEFIBRILLATION N/A 3/8/2019    Performed by Jose Reece MD at [facility] EP [facility]    CHOLECYSTECTOMY      ECHOCARDIOGRAM,TRANSESOPHAGEAL N/A 8/20/2019    Performed by [facility] at [facility] EP [facility]    ECHOCARDIOGRAM,TRANSESOPHAGEAL N/A 3/8/2019    Performed by [facility] at [facility] EP [facility]    HYSTERECTOMY      JOINT REPLACEMENT  2009    rt knee replacment    pace maker  12/2010       Time Tracking:     OT Date of Treatment: 08/23/19  OT Start Time: 1018  OT Stop Time: 1035  OT Total Time (min): 17 min    Billable Minutes:Evaluation 7  Therapeutic Exercise 10    KATERINA Yeung  8/23/2019

## 2019-08-23 NOTE — PLAN OF CARE
Problem: Adult Inpatient Plan of Care  Goal: Plan of Care Review  IABP pulled at 1635 per Dr Singleton,Right groin dressing dry area soft,continue to monitor,.

## 2019-08-24 NOTE — PROGRESS NOTES
HTS update plan of care  8.23, 8:57 pm       Hemodynamic parameters reviewed following IABP removal ~ 2 hours ago:    CVP 9, SVO2 65    Calculated CO 4.6, CI 2.5, SVR 1200.     Continue with Cesar 10 ppm and Lasix 40/hr, will continue to monitor closely.       Marce Savage MD   Cardiology Fellow, PGY-5

## 2019-08-24 NOTE — SUBJECTIVE & OBJECTIVE
Interval History:   Patient states she feels better. IABP removed yesterday in the afternoon. Remains in atrial fibrillation; better controlled today.     Continuous Infusions:   amiodarone in dextrose 5% 1 mg/min (08/24/19 1400)    furosemide (LASIX) 2 mg/mL infusion (non-titrating) 40 mg/hr (08/24/19 1400)    heparin (porcine) in D5W 14 Units/kg/hr (08/24/19 1400)    nitric oxide gas       Scheduled Meds:   digoxin  0.125 mg Oral Every Mon, Wed, Fri    metOLazone  5 mg Oral Daily    polyethylene glycol  17 g Oral Daily    senna-docusate 8.6-50 mg  2 tablet Oral BID     PRN Meds:heparin (PORCINE), heparin (PORCINE), ramelteon, silver sulfADIAZINE 1%, sodium chloride 0.9%    Review of patient's allergies indicates:   Allergen Reactions    Augmentin [amoxicillin-pot clavulanate] Swelling     Lip swelling      Penicillins      Other reaction(s): Swelling  Lip swelling       Objective:     Vital Signs (Most Recent):  Temp: 98.2 °F (36.8 °C) (08/24/19 1100)  Pulse: (!) 115 (08/24/19 1400)  Resp: 13 (08/24/19 1400)  BP: 92/71 (08/24/19 1400)  SpO2: 95 % (08/24/19 1400) Vital Signs (24h Range):  Temp:  [97.7 °F (36.5 °C)-98.2 °F (36.8 °C)] 98.2 °F (36.8 °C)  Pulse:  [102-123] 115  Resp:  [11-38] 13  SpO2:  [92 %-100 %] 95 %  BP: ()/(61-80) 92/71     Patient Vitals for the past 72 hrs (Last 3 readings):   Weight   08/24/19 0359 72.1 kg (158 lb 15.2 oz)   08/23/19 0439 73.4 kg (161 lb 13.1 oz)   08/22/19 0413 80.2 kg (176 lb 12.9 oz)     Body mass index is 26.45 kg/m².      Intake/Output Summary (Last 24 hours) at 8/24/2019 1441  Last data filed at 8/24/2019 1400  Gross per 24 hour   Intake 2443.67 ml   Output 3091 ml   Net -647.33 ml       Hemodynamic Parameters:       Telemetry: A fib with RVR    Physical Exam   Constitutional: She is oriented to person, place, and time. She appears well-developed and well-nourished.   HENT:   Head: Normocephalic and atraumatic.   Eyes: Pupils are equal, round, and  reactive to light. Conjunctivae and EOM are normal.   Neck: Normal range of motion. Neck supple. No JVD present. No thyromegaly present.   RIJ TLC   Cardiovascular: Intact distal pulses.   Tachycardic, irregularly irregular   Pulmonary/Chest: Effort normal and breath sounds normal.   Abdominal: Soft. Bowel sounds are normal.   Musculoskeletal: Normal range of motion. She exhibits no edema. Right femoral IABP  Neurological: She is alert and oriented to person, place, and time.   Skin: Skin is warm and dry. Capillary refill takes 2 to 3 seconds.   Psychiatric: She has a normal mood and affect. Her behavior is normal. Judgment and thought content normal.       Significant Labs:  CBC:  Recent Labs   Lab 08/22/19  0413 08/23/19  0248 08/24/19  0313   WBC 10.08 8.94 10.23   RBC 4.35 4.35 4.60   HGB 13.3 13.0 13.9   HCT 39.8 39.9 41.9   PLT 98* 87* 75*   MCV 92 92 91   MCH 30.6 29.9 30.2   MCHC 33.4 32.6 33.2     BNP:  Recent Labs   Lab 08/19/19  1541   BNP 3,234*     CMP:  Recent Labs   Lab 08/22/19  1416 08/23/19  0248 08/23/19  1416 08/24/19  0313 08/24/19  0939   * 119* 123* 108  --    CALCIUM 9.7 9.5 9.8 9.7  --    ALBUMIN 2.6* 2.4*  --  2.5*  --    PROT 6.7 6.6  --  7.0  --     138 136 134*  --    K 5.2* 4.7 5.0 5.3* 4.9   CO2 35* 35* 36* 32*  --    CL 94* 91* 89* 89*  --    BUN 57* 54* 54* 51*  --    CREATININE 1.8* 1.8* 1.8* 1.8*  --    ALKPHOS 242* 225*  --  212*  --    ALT 22 18  --  18  --    AST 19 18  --  20  --    BILITOT 0.6 0.6  --  0.5  --       Coagulation:   Recent Labs   Lab 08/20/19  0147 08/20/19  0849  08/23/19  0248 08/24/19  0313 08/24/19  0939   INR 1.7* 1.9*  --   --   --   --    APTT 26.5 26.4   < > 45.7* 43.0* 46.3*    < > = values in this interval not displayed.     LDH:  No results for input(s): LDH in the last 72 hours.  Microbiology:  Microbiology Results (last 7 days)     Procedure Component Value Units Date/Time    Urine culture [[id number]]  (Abnormal)  (Susceptibility)  Collected:  08/20/19 0422    Order Status:  Completed Specimen:  Urine Updated:  08/22/19 0451     Urine Culture, Routine ESCHERICHIA COLI  >100,000 cfu/ml      Narrative:       Preferred Collection Type->Urine, Clean Catch          I have reviewed all pertinent labs wit  hin the past 24 hours.    Estimated Creatinine Clearance: 29.8 mL/min (A) (based on SCr of 1.8 mg/dL (H)).    Diagnostic Results:  I have reviewed all pertinent imaging results/findings within the past 24 hours.

## 2019-08-24 NOTE — ASSESSMENT & PLAN NOTE
- return to sinus rhythm after dccv/candice 8/20/19, but is nor back in A fib with RVR and failed repeat cardioversion 12/22.  -on amio gtt (rebolused and increased back to 1 mg/min) and heparin gtt  -Digoxin load today to get heart rates <110. Will check digoxin levels tomorrow.   - Case discussed with EP. Plan to AVNA with BiV upgrade. Will keep patient on warfarin and heparin with INR goal of 2. Will stop heparin drip 2 hours before procedure.   Appreciated EP inputs.

## 2019-08-24 NOTE — PROGRESS NOTES
[facility]  Heart Transplant  Progress Note    Patient Name: Sherice Squires  MRN: [id number]  Admission Date: 8/20/2019  Hospital Length of Stay: 4 days  Attending Physician: Annamarie Soria MD  Primary Care Provider: Annamarie Soria MD  Principal Problem:Acute on chronic combined systolic and diastolic heart failure    Subjective:     Interval History:   Patient states she feels better. IABP removed yesterday in the afternoon. Remains in atrial fibrillation; better controlled today.     Continuous Infusions:   amiodarone in dextrose 5% 1 mg/min (08/24/19 1400)    furosemide (LASIX) 2 mg/mL infusion (non-titrating) 40 mg/hr (08/24/19 1400)    heparin (porcine) in D5W 14 Units/kg/hr (08/24/19 1400)    nitric oxide gas       Scheduled Meds:   digoxin  0.125 mg Oral Every Mon, Wed, Fri    metOLazone  5 mg Oral Daily    polyethylene glycol  17 g Oral Daily    senna-docusate 8.6-50 mg  2 tablet Oral BID     PRN Meds:heparin (PORCINE), heparin (PORCINE), ramelteon, silver sulfADIAZINE 1%, sodium chloride 0.9%    Review of patient's allergies indicates:   Allergen Reactions    Augmentin [amoxicillin-pot clavulanate] Swelling     Lip swelling      Penicillins      Other reaction(s): Swelling  Lip swelling       Objective:     Vital Signs (Most Recent):  Temp: 98.2 °F (36.8 °C) (08/24/19 1100)  Pulse: (!) 115 (08/24/19 1400)  Resp: 13 (08/24/19 1400)  BP: 92/71 (08/24/19 1400)  SpO2: 95 % (08/24/19 1400) Vital Signs (24h Range):  Temp:  [97.7 °F (36.5 °C)-98.2 °F (36.8 °C)] 98.2 °F (36.8 °C)  Pulse:  [102-123] 115  Resp:  [11-38] 13  SpO2:  [92 %-100 %] 95 %  BP: ()/(61-80) 92/71     Patient Vitals for the past 72 hrs (Last 3 readings):   Weight   08/24/19 0359 72.1 kg (158 lb 15.2 oz)   08/23/19 0439 73.4 kg (161 lb 13.1 oz)   08/22/19 0416 80.2 kg (176 lb 12.9 oz)     Body mass index is 26.45 kg/m².      Intake/Output Summary (Last 24 hours) at 8/24/2019 1441  Last data filed at 8/24/2019  1400  Gross per 24 hour   Intake 2443.67 ml   Output 3091 ml   Net -647.33 ml       Hemodynamic Parameters:       Telemetry: A fib with RVR    Physical Exam   Constitutional: She is oriented to person, place, and time. She appears well-developed and well-nourished.   HENT:   Head: Normocephalic and atraumatic.   Eyes: Pupils are equal, round, and reactive to light. Conjunctivae and EOM are normal.   Neck: Normal range of motion. Neck supple. No JVD present. No thyromegaly present.   RIJ TLC   Cardiovascular: Intact distal pulses.   Tachycardic, irregularly irregular   Pulmonary/Chest: Effort normal and breath sounds normal.   Abdominal: Soft. Bowel sounds are normal.   Musculoskeletal: Normal range of motion. She exhibits no edema. Right femoral IABP  Neurological: She is alert and oriented to person, place, and time.   Skin: Skin is warm and dry. Capillary refill takes 2 to 3 seconds.   Psychiatric: She has a normal mood and affect. Her behavior is normal. Judgment and thought content normal.       Significant Labs:  CBC:  Recent Labs   Lab 08/22/19  0413 08/23/19  0248 08/24/19  0313   WBC 10.08 8.94 10.23   RBC 4.35 4.35 4.60   HGB 13.3 13.0 13.9   HCT 39.8 39.9 41.9   PLT 98* 87* 75*   MCV 92 92 91   MCH 30.6 29.9 30.2   MCHC 33.4 32.6 33.2     BNP:  Recent Labs   Lab 08/19/19  1541   BNP 3,234*     CMP:  Recent Labs   Lab 08/22/19  1416 08/23/19  0248 08/23/19  1416 08/24/19  0313 08/24/19  0939   * 119* 123* 108  --    CALCIUM 9.7 9.5 9.8 9.7  --    ALBUMIN 2.6* 2.4*  --  2.5*  --    PROT 6.7 6.6  --  7.0  --     138 136 134*  --    K 5.2* 4.7 5.0 5.3* 4.9   CO2 35* 35* 36* 32*  --    CL 94* 91* 89* 89*  --    BUN 57* 54* 54* 51*  --    CREATININE 1.8* 1.8* 1.8* 1.8*  --    ALKPHOS 242* 225*  --  212*  --    ALT 22 18  --  18  --    AST 19 18  --  20  --    BILITOT 0.6 0.6  --  0.5  --       Coagulation:   Recent Labs   Lab 08/20/19  0147 08/20/19  0849  08/23/19  0248 08/24/19  0313  08/24/19  0939   INR 1.7* 1.9*  --   --   --   --    APTT 26.5 26.4   < > 45.7* 43.0* 46.3*    < > = values in this interval not displayed.     LDH:  No results for input(s): LDH in the last 72 hours.  Microbiology:  Microbiology Results (last 7 days)     Procedure Component Value Units Date/Time    Urine culture [[id number]]  (Abnormal)  (Susceptibility) Collected:  08/20/19 0422    Order Status:  Completed Specimen:  Urine Updated:  08/22/19 0451     Urine Culture, Routine ESCHERICHIA COLI  >100,000 cfu/ml      Narrative:       Preferred Collection Type->Urine, Clean Catch          I have reviewed all pertinent labs wit  hin the past 24 hours.    Estimated Creatinine Clearance: 29.8 mL/min (A) (based on SCr of 1.8 mg/dL (H)).    Diagnostic Results:  I have reviewed all pertinent imaging results/findings within the past 24 hours.    Assessment and Plan:     Sherice Squires is a 69 yo AAF with PMHx significant for NIDCM / stage D HFrEF (LVEF 20-25%, LVEDD 5.9 cm) s/p dc ICD, AF, HTN, DLD, CKD who is admitted as a transfer from  ED for management of ADHF +/- possible cardiogenic shock.     Patient reports she presented to OSH with progressive/worsening shortness of breath on exertion, orthopnea, and peripheral swelling of approx 5 days duration associated with nausea and fatigue. Denies fever/chills/sweats, chest pain, diaphoresis, presyncope/syncope. Does report intermittent palpitations.      Patient was afebrile and normotensive on arrival (/55 mmHg) to OSH ED with pulses in the 120-130s in AF with RVR. Initial labs showed worsening FAMILIA on CKD (with Cr 3.8 from 2.7-3.1 this past week from previous baseline of 1.3-1.4 in June 2019) as well as elevated T bili 2.3 and BNP >3000. CXR obtained without acute cardiopulmonary process. Patient ultimately transferred to [facility] for higher level of care.      Patient follows with [facility] clinic - currently on Entresto 97/103 mg BID, Toprol  mg daily, Aldactone 25 mg  daily, and digoxin 0.125 mg daily. She was last seen by Dr Rodriguez on 8/6/19 who added metolazone 2.5 mg QHS before evening dose of Bumex to augment diuresis due to complaints of SOB / peripheral swelling at the time. She did not respond to this regimen and reports she was ultimately switched to Torsemide instead.      Patient is fully complaint with her medicines. Also adherent to fluid / salt restrictions with her hx of congestive heart failure.      States she was hospitalized only once before for ADHF within the past year.      Of note patient follows with Dr Reece of EP for her AF hx, last seen by him in 5/2019    * Acute on chronic combined systolic and diastolic heart failure  - 69 yo F with NIDCM EF 20% , NYHA FC III with admitted with afib with rvr and cardiogenic shock  -TTE with contrast 8/20: LVEDD 6.51, LVEF `5%, indeterminate diastolic function, severe LAE, trace AI, mod MR and TR, PAS 51 and IVC 15.   - Today: CVP: 9; SVO2: 69 ; CO: 4.6 CI: 2.5 SVR: 1035 MAP: 75.   - Nitric oxide decreased from 10 ppm to 5 ppm.   - Will continue with diuresis. Today I/O: 1.2 liters / 24hrs, On lasix @ 40 with metolazone daily.   -CTs done off pathway for possible LVAD/transplant evaluation after clinical stabilization    - Will need PM+R consult regarding knee pain to assess ability to rehab after potential advanced options.    Constipation  -on bowel regimen    Thrombocytopenia, unspecified  -Platelet count trending down likely 2/2 IABP, low suspicion for HIT given 4Ts=2, HIT/BELGICA pending    Chronic pain of left knee  -Long h/o of this for which she uses a cane, and more recently, a walker  -Consult PT/OT  -will need PM+R consult regarding knee pain to assess ability to rehab after potential advanced options    Sepsis due to gram-negative urinary tract infection  -had hesitancy on history prior to arrival and growing e. Coli in urine  -aztreonam started 8/21 given pcn allergy  -Plan to D/C Grant once diuretics decreased  and will then stop Aztreonam    Acute on chronic kidney failure  - Cr up to 4.0 from baseline 1.3-1.4 in June 2019, downtrending with improvement in cardiogenic shock    Atrial fibrillation with RVR  - return to sinus rhythm after dccv/candice 8/20/19, but is nor back in A fib with RVR and failed repeat cardioversion 12/22.  -on amio gtt (rebolused and increased back to 1 mg/min) and heparin gtt  -Digoxin load today to get heart rates <110. Will check digoxin levels tomorrow.   - Case discussed with EP. Plan to AVNA with BiV upgrade. Will keep patient on warfarin and heparin with INR goal of 2. Will stop heparin drip 2 hours before procedure.   Appreciated EP inputs.     Hypertension  - holding home antihypertensives due to decompensated heart failure and possible component of cardiogenic shock.     Automatic implantable cardioverter-defibrillator in situ  -S/P [employer] dual chamber ICD  - hx VT/VF per chart review.  - telemetry monitoring and repletion of electrolytes PRN.      Celestine Combs MD  Heart Transplant  [facility]

## 2019-08-24 NOTE — PLAN OF CARE
Brief Electrophysiology Note    Patient remains in afib rates 110-120 (failed cardioversion earlier this admission), will plan to continue on amio gtt. If unable to get either rate or rhythm control over the weekend, will sign up on Sunday for AV ken ablation and device upgrade to Bi-V. Will need to continued anticoagulation, defer anticoagulant choice to primary team given her labile renal function. In the interim, if arrhythmia causes hemodynamic instability, would proceed with additional attempts at emergent cardioversion as per ACLS protocol. Elective cardioversion is unlikely to give any further benefit.    Case discussed with team.    Bay Holliday MD

## 2019-08-24 NOTE — PLAN OF CARE
Problem: Adult Inpatient Plan of Care  Goal: Plan of Care Review  Outcome: Ongoing (interventions implemented as appropriate)  See flowsheets for vital signs and assessments. See below for updates on progress made.      Neuro: AAOx4, Afebrile, Pupils equal and reactive     Cardiovascular: -120 A-fib, BP MAP >65. Systolic 80-110s, AICD, CVP 8/8/9 SVO2 69      Pulmonary: Infrequent nonproductive cough. 4L NC with Nitric at 5ppm.      Gastrointestinal: Cardiac Diet, 1500cc FR, Last BM 8/23 Dayshift.      Genitourinary: Grant in place. UOP 1600/Shift     Integumentary/other: HAPI prevention bundle in place; Last CHG bath: Full CHG bath, linens/gown changed 8/23 pm.     Infusions:  Amio, Lasix, Heparin per eMAR. APTT therapeutic, redraw in am.     POC: LVAD workup.      Patient progressing towards goals as tolerated, plan of care communicated and reviewed with Sherice Squires. All concerns addressed. Will continue to monitor.   A: Awake               RASS: Goal - 0  alert and calm Actual - 0  alert and calm              Restraint necessity: no  B: Breath              SBT: NA  C: Coordinate A & B, analgesics/sedatives              Pain: managed              SAT: NA  D: Delirium              CAM-ICU: negative  E: Early Mobility              MOVE Screen: Pass              Activity order: Progressive Mobility  FAS: Feeding/Nutrition              Diet order: Cardiac Fluid restriction: 1500  T: Thrombus              DVT prophylaxis: pharmacologic  H: HOB Elevation              30 degrees: No, encouraging pt to sit up more, pt prefers HOB lower.  U: Ulcer Prophylaxis              GI: no  G: Glucose control              no issues  S: Skin              Bundle compliance: yes  B: Bowel Function              no issues  I: Indwelling Catheters              Grant necessity: Yes              CVC necessity: Yes              IPAD offered: No  D: De-escalation Antibx              No  Plan for the day   Wean nitric.               Have patient designate a caretaker for LVAD;continue LVAD workup.   Family/Goals of care/Code Status              Code Status: Full Code              GOC: possible ablation Monday; LVAD workup

## 2019-08-24 NOTE — PLAN OF CARE
Problem: Infection  Goal: Infection Symptom Resolution    Intervention: Prevent or Manage Infection  Patient educated on aseptic technique.       Comments: NAEON. See flowsheets for vital signs and assessments. See below for updates on progress made.       Neuro: AAOx4, Afebrile, Pupils SINDI 2mm.     Cardiovascular: -120 A-fib, BP MAP >65. Systolic 80-110s, AICD, CVP 9/9/9 SVO2 69      Pulmonary: Infrequent nonproductive cough. 4L NC with Nitric at 10ppm. Inspiratory/expiratory wheezes.    Gastrointestinal: Cardiac Diet, 1500cc FR, Last BM 8/23 Dayshift.     Genitourinary: Grant in place. UOP 1600/Shift    Integumentary/other: HAPI prevention bundle in place; Last CHG bath: Full CHG bath, linens/gown changed 8/23 pm.    Infusions:  Amio, Lasix, Heparin per eMAR. APTT therapeutic, redraw at 10:00    POC: LVAD workup.     Patient progressing towards goals as tolerated, plan of care communicated and reviewed with Sherice Squires. All concerns addressed. Will continue to monitor.   A: Awake    RASS: Goal - 0  alert and calm Actual - 0  alert and calm   Restraint necessity: no  B: Breath   SBT: NA  C: Coordinate A & B, analgesics/sedatives   Pain: managed   SAT: NA  D: Delirium   CAM-ICU: negative  E: Early Mobility   MOVE Screen: Pass   Activity order: Progressive Mobility  FAS: Feeding/Nutrition   Diet order: Cardiac Fluid restriction: 1500  T: Thrombus   DVT prophylaxis: pharmacologic  H: HOB Elevation   30 degrees: No  U: Ulcer Prophylaxis   GI: no  G: Glucose control   managed  S: Skin   Bundle compliance: yes  B: Bowel Function   no issues  I: Indwelling Catheters   Rgant necessity: Yes   CVC necessity: Yes   IPAD offered: No  D: De-escalation Antibx   No  Plan for the day   Have patient designate a caretaker for LVAD;continue LVAD workup.   Family/Goals of care/Code Status   Code Status: Full Code   GOC: LVAD workup

## 2019-08-25 NOTE — PLAN OF CARE
Problem: Adult Inpatient Plan of Care  Goal: Plan of Care Review  Outcome: Ongoing (interventions implemented as appropriate)  Neuro: AAOx4, Afebrile, PERRL     Cardiovascular: A-fib 100-130s, BP MAP >65. Sys 80s-110s. Pulses palpable. Intermittent doppler PT. CVP 6, 6, 7. SVO2 64     Pulmonary: 4L NC Nitric at 2, still weaning. SPO2 goal >92%     Gastrointestinal: No BM for shift. Bowel sounds present. Cardiac diet 1500 cc FR.      Genitourinary: Grant in place, catheter care performed. UO adequate, lasix weaned down.      Integumentary/other: HAPI prevention bundle in place; Last CHG bath: Full CHG bath, linens/gown changed 8/24 PM shift.     Infusions: Amio, Heparin, Lasix per eMAR. APTT therapeutic.      POC: Continue LVAD workup.     Patient progressing towards goals as tolerated, plan of care communicated and reviewed with Sherice Squires. All concerns addressed. Will continue to monitor.   A: Awake               RASS: Goal - 0  alert and calm Actual - 0  alert and calm              Restraint necessity: no  B: Breath              SBT: NA  C: Coordinate A & B, analgesics/sedatives              Pain: managed              SAT: NA  D: Delirium              CAM-ICU: negative  E: Early Mobility              MOVE Screen: Pass              Activity order: Progressive Mobility  FAS: Feeding/Nutrition              Diet order: Cardiac Fluid restriction: 1500  T: Thrombus              DVT prophylaxis: pharmacologic  H: HOB Elevation              30 degrees: Yes   U: Ulcer Prophylaxis              GI: no  G: Glucose control              managed  S: Skin              Bundle compliance: yes  B: Bowel Function              no issues  I: Indwelling Catheters              Grant necessity: Yes              CVC necessity: Yes              IPAD offered: No  D: De-escalation Antibx              N/A  Plan for the day              Monitor Cardiovascular status, continue weaning nitric off.   Family/Goals of care/Code Status               Code Status: Full Code              GOC: LVAD, possible biventricular PM Tuesday

## 2019-08-25 NOTE — ASSESSMENT & PLAN NOTE
- 69 yo F with NIDCM EF 20% , NYHA FC III with admitted with afib with rvr and cardiogenic shock  -TTE with contrast 8/20: LVEDD 6.51, LVEF `5%, indeterminate diastolic function, severe LAE, trace AI, mod MR and TR, PAS 51 and IVC 15.   - Today: CVP: 6; SVO2: 1059 ; CO: 4.5 CI: 2.4 SVR: 1059 MAP: 70. On afterload reduction PO meds. Continue with at current dose of Hydralazine and Isosorbide.   - Weaning Nitric oxide today.  - Will continue with diuresis. Her CVP today was 6. Currently lasix drip @ 20 only. Will reassess volume status later afternoon.   -CTs done off pathway for possible LVAD/transplant evaluation after clinical stabilization    - Will need PM+R consult regarding knee pain to assess ability to rehab after potential advanced options.

## 2019-08-25 NOTE — SUBJECTIVE & OBJECTIVE
Interval History:   Patient states she feels better. Remains in atrial fibrillation. Hypertensive overnight with elevated SVR; now on PO afterload reduction with improvement in his hemodynamics. Clinically improving. Scheduled for AVN ablation Tuesday.       Continuous Infusions:   amiodarone in dextrose 5% 1 mg/min (08/25/19 1100)    furosemide (LASIX) 2 mg/mL infusion (non-titrating) 20 mg/hr (08/25/19 1100)    heparin (porcine) in D5W 14 Units/kg/hr (08/25/19 1100)    nitric oxide gas       Scheduled Meds:   digoxin  0.125 mg Oral Every Mon, Wed, Fri    hydrALAZINE  25 mg Oral Q8H    isosorbide dinitrate  20 mg Oral Q8H    metOLazone  5 mg Oral Daily    polyethylene glycol  17 g Oral Daily    senna-docusate 8.6-50 mg  2 tablet Oral BID    warfarin  5 mg Oral Daily     PRN Meds:heparin (PORCINE), heparin (PORCINE), ramelteon, silver sulfADIAZINE 1%, sodium chloride 0.9%    Review of patient's allergies indicates:   Allergen Reactions    Augmentin [amoxicillin-pot clavulanate] Swelling     Lip swelling      Penicillins      Other reaction(s): Swelling  Lip swelling       Objective:     Vital Signs (Most Recent):  Temp: 98 °F (36.7 °C) (08/25/19 1100)  Pulse: (!) 122 (08/25/19 1119)  Resp: (!) 36 (08/25/19 1119)  BP: (!) 104/57 (08/25/19 1100)  SpO2: 100 % (08/25/19 1119) Vital Signs (24h Range):  Temp:  [98 °F (36.7 °C)-98.5 °F (36.9 °C)] 98 °F (36.7 °C)  Pulse:  [109-128] 122  Resp:  [11-38] 36  SpO2:  [79 %-100 %] 100 %  BP: ()/(52-86) 104/57     Patient Vitals for the past 72 hrs (Last 3 readings):   Weight   08/24/19 0359 72.1 kg (158 lb 15.2 oz)   08/23/19 0439 73.4 kg (161 lb 13.1 oz)     Body mass index is 26.45 kg/m².      Intake/Output Summary (Last 24 hours) at 8/25/2019 1138  Last data filed at 8/25/2019 1100  Gross per 24 hour   Intake 2623.4 ml   Output 2600 ml   Net 23.4 ml       Hemodynamic Parameters:       Telemetry: A fib with RVR    Physical Exam   Constitutional: She is  oriented to person, place, and time. She appears well-developed and well-nourished.   HENT:   Head: Normocephalic and atraumatic.   Eyes: Pupils are equal, round, and reactive to light. Conjunctivae and EOM are normal.   Neck: Normal range of motion. Neck supple. No JVD present. No thyromegaly present.   RIJ TLC   Cardiovascular: Intact distal pulses.   Tachycardic, irregularly irregular   Pulmonary/Chest: Effort normal and breath sounds normal.   Abdominal: Soft. Bowel sounds are normal.   Musculoskeletal: Normal range of motion. She exhibits no edema. Right femoral IABP  Neurological: She is alert and oriented to person, place, and time.   Skin: Skin is warm and dry. Capillary refill takes 2 to 3 seconds.   Psychiatric: She has a normal mood and affect. Her behavior is normal. Judgment and thought content normal.       Significant Labs:  CBC:  Recent Labs   Lab 08/23/19  0248 08/24/19  0313 08/25/19  0300   WBC 8.94 10.23 9.98   RBC 4.35 4.60 4.57   HGB 13.0 13.9 13.4   HCT 39.9 41.9 41.4   PLT 87* 75* 85*   MCV 92 91 91   MCH 29.9 30.2 29.3   MCHC 32.6 33.2 32.4     BNP:  Recent Labs   Lab 08/19/19  1541   BNP 3,234*     CMP:  Recent Labs   Lab 08/24/19  0313 08/24/19  0939 08/24/19  1534 08/25/19  0300     --  120*  120* 99   CALCIUM 9.7  --  9.3  9.3 9.5   ALBUMIN 2.5*  --  2.4* 2.3*   PROT 7.0  --  6.8 6.7   *  --  135*  135* 134*   K 5.3* 4.9 4.8  4.8 4.5   CO2 32*  --  35*  35* 32*   CL 89*  --  87*  87* 86*   BUN 51*  --  49*  49* 49*   CREATININE 1.8*  --  1.7*  1.7* 1.8*   ALKPHOS 212*  --  204* 186*   ALT 18  --  18 14   AST 20  --  17 18   BILITOT 0.5  --  0.5 0.5      Coagulation:   Recent Labs   Lab 08/20/19  0849  08/24/19  0313 08/24/19  0939 08/24/19  1534 08/25/19  0300   INR 1.9*  --   --   --  1.1 1.2   APTT 26.4   < > 43.0* 46.3*  --  48.1*    < > = values in this interval not displayed.     LDH:  No results for input(s): LDH in the last 72  hours.  Microbiology:  Microbiology Results (last 7 days)     Procedure Component Value Units Date/Time    Urine culture [[id number]]  (Abnormal)  (Susceptibility) Collected:  08/20/19 0422    Order Status:  Completed Specimen:  Urine Updated:  08/22/19 0451     Urine Culture, Routine ESCHERICHIA COLI  >100,000 cfu/ml      Narrative:       Preferred Collection Type->Urine, Clean Catch          I have reviewed all pertinent labs wit  hin the past 24 hours.    Estimated Creatinine Clearance: 29.8 mL/min (A) (based on SCr of 1.8 mg/dL (H)).    Diagnostic Results:  I have reviewed all pertinent imaging results/findings within the past 24 hours.

## 2019-08-25 NOTE — NURSING
New orders for Isosorbide and Hydralazine. Touched base with Dr. Craft patient BP currently 107/65 and frequently dips into systolic in the 90's while sleeping. Ok to give meds per MD.

## 2019-08-25 NOTE — ASSESSMENT & PLAN NOTE
- return to sinus rhythm after dccv/candice 8/20/19, but is nor back in A fib with RVR and failed repeat cardioversion 12/22.  -on amio gtt (rebolused and increased back to 1 mg/min) and heparin gtt  - Case discussed with EP. Plan to AVNA with BiV upgrade. Will keep patient on warfarin and heparin with INR goal of 2. Will stop heparin drip 2 hours before procedure.   Appreciated EP inputs.

## 2019-08-25 NOTE — PROGRESS NOTES
[facility]  Heart Transplant  Progress Note    Patient Name: Sherice Squires  MRN: [id number]  Admission Date: 8/20/2019  Hospital Length of Stay: 5 days  Attending Physician: Annamarie Soria MD  Primary Care Provider: Annamarie Soria MD  Principal Problem:Acute on chronic combined systolic and diastolic heart failure    Subjective:     Interval History:   Patient states she feels better. Remains in atrial fibrillation. Hypertensive overnight with elevated SVR; now on PO afterload reduction with improvement in his hemodynamics. Clinically improving. Scheduled for AVN ablation Tuesday.       Continuous Infusions:   amiodarone in dextrose 5% 1 mg/min (08/25/19 1100)    furosemide (LASIX) 2 mg/mL infusion (non-titrating) 20 mg/hr (08/25/19 1100)    heparin (porcine) in D5W 14 Units/kg/hr (08/25/19 1100)    nitric oxide gas       Scheduled Meds:   digoxin  0.125 mg Oral Every Mon, Wed, Fri    hydrALAZINE  25 mg Oral Q8H    isosorbide dinitrate  20 mg Oral Q8H    metOLazone  5 mg Oral Daily    polyethylene glycol  17 g Oral Daily    senna-docusate 8.6-50 mg  2 tablet Oral BID    warfarin  5 mg Oral Daily     PRN Meds:heparin (PORCINE), heparin (PORCINE), ramelteon, silver sulfADIAZINE 1%, sodium chloride 0.9%    Review of patient's allergies indicates:   Allergen Reactions    Augmentin [amoxicillin-pot clavulanate] Swelling     Lip swelling      Penicillins      Other reaction(s): Swelling  Lip swelling       Objective:     Vital Signs (Most Recent):  Temp: 98 °F (36.7 °C) (08/25/19 1100)  Pulse: (!) 122 (08/25/19 1119)  Resp: (!) 36 (08/25/19 1119)  BP: (!) 104/57 (08/25/19 1100)  SpO2: 100 % (08/25/19 1119) Vital Signs (24h Range):  Temp:  [98 °F (36.7 °C)-98.5 °F (36.9 °C)] 98 °F (36.7 °C)  Pulse:  [109-128] 122  Resp:  [11-38] 36  SpO2:  [79 %-100 %] 100 %  BP: ()/(52-86) 104/57     Patient Vitals for the past 72 hrs (Last 3 readings):   Weight   08/24/19 0359 72.1 kg (158 lb 15.2  oz)   08/23/19 0439 73.4 kg (161 lb 13.1 oz)     Body mass index is 26.45 kg/m².      Intake/Output Summary (Last 24 hours) at 8/25/2019 1138  Last data filed at 8/25/2019 1100  Gross per 24 hour   Intake 2623.4 ml   Output 2600 ml   Net 23.4 ml       Hemodynamic Parameters:       Telemetry: A fib with RVR    Physical Exam   Constitutional: She is oriented to person, place, and time. She appears well-developed and well-nourished.   HENT:   Head: Normocephalic and atraumatic.   Eyes: Pupils are equal, round, and reactive to light. Conjunctivae and EOM are normal.   Neck: Normal range of motion. Neck supple. No JVD present. No thyromegaly present.   RIJ TLC   Cardiovascular: Intact distal pulses.   Tachycardic, irregularly irregular   Pulmonary/Chest: Effort normal and breath sounds normal.   Abdominal: Soft. Bowel sounds are normal.   Musculoskeletal: Normal range of motion. She exhibits no edema. Right femoral IABP  Neurological: She is alert and oriented to person, place, and time.   Skin: Skin is warm and dry. Capillary refill takes 2 to 3 seconds.   Psychiatric: She has a normal mood and affect. Her behavior is normal. Judgment and thought content normal.       Significant Labs:  CBC:  Recent Labs   Lab 08/23/19  0248 08/24/19  0313 08/25/19  0300   WBC 8.94 10.23 9.98   RBC 4.35 4.60 4.57   HGB 13.0 13.9 13.4   HCT 39.9 41.9 41.4   PLT 87* 75* 85*   MCV 92 91 91   MCH 29.9 30.2 29.3   MCHC 32.6 33.2 32.4     BNP:  Recent Labs   Lab 08/19/19  1541   BNP 3,234*     CMP:  Recent Labs   Lab 08/24/19  0313 08/24/19  0939 08/24/19  1534 08/25/19  0300     --  120*  120* 99   CALCIUM 9.7  --  9.3  9.3 9.5   ALBUMIN 2.5*  --  2.4* 2.3*   PROT 7.0  --  6.8 6.7   *  --  135*  135* 134*   K 5.3* 4.9 4.8  4.8 4.5   CO2 32*  --  35*  35* 32*   CL 89*  --  87*  87* 86*   BUN 51*  --  49*  49* 49*   CREATININE 1.8*  --  1.7*  1.7* 1.8*   ALKPHOS 212*  --  204* 186*   ALT 18  --  18 14   AST 20  --  17 18    BILITOT 0.5  --  0.5 0.5      Coagulation:   Recent Labs   Lab 08/20/19  0849  08/24/19  0313 08/24/19  0939 08/24/19  1534 08/25/19  0300   INR 1.9*  --   --   --  1.1 1.2   APTT 26.4   < > 43.0* 46.3*  --  48.1*    < > = values in this interval not displayed.     LDH:  No results for input(s): LDH in the last 72 hours.  Microbiology:  Microbiology Results (last 7 days)     Procedure Component Value Units Date/Time    Urine culture [[id number]]  (Abnormal)  (Susceptibility) Collected:  08/20/19 0422    Order Status:  Completed Specimen:  Urine Updated:  08/22/19 0451     Urine Culture, Routine ESCHERICHIA COLI  >100,000 cfu/ml      Narrative:       Preferred Collection Type->Urine, Clean Catch          I have reviewed all pertinent labs wit  hin the past 24 hours.    Estimated Creatinine Clearance: 29.8 mL/min (A) (based on SCr of 1.8 mg/dL (H)).    Diagnostic Results:  I have reviewed all pertinent imaging results/findings within the past 24 hours.    Assessment and Plan:     Sherice Squires is a 67 yo AAF with PMHx significant for NIDCM / stage D HFrEF (LVEF 20-25%, LVEDD 5.9 cm) s/p dc ICD, AF, HTN, DLD, CKD who is admitted as a transfer from  ED for management of ADHF +/- possible cardiogenic shock.     Patient reports she presented to OSH with progressive/worsening shortness of breath on exertion, orthopnea, and peripheral swelling of approx 5 days duration associated with nausea and fatigue. Denies fever/chills/sweats, chest pain, diaphoresis, presyncope/syncope. Does report intermittent palpitations.      Patient was afebrile and normotensive on arrival (/55 mmHg) to OSH ED with pulses in the 120-130s in AF with RVR. Initial labs showed worsening FAMILIA on CKD (with Cr 3.8 from 2.7-3.1 this past week from previous baseline of 1.3-1.4 in June 2019) as well as elevated T bili 2.3 and BNP >3000. CXR obtained without acute cardiopulmonary process. Patient ultimately transferred to [facility] for higher level of  care.      Patient follows with HTS clinic - currently on Entresto 97/103 mg BID, Toprol  mg daily, Aldactone 25 mg daily, and digoxin 0.125 mg daily. She was last seen by Dr Rodriguez on 8/6/19 who added metolazone 2.5 mg QHS before evening dose of Bumex to augment diuresis due to complaints of SOB / peripheral swelling at the time. She did not respond to this regimen and reports she was ultimately switched to Torsemide instead.      Patient is fully complaint with her medicines. Also adherent to fluid / salt restrictions with her hx of congestive heart failure.      States she was hospitalized only once before for ADHF within the past year.      Of note patient follows with Dr Reece of EP for her AF hx, last seen by him in 5/2019    * Acute on chronic combined systolic and diastolic heart failure  - 67 yo F with NIDCM EF 20% , NYHA FC III with admitted with afib with rvr and cardiogenic shock  -TTE with contrast 8/20: LVEDD 6.51, LVEF `5%, indeterminate diastolic function, severe LAE, trace AI, mod MR and TR, PAS 51 and IVC 15.   - Today: CVP: 9; SVO2: 69 ; CO: 4.6 CI: 2.5 SVR: 1035 MAP: 75.   - Nitric oxide decreased from 10 ppm to 5 ppm.   - Will continue with diuresis. Today I/O: 1.2 liters / 24hrs, On lasix @ 40 with metolazone daily.   -CTs done off pathway for possible LVAD/transplant evaluation after clinical stabilization    - Will need PM+R consult regarding knee pain to assess ability to rehab after potential advanced options.    Constipation  -on bowel regimen    Thrombocytopenia, unspecified  -Platelet count trending down likely 2/2 IABP, low suspicion for HIT given 4Ts=2, HIT/BELGICA pending    Chronic pain of left knee  -Long h/o of this for which she uses a cane, and more recently, a walker  -Consult PT/OT  -will need PM+R consult regarding knee pain to assess ability to rehab after potential advanced options    Sepsis due to gram-negative urinary tract infection  -had hesitancy on history prior to  arrival and growing e. Coli in urine  -aztreonam started 8/21 given pcn allergy  -Plan to D/C [facility] once diuretics decreased and will then stop Aztreonam    Acute on chronic kidney failure  - Cr up to 4.0 from baseline 1.3-1.4 in June 2019, downtrending with improvement in cardiogenic shock    Atrial fibrillation with RVR  - return to sinus rhythm after dccv/candice 8/20/19, but is nor back in A fib with RVR and failed repeat cardioversion 12/22.  -on amio gtt (rebolused and increased back to 1 mg/min) and heparin gtt  -Digoxin load today to get heart rates <110. Will check digoxin levels tomorrow.   - Case discussed with EP. Plan to AVNA with BiV upgrade. Will keep patient on warfarin and heparin with INR goal of 2. Will stop heparin drip 2 hours before procedure.   Appreciated EP inputs.     Hypertension  - holding home antihypertensives due to decompensated heart failure and possible component of cardiogenic shock.     Automatic implantable cardioverter-defibrillator in situ  -S/P [employer] dual chamber ICD  - hx VT/VF per chart review.  - telemetry monitoring and repletion of electrolytes PRN.        Celestine Combs MD  Heart Transplant  [facility]

## 2019-08-25 NOTE — PLAN OF CARE
Problem: Infection  Goal: Infection Symptom Resolution    Intervention: Prevent or Manage Infection  Full CHG bath given, with fresh linens and gown.       Comments: NAEON. See flowsheets for vital signs and assessments. See below for updates on progress made.       Neuro: AAOx4, Afebrile, PERRL    Cardiovascular: A-fib 100-120s, BP MAP >65. Sys 80s-120s. Pulses palpable. Intermittent doppler PT. CVP 9 and 6. SVO2 64      Pulmonary: Lungs coarse, 4L NC Nitric at 5.     Gastrointestinal: No BM for shift. Bowel sounds present. Cardiac diet 1500 cc FR.     Genitourinary: Grant in place, catheter care performed. UOP 1250/shift.     Integumentary/other: HAPI prevention bundle in place; Last CHG bath: Full CHG bath, linens/gown changed 8/24 PM shift.    Infusions: Amio, Heparin, Lasix per eMAR. APTT therapeutic.     POC: Continue LVAD workup.    Patient progressing towards goals as tolerated, plan of care communicated and reviewed with Sherice Squires. All concerns addressed. Will continue to monitor.   A: Awake    RASS: Goal - 0  alert and calm Actual - 0  alert and calm   Restraint necessity: no  B: Breath   SBT: NA  C: Coordinate A & B, analgesics/sedatives   Pain: managed   SAT: NA  D: Delirium   CAM-ICU: negative  E: Early Mobility   MOVE Screen: Pass   Activity order: Progressive Mobility  FAS: Feeding/Nutrition   Diet order: Cardiac Fluid restriction: 1500  T: Thrombus   DVT prophylaxis: pharmacologic  H: HOB Elevation   30 degrees: Yes   U: Ulcer Prophylaxis   GI: no  G: Glucose control   managed  S: Skin   Bundle compliance: yes  B: Bowel Function   no issues  I: Indwelling Catheters   Grant necessity: Yes   CVC necessity: Yes   IPAD offered: No  D: De-escalation Antibx   No  Plan for the day   Monitor Cardiovascular status.  Family/Goals of care/Code Status   Code Status: Full Code   GOC: LVAD, possible biventricular PM

## 2019-08-26 NOTE — SUBJECTIVE & OBJECTIVE
Interval History: Feeling well, remains in afib rates 110s-120s. Denies chest pain, palpitations or shortness of breath.       Review of Systems   Constitution: Negative. Negative for chills and fever.   HENT: Negative.    Eyes: Negative.    Cardiovascular: Negative for chest pain, claudication and paroxysmal nocturnal dyspnea.   Respiratory: Negative for cough, shortness of breath and wheezing.    Endocrine: Negative.    Hematologic/Lymphatic: Does not bruise/bleed easily.   Skin: Negative for nail changes and rash.   Musculoskeletal: Negative.  Negative for back pain.   Gastrointestinal: Negative for abdominal pain, melena, nausea and vomiting.   Neurological: Negative for dizziness and headaches.   Psychiatric/Behavioral: Negative for altered mental status, depression and substance abuse.   Allergic/Immunologic: Negative.      Objective:     Vital Signs (Most Recent):  Temp: 97.9 °F (36.6 °C) (08/26/19 0715)  Pulse: (!) 114 (08/26/19 0855)  Resp: (!) 32 (08/26/19 0800)  BP: 94/67 (08/26/19 0800)  SpO2: (!) 86 % (08/26/19 0800) Vital Signs (24h Range):  Temp:  [97.9 °F (36.6 °C)-98.5 °F (36.9 °C)] 97.9 °F (36.6 °C)  Pulse:  [110-131] 114  Resp:  [14-46] 32  SpO2:  [82 %-100 %] 86 %  BP: ()/(49-77) 94/67     Weight: 71 kg (156 lb 8.4 oz)  Body mass index is 26.05 kg/m².     SpO2: (!) 86 %  O2 Device (Oxygen Therapy): nasal cannula w/ humidification    Physical Exam   Constitutional: She is oriented to person, place, and time. She appears well-developed and well-nourished.   HENT:   Head: Normocephalic and atraumatic.   Eyes: Pupils are equal, round, and reactive to light. Conjunctivae and EOM are normal.   Neck: No JVD present.   Cardiovascular: Normal heart sounds and intact distal pulses. Exam reveals no gallop and no friction rub.   No murmur heard.  afib 100s   Pulmonary/Chest: Effort normal. No stridor. She has no wheezes. She has no rales. She exhibits no tenderness.   Abdominal: Soft. Bowel sounds are  normal. She exhibits no distension and no mass. There is no tenderness. There is no guarding.   Musculoskeletal: She exhibits no edema, tenderness or deformity.   Neurological: She is alert and oriented to person, place, and time.   Skin: Skin is warm and dry. No rash noted. No erythema.   Psychiatric: She has a normal mood and affect.       Significant Labs:   CMP:   Recent Labs   Lab 08/25/19  0300 08/25/19  1506 08/26/19  0230   * 133* 130*   K 4.5 4.4 3.9   CL 86* 85* 83*   CO2 32* 35* 33*   GLU 99 125* 111*   BUN 49* 50* 48*   CREATININE 1.8* 1.9* 1.9*   CALCIUM 9.5 9.4 9.1   PROT 6.7 6.8 7.0   ALBUMIN 2.3* 2.4* 2.5*   BILITOT 0.5 0.4 0.5   ALKPHOS 186* 181* 168*   AST 18 18 18   ALT 14 17 15   ANIONGAP 16 13 14   ESTGFRAFRICA 32.9* 30.8* 30.8*   EGFRNONAA 28.5* 26.7* 26.7*    and CBC:   Recent Labs   Lab 08/25/19  0300 08/26/19  0230   WBC 9.98 9.91   HGB 13.4 13.0   HCT 41.4 39.3   PLT 85* 95*       Significant Imaging: Echocardiogram:   Transthoracic echo (TTE) complete (Cupid Only):   Results for orders placed or performed during the hospital encounter of 08/20/19   Transthoracic echo (TTE) 2D with Color Flow   Result Value Ref Range    Ascending aorta 3.07 cm    STJ 3.25 cm    AV mean gradient 4 mmHg    Ao peak kevin 1.44 m/s    Ao VTI 17.27 cm    IVS 0.78 0.6 - 1.1 cm    LA size 4.90 cm    Left Atrium Major Axis 7.30 cm    Left Atrium Minor Axis 7.20 cm    LVIDD 6.51 (A) 3.5 - 6.0 cm    LVIDS 6.03 (A) 2.1 - 4.0 cm    LVOT diameter 2.09 cm    LVOT peak VTI 8.95 cm    PW 0.90 0.6 - 1.1 cm    MV Peak E Kevin 1.12 m/s    RA Major Axis 5.35 cm    RA Width 4.31 cm    RVDD 5.12 cm    Sinus 3.00 cm    TAPSE 0.92 cm    TR Max Kevin 2.98 m/s    TDI LATERAL 0.07 m/s    TDI SEPTAL 0.07 m/s    LA WIDTH 5.68 cm    LV Diastolic Volume 216.83 mL    LV Systolic Volume 181.73 mL    LVOT peak kevin 0.75 m/s    LV LATERAL E/E' RATIO 16.00 m/s    LV SEPTAL E/E' RATIO 16.00 m/s    FS 7 %    LA volume 171.51 cm3    LV mass 228.12  g    Left Ventricle Relative Wall Thickness 0.28 cm    AV valve area 1.78 cm2    AV Velocity Ratio 0.52     AV index (prosthetic) 0.52     Mean e' 0.07 m/s    LVOT area 3.4 cm2    LVOT stroke volume 30.69 cm3    AV peak gradient 8 mmHg    E/E' ratio 16.00 m/s    LV Systolic Volume Index 96.7 mL/m2    LV Diastolic Volume Index 115.39 mL/m2    LA Volume Index 91.3 mL/m2    LV Mass Index 121 g/m2    Triscuspid Valve Regurgitation Peak Gradient 36 mmHg    BSA 1.95 m2    Right Atrial Pressure (from IVC) 15 mmHg    TV rest pulmonary artery pressure 51 mmHg    Narrative    · Moderate left ventricular enlargement.  · Severely decreased left ventricular systolic function. The estimated   ejection fraction is 15%  · Severe global hypokinetic wall motion.  · Septal wall has abnormal motion. Systolic and diastolic flattening of   the interventricular septum consistent with right ventricle pressure and   volume overload.  · Eccentric left ventricular hypertrophy.  · Indeterminate left ventricular diastolic function.  · Mildly reduced right ventricular systolic function.  · Severe left atrial enlargement.  · Moderate mitral regurgitation.  · Moderate tricuspid regurgitation.  · The estimated PA systolic pressure is 51 mm Hg. Pulmonary hypertension   present.  · Elevated central venous pressure (15 mm Hg).

## 2019-08-26 NOTE — NURSING
Pt's BPs borderline, SBPs have read in the 70s a few times. Fanny Clemens NP notified, both Hydralazine and Isosorbide Dinitrate okay to give. Current /103.

## 2019-08-26 NOTE — SUBJECTIVE & OBJECTIVE
**Interval History: Remains in A fib with HR in the 110's-120's. Feels generally well this morning. CVP 6, so Lasix gtt decreased to 10 mg/hr. sVO2 61 - D/C'd Cesar. Plan to step down. INR is 1.2 with plan to repeat JOSE guided DCCV tomorrow followed by AVN ablation and CRT upgrade at a later time.    Continuous Infusions:   amiodarone in dextrose 5% 1 mg/min (08/26/19 1100)    furosemide (LASIX) 2 mg/mL infusion (non-titrating) 10 mg/hr (08/26/19 1100)    heparin (porcine) in D5W 14 Units/kg/hr (08/26/19 1100)     Scheduled Meds:   digoxin  0.125 mg Oral Every Mon, Wed, Fri    hydrALAZINE  50 mg Oral Q8H    isosorbide dinitrate  20 mg Oral Q8H    polyethylene glycol  17 g Oral Daily    senna-docusate 8.6-50 mg  2 tablet Oral BID    warfarin  7.5 mg Oral Daily     PRN Meds:heparin (PORCINE), heparin (PORCINE), ramelteon, silver sulfADIAZINE 1%, sodium chloride 0.9%    Review of patient's allergies indicates:   Allergen Reactions    Augmentin [amoxicillin-pot clavulanate] Swelling     Lip swelling      Penicillins      Other reaction(s): Swelling  Lip swelling       Objective:     Vital Signs (Most Recent):  Temp: 98.7 °F (37.1 °C) (08/26/19 1100)  Pulse: (!) 121 (08/26/19 1200)  Resp: (!) 23 (08/26/19 1200)  BP: 110/81 (08/26/19 1200)  SpO2: 98 % (08/26/19 1200) Vital Signs (24h Range):  Temp:  [97.9 °F (36.6 °C)-98.7 °F (37.1 °C)] 98.7 °F (37.1 °C)  Pulse:  [110-131] 121  Resp:  [14-46] 23  SpO2:  [82 %-100 %] 98 %  BP: ()/(49-81) 110/81     Patient Vitals for the past 72 hrs (Last 3 readings):   Weight   08/26/19 0500 71 kg (156 lb 8.4 oz)   08/24/19 0359 72.1 kg (158 lb 15.2 oz)     Body mass index is 26.05 kg/m².      Intake/Output Summary (Last 24 hours) at 8/26/2019 1212  Last data filed at 8/26/2019 1200  Gross per 24 hour   Intake 2211.3 ml   Output 2845 ml   Net -633.7 ml       Hemodynamic Parameters:       Telemetry: A fib with RVR, PVC's    Physical Exam   Constitutional: She is oriented to  person, place, and time. She appears well-developed and well-nourished.   HENT:   Head: Normocephalic and atraumatic.   Eyes: Pupils are equal, round, and reactive to light. Conjunctivae and EOM are normal.   Neck: Normal range of motion. Neck supple. No JVD present. No thyromegaly present.   RIJ TLC   Cardiovascular: Intact distal pulses.   Tachycardic, irregularly irregular   Pulmonary/Chest: Effort normal and breath sounds normal.   Abdominal: Soft. Bowel sounds are normal.   Musculoskeletal: Normal range of motion. She exhibits no edema.   Neurological: She is alert and oriented to person, place, and time.   Skin: Skin is warm and dry. Capillary refill takes 2 to 3 seconds.   Psychiatric: She has a normal mood and affect. Her behavior is normal. Judgment and thought content normal.       Significant Labs:  CBC:  Recent Labs   Lab 08/24/19  0313 08/25/19  0300 08/26/19  0230   WBC 10.23 9.98 9.91   RBC 4.60 4.57 4.43   HGB 13.9 13.4 13.0   HCT 41.9 41.4 39.3   PLT 75* 85* 95*   MCV 91 91 89   MCH 30.2 29.3 29.3   MCHC 33.2 32.4 33.1     BNP:  Recent Labs   Lab 08/19/19  1541 08/26/19  0230   BNP 3,234* 915*     CMP:  Recent Labs   Lab 08/25/19  0300 08/25/19  1506 08/26/19  0230   GLU 99 125* 111*   CALCIUM 9.5 9.4 9.1   ALBUMIN 2.3* 2.4* 2.5*   PROT 6.7 6.8 7.0   * 133* 130*   K 4.5 4.4 3.9   CO2 32* 35* 33*   CL 86* 85* 83*   BUN 49* 50* 48*   CREATININE 1.8* 1.9* 1.9*   ALKPHOS 186* 181* 168*   ALT 14 17 15   AST 18 18 18   BILITOT 0.5 0.4 0.5      Coagulation:   Recent Labs   Lab 08/24/19  0939 08/24/19  1534 08/25/19  0300 08/26/19  0230   INR  --  1.1 1.2 1.2   APTT 46.3*  --  48.1* 53.8*     LDH:  No results for input(s): LDH in the last 72 hours.  Microbiology:  Microbiology Results (last 7 days)     Procedure Component Value Units Date/Time    Urine culture [[id number]]  (Abnormal)  (Susceptibility) Collected:  08/20/19 0422    Order Status:  Completed Specimen:  Urine Updated:  08/22/19 0235      Urine Culture, Routine ESCHERICHIA COLI  >100,000 cfu/ml      Narrative:       Preferred Collection Type->Urine, Clean Catch          I have reviewed all pertinent labs within the past 24 hours.    Estimated Creatinine Clearance: 28 mL/min (A) (based on SCr of 1.9 mg/dL (H)).    Diagnostic Results:  I have reviewed all pertinent imaging results/findings within the past 24 hours.

## 2019-08-26 NOTE — ASSESSMENT & PLAN NOTE
-S/P [employer] dual chamber ICD  - hx VT/VF per chart review.  - telemetry monitoring and repletion of electrolytes PRN.

## 2019-08-26 NOTE — ASSESSMENT & PLAN NOTE
- return to sinus rhythm after dccv/candice 8/20/19, but quickly went back in A fib with RVR and failed repeat cardioversion 8/22. INR is 1.2 - EP plans to do another CANDICE guided DCCV tomorrow (has had one subtherapeutic PTT).  - Discussed putting patient back on Eliquis or another DOAC with EP - they want her to stay on Coumadin with Heparin  - Decrease Amio gtt to 0.5 mg/min  - Dig level 1.0 today. Will continue plan for Dig 125 mcg every M-W-F  - EP plans possible AVNA with BiV upgrade at a later time. Will keep patient on warfarin and heparin with INR goal of 2.

## 2019-08-26 NOTE — ASSESSMENT & PLAN NOTE
-Long h/o of this for which she uses a cane, and more recently, a walker  -Consult PT/OT  -PM+R consult regarding knee pain to assess ability to rehab after potential advanced options

## 2019-08-26 NOTE — PLAN OF CARE
Problem: Skin Injury Risk Increased  Goal: Skin Health and Integrity    Intervention: Optimize Skin Protection  Patient turned 2QH      Comments: NAEON. See flowsheets for vital signs and assessments. See below for updates on progress made.       Neuro: AAOx4, Afebrile    Cardiovascular: A-fib. -120s, MAP >65, Syst . Pulses palpable. Intermittently doppler PT      Pulmonary: Lungs diminished. NC at 4 L, Nitric at 2 PPM. Sat >90    Gastrointestinal: Cardiac diet 1500 cc FR. No BM/shift. Gave senna. Bowel sounds present.     Genitourinary: Grant in place. Catheter care complete. UOP ~ 1750cc/shift.    Integumentary/other: HAPI prevention bundle in place; Last CHG bath: 08/24 PM shift.    Infusions: Heparin therapeutic, lasix, and amio per eMAR. Replaced K+ and Mag.    POC: Possible step down. Wean nitric. LVAD Workup.    Patient progressing towards goals as tolerated, plan of care communicated and reviewed with Sherice Squires. All concerns addressed. Will continue to monitor.   A: Awake    RASS: Goal - 0  alert and calm Actual - 0  alert and calm   Restraint necessity: no  B: Breath   SBT: NA  C: Coordinate A & B, analgesics/sedatives   Pain: managed   SAT: NA  D: Delirium   CAM-ICU: negative  E: Early Mobility   MOVE Screen: Pass   Activity order: Progressive Mobility  FAS: Feeding/Nutrition   Diet order: Cardiac Fluid restriction: 1500  T: Thrombus   DVT prophylaxis: pharmacologic  H: HOB Elevation   30 degrees: Yes   U: Ulcer Prophylaxis   GI: no  G: Glucose control   managed  S: Skin   Bundle compliance: yes  B: Bowel Function   constipation  I: Indwelling Catheters   Grant necessity: Yes   CVC necessity: Yes   IPAD offered: No  D: De-escalation Antibx   No  Plan for the day   Wean nitric, step down if able.  Family/Goals of care/Code Status   Code Status: Full Code   GOC: LVAD

## 2019-08-26 NOTE — ASSESSMENT & PLAN NOTE
-Platelet count was trending down likely 2/2 IABP, low suspicion for HIT given 4Ts=2, HIT with OD < 0.4, BELGICA not required  -Platelet count today is climbing back up at 95k

## 2019-08-26 NOTE — PT/OT/SLP EVAL
Occupational Therapy  Re- Evaluation/tx    Name: Sherice Squires  MRN: [id number]  Admitting Diagnosis:  Acute on chronic combined systolic and diastolic heart failure 4 Days Post-Op    Recommendations:     Discharge Recommendations: home health OT  Discharge Equipment Recommendations:  none  Barriers to discharge:  None    Assessment:     Sherice Squires is a 68 y.o. female with a medical diagnosis of Acute on chronic combined systolic and diastolic heart failure.  She presents with deficits in functional mobility, endurance, self-care skills, impaired  and FMC skills noted in LUE. Performance deficits affecting function: impaired endurance, impaired self care skills, impaired functional mobilty, impaired cardiopulmonary response to activity.  Pt. Tolerated evaluation well but noted to have decreased 02 sats to lower 80's at times and required vc's for deep breathing to improve 02 sats to 90's. IABP removed and pt. Now cleared for EOB activity.    Rehab Prognosis: Good; patient would benefit from acute skilled OT services to address these deficits and reach maximum level of function.       Plan:     Patient to be seen 4 x/week to address the above listed problems via self-care/home management, therapeutic activities, therapeutic exercises  · Plan of Care Expires: 09/25/19  · Plan of Care Reviewed with: patient    Subjective     Chief Complaint: No specific complaints noted on this date  Patient/Family Comments/goals: To get better     Occupational Profile:  Living Environment: Reports living alone in ss house with a threshold entrance.  Pt. 's son is a  and checks on pt. Daily.  Pt. Also has 2 sisters.    Previous level of function: Able to perform ADL tasks.  Ambulates with either a cane or rollator depending on the type of day she is having.  Has a bath that she can walk into with grab bars.  At times did not use an AD to ambulate. + driving  Roles and Routines: caretaker of self, mother,  sister  Equipment Used at Home:  cane, quad, rollator  Assistance upon Discharge: may be limited as son works    Pain/Comfort:  · Pain Rating 1: 0/10  · Pain Rating Post-Intervention 1: 0/10    Patients cultural, spiritual, Islam conflicts given the current situation: no    Objective:     Communicated with: nurse prior to session.  Patient found supine with central line, oxygen, peripheral IV, tong catheter, pulse ox (continuous), telemetry(supine  on air bed) upon OT entry to room.    General Precautions: Standard, fall(cardiac)   Orthopedic Precautions:N/A   Braces: N/A     Occupational Performance:    Bed Mobility:    · Patient completed Supine to Sit with moderate assistance  · Patient completed Sit to Supine with maximal assistance    Functional Mobility/Transfers:  · Patient completed Sit <> Stand Transfer with maximal assistance  with  no assistive device from EOB  · Functional Mobility: not tested    Activities of Daily Living:  · Not tested    Cognitive/Visual Perceptual:  Cognitive/Psychosocial Skills:     -       Oriented to: Person, Place, Time and Situation   -       Follows Commands/attention:Follows multistep  commands  -       Safety awareness/insight to disability: intact   -       Mood/Affect/Coping skills/emotional control: Appropriate to situation  Visual/Perceptual:      -Intact on this date    Physical Exam:  Balance: static sit EOB with CGA; Max A to stand from EOB; Max A to scoot to EOB  Postural examination/scapula alignment:    -       Rounded shoulders  -       Forward head  -       Posterior pelvic tilt  Upper Extremity Range of Motion:     -       Right Upper Extremity: appears to be able to perform BUE shoulder flexion to 90 degrees  -       Left Upper Extremity: shoulder flexion to 90 degrees  Upper Extremity Strength: not tested   Strength:    -       Right Upper Extremity: WFL  -       Left Upper Extremity: unable to make full fist on left (nurse notified)    [facility] 6 Click  ADL:  AMPAC Total Score: 15    Treatment & Education:  Pt. Educated on role of OT and POC  Pt. Educated on safety with mobility and transfers  Pt. Educated on importance of therapy  Education:    Patient left supine with all lines intact, call button in reach and nurse notified    GOALS:   Multidisciplinary Problems     Occupational Therapy Goals        Problem: Occupational Therapy Goal    Goal Priority Disciplines Outcome Interventions   Occupational Therapy Goal     OT, PT/OT            Problem: Occupational Therapy Goal    Goal Priority Disciplines Outcome Interventions   Occupational Therapy Goal     OT, PT/OT     Description:  Goals to be met by: 09-09-19     Patient will increase functional independence with ADLs by performing:    UE Dressing with Stand-by Assistance.  Grooming while standing with Contact Guard Assistance.  Toileting from bedside commode with Minimal Assistance for hygiene and clothing management.   Supine to sit with Stand-by Assistance.  Stand pivot transfers with Contact Guard Assistance.  Toilet transfer to bedside commode with Contact Guard Assistance.  Pt. To be I with HEP to improve level of endurance                      History:     Past Medical History:   Diagnosis Date    Atrial fibrillation     Cardiomyopathy     CHF (congestive heart failure)     Hyperlipidemia     Hypertension     Ventricular tachycardia        Past Surgical History:   Procedure Laterality Date    CARDIAC DEFIBRILLATOR PLACEMENT      CARDIOVERSION N/A 8/22/2019    Performed by [facility] at [facility]    CARDIOVERSION N/A 8/20/2019    Performed by [facility] at [facility]    CARDIOVERSION OR DEFIBRILLATION N/A 3/8/2019    Performed by [facility] at [facility]    CHOLECYSTECTOMY      ECHOCARDIOGRAM,TRANSESOPHAGEAL N/A 8/20/2019    Performed by [facility] at [facility]    ECHOCARDIOGRAM,TRANSESOPHAGEAL N/A 3/8/2019    Per[facility] by [facility]  at [facility]    HYSTERECTOMY      JOINT REPLACEMENT  2009    rt knee replacment    pace maker  12/2010       Time Tracking:     OT Date of Treatment: 08/26/19  OT Start Time: 1329  OT Stop Time: 1407  OT Total Time (min): 38 min    Billable Minutes:Re-eval 15  Self Care/Home Management 23    KATERINA Jimenez  8/26/2019

## 2019-08-26 NOTE — ASSESSMENT & PLAN NOTE
-had hesitancy on history prior to arrival and growing e. Coli in urine  -Completed course of Astreonam  -D/C Grant as Lasix gtt has been decreased

## 2019-08-26 NOTE — CONSULTS
Food & Nutrition  Education    Diet Education: Vitamin K & Coumadin   Time Spent: 10 minutes   Learners: Patient & 2 family members     Nutrition Education provided with handouts. All questions and concerns answered. Dietitian's contact information provided.     Follow-Up: 9/2    Please re-consult as needed.    Thanks!  CASSY Conway, LDN

## 2019-08-26 NOTE — ASSESSMENT & PLAN NOTE
- 67 yo F with NIDCM EF 20% , NYHA FC III with admitted with afib with rvr and cardiogenic shock  - IABP placed 8/20 and pulled 8/23  -TTE with contrast 8/20: LVEDD 6.51, LVEF 15%, indeterminate diastolic function, severe LAE, trace AI, mod MR and TR, PAS 51 and IVC 15.   -Today: CVP: 6; SVO2: 61 ; CO: 4.63, SVR: 1123. Decrease Lasix gtt to 10 mg/hr, Cesar weaned off. Increase Hydralazine to 50 mg every 8 hours, and continue Isordil 20 mg tid  -CTs done off pathway for possible LVAD/transplant evaluation. Patient seen by CTS, not considered a candidate for advanced options at this time HOWEVER would like to see how she does with rehab/PT/OT, and see if she can walk into clinic, to see if she could become a candidate in the near future.  -Transfer to step down today

## 2019-08-26 NOTE — PROGRESS NOTES
[facility]  Cardiac Electrophysiology  Progress Note    Admission Date: 8/20/2019  Code Status: Full Code   Attending Physician: Annamarie Soria MD   Expected Discharge Date: 8/30/2019  Principal Problem:Acute on chronic combined systolic and diastolic heart failure    Subjective:     Interval History: Feeling well, remains in afib rates 110s-120s. Denies chest pain, palpitations or shortness of breath.       Review of Systems   Constitution: Negative. Negative for chills and fever.   HENT: Negative.    Eyes: Negative.    Cardiovascular: Negative for chest pain, claudication and paroxysmal nocturnal dyspnea.   Respiratory: Negative for cough, shortness of breath and wheezing.    Endocrine: Negative.    Hematologic/Lymphatic: Does not bruise/bleed easily.   Skin: Negative for nail changes and rash.   Musculoskeletal: Negative.  Negative for back pain.   Gastrointestinal: Negative for abdominal pain, melena, nausea and vomiting.   Neurological: Negative for dizziness and headaches.   Psychiatric/Behavioral: Negative for altered mental status, depression and substance abuse.   Allergic/Immunologic: Negative.      Objective:     Vital Signs (Most Recent):  Temp: 97.9 °F (36.6 °C) (08/26/19 0715)  Pulse: (!) 114 (08/26/19 0855)  Resp: (!) 32 (08/26/19 0800)  BP: 94/67 (08/26/19 0800)  SpO2: (!) 86 % (08/26/19 0800) Vital Signs (24h Range):  Temp:  [97.9 °F (36.6 °C)-98.5 °F (36.9 °C)] 97.9 °F (36.6 °C)  Pulse:  [110-131] 114  Resp:  [14-46] 32  SpO2:  [82 %-100 %] 86 %  BP: ()/(49-77) 94/67     Weight: 71 kg (156 lb 8.4 oz)  Body mass index is 26.05 kg/m².     SpO2: (!) 86 %  O2 Device (Oxygen Therapy): nasal cannula w/ humidification    Physical Exam   Constitutional: She is oriented to person, place, and time. She appears well-developed and well-nourished.   HENT:   Head: Normocephalic and atraumatic.   Eyes: Pupils are equal, round, and reactive to light. Conjunctivae and EOM are normal.    Neck: No JVD present.   Cardiovascular: Normal heart sounds and intact distal pulses. Exam reveals no gallop and no friction rub.   No murmur heard.  afib 100s   Pulmonary/Chest: Effort normal. No stridor. She has no wheezes. She has no rales. She exhibits no tenderness.   Abdominal: Soft. Bowel sounds are normal. She exhibits no distension and no mass. There is no tenderness. There is no guarding.   Musculoskeletal: She exhibits no edema, tenderness or deformity.   Neurological: She is alert and oriented to person, place, and time.   Skin: Skin is warm and dry. No rash noted. No erythema.   Psychiatric: She has a normal mood and affect.       Significant Labs:   CMP:   Recent Labs   Lab 08/25/19  0300 08/25/19  1506 08/26/19  0230   * 133* 130*   K 4.5 4.4 3.9   CL 86* 85* 83*   CO2 32* 35* 33*   GLU 99 125* 111*   BUN 49* 50* 48*   CREATININE 1.8* 1.9* 1.9*   CALCIUM 9.5 9.4 9.1   PROT 6.7 6.8 7.0   ALBUMIN 2.3* 2.4* 2.5*   BILITOT 0.5 0.4 0.5   ALKPHOS 186* 181* 168*   AST 18 18 18   ALT 14 17 15   ANIONGAP 16 13 14   ESTGFRAFRICA 32.9* 30.8* 30.8*   EGFRNONAA 28.5* 26.7* 26.7*    and CBC:   Recent Labs   Lab 08/25/19  0300 08/26/19  0230   WBC 9.98 9.91   HGB 13.4 13.0   HCT 41.4 39.3   PLT 85* 95*       Significant Imaging: Echocardiogram:   Transthoracic echo (TTE) complete (Cupid Only):   Results for orders placed or performed during the hospital encounter of 08/20/19   Transthoracic echo (TTE) 2D with Color Flow   Result Value Ref Range    Ascending aorta 3.07 cm    STJ 3.25 cm    AV mean gradient 4 mmHg    Ao peak kevin 1.44 m/s    Ao VTI 17.27 cm    IVS 0.78 0.6 - 1.1 cm    LA size 4.90 cm    Left Atrium Major Axis 7.30 cm    Left Atrium Minor Axis 7.20 cm    LVIDD 6.51 (A) 3.5 - 6.0 cm    LVIDS 6.03 (A) 2.1 - 4.0 cm    LVOT diameter 2.09 cm    LVOT peak VTI 8.95 cm    PW 0.90 0.6 - 1.1 cm    MV Peak E Kevin 1.12 m/s    RA Major Axis 5.35 cm    RA Width 4.31 cm    RVDD 5.12 cm    Sinus 3.00 cm    TAPSE  0.92 cm    TR Max Kevin 2.98 m/s    TDI LATERAL 0.07 m/s    TDI SEPTAL 0.07 m/s    LA WIDTH 5.68 cm    LV Diastolic Volume 216.83 mL    LV Systolic Volume 181.73 mL    LVOT peak kevin 0.75 m/s    LV LATERAL E/E' RATIO 16.00 m/s    LV SEPTAL E/E' RATIO 16.00 m/s    FS 7 %    LA volume 171.51 cm3    LV mass 228.12 g    Left Ventricle Relative Wall Thickness 0.28 cm    AV valve area 1.78 cm2    AV Velocity Ratio 0.52     AV index (prosthetic) 0.52     Mean e' 0.07 m/s    LVOT area 3.4 cm2    LVOT stroke volume 30.69 cm3    AV peak gradient 8 mmHg    E/E' ratio 16.00 m/s    LV Systolic Volume Index 96.7 mL/m2    LV Diastolic Volume Index 115.39 mL/m2    LA Volume Index 91.3 mL/m2    LV Mass Index 121 g/m2    Triscuspid Valve Regurgitation Peak Gradient 36 mmHg    BSA 1.95 m2    Right Atrial Pressure (from IVC) 15 mmHg    TV rest pulmonary artery pressure 51 mmHg    Narrative    · Moderate left ventricular enlargement.  · Severely decreased left ventricular systolic function. The estimated   ejection fraction is 15%  · Severe global hypokinetic wall motion.  · Septal wall has abnormal motion. Systolic and diastolic flattening of   the interventricular septum consistent with right ventricle pressure and   volume overload.  · Eccentric left ventricular hypertrophy.  · Indeterminate left ventricular diastolic function.  · Mildly reduced right ventricular systolic function.  · Severe left atrial enlargement.  · Moderate mitral regurgitation.  · Moderate tricuspid regurgitation.  · The estimated PA systolic pressure is 51 mm Hg. Pulmonary hypertension   present.  · Elevated central venous pressure (15 mm Hg).        Assessment and Plan:     Atrial fibrillation with RVR  69 y/o woman history of NICM 20-25%, persistent atrial fibrillation on apixaban, [employer] ICD, CKD presenting with cardiogenic shock and acute decompensated heart failure in setting of atrial fibrillation w RVR, and high atrial fibrillation burden on  device interrogation. Failed DCCV x 2.    - Improved rate control, please reduce amio to 0.5mg/mion  - Given improved volume status, may have improved response to DCCV.  - Please make NPO @ 0000 for likely JOSE/DCCV tomorrow  - Please continue warfarin (remains subtherapeutic) with heparin bridging. Would prefer avoiding NOACs in case patient requires AVJ ablation with ICD upgrade.  - s/p digoxin load to help with rate control in the interim, given renal dysfunction, would not continue thereafter unless persistent problems with rate control  - if unable to get either rate or rhythm control, will sign up for AV ken ablation and device upgrade to Bi-V  - in the interim, if arrhythmia causes hemodynamic instability, would proceed with additional attempts at emergent cardioversion as per ACLS protocol.        Bay Holliday MD  Cardiac Electrophysiology  [facility]

## 2019-08-26 NOTE — PLAN OF CARE
Problem: Adult Inpatient Plan of Care  Goal: Plan of Care Review  Outcome: Ongoing (interventions implemented as appropriate)  No significant events during shift. See flowsheets for vital signs and assessments. See below for updates on progress made.       Neuro: AAOx4. Afebrile. Pupils 3mm equal/brisk.     Cardiovascular: Afib with plan for JOSE with possible cardioversion tomorrow afternoon . HR 110s-120s. CVP 6, 10. Palpable pulses +2.       Pulmonary: Nitric d/c during shift. Pt tolerating 3L NC with SpO2 > 90%.     Gastrointestinal: No BM during shift. Cardiac diet with 1500cc FR.     Genitourinary: Grant in place. Urine output 980mL over shift.     Integumentary/other: HAPI prevention bundle in place; Last CHG bath: 8/26/19 am.    Infusions: Lasix 10, Amio 0.5, Heparin 14 with therapeutic PTT.     POC: Transfer to floor. JOSE with possible cardioversion and. Biventricular pacemaker placement and AV node ablation for tomorrow.     Patient progressing towards goals as tolerated, plan of care communicated and reviewed with Sherice Squires. All concerns addressed. Will continue to monitor.     A: Awake    RASS: Goal - 0  alert and calm Actual - 0  alert and calm   Restraint necessity: no  B: Breath   SBT: Not intubated  C: Coordinate A & B, analgesics/sedatives   Pain: managed   SAT: Not intubated  D: Delirium   CAM-ICU: negative  E: Early Mobility   MOVE Screen: Pass   Activity order: Activity adjusted per tolerance   FAS: Feeding/Nutrition   Diet order: Cardiac Fluid restriction: 1500  T: Thrombus   DVT prophylaxis: pharmacologic  H: HOB Elevation   30 degrees: Yes   U: Ulcer Prophylaxis   GI: no  G: Glucose control   managed  S: Skin   Bundle compliance: yes  B: Bowel Function   no issues  I: Indwelling Catheters   Grant necessity: Yes   CVC necessity: Yes   IPAD offered: Not appropriate  D: De-escalation Antibx   No  Plan for the day   Transfer pt to floor.   Family/Goals of care/Code Status   Code Status: Full  Code   GOC: Transfer pt to floor. JOSE with possible cardioversion.

## 2019-08-26 NOTE — ASSESSMENT & PLAN NOTE
69 y/o woman history of NICM 20-25%, persistent atrial fibrillation on apixaban, [employer] ICD, CKD presenting with cardiogenic shock and acute decompensated heart failure in setting of atrial fibrillation w RVR, and high atrial fibrillation burden on device interrogation. Failed DCCV x 2.    - Improved rate control, please reduce amio to 0.5mg/mion  - Given improved volume status, may have improved response to DCCV.  - Please make NPO @ 0000 for likely DCCV tomorrow. Does not need JOSE given therapeutic heparin since prior to JOSE negative for thrombus 8/20.  - Please continue warfarin (remains subtherapeutic) with heparin bridging. Would prefer avoiding NOACs in case patient requires AVJ ablation with ICD upgrade.  - s/p digoxin load to help with rate control in the interim, given renal dysfunction, would not continue thereafter unless persistent problems with rate control  - if unable to get either rate or rhythm control over the weekend, will sign up on Sunday for AV ken ablation and device upgrade to Bi-V  - in the interim, if arrhythmia causes hemodynamic instability, would proceed with additional attempts at emergent cardioversion as per ACLS protocol.

## 2019-08-26 NOTE — ANESTHESIA PREPROCEDURE EVALUATION
[facility]  Anesthesia Pre-Operative Evaluation         Patient Name: Sherice Squires  YOB: 1950  MRN: [id number]    SUBJECTIVE:     Pre-operative evaluation for Procedure(s) (LRB):  CARDIOVERSION (N/A)  ECHOCARDIOGRAM, TRANSESOPHAGEAL (N/A)     08/26/2019    Sherice Squires is a 68 y.o. female w/ a significant PMHx of dilated cardiomyopathy, obesity, HTN, HLD, A-fib, Mitral regurg, CKD, and constipation.    Patient now presents for the above procedure(s).      LDA:       Percutaneous Central Line Insertion/Assessment - triple lumen  08/20/19 0300 right internal jugular (Active)   Dressing biopatch in place;dressing dry and intact 8/26/2019 11:30 AM   Securement secured w/ sutures 8/26/2019 11:30 AM   Additional Site Signs no warmth;no edema;no pain 8/26/2019 11:30 AM   Distal Patency/Care infusing 8/26/2019 11:30 AM   Medial Patency/Care infusing 8/26/2019 11:30 AM   Proximal Patency/Care infusing 8/26/2019 11:30 AM   Waveform normal 8/26/2019 11:30 AM   Line Interventions line leveled/zeroed 8/26/2019 11:30 AM   Dressing Change Due 08/27/19 8/26/2019 11:30 AM   Daily Line Review Performed 8/26/2019 11:30 AM   Number of days: 6            Peripheral IV - Single Lumen 08/23/19 2230 22 G Posterior;Right Hand (Active)   Site Assessment Clean;Dry;Intact;No redness;No swelling 8/26/2019 11:30 AM   Line Status Infusing 8/26/2019 11:30 AM   Dressing Status Clean;Dry;Intact 8/26/2019 11:30 AM   Dressing Intervention New dressing 8/26/2019  3:15 AM   Dressing Change Due 08/27/19 8/26/2019 11:30 AM   Site Change Due 08/27/19 8/26/2019  7:15 AM   Reason Not Rotated Not due 8/26/2019 11:30 AM   Number of days: 2            Urethral Catheter 08/20/19 0400 (Active)   Site Assessment Clean;Intact 8/26/2019 11:30 AM   Collection Container Urimeter 8/26/2019 11:30 AM   Securement Method secured to top of thigh w/ adhesive device 8/26/2019 11:30 AM   Catheter Care Performed yes 8/26/2019 11:30  "AM   Reason for Continuing Urinary Catheterization Critically ill in ICU requiring intensive monitoring 8/26/2019 11:30 AM   CAUTI Prevention Bundle StatLock in place w 1" slack;Intact seal between catheter & drainage tubing;Drainage bag off the floor;Green sheeting clip in use;No dependent loops or kinks;Drainage bag not overfilled (<2/3 full);Drainage bag below bladder 8/26/2019 11:30 AM   Output (mL) 75 mL 8/26/2019  1:00 PM   Number of days: 6       Prev airway: None documented.    Drips:   amiodarone in dextrose 5% 0.5 mg/min (08/26/19 1300)    furosemide (LASIX) 2 mg/mL infusion (non-titrating) 10 mg/hr (08/26/19 1300)    heparin (porcine) in D5W 14 Units/kg/hr (08/26/19 1300)       Patient Active Problem List   Diagnosis    Dilated cardiomyopathy    Automatic implantable cardioverter-defibrillator in situ    Obesity    Hyperlipidemia    Degenerative joint disease of knee    Atrial fibrillation    PSVT (paroxysmal supraventricular tachycardia)    Congestive heart failure, NYHA class 3 and ACC/AHA stage C    severe Mitral regurg wih mild sclerosis    Atrial fibrillation with RVR    Acute on chronic combined systolic and diastolic heart failure    Acute on chronic kidney failure    Cardiogenic shock    Sepsis due to gram-negative urinary tract infection    Chronic pain of left knee    Thrombocytopenia, unspecified    Constipation       Review of patient's allergies indicates:   Allergen Reactions    Augmentin [amoxicillin-pot clavulanate] Swelling     Lip swelling      Penicillins      Other reaction(s): Swelling  Lip swelling         Current Inpatient Medications:   digoxin  0.125 mg Oral Every Mon, Wed, Fri    hydrALAZINE  50 mg Oral Q8H    isosorbide dinitrate  20 mg Oral Q8H    polyethylene glycol  17 g Oral Daily    senna-docusate 8.6-50 mg  2 tablet Oral BID    warfarin  7.5 mg Oral Daily       No current facility-administered medications on file prior to encounter.      Current " Outpatient Medications on File Prior to Encounter   Medication Sig Dispense Refill    apixaban (ELIQUIS) 5 mg Tab Take 1 tablet (5 mg total) by mouth 2 (two) times daily. 60 tablet 11    digoxin (DIGOX) 125 mcg tablet Take 1 tablet by mouth once daily 30 tablet 11    esomeprazole (NEXIUM) 20 MG capsule Take 20 mg by mouth before breakfast.      hydrALAZINE (APRESOLINE) 50 MG tablet TAKE 1 TABLET (50 MG TOTAL) BY MOUTH 3 (THREE) TIMES DAILY. 270 tablet 3    isosorbide mononitrate (IMDUR) 30 MG 24 hr tablet TAKE 1 TABLET (30 MG TOTAL) BY MOUTH ONCE DAILY. 90 tablet 3    metoprolol succinate (TOPROL-XL) 200 MG 24 hr tablet TAKE 1 TABLET (200 MG TOTAL) BY MOUTH 2 (TWO) TIMES DAILY. 180 tablet 3    potassium chloride SA (K-DUR,KLOR-CON) 20 MEQ tablet Take 1 tablet (20 mEq total) by mouth once daily. 30 tablet 3    sacubitril-valsartan (ENTRESTO)  mg per tablet Take 1 tablet by mouth 2 (two) times daily. 180 tablet 4    simvastatin (ZOCOR) 40 MG tablet TAKE 1 TABLET EVERY EVENING 90 tablet 3    spironolactone (ALDACTONE) 25 MG tablet TAKE 1 TABLET EVERY DAY 90 tablet 3    torsemide (DEMADEX) 20 MG Tab Take 1 tablet (20 mg total) by mouth 2 (two) times daily. 60 tablet 3    metOLazone (ZAROXOLYN) 2.5 MG tablet Take it once today 30 min before your evening dose of Bumex. 5 tablet 0       Past Surgical History:   Procedure Laterality Date    CARDIAC DEFIBRILLATOR PLACEMENT      CARDIOVERSION N/A 8/22/2019    Performed by [facility]ece [facility] at [facility] EP [facility]    CARDIOVERSION N/A 8/20/2019    Performed by [facility] at [facility] EP [facility]    CARDIOVERSION OR DEFIBRILLATION N/A 3/8/2019    Performed by Jose Reece MD at [facility] EP LAB    CHOLECYSTECTOMY      ECHOCARDIOGRAM,TRANSESOPHAGEAL N/A 8/20/2019    Performed by [facility] Diagnostic Provider at [facility] EP [facility]    ECHOCARDIOGRAM,TRANSESOPHAGEAL N/A 3/8/2019    Performed by [facility] Diagnostic Provider at [facility] EP LAB    HYSTERECTOMY      JOINT REPLACEMENT   2009    rt knee replacment    pace maker  12/2010       Social History     Socioeconomic History    Marital status: Single     Spouse name: Not on file    Number of children: Not on file    Years of education: Not on file    Highest education level: Not on file   Occupational History    Not on file   Social Needs    Financial resource strain: Not on file    Food insecurity:     Worry: Not on file     Inability: Not on file    Transportation needs:     Medical: Not on file     Non-medical: Not on file   Tobacco Use    Smoking status: Never Smoker    Smokeless tobacco: Never Used   Substance and Sexual Activity    Alcohol use: No     Comment: rarely    Drug use: No    Sexual activity: Not on file   Lifestyle    Physical activity:     Days per week: Not on file     Minutes per session: Not on file    Stress: Not on file   Relationships    Social connections:     Talks on phone: Not on file     Gets together: Not on file     Attends Baptism service: Not on file     Active member of club or organization: Not on file     Attends meetings of clubs or organizations: Not on file     Relationship status: Not on file   Other Topics Concern    Not on file   Social History Narrative    Not on file       OBJECTIVE:     Vital Signs Range (Last 24H):  Temp:  [36.6 °C (97.9 °F)-37.1 °C (98.7 °F)]   Pulse:  [110-131]   Resp:  [14-46]   BP: ()/(49-81)   SpO2:  [82 %-100 %]       Significant Labs:  Lab Results   Component Value Date    WBC 9.91 08/26/2019    HGB 13.0 08/26/2019    HCT 39.3 08/26/2019    PLT 95 (L) 08/26/2019    CHOL 176 09/10/2014    TRIG 64 09/10/2014    HDL 63 09/10/2014    ALT 15 08/26/2019    AST 18 08/26/2019     (L) 08/26/2019    K 3.9 08/26/2019    CL 83 (L) 08/26/2019    CREATININE 1.9 (H) 08/26/2019    BUN 48 (H) 08/26/2019    CO2 33 (H) 08/26/2019    TSH 1.253 08/20/2019    INR 1.2 08/26/2019       Diagnostic Studies: No relevant studies.    EKG:   Results for orders placed or  performed during the hospital encounter of 08/20/19   EKG 12-lead    Collection Time: 08/22/19  2:50 PM    Narrative    Test Reason : R07.9    Vent. Rate : 133 BPM     Atrial Rate : 052 BPM     P-R Int : 000 ms          QRS Dur : 142 ms      QT Int : 370 ms       P-R-T Axes : 000 259 081 degrees     QTc Int : 550 ms    Atrial fibrillation with rapid ventricular response  Right superior axis deviation  Nonspecific intraventricular block    Abnormal ECG  When compared with ECG of 21-AUG-2019 17:02,    Nonspecific intraventricular block has replaced Right bundle branch block      Confirmed by SHEKHAR LONDON MD ([id number]) on 8/23/2019 10:54:35 AM    Referred By: PARISH LYN           Confirmed By:SHEKHAR LONDON MD       2D ECHO:  TTE:  Results for orders placed or performed during the hospital encounter of 08/20/19   Transthoracic echo (TTE) 2D with Color Flow   Result Value Ref Range    Ascending aorta 3.07 cm    STJ 3.25 cm    AV mean gradient 4 mmHg    Ao peak natasha 1.44 m/s    Ao VTI 17.27 cm    IVS 0.78 0.6 - 1.1 cm    LA size 4.90 cm    Left Atrium Major Axis 7.30 cm    Left Atrium Minor Axis 7.20 cm    LVIDD 6.51 (A) 3.5 - 6.0 cm    LVIDS 6.03 (A) 2.1 - 4.0 cm    LVOT diameter 2.09 cm    LVOT peak VTI 8.95 cm    PW 0.90 0.6 - 1.1 cm    MV Peak E Natasha 1.12 m/s    RA Major Axis 5.35 cm    RA Width 4.31 cm    RVDD 5.12 cm    Sinus 3.00 cm    TAPSE 0.92 cm    TR Max Natasha 2.98 m/s    TDI LATERAL 0.07 m/s    TDI SEPTAL 0.07 m/s    LA WIDTH 5.68 cm    LV Diastolic Volume 216.83 mL    LV Systolic Volume 181.73 mL    LVOT peak natasha 0.75 m/s    LV LATERAL E/E' RATIO 16.00 m/s    LV SEPTAL E/E' RATIO 16.00 m/s    FS 7 %    LA volume 171.51 cm3    LV mass 228.12 g    Left Ventricle Relative Wall Thickness 0.28 cm    AV valve area 1.78 cm2    AV Velocity Ratio 0.52     AV index (prosthetic) 0.52     Mean e' 0.07 m/s    LVOT area 3.4 cm2    LVOT stroke volume 30.69 cm3    AV peak gradient 8 mmHg    E/E' ratio 16.00 m/s    LV Systolic  Volume Index 96.7 mL/m2    LV Diastolic Volume Index 115.39 mL/m2    LA Volume Index 91.3 mL/m2    LV Mass Index 121 g/m2    Triscuspid Valve Regurgitation Peak Gradient 36 mmHg    BSA 1.95 m2    Right Atrial Pressure (from IVC) 15 mmHg    TV rest pulmonary artery pressure 51 mmHg    Narrative    · Moderate left ventricular enlargement.  · Severely decreased left ventricular systolic function. The estimated   ejection fraction is 15%  · Severe global hypokinetic wall motion.  · Septal wall has abnormal motion. Systolic and diastolic flattening of   the interventricular septum consistent with right ventricle pressure and   volume overload.  · Eccentric left ventricular hypertrophy.  · Indeterminate left ventricular diastolic function.  · Mildly reduced right ventricular systolic function.  · Severe left atrial enlargement.  · Moderate mitral regurgitation.  · Moderate tricuspid regurgitation.  · The estimated PA systolic pressure is 51 mm Hg. Pulmonary hypertension   present.  · Elevated central venous pressure (15 mm Hg).          JOSE:  No results found for this or any previous visit.    ASSESSMENT/PLAN:                                                                                                                08/26/2019  Sherice Squires is a 68 y.o., female.    Anesthesia Evaluation    I have reviewed the Patient Summary Reports.    I have reviewed the Nursing Notes.   I have reviewed the Medications.     Review of Systems  Anesthesia Hx:  No problems with previous Anesthesia  Denies Family Hx of Anesthesia complications.   Denies Personal Hx of Anesthesia complications.   Social:  Non-Smoker, No Alcohol Use    Hematology/Oncology:  Hematology Normal   Oncology Normal     EENT/Dental:EENT/Dental Normal   Cardiovascular:   Hypertension CHF Dilated cardiomyopathy  Automatic implantable cardioverter-defibrillator in situ  PSVT (paroxysmal supraventricular tachycardia)  Congestive heart failure, NYHA class 3 and  ACC/AHA stage C  severe Mitral regurg wih mild sclerosis  Atrial fibrillation with RVR  Acute on chronic combined systolic and diastolic heart failure         Pulmonary:  Pulmonary Normal    Renal/:   Chronic Renal Disease    Hepatic/GI:  Hepatic/GI Normal    Musculoskeletal:   Arthritis     Neurological:  Neurology Normal    Endocrine:  Endocrine Normal    Dermatological:  Skin Normal    Psych:  Psychiatric Normal           Physical Exam  General:  Well nourished    Airway/Jaw/Neck:  Airway Findings: Mouth Opening: Normal Tongue: Normal  General Airway Assessment: Adult  Mallampati: II  TM Distance: Normal, at least 6 cm        Eyes/Ears/Nose:  EYES/EARS/NOSE FINDINGS: Normal   Dental:  Dental Findings: Periodontal disease, Severe    Chest/Lungs:  Chest/Lungs Clear    Heart/Vascular:  Heart Findings: Normal Heart murmur: negative Vascular Findings: Normal    Abdomen:  Abdomen Findings: Normal    Musculoskeletal:  Musculoskeletal Findings: Normal   Skin:  Skin Findings: Normal    Mental Status:  Mental Status Findings: Normal        Anesthesia Plan  Type of Anesthesia, risks & benefits discussed:  Anesthesia Type:  general  Patient's Preference:   Intra-op Monitoring Plan: standard ASA monitors  Intra-op Monitoring Plan Comments:   Post Op Pain Control Plan: multimodal analgesia, IV/PO Opioids PRN and per primary service following discharge from PACU  Post Op Pain Control Plan Comments:   Induction:   IV  Beta Blocker:  Patient is on a Beta-Blocker and has received one dose within the past 24 hours (No further documentation required).       Informed Consent: Patient understands risks and agrees with Anesthesia plan.  Questions answered. Anesthesia consent signed with patient.  ASA Score: 4     Day of Surgery Review of History & Physical:    H&P update referred to the surgeon.         Ready For Surgery From Anesthesia Perspective.

## 2019-08-26 NOTE — PROGRESS NOTES
[facility]  Heart Transplant  Progress Note    Patient Name: Sherice Squires  MRN: [id number]  Admission Date: 8/20/2019  Hospital Length of Stay: 6 days  Attending Physician: Annamarie Soria MD  Primary Care Provider: Annamarie Soria MD  Principal Problem:Acute on chronic combined systolic and diastolic heart failure    Subjective:     **Interval History: Remains in A fib with HR in the 110's-120's. Feels generally well this morning. CVP 6, so Lasix gtt decreased to 10 mg/hr. sVO2 61 - D/C'd Cesar. Plan to step down. INR is 1.2 with plan to repeat JOSE guided DCCV tomorrow followed by AVN ablation and CRT upgrade at a later time.    Continuous Infusions:   amiodarone in dextrose 5% 1 mg/min (08/26/19 1100)    furosemide (LASIX) 2 mg/mL infusion (non-titrating) 10 mg/hr (08/26/19 1100)    heparin (porcine) in D5W 14 Units/kg/hr (08/26/19 1100)     Scheduled Meds:   digoxin  0.125 mg Oral Every Mon, Wed, Fri    hydrALAZINE  50 mg Oral Q8H    isosorbide dinitrate  20 mg Oral Q8H    polyethylene glycol  17 g Oral Daily    senna-docusate 8.6-50 mg  2 tablet Oral BID    warfarin  7.5 mg Oral Daily     PRN Meds:heparin (PORCINE), heparin (PORCINE), ramelteon, silver sulfADIAZINE 1%, sodium chloride 0.9%    Review of patient's allergies indicates:   Allergen Reactions    Augmentin [amoxicillin-pot clavulanate] Swelling     Lip swelling      Penicillins      Other reaction(s): Swelling  Lip swelling       Objective:     Vital Signs (Most Recent):  Temp: 98.7 °F (37.1 °C) (08/26/19 1100)  Pulse: (!) 121 (08/26/19 1200)  Resp: (!) 23 (08/26/19 1200)  BP: 110/81 (08/26/19 1200)  SpO2: 98 % (08/26/19 1200) Vital Signs (24h Range):  Temp:  [97.9 °F (36.6 °C)-98.7 °F (37.1 °C)] 98.7 °F (37.1 °C)  Pulse:  [110-131] 121  Resp:  [14-46] 23  SpO2:  [82 %-100 %] 98 %  BP: ()/(49-81) 110/81     Patient Vitals for the past 72 hrs (Last 3 readings):   Weight   08/26/19 0500 71 kg (156 lb 8.4 oz)   08/24/19  0359 72.1 kg (158 lb 15.2 oz)     Body mass index is 26.05 kg/m².      Intake/Output Summary (Last 24 hours) at 8/26/2019 1212  Last data filed at 8/26/2019 1200  Gross per 24 hour   Intake 2211.3 ml   Output 2845 ml   Net -633.7 ml       Hemodynamic Parameters:       Telemetry: A fib with RVR, PVC's    Physical Exam   Constitutional: She is oriented to person, place, and time. She appears well-developed and well-nourished.   HENT:   Head: Normocephalic and atraumatic.   Eyes: Pupils are equal, round, and reactive to light. Conjunctivae and EOM are normal.   Neck: Normal range of motion. Neck supple. No JVD present. No thyromegaly present.   RIJ TLC   Cardiovascular: Intact distal pulses.   Tachycardic, irregularly irregular   Pulmonary/Chest: Effort normal and breath sounds normal.   Abdominal: Soft. Bowel sounds are normal.   Musculoskeletal: Normal range of motion. She exhibits no edema.   Neurological: She is alert and oriented to person, place, and time.   Skin: Skin is warm and dry. Capillary refill takes 2 to 3 seconds.   Psychiatric: She has a normal mood and affect. Her behavior is normal. Judgment and thought content normal.       Significant Labs:  CBC:  Recent Labs   Lab 08/24/19  0313 08/25/19  0300 08/26/19  0230   WBC 10.23 9.98 9.91   RBC 4.60 4.57 4.43   HGB 13.9 13.4 13.0   HCT 41.9 41.4 39.3   PLT 75* 85* 95*   MCV 91 91 89   MCH 30.2 29.3 29.3   MCHC 33.2 32.4 33.1     BNP:  Recent Labs   Lab 08/19/19  1541 08/26/19  0230   BNP 3,234* 915*     CMP:  Recent Labs   Lab 08/25/19  0300 08/25/19  1506 08/26/19  0230   GLU 99 125* 111*   CALCIUM 9.5 9.4 9.1   ALBUMIN 2.3* 2.4* 2.5*   PROT 6.7 6.8 7.0   * 133* 130*   K 4.5 4.4 3.9   CO2 32* 35* 33*   CL 86* 85* 83*   BUN 49* 50* 48*   CREATININE 1.8* 1.9* 1.9*   ALKPHOS 186* 181* 168*   ALT 14 17 15   AST 18 18 18   BILITOT 0.5 0.4 0.5      Coagulation:   Recent Labs   Lab 08/24/19  0939 08/24/19  1534 08/25/19  0300 08/26/19  0230   INR  --  1.1  1.2 1.2   APTT 46.3*  --  48.1* 53.8*     LDH:  No results for input(s): LDH in the last 72 hours.  Microbiology:  Microbiology Results (last 7 days)     Procedure Component Value Units Date/Time    Urine culture [[id number]]  (Abnormal)  (Susceptibility) Collected:  08/20/19 0422    Order Status:  Completed Specimen:  Urine Updated:  08/22/19 0451     Urine Culture, Routine ESCHERICHIA COLI  >100,000 cfu/ml      Narrative:       Preferred Collection Type->Urine, Clean Catch          I have reviewed all pertinent labs within the past 24 hours.    Estimated Creatinine Clearance: 28 mL/min (A) (based on SCr of 1.9 mg/dL (H)).    Diagnostic Results:  I have reviewed all pertinent imaging results/findings within the past 24 hours.    Assessment and Plan:     Sherice Squires is a 69 yo AAF with PMHx significant for NIDCM / stage D HFrEF (LVEF 20-25%, LVEDD 5.9 cm) s/p dc ICD, AF, HTN, DLD, CKD who is admitted as a transfer from  ED for management of ADHF +/- possible cardiogenic shock.     Patient reports she presented to OSH with progressive/worsening shortness of breath on exertion, orthopnea, and peripheral swelling of approx 5 days duration associated with nausea and fatigue. Denies fever/chills/sweats, chest pain, diaphoresis, presyncope/syncope. Does report intermittent palpitations.      Patient was afebrile and normotensive on arrival (/55 mmHg) to OSH ED with pulses in the 120-130s in AF with RVR. Initial labs showed worsening FAMILIA on CKD (with Cr 3.8 from 2.7-3.1 this past week from previous baseline of 1.3-1.4 in June 2019) as well as elevated T bili 2.3 and BNP >3000. CXR obtained without acute cardiopulmonary process. Patient ultimately [place] [facility] [place] level [place] care.      Patient follows with hospitals clinic - currently on Entresto 97/103 mg BID, Toprol  mg daily, Aldactone 25 mg daily, and digoxin 0.125 mg daily. She was last seen by Dr Rodriguez on 8/6/19 who added metolazone 2.5 mg QHS  before evening dose of Bumex to augment diuresis due to complaints of SOB / peripheral swelling at the time. She did not respond to this regimen and reports she was ultimately switched to Torsemide instead.      Patient is fully complaint with her medicines. Also adherent to fluid / salt restrictions with her hx of congestive heart failure.      States she was hospitalized only once before for ADHF within the past year.      Of note patient follows with Dr Reece of EP for her AF hx, last seen by him in 5/2019    * Acute on chronic combined systolic and diastolic heart failure  - 67 yo F with NIDCM EF 20% , NYHA FC III with admitted with afib with rvr and cardiogenic shock  - IABP placed 8/20 and pulled 8/23  -TTE with contrast 8/20: LVEDD 6.51, LVEF 15%, indeterminate diastolic function, severe LAE, trace AI, mod MR and TR, PAS 51 and IVC 15.   -Today: CVP: 6; SVO2: 61 ; CO: 4.63, SVR: 1123. Decrease Lasix gtt to 10 mg/hr, Cesar weaned off. Increase Hydralazine to 50 mg every 8 hours, and continue Isordil 20 mg tid  -CTs done off pathway for possible LVAD/transplant evaluation. Patient seen by CTS, not considered a candidate for advanced options at this time HOWEVER would like to see how she does with rehab/PT/OT, and see if she can walk into clinic, to see if she could become a candidate in the near future.  -Transfer to step down today      Constipation  -on bowel regimen    Thrombocytopenia, unspecified  -Platelet count was trending down likely 2/2 IABP, low suspicion for HIT given 4Ts=2, HIT with OD < 0.4, BELGICA not required  -Platelet count today is climbing back up at 95k    Chronic pain of left knee  -Long h/o of this for which she uses a cane, and more recently, a walker  -Consult PT/OT  -PM+R consult regarding knee pain to assess ability to rehab after potential advanced options    Sepsis due to gram-negative urinary tract infection  -had hesitancy on history prior to arrival and growing e. Coli in  urine  -Completed course of Astreonam  -D/C Grant as Lasix gtt has been decreased    Cardiogenic shock  - See acute on chronic heart failure    Acute on chronic kidney failure  - Cr up to 4.0 from baseline 1.3-1.4 in June 2019, downtrending with improvement in cardiogenic shock    Atrial fibrillation with RVR  - return to sinus rhythm after dccv/candice 8/20/19, but quickly went back in A fib with RVR and failed repeat cardioversion 8/22. INR is 1.2 - EP plans to do another CANDICE guided DCCV tomorrow (has had one subtherapeutic PTT).  - Discussed putting patient back on Eliquis or another DOAC with EP - they want her to stay on Coumadin with Heparin  - Decrease Amio gtt to 0.5 mg/min  - Dig level 1.0 today. Will continue plan for Dig 125 mcg every M-W-F  - EP plans possible AVNA with BiV upgrade at a later time. Will keep patient on warfarin and heparin with INR goal of 2.       Automatic implantable cardioverter-defibrillator in situ  -S/P [employer] dual chamber ICD  - hx VT/VF per chart review.  - telemetry monitoring and repletion of electrolytes PRN.      Uninterrupted Critical Care/Counseling Time (not including procedures): 30 [place] [id number]  Heart Transplant  [facility]

## 2019-08-27 PROBLEM — Z74.09 IMPAIRED FUNCTIONAL MOBILITY AND ENDURANCE: Status: ACTIVE | Noted: 2019-01-01

## 2019-08-27 NOTE — ASSESSMENT & PLAN NOTE
-Platelet count was trending down likely 2/2 IABP, low suspicion for HIT given 4Ts=2, HIT with OD < 0.4, BELGICA not required  -Platelet count today is climbing back up at 107k

## 2019-08-27 NOTE — TRANSFER OF CARE
"Anesthesia Transfer of Care Note    Patient: Sherice Squires    Procedure(s) Performed: Procedure(s) (LRB):  CARDIOVERSION (N/A)  ECHOCARDIOGRAM, TRANSESOPHAGEAL (N/A)    Patient location: ICU    Anesthesia Type: general    Transport from OR: Transported from OR on 2-3 L/min O2 by NC with adequate spontaneous ventilation. Continuous ECG monitoring in transport. Continuous SpO2 monitoring in transport. Continuous CVP monitoring in transport    Post pain: adequate analgesia    Post assessment: no apparent anesthetic complications and tolerated procedure well    Post vital signs: stable    Level of consciousness: lethargic and responds to stimulation    Nausea/Vomiting: no nausea/vomiting    Complications: none    Transfer of care protocol was followed    Bedside report given to ICU RN in JOSE. Patient transported back to ICU by ICU RN and ICU student RN on portable monitor and 2LPM O2 via NC. All questions and concerns addressed prior to patient leaving JOSE.    Last vitals:   Visit Vitals  BP 96/69 (BP Location: Left arm, Patient Position: Lying)   Pulse (!) 126   Temp 37.1 °C (98.7 °F) (Oral)   Resp 20   Ht 5' 5" (1.651 m)   Wt 71.1 kg (156 lb 12 oz)   SpO2 (!) 94%   Breastfeeding? No   BMI 26.08 kg/m²     "

## 2019-08-27 NOTE — ASSESSMENT & PLAN NOTE
-h/o known L knee OA, patient states possible future TKA   -takes tylenol arthritis at home and has received intra-articular injections previously   -uses SPC/rollator at baseline  -recommend to restart tylenol or NSAID (if not contraindicated)

## 2019-08-27 NOTE — SUBJECTIVE & OBJECTIVE
Past Medical History:   Diagnosis Date    Atrial fibrillation     Cardiomyopathy     CHF (congestive heart failure)     Hyperlipidemia     Hypertension     Ventricular tachycardia      Past Surgical History:   Procedure Laterality Date    CARDIAC DEFIBRILLATOR PLACEMENT      CARDIOVERSION N/A 8/22/2019    Performed by [facility]ece MD at [facility]    CARDIOVERSION N/A 8/20/2019    Performed by [facility]ece [facility] at [facility]    CARDIOVERSION OR DEFIBRILLATION N/A 3/8/2019    Performed by Jose Reece MD at [facility]    CHOLECYSTECTOMY      ECHOCARDIOGRAM,TRANSESOPHAGEAL N/A 8/20/2019    Performed by [facility] at [facility]    ECHOCARDIOGRAM,TRANSESOPHAGEAL N/A 3/8/2019    Performed by [facility] at [facility]    HYSTERECTOMY      JOINT REPLACEMENT  2009    rt knee replacment    pace maker  12/2010     Review of patient's allergies indicates:   Allergen Reactions    Augmentin [amoxicillin-pot clavulanate] Swelling     Lip swelling      Penicillins      Other reaction(s): Swelling  Lip swelling         Scheduled Medications:    digoxin  0.125 mg Oral Every Mon, Wed, Fri    hydrALAZINE  50 mg Oral Q8H    isosorbide dinitrate  20 mg Oral Q8H    polyethylene glycol  17 g Oral Daily    senna-docusate 8.6-50 mg  2 tablet Oral BID    warfarin  7.5 mg Oral Daily       PRN Medications: heparin (PORCINE), heparin (PORCINE), ramelteon, silver sulfADIAZINE 1%, sodium chloride 0.9%    Family History     Problem Relation (Age of Onset)    Arthritis Father    COPD Brother    Cancer Father    Drug abuse Sister    Hypertension Maternal Grandmother        Tobacco Use    Smoking status: Never Smoker    Smokeless tobacco: Never Used   Substance and Sexual Activity    Alcohol use: No     Comment: rarely    Drug use: No    Sexual activity: Not on file     Review of Systems   Constitutional: Positive for activity change. Negative for chills, fatigue and fever.    HENT: Negative for trouble swallowing and voice change.    Eyes: Negative for photophobia and visual disturbance.   Respiratory: Negative for cough and shortness of breath.    Cardiovascular: Negative for chest pain and palpitations.   Gastrointestinal: Negative for nausea and vomiting.   Genitourinary: Negative for difficulty urinating and flank pain.   Musculoskeletal: Positive for arthralgias and gait problem. Negative for joint swelling.   Skin: Negative for color change and rash.   Neurological: Positive for weakness. Negative for speech difficulty and numbness.   Psychiatric/Behavioral: Negative for agitation and confusion.     Objective:     Vital Signs (Most Recent):  Temp: 98.2 °F (36.8 °C) (08/27/19 0705)  Pulse: (!) 127 (08/27/19 1000)  Resp: 20 (08/27/19 1000)  BP: 98/72 (08/27/19 1000)  SpO2: 98 % (08/27/19 1000)    Vital Signs (24h Range):  Temp:  [98.2 °F (36.8 °C)-98.8 °F (37.1 °C)] 98.2 °F (36.8 °C)  Pulse:  [114-139] 127  Resp:  [13-28] 20  SpO2:  [85 %-100 %] 98 %  BP: ()/() 98/72     Body mass index is 26.08 kg/m².    Physical Exam   Constitutional: She is oriented to person, place, and time. She appears well-developed and well-nourished.   HENT:   Head: Normocephalic and atraumatic.   Eyes: Right eye exhibits no discharge. Left eye exhibits no discharge. No scleral icterus.   Neck: Neck supple.   Cardiovascular: Intact distal pulses. Tachycardia present.   Pulmonary/Chest: Effort normal. No respiratory distress.   02 via NC    Abdominal: Soft. There is no tenderness.   Musculoskeletal: She exhibits no edema or deformity.        Left knee: She exhibits decreased range of motion. Tenderness found. Lateral joint line tenderness noted.   Neurological: She is alert and oriented to person, place, and time.   Follows commands    Skin: Skin is warm and dry.   Psychiatric: She has a normal mood and affect. Her behavior is normal. Cognition and memory are impaired.   Vitals  reviewed.    NEUROLOGICAL EXAMINATION:     MENTAL STATUS   Oriented to person, place, and time.       Diagnostic Results:   Labs: Reviewed  ECG: Reviewed  X-Ray: Reviewed  CT: Reviewed

## 2019-08-27 NOTE — SUBJECTIVE & OBJECTIVE
Interval History: Still in bed this morning. Had CP overnight after swallowing pills and still feels it this am. Son at bedside.    Continuous Infusions:   amiodarone in dextrose 5% 0.5 mg/min (08/27/19 1000)    furosemide (LASIX) 2 mg/mL infusion (non-titrating) 10 mg/hr (08/27/19 1000)    heparin (porcine) in D5W 14 Units/kg/hr (08/27/19 1000)     Scheduled Meds:   digoxin  0.125 mg Oral Every Mon, Wed, Fri    famotidine  20 mg Oral Daily    hydrALAZINE  50 mg Oral Q8H    isosorbide dinitrate  20 mg Oral Q8H    polyethylene glycol  17 g Oral Daily    senna-docusate 8.6-50 mg  2 tablet Oral BID    warfarin  7.5 mg Oral Daily     PRN Meds:heparin (PORCINE), heparin (PORCINE), ramelteon, silver sulfADIAZINE 1%, sodium chloride 0.9%    Review of patient's allergies indicates:   Allergen Reactions    Augmentin [amoxicillin-pot clavulanate] Swelling     Lip swelling      Penicillins      Other reaction(s): Swelling  Lip swelling       Objective:     Vital Signs (Most Recent):  Temp: 98.2 °F (36.8 °C) (08/27/19 0705)  Pulse: (!) 127 (08/27/19 1000)  Resp: 20 (08/27/19 1000)  BP: 98/72 (08/27/19 1000)  SpO2: 98 % (08/27/19 1000) Vital Signs (24h Range):  Temp:  [98.2 °F (36.8 °C)-98.8 °F (37.1 °C)] 98.2 °F (36.8 °C)  Pulse:  [114-139] 127  Resp:  [13-28] 20  SpO2:  [85 %-100 %] 98 %  BP: ()/() 98/72     Patient Vitals for the past 72 hrs (Last 3 readings):   Weight   08/27/19 0500 71.1 kg (156 lb 12 oz)   08/26/19 0500 71 kg (156 lb 8.4 oz)     Body mass index is 26.08 kg/m².      Intake/Output Summary (Last 24 hours) at 8/27/2019 1042  Last data filed at 8/27/2019 1000  Gross per 24 hour   Intake 1276.61 ml   Output 1885 ml   Net -608.39 ml       Hemodynamic Parameters:       Telemetry: A fib with RVR, PVC's    Physical Exam   Constitutional: She is oriented to person, place, and time. She appears well-developed and well-nourished.   HENT:   Head: Normocephalic and atraumatic.   Eyes: Pupils are  equal, round, and reactive to light. Conjunctivae and EOM are normal.   Neck: Normal range of motion. Neck supple. No JVD present. No thyromegaly present.   RIJ TLC   Cardiovascular: Intact distal pulses.   Tachycardic, irregularly irregular   Pulmonary/Chest: Effort normal and breath sounds normal.   Abdominal: Soft. Bowel sounds are normal.   Musculoskeletal: Normal range of motion. She exhibits no edema.   Neurological: She is alert and oriented to person, place, and time.   Skin: Skin is warm and dry. Capillary refill takes 2 to 3 seconds.   Psychiatric: She has a normal mood and affect. Her behavior is normal. Judgment and thought content normal.       Significant Labs:  CBC:  Recent Labs   Lab 08/25/19  0300 08/26/19 0230 08/27/19 0236   WBC 9.98 9.91 9.01   RBC 4.57 4.43 4.51   HGB 13.4 13.0 13.2   HCT 41.4 39.3 38.5   PLT 85* 95* 107*   MCV 91 89 85   MCH 29.3 29.3 29.3   MCHC 32.4 33.1 34.3     BNP:  Recent Labs   Lab 08/26/19  0230   *     CMP:  Recent Labs   Lab 08/26/19  0230 08/26/19  1526 08/27/19  0236   * 101 104   CALCIUM 9.1 9.4 9.3   ALBUMIN 2.5* 2.5* 2.4*   PROT 7.0 7.2 7.0   * 129* 129*   K 3.9 4.2 3.9   CO2 33* 33* 32*   CL 83* 84* 84*   BUN 48* 45* 44*   CREATININE 1.9* 1.9* 1.8*   ALKPHOS 168* 169* 154*   ALT 15 15 14   AST 18 20 18   BILITOT 0.5 0.5 0.5      Coagulation:   Recent Labs   Lab 08/25/19  0300 08/26/19  0230 08/27/19  0236   INR 1.2 1.2 1.3*   APTT 48.1* 53.8* 47.0*     LDH:  No results for input(s): LDH in the last 72 hours.  Microbiology:  Microbiology Results (last 7 days)     Procedure Component Value Units Date/Time    Urine culture [[id number]]  (Abnormal)  (Susceptibility) Collected:  08/20/19 0422    Order Status:  Completed Specimen:  Urine Updated:  08/22/19 0451     Urine Culture, Routine ESCHERICHIA COLI  >100,000 cfu/ml      Narrative:       Preferred Collection Type->Urine, Clean Catch          I have reviewed all pertinent labs within the past  24 hours.    Estimated Creatinine Clearance: 29.6 mL/min (A) (based on SCr of 1.8 mg/dL (H)).    Diagnostic Results:  I have reviewed all pertinent imaging results/findings within the past 24 hours.

## 2019-08-27 NOTE — ASSESSMENT & PLAN NOTE
-Long h/o of this for which she uses a cane, and more recently, a walker  -PT/OT  -PM+R consult regarding knee pain to assess ability to rehab after potential advanced options

## 2019-08-27 NOTE — PROGRESS NOTES
[facility]  Cardiac Electrophysiology  Progress Note    Admission Date: 8/20/2019  Code Status: Full Code   Attending Physician: Annamarie Soria MD   Expected Discharge Date: 8/30/2019  Principal Problem:Acute on chronic combined systolic and diastolic heart failure    Subjective:     Interval History: No acute events overnight, NPO for repeat JOSE/DCCV today. Remains in afib w RVR in 120s.    Review of Systems   Constitution: Negative. Negative for chills and fever.   HENT: Negative.    Eyes: Negative.    Cardiovascular: Negative for chest pain, claudication and paroxysmal nocturnal dyspnea.   Respiratory: Negative for cough, shortness of breath and wheezing.    Endocrine: Negative.    Hematologic/Lymphatic: Does not bruise/bleed easily.   Skin: Negative for nail changes and rash.   Musculoskeletal: Negative.  Negative for back pain.   Gastrointestinal: Negative for abdominal pain, melena, nausea and vomiting.   Neurological: Negative for dizziness and headaches.   Psychiatric/Behavioral: Negative for altered mental status, depression and substance abuse.   Allergic/Immunologic: Negative.      Objective:     Vital Signs (Most Recent):  Temp: 98.2 °F (36.8 °C) (08/27/19 0705)  Pulse: (!) 118 (08/27/19 0844)  Resp: (!) 24 (08/27/19 0844)  BP: (!) 110/58 (08/27/19 0800)  SpO2: 97 % (08/27/19 0844) Vital Signs (24h Range):  Temp:  [98.2 °F (36.8 °C)-98.8 °F (37.1 °C)] 98.2 °F (36.8 °C)  Pulse:  [114-139] 118  Resp:  [13-28] 24  SpO2:  [85 %-100 %] 97 %  BP: ()/() 110/58     Weight: 71.1 kg (156 lb 12 oz)  Body mass index is 26.08 kg/m².     SpO2: 97 %  O2 Device (Oxygen Therapy): nasal cannula    Physical Exam   Constitutional: She is oriented to person, place, and time. She appears well-developed and well-nourished.   HENT:   Head: Normocephalic and atraumatic.   Eyes: Pupils are equal, round, and reactive to light. Conjunctivae and EOM are normal.   Neck: No JVD present.    Cardiovascular: Normal heart sounds and intact distal pulses. Exam reveals no gallop and no friction rub.   No murmur heard.  Afib, 120s   Pulmonary/Chest: Effort normal. No stridor. She has no wheezes. She has no rales. She exhibits no tenderness.   Abdominal: Soft. Bowel sounds are normal. She exhibits no distension and no mass. There is no tenderness. There is no guarding.   Musculoskeletal: She exhibits no edema, tenderness or deformity.   Neurological: She is alert and oriented to person, place, and time.   Skin: Skin is warm and dry. No rash noted. No erythema.   Psychiatric: She has a normal mood and affect.       Significant Labs:   CMP:   Recent Labs   Lab 08/26/19  0230 08/26/19  1526 08/27/19  0236   * 129* 129*   K 3.9 4.2 3.9   CL 83* 84* 84*   CO2 33* 33* 32*   * 101 104   BUN 48* 45* 44*   CREATININE 1.9* 1.9* 1.8*   CALCIUM 9.1 9.4 9.3   PROT 7.0 7.2 7.0   ALBUMIN 2.5* 2.5* 2.4*   BILITOT 0.5 0.5 0.5   ALKPHOS 168* 169* 154*   AST 18 20 18   ALT 15 15 14   ANIONGAP 14 12 13   ESTGFRAFRICA 30.8* 30.8* 32.9*   EGFRNONAA 26.7* 26.7* 28.5*    and CBC:   Recent Labs   Lab 08/26/19  0230 08/27/19  0236   WBC 9.91 9.01   HGB 13.0 13.2   HCT 39.3 38.5   PLT 95* 107*       Significant Imaging: Echocardiogram:   Transthoracic echo (TTE) complete (Cupid Only):   Results for orders placed or performed during the hospital encounter of 08/20/19   Transthoracic echo (TTE) 2D with Color Flow   Result Value Ref Range    Ascending aorta 3.07 cm    STJ 3.25 cm    AV mean gradient 4 mmHg    Ao peak kevin 1.44 m/s    Ao VTI 17.27 cm    IVS 0.78 0.6 - 1.1 cm    LA size 4.90 cm    Left Atrium Major Axis 7.30 cm    Left Atrium Minor Axis 7.20 cm    LVIDD 6.51 (A) 3.5 - 6.0 cm    LVIDS 6.03 (A) 2.1 - 4.0 cm    LVOT diameter 2.09 cm    LVOT peak VTI 8.95 cm    PW 0.90 0.6 - 1.1 cm    MV Peak E Kevin 1.12 m/s    RA Major Axis 5.35 cm    RA Width 4.31 cm    RVDD 5.12 cm    Sinus 3.00 cm    TAPSE 0.92 cm    TR Max Kevin  2.98 m/s    TDI LATERAL 0.07 m/s    TDI SEPTAL 0.07 m/s    LA WIDTH 5.68 cm    LV Diastolic Volume 216.83 mL    LV Systolic Volume 181.73 mL    LVOT peak natasha 0.75 m/s    LV LATERAL E/E' RATIO 16.00 m/s    LV SEPTAL E/E' RATIO 16.00 m/s    FS 7 %    LA volume 171.51 cm3    LV mass 228.12 g    Left Ventricle Relative Wall Thickness 0.28 cm    AV valve area 1.78 cm2    AV Velocity Ratio 0.52     AV index (prosthetic) 0.52     Mean e' 0.07 m/s    LVOT area 3.4 cm2    LVOT stroke volume 30.69 cm3    AV peak gradient 8 mmHg    E/E' ratio 16.00 m/s    LV Systolic Volume Index 96.7 mL/m2    LV Diastolic Volume Index 115.39 mL/m2    LA Volume Index 91.3 mL/m2    LV Mass Index 121 g/m2    Triscuspid Valve Regurgitation Peak Gradient 36 mmHg    BSA 1.95 m2    Right Atrial Pressure (from IVC) 15 mmHg    TV rest pulmonary artery pressure 51 mmHg    Narrative    · Moderate left ventricular enlargement.  · Severely decreased left ventricular systolic function. The estimated   ejection fraction is 15%  · Severe global hypokinetic wall motion.  · Septal wall has abnormal motion. Systolic and diastolic flattening of   the interventricular septum consistent with right ventricle pressure and   volume overload.  · Eccentric left ventricular hypertrophy.  · Indeterminate left ventricular diastolic function.  · Mildly reduced right ventricular systolic function.  · Severe left atrial enlargement.  · Moderate mitral regurgitation.  · Moderate tricuspid regurgitation.  · The estimated PA systolic pressure is 51 mm Hg. Pulmonary hypertension   present.  · Elevated central venous pressure (15 mm Hg).        Assessment and Plan:     Atrial fibrillation with RVR  67 y/o woman history of NICM 20-25%, persistent atrial fibrillation on apixaban, [employer] ICD, CKD presenting with cardiogenic shock and acute decompensated heart failure in setting of atrial fibrillation w RVR, and high atrial fibrillation burden on device interrogation.  Failed DCCV x 2.    - Given improved volume status, may have improved response to repeat DCCV.  - NPO for JOSE/DCCV today  - Please continue warfarin (remains subtherapeutic) with heparin bridging. Would prefer avoiding NOACs in case patient requires AVJ ablation with ICD upgrade.  - Continue amio to 0.5mg/min  - s/p digoxin load to help with rate control in the interim, given renal dysfunction, would not continue thereafter unless persistent problems with rate control  - if obtain either rate or rhythm control, will consider for PVI vs AV ken ablation and device upgrade to Bi-V  - in the interim, if arrhythmia causes hemodynamic instability, would proceed with additional attempts at emergent cardioversion as per ACLS protocol.        Bay Holliday MD  Cardiac Electrophysiology  [facility]

## 2019-08-27 NOTE — NURSING TRANSFER
Nursing Transfer Note      8/27/2019     Transfer To: Echo stress lab    Transfer via bed    Transfer with 4L O2 via nasal cannula, cardiac monitoring    Transported by RN, SN, and transport    Medicines sent: heparin, lasix and amio gtt    Chart send with patient: Yes    Notified: son    Patient reassessed at: 8/27/19 @ 1600    Upon arrival to floor: patient oriented to room

## 2019-08-27 NOTE — ASSESSMENT & PLAN NOTE
69 y/o woman history of NICM 20-25%, persistent atrial fibrillation on apixaban, [employer] ICD, CKD presenting with cardiogenic shock and acute decompensated heart failure in setting of atrial fibrillation w RVR, and high atrial fibrillation burden on device interrogation. Failed DCCV x 2.    - Given improved volume status, may have improved response to repeat DCCV.  - NPO for JOSE/DCCV today  - Please continue warfarin (remains subtherapeutic) with heparin bridging. Would prefer avoiding NOACs in case patient requires AVJ ablation with ICD upgrade.  - Continue amio to 0.5mg/min  - s/p digoxin load to help with rate control in the interim, given renal dysfunction, would not continue thereafter unless persistent problems with rate control  - if obtain either rate or rhythm control, will consider for PVI vs AV ken ablation and device upgrade to Bi-V  - in the interim, if arrhythmia causes hemodynamic instability, would proceed with additional attempts at emergent cardioversion as per ACLS protocol.

## 2019-08-27 NOTE — HPI
Sherice Squires is a 68-year-old female with PMHx of non-ishchemic cardiomyopathy, heart failure, s/p ICD, A-fib, HTN, CKD, s/p R TKA, & L knee OA (takes tylenol arthritis at home and previously received intraarticular injections).  Patient presented to [facility] on [place]/20 with cardiogenic shock & A-fib w/ RVR.  Intra-aortic balloon pump placed on 8/20 and started on Epi gtt. IABP d/c'd on 8/23. CTS was consulted for transplant candidacy.  Currently on Lasix, Heparin, and Amio gtt. Hospital course further complicated by continued A-fib w/ RVR (JOSE/DCCV pending), constipation, chronic L knee pain, & sepsis 2/2 UTI (abx course completed-tong in place).     Functional History: Patient lives in [place] alone in a single story home with threshold to enter.  Prior to admission, (I) with ADLs and Mod (I) w/ SPC/Rollator for mobility. DME: rollator/SPC.

## 2019-08-27 NOTE — PLAN OF CARE
Problem: Physical Therapy Goal  Goal: Physical Therapy Goal  Goals to be met by: 9/10/19     Patient will increase functional independence with mobility by performin. Supine to sit with MInimal Assistance  2. Sit to supine with MInimal Assistance  3. Sit to stand transfer with Stand-by Assistance  4. Bed to chair transfer with Stand-by Assistance using Rolling Walker  5. Gait  x 50 feet with Stand-by Assistance using Rolling Walker.     Outcome: Ongoing (interventions implemented as appropriate)  PT eval completed. Pt will begin PT POC.    Vane Maria, PT  2019

## 2019-08-27 NOTE — CONSULTS
[facility]  Physical Medicine & Rehab  Consult Note    Patient Name: Sherice Squires  MRN: [id number]  Admission Date: 8/20/2019  Hospital Length of Stay: 7 days  Attending Physician: Annamarie Soria MD     Inpatient consult to Physical Medicine & Rehabilitation  Consult performed by: Perlita Mercado NP  Consult requested by:  Annamarie Soria MD    Reason for Consult:  assess rehabilitation needs  Consults  Subjective:     Principal Problem: Acute on chronic combined systolic and diastolic heart failure    HPI: Sherice Squires is a 68-year-old female with PMHx of non-ishchemic cardiomyopathy, heart failure, s/p ICD, A-fib, HTN, CKD, s/p R TKA, & L knee OA (takes tylenol arthritis at home and previously received intraarticular injections).  [place] [facility] [place] with cardiogenic shock & A-fib w/ RVR.  Intra-aortic balloon pump placed on 8/20 and started on Epi gtt. IABP d/c'd on 8/23. CTS was consulted for transplant candidacy.  Currently on Lasix, Heparin, and Amio gtt. Hospital course further complicated by continued A-fib w/ RVR (JOSE/DCCV pending), constipation, chronic L knee pain, & sepsis 2/2 UTI (abx course completed-tong in place).     Functional History: Patient lives in [place] alone in a single story home with threshold to enter.  Prior to admission, (I) with ADLs and Mod (I) w/ SPC/Rollator for mobility. DME: rollator/SPC.     Hospital Course:   8/23/19: PT eval not performed.  08/26/2019: OT-Bed mobility Mod-MaxA.  Sit to stand MaxA. ADLs not tested.    Past Medical History:   Diagnosis Date    Atrial fibrillation     Cardiomyopathy     CHF (congestive heart failure)     Hyperlipidemia     Hypertension     Ventricular tachycardia      Past Surgical History:   Procedure Laterality Date    CARDIAC DEFIBRILLATOR PLACEMENT      CARDIOVERSION N/A 8/22/2019    Performed by Jose Reece MD at [facility] EP LAB    CARDIOVERSION N/A 8/20/2019    Performed by Jose Reece,  MD at [facility] EP LAB    CARDIOVERSION OR DEFIBRILLATION N/A 3/8/2019    Performed by Jose [facility]ece [facility] at [facility] EP LAB    CHOLECYSTECTOMY      ECHOCARDIOGRAM,TRANSESOPHAGEAL N/A 8/20/2019    Performed by [facility] Diagnostic Provider at [facility] EP LAB    ECHOCARDIOGRAM,TRANSESOPHAGEAL N/A 3/8/2019    Performed by [facility] Diagnostic Provider at [facility] EP LAB    HYSTERECTOMY      JOINT REPLACEMENT  2009    rt knee replacment    pace maker  12/2010     Review of patient's allergies indicates:   Allergen Reactions    Augmentin [amoxicillin-pot clavulanate] Swelling     Lip swelling      Penicillins      Other reaction(s): Swelling  Lip swelling         Scheduled Medications:    digoxin  0.125 mg Oral Every Mon, Wed, Fri    hydrALAZINE  50 mg Oral Q8H    isosorbide dinitrate  20 mg Oral Q8H    polyethylene glycol  17 g Oral Daily    senna-docusate 8.6-50 mg  2 tablet Oral BID    warfarin  7.5 mg Oral Daily       PRN Medications: heparin (PORCINE), heparin (PORCINE), ramelteon, silver sulfADIAZINE 1%, sodium chloride 0.9%    Family History     Problem Relation (Age of Onset)    Arthritis Father    COPD Brother    Cancer Father    Drug abuse Sister    Hypertension Maternal Grandmother        Tobacco Use    Smoking status: Never Smoker    Smokeless tobacco: Never Used   Substance and Sexual Activity    Alcohol use: No     Comment: rarely    Drug use: No    Sexual activity: Not on file     Review of Systems   Constitutional: Positive for activity change. Negative for chills, fatigue and fever.   HENT: Negative for trouble swallowing and voice change.    Eyes: Negative for photophobia and visual disturbance.   Respiratory: Negative for cough and shortness of breath.    Cardiovascular: Negative for chest pain and palpitations.   Gastrointestinal: Negative for nausea and vomiting.   Genitourinary: Negative for difficulty urinating and flank pain.   Musculoskeletal: Positive for arthralgias and gait problem. Negative for  joint swelling.   Skin: Negative for color change and rash.   Neurological: Positive for weakness. Negative for speech difficulty and numbness.   Psychiatric/Behavioral: Negative for agitation and confusion.     Objective:     Vital Signs (Most Recent):  Temp: 98.2 °F (36.8 °C) (08/27/19 0705)  Pulse: (!) 127 (08/27/19 1000)  Resp: 20 (08/27/19 1000)  BP: 98/72 (08/27/19 1000)  SpO2: 98 % (08/27/19 1000)    Vital Signs (24h Range):  Temp:  [98.2 °F (36.8 °C)-98.8 °F (37.1 °C)] 98.2 °F (36.8 °C)  Pulse:  [114-139] 127  Resp:  [13-28] 20  SpO2:  [85 %-100 %] 98 %  BP: ()/() 98/72     Body mass index is 26.08 kg/m².    Physical Exam   Constitutional: She is oriented to person, place, and time. She appears well-developed and well-nourished.   HENT:   Head: Normocephalic and atraumatic.   Eyes: Right eye exhibits no discharge. Left eye exhibits no discharge. No scleral icterus.   Neck: Neck supple.   Cardiovascular: Intact distal pulses. Tachycardia present.   Pulmonary/Chest: Effort normal. No respiratory distress.   02 via NC    Abdominal: Soft. There is no tenderness.   Musculoskeletal: She exhibits no edema or deformity.        Left knee: She exhibits decreased range of motion. Tenderness found. Lateral joint line tenderness noted.   Neurological: She is alert and oriented to person, place, and time.   Follows commands    Skin: Skin is warm and dry.   Psychiatric: She has a normal mood and affect. Her behavior is normal. Cognition and memory are impaired.   Vitals reviewed.        Diagnostic Results:   Labs: Reviewed  ECG: Reviewed  X-Ray: Reviewed  CT: Reviewed    Assessment/Plan:     * Acute on chronic combined systolic and diastolic heart failure  -HTS following  -currently on Lasix, Heparin, & Amio gtt  -LVAD scheduled for 9/4/19    Impaired functional mobility and endurance  Recommendations  -  Encourage mobility, OOB in chair at least 3 hours per day, and early ambulation as appropriate  -  PT/OT  evaluate and treat  -  Pain management  -  Monitor for and prevent skin breakdown and pressure ulcers  · Early mobility, repositioning/weight shifting every 20-30 minutes when sitting, turn patient every 2 hours, proper mattress/overlay and chair cushioning, pressure relief/heel protector boots  -  DVT prophylaxis    -  Reviewed discharge options (IP rehab, SNF, HH therapy, and OP therapy)      Chronic pain of left knee  -h/o known L knee OA, patient states possible future L TKA   -takes tylenol arthritis at home and has received intra-articular injections previously   -uses SPC/rollator at baseline  -recommend to restart home tylenol or NSAID (if not contraindicated)    Sepsis due to gram-negative urinary tract infection  -s/p course of Astreonam    Acute on chronic kidney failure  -downtrending with improvement in cardiogenic shock    Atrial fibrillation with RVR  - Arrhythmia following  -on Amio gtt   -JOSE/DCCV pending    Reviewed case w/ PM&R staff. Rehab potential can not be properly assessed at this time because patient is not medically stable. Recommendations for L knee pain as above. PT eval pending. Will follow.     Thank you for your consult.     Perlita Mercado NP  Department of Physical Medicine & Rehab  [facility]

## 2019-08-27 NOTE — PROGRESS NOTES
SW followed up with pt today at bedside. Pt alone in room and alert/oriented x4 with pleasant affect. Pt's son is here but had gone to get lunch. Pt states she is in Afib and is scheduled for a cardioversion today and is hopeful she will feel better after the procedure.    No concerns voiced, pt coping adequately with plan of care. No d/c date at this time.     SW follow for support, education, resources and dc planning. SW remains available and continues to follow.

## 2019-08-27 NOTE — ASSESSMENT & PLAN NOTE
- 69 yo F with NIDCM EF 20% , NYHA FC III with admitted with afib with rvr and cardiogenic shock  - IABP placed 8/20 and pulled 8/23  -TTE with contrast 8/20: LVEDD 6.51, LVEF 15%, indeterminate diastolic function, severe LAE, trace AI, mod MR and TR, PAS 51 and IVC 15.   -Today: CVP: 5; SVO2: 73 ; CO: 5.4, SVR: 1081. Cont Lasix gtt at 10 mg/hr. Cont Hydralazine 50 mg every 8 hours and continue Isordil 20 mg tid  -CTs done off pathway for possible LVAD/transplant evaluation. Patient seen by CTS, not considered a candidate for advanced options at this time HOWEVER would like to see how she does with rehab/PT/OT, and see if she can walk into clinic, to see if she could become a candidate in the near future.  -Transfer to step down, orders placed yesterday

## 2019-08-27 NOTE — HOSPITAL COURSE
8/23/19: PT eval not performed.  08/26/2019: OT-Bed mobility Mod-MaxA.  Sit to stand MaxA. ADLs not tested.  08/31/2019: Sit to stand MaxA.  Ambulated 2 steps MaxA.   09/03/2019: OT-Bed mobility Min-CGA.  Sit to stand CGA-Hay x 2 ppl and trxs Hay x 2 ppl.  Ambulated 6 ft Hay x 2 ppl w/ chair to follow.  UBD setupA and LBD MaxA.  09/04/2019: Bed mobility SBA-Hay.  Sit to stand Hay and transfers CGA & RW.  Ambulated 24 ft CGA & RW.  LBD Hay.

## 2019-08-27 NOTE — ASSESSMENT & PLAN NOTE
-had hesitancy on history prior to arrival and growing e. Coli in urine  -Completed course of Astreonam  -D/C [place] today as Lasix gtt has been decreased

## 2019-08-27 NOTE — SUBJECTIVE & OBJECTIVE
Past Medical History:   Diagnosis Date    Atrial fibrillation     Cardiomyopathy     CHF (congestive heart failure)     Hyperlipidemia     Hypertension     Ventricular tachycardia        Past Surgical History:   Procedure Laterality Date    CARDIAC DEFIBRILLATOR PLACEMENT      CARDIOVERSION N/A 8/22/2019    Performed by Jose Reece MD at [facility]    CARDIOVERSION N/A 8/20/2019    Performed by Jose Reece MD at [facility]    CARDIOVERSION OR DEFIBRILLATION N/A 3/8/2019    Performed by Jose Reece MD at [facility]    CHOLECYSTECTOMY      ECHOCARDIOGRAM,TRANSESOPHAGEAL N/A 8/20/2019    Performed by [facility] at [facility]    ECHOCARDIOGRAM,TRANSESOPHAGEAL N/A 3/8/2019    Performed by [facility] at [facility]    HYSTERECTOMY      JOINT REPLACEMENT  2009    rt knee replacment    pace maker  12/2010       Review of patient's allergies indicates:   Allergen Reactions    Augmentin [amoxicillin-pot clavulanate] Swelling     Lip swelling      Penicillins      Other reaction(s): Swelling  Lip swelling         No current facility-administered medications on file prior to encounter.      Current Outpatient Medications on File Prior to Encounter   Medication Sig    apixaban (ELIQUIS) 5 mg Tab Take 1 tablet (5 mg total) by mouth 2 (two) times daily.    digoxin (DIGOX) 125 mcg tablet Take 1 tablet by mouth once daily    esomeprazole (NEXIUM) 20 MG capsule Take 20 mg by mouth before breakfast.    hydrALAZINE (APRESOLINE) 50 MG tablet TAKE 1 TABLET (50 MG TOTAL) BY MOUTH 3 (THREE) TIMES DAILY.    isosorbide mononitrate (IMDUR) 30 MG 24 hr tablet TAKE 1 TABLET (30 MG TOTAL) BY MOUTH ONCE DAILY.    metoprolol succinate (TOPROL-XL) 200 MG 24 hr tablet TAKE 1 TABLET (200 MG TOTAL) BY MOUTH 2 (TWO) TIMES DAILY.    potassium chloride SA (K-DUR,KLOR-CON) 20 MEQ tablet Take 1 tablet (20 mEq total) by mouth once daily.    sacubitril-valsartan (ENTRESTO)  mg per  tablet Take 1 tablet by mouth 2 (two) times daily.    simvastatin (ZOCOR) 40 MG tablet TAKE 1 TABLET EVERY EVENING    spironolactone (ALDACTONE) 25 MG tablet TAKE 1 TABLET EVERY DAY    torsemide (DEMADEX) 20 MG Tab Take 1 tablet (20 mg total) by mouth 2 (two) times daily.    metOLazone (ZAROXOLYN) 2.5 MG tablet Take it once today 30 min before your evening dose of Bumex.     Family History     Problem Relation (Age of Onset)    Arthritis Father    COPD Brother    Cancer Father    Drug abuse Sister    Hypertension Maternal Grandmother        Tobacco Use    Smoking status: Never Smoker    Smokeless tobacco: Never Used   Substance and Sexual Activity    Alcohol use: No     Comment: rarely    Drug use: No    Sexual activity: Not on file     Review of Systems   Constitution: Negative for chills, decreased appetite and diaphoresis.   HENT: Negative for congestion and ear discharge.    Eyes: Negative for blurred vision and discharge.   Cardiovascular: Negative for chest pain, dyspnea on exertion, irregular heartbeat, leg swelling and paroxysmal nocturnal dyspnea.   Respiratory: Negative for cough, hemoptysis and shortness of breath.    Gastrointestinal: Negative for abdominal pain.     Objective:     Vital Signs (Most Recent):  Temp: 98.2 °F (36.8 °C) (08/27/19 0705)  Pulse: (!) 127 (08/27/19 1000)  Resp: 20 (08/27/19 1000)  BP: 98/72 (08/27/19 1000)  SpO2: 98 % (08/27/19 1000) Vital Signs (24h Range):  Temp:  [98.2 °F (36.8 °C)-98.8 °F (37.1 °C)] 98.2 °F (36.8 °C)  Pulse:  [114-139] 127  Resp:  [13-28] 20  SpO2:  [85 %-100 %] 98 %  BP: ()/() 98/72     Weight: 71.1 kg (156 lb 12 oz)  Body mass index is 26.08 kg/m².    SpO2: 98 %  O2 Device (Oxygen Therapy): nasal cannula      Intake/Output Summary (Last 24 hours) at 8/27/2019 1142  Last data filed at 8/27/2019 1100  Gross per 24 hour   Intake 1260.01 ml   Output 1820 ml   Net -559.99 ml       Lines/Drains/Airways     Central Venous Catheter Line                  Percutaneous Central Line Insertion/Assessment - triple lumen  08/20/19 0300 right internal jugular 7 days          Drain                 Urethral Catheter 08/20/19 0400 7 days          Peripheral Intravenous Line                 Peripheral IV - Single Lumen 08/23/19 2230 22 G Posterior;Right Hand 3 days                Physical Exam   Constitutional: She is oriented to person, place, and time. She appears well-developed and well-nourished. No distress.   Eyes: Pupils are equal, round, and reactive to light. Conjunctivae are normal.   Neck: No tracheal deviation present. No thyromegaly present.   Cardiovascular: Normal heart sounds and intact distal pulses. An irregularly irregular rhythm present. Tachycardia present. Exam reveals no gallop and no friction rub.   No murmur heard.  Pulses:       Radial pulses are 2+ on the right side, and 2+ on the left side.        Femoral pulses are 2+ on the right side, and 2+ on the left side.  Pulmonary/Chest: Effort normal and breath sounds normal. No respiratory distress. She has no wheezes. She has no rales.   Abdominal: Soft. Bowel sounds are normal. She exhibits no distension. There is no tenderness.   Musculoskeletal: She exhibits no edema or deformity.   Neurological: She is alert and oriented to person, place, and time. No cranial nerve deficit. Coordination normal.   Skin: Skin is warm and dry. She is not diaphoretic.   Psychiatric: She has a normal mood and affect. Her behavior is normal.       Significant Labs:   BMP:   Recent Labs   Lab 08/26/19  0230 08/26/19  1526 08/27/19  0236   * 101 104   * 129* 129*   K 3.9 4.2 3.9   CL 83* 84* 84*   CO2 33* 33* 32*   BUN 48* 45* 44*   CREATININE 1.9* 1.9* 1.8*   CALCIUM 9.1 9.4 9.3   MG 1.8 2.4 2.1       Significant Imaging: Echocardiogram:   2D echo with color flow doppler:   Results for orders placed or performed in visit on 07/08/19   2D echo with color flow doppler   Result Value Ref Range    QEF 20  (A) 55 - 65    Mitral Valve Regurgitation MODERATE TO SEVERE (A)     Aortic Valve Regurgitation MILD     Est. PA Systolic Pressure 54.19 (A)     Tricuspid Valve Regurgitation MODERATE (A)     Narrative    Date of Procedure: 07/08/2019        TEST DESCRIPTION       General: The patient was in an irregularly irregular rhythm throughout the study. A catheter is present in the right-sided cardiac chambers.     Aorta: The aortic root is normal in size, measuring 2.8 cm at sinotubular junction and 2.9 cm at Sinuses of Valsalva. The proximal ascending aorta is normal in size, measuring 3.2 cm across.     Left Atrium: The left atrial volume index is severely enlarged, measuring 61.90 cc/m2.     Left Ventricle: The left ventricle is moderately enlarged, with an end-diastolic diameter of 5.9 cm, and an end-systolic diameter of 5.4 cm. Septal wall thickness is mildly increased, with the septum measuring 1.3 cm and the posterior wall measuring 1.0   cm across. Relative wall thickness was normal at 0.34, and the LV mass index was increased at 180.3 g/m2 consistent with eccentric left ventricular hypertrophy. There is global hypokinesis. Left ventricular systolic function appears severely depressed.   Visually estimated ejection fraction is 20-25%. The LV Doppler derived stroke volume equals 27.0 ccs.         Right Atrium: The right atrium is normal in size, measuring 4.9 cm in length and 4.2 cm in width in the apical view.     Right Ventricle: The right ventricle is normal in size measuring 4.5 cm at the base in the apical right ventricle-focused view. Global right ventricular systolic function appears normal. Tricuspid annular plane systolic excursion (TAPSE) is 1.1 cm. The   estimated PA systolic pressure is 54 mmHg.     Aortic Valve:  The aortic valve is normal in structure. The aortic valve is tri-leaflet in structure. Additionally, there is mild aortic regurgitation.     Mitral Valve:  The mitral valve is moderately  sclerotic. There is moderate to severe mitral regurgitation. There is mitral annular calcification.     Tricuspid Valve:  There is moderate tricuspid regurgitation.     Pulmonary Valve:  There is mild to moderate pulmonic regurgitation.     IVC: IVC is enlarged and collapses < 50% with a sniff, suggesting high right atrial pressure of 15 mmHg.     Intracavitary: There is no evidence of pericardial effusion, intracavity mass, thrombi, or vegetation.         CONCLUSIONS     1 - Severe left atrial enlargement.     2 - Moderate left ventricular enlargement.     3 - Eccentric hypertrophy.     4 - Severely depressed left ventricular systolic function (EF 20-25%).     5 - Normal right ventricular systolic function .     6 - Pulmonary hypertension. The estimated PA systolic pressure is 54 mmHg.     7 - Mild aortic regurgitation.     8 - Moderate to severe mitral regurgitation.     9 - Moderate tricuspid regurgitation.     10 - Mild to moderate pulmonic regurgitation.     11 - Increased central venous pressure.             This document has been electronically    SIGNED BY: Jalil Cabezas MD On: 07/08/2019 14:23

## 2019-08-27 NOTE — PROGRESS NOTES
[facility]  Heart Transplant  Progress Note    Patient Name: Sherice Squires  MRN: [id number]  Admission Date: 8/20/2019  Hospital Length of Stay: 7 days  Attending Physician: Annamarie Soria MD  Primary Care Provider: Annamarie Soria MD  Principal Problem:Acute on chronic combined systolic and diastolic heart failure    Subjective:     Interval History: Still in bed this morning. Had CP overnight after swallowing pills and still feels it this am. Son at bedside.    Continuous Infusions:   amiodarone in dextrose 5% 0.5 mg/min (08/27/19 1000)    furosemide (LASIX) 2 mg/mL infusion (non-titrating) 10 mg/hr (08/27/19 1000)    heparin (porcine) in D5W 14 Units/kg/hr (08/27/19 1000)     Scheduled Meds:   digoxin  0.125 mg Oral Every Mon, Wed, Fri    famotidine  20 mg Oral Daily    hydrALAZINE  50 mg Oral Q8H    isosorbide dinitrate  20 mg Oral Q8H    polyethylene glycol  17 g Oral Daily    senna-docusate 8.6-50 mg  2 tablet Oral BID    warfarin  7.5 mg Oral Daily     PRN Meds:heparin (PORCINE), heparin (PORCINE), ramelteon, silver sulfADIAZINE 1%, sodium chloride 0.9%    Review of patient's allergies indicates:   Allergen Reactions    Augmentin [amoxicillin-pot clavulanate] Swelling     Lip swelling      Penicillins      Other reaction(s): Swelling  Lip swelling       Objective:     Vital Signs (Most Recent):  Temp: 98.2 °F (36.8 °C) (08/27/19 0705)  Pulse: (!) 127 (08/27/19 1000)  Resp: 20 (08/27/19 1000)  BP: 98/72 (08/27/19 1000)  SpO2: 98 % (08/27/19 1000) Vital Signs (24h Range):  Temp:  [98.2 °F (36.8 °C)-98.8 °F (37.1 °C)] 98.2 °F (36.8 °C)  Pulse:  [114-139] 127  Resp:  [13-28] 20  SpO2:  [85 %-100 %] 98 %  BP: ()/() 98/72     Patient Vitals for the past 72 hrs (Last 3 readings):   Weight   08/27/19 0500 71.1 kg (156 lb 12 oz)   08/26/19 0500 71 kg (156 lb 8.4 oz)     Body mass index is 26.08 kg/m².      Intake/Output Summary (Last 24 hours) at 8/27/2019 1042  Last data  filed at 8/27/2019 1000  Gross per 24 hour   Intake 1276.61 ml   Output 1885 ml   Net -608.39 ml       Hemodynamic Parameters:       Telemetry: A fib with RVR, PVC's    Physical Exam   Constitutional: She is oriented to person, place, and time. She appears well-developed and well-nourished.   HENT:   Head: Normocephalic and atraumatic.   Eyes: Pupils are equal, round, and reactive to light. Conjunctivae and EOM are normal.   Neck: Normal range of motion. Neck supple. No JVD present. No thyromegaly present.   RIJ TLC   Cardiovascular: Intact distal pulses.   Tachycardic, irregularly irregular   Pulmonary/Chest: Effort normal and breath sounds normal.   Abdominal: Soft. Bowel sounds are normal.   Musculoskeletal: Normal range of motion. She exhibits no edema.   Neurological: She is alert and oriented to person, place, and time.   Skin: Skin is warm and dry. Capillary refill takes 2 to 3 seconds.   Psychiatric: She has a normal mood and affect. Her behavior is normal. Judgment and thought content normal.       Significant Labs:  CBC:  Recent Labs   Lab 08/25/19  0300 08/26/19  0230 08/27/19  0236   WBC 9.98 9.91 9.01   RBC 4.57 4.43 4.51   HGB 13.4 13.0 13.2   HCT 41.4 39.3 38.5   PLT 85* 95* 107*   MCV 91 89 85   MCH 29.3 29.3 29.3   MCHC 32.4 33.1 34.3     BNP:  Recent Labs   Lab 08/26/19  0230   *     CMP:  Recent Labs   Lab 08/26/19  0230 08/26/19  1526 08/27/19  0236   * 101 104   CALCIUM 9.1 9.4 9.3   ALBUMIN 2.5* 2.5* 2.4*   PROT 7.0 7.2 7.0   * 129* 129*   K 3.9 4.2 3.9   CO2 33* 33* 32*   CL 83* 84* 84*   BUN 48* 45* 44*   CREATININE 1.9* 1.9* 1.8*   ALKPHOS 168* 169* 154*   ALT 15 15 14   AST 18 20 18   BILITOT 0.5 0.5 0.5      Coagulation:   Recent Labs   Lab 08/25/19  0300 08/26/19  0230 08/27/19  0236   INR 1.2 1.2 1.3*   APTT 48.1* 53.8* 47.0*     LDH:  No results for input(s): LDH in the last 72 hours.  Microbiology:  Microbiology Results (last 7 days)     Procedure Component Value  Units Date/Time    Urine culture [[id number]]  (Abnormal)  (Susceptibility) Collected:  08/20/19 0422    Order Status:  Completed Specimen:  Urine Updated:  08/22/19 0451     Urine Culture, Routine ESCHERICHIA COLI  >100,000 cfu/ml      Narrative:       Preferred Collection Type->Urine, Clean Catch          I have reviewed all pertinent labs within the past 24 hours.    Estimated Creatinine Clearance: 29.6 mL/min (A) (based on SCr of 1.8 mg/dL (H)).    Diagnostic Results:  I have reviewed all pertinent imaging results/findings within the past 24 hours.    Assessment and Plan:     Sherice Squires is a 67 yo AAF with PMHx significant for NIDCM / stage D HFrEF (LVEF 20-25%, LVEDD 5.9 cm) s/p dc ICD, AF, HTN, DLD, CKD who is admitted as a transfer from  ED for management of ADHF +/- possible cardiogenic shock.     Patient reports she presented to OSH with progressive/worsening shortness of breath on exertion, orthopnea, and peripheral swelling of approx 5 days duration associated with nausea and fatigue. Denies fever/chills/sweats, chest pain, diaphoresis, presyncope/syncope. Does report intermittent palpitations.      Patient was afebrile and normotensive on arrival (/55 mmHg) to OSH ED with pulses in the 120-130s in AF with RVR. Initial labs showed worsening FAMILIA on CKD (with Cr 3.8 from 2.7-3.1 this past week from previous baseline of 1.3-1.4 in June 2019) as well as elevated T bili 2.3 and BNP >3000. CXR obtained without acute cardiopulmonary process. Patient ultimately transferred to [facility] for higher level of care.      Patient follows with [facility] clinic - currently on Entresto 97/103 mg BID, Toprol  mg daily, Aldactone 25 mg daily, and digoxin 0.125 mg daily. She was last seen by Dr Rodriguez on 8/6/19 who added metolazone 2.5 mg QHS before evening dose of Bumex to augment diuresis due to complaints of SOB / peripheral swelling at the time. She did not respond to this regimen and reports she was ultimately  switched to Torsemide instead.      Patient is fully complaint with her medicines. Also adherent to fluid / salt restrictions with her hx of congestive heart failure.      States she was hospitalized only once before for ADHF within the past year.      Of note patient follows with Dr Reece of EP for her AF hx, last seen by him in 5/2019    * Acute on chronic combined systolic and diastolic heart failure  - 69 yo F with NIDCM EF 20% , NYHA FC III with admitted with afib with rvr and cardiogenic shock  - IABP placed 8/20 and pulled 8/23  -TTE with contrast 8/20: LVEDD 6.51, LVEF 15%, indeterminate diastolic function, severe LAE, trace AI, mod MR and TR, PAS 51 and IVC 15.   -Today: CVP: 5; SVO2: 73 ; CO: 5.4, SVR: 1081. Cont Lasix gtt at 10 mg/hr. Cont Hydralazine 50 mg every 8 hours and continue Isordil 20 mg tid  -CTs done off pathway for possible LVAD/transplant evaluation. Patient seen by CTS, not considered a candidate for advanced options at this time HOWEVER would like to see how she does with rehab/PT/OT, and see if she can walk into clinic, to see if she could become a candidate in the near future.  -Transfer to step down, orders placed yesterday      Atrial fibrillation with RVR  - return to sinus rhythm after dccv/candice 8/20/19, but quickly went back in A fib with RVR and failed repeat cardioversion 8/22/19. INR is 1.3 - EP plans to do another CANDICE guided DCCV today (has had one subtherapeutic PTT).  - Discussed putting patient back on Eliquis or another DOAC with EP - they want her to stay on Coumadin with Heparin  - Decreased Amio gtt to 0.5 mg/min yesterday  -Continue plan for Dig 125 mcg every M-W-F  - EP plans possible AVNA with BiV upgrade at a later time. Will keep patient on warfarin and heparin with INR goal of 2.       Cardiogenic shock  - See acute on chronic heart failure    Acute on chronic kidney failure  - Cr up to 4.0 from baseline 1.3-1.4 in June 2019, downtrending with improvement in  cardiogenic shock    Automatic implantable cardioverter-defibrillator in situ  -S/P [employer] dual chamber ICD  - hx VT/VF per chart review.  - telemetry monitoring and repletion of electrolytes PRN.    Constipation  -on bowel regimen    Thrombocytopenia, unspecified  -Platelet count was trending down likely 2/2 IABP, low suspicion for HIT given 4Ts=2, HIT with OD < 0.4, BELGICA not required  -Platelet count today is climbing back up at 107k    Chronic pain of left knee  -Long h/o of this for which she uses a cane, and more recently, a walker  -PT/OT  -PM+R consult regarding knee pain to assess ability to rehab after potential advanced options    Sepsis due to gram-negative urinary tract infection  -had hesitancy on history prior to arrival and growing e. Coli in urine  -Completed course of Astreonam  -D/C Grant today as Lasix gtt has been decreased      Jenny Cabral PA-C  Heart Transplant  [facility]

## 2019-08-27 NOTE — NURSING
Patient rounding at midnight. SAT 88% on 1L NC. Patient stated heaviness in chest and difficulty breathing, has tears in eyes. Increased O2 to 4L NC, obtained 12 Lead EKG, CVP 6, called Dr. Savage. Hydromorphone 0.2 mg ordered and given. SAT >95%. WCTM

## 2019-08-27 NOTE — PLAN OF CARE
"Problem: Infection  Goal: Infection Symptom Resolution    Intervention: Prevent or Manage Infection  Patient afebrile for shift.       Comments: NAEON. See flowsheets for vital signs and assessments. See below for updates on progress made.       Neuro: AAOx4, Afebrile, PERRL, tearful as of this morning approx 03:00 for unspecified reasons.     Cardiovascular: A-fib, -120s, Pulses palpable, intermittently doppler PT, Systolic BP 78-110s, MAPs >65.  CVP 9/6/7. SVO2 73      Pulmonary: Lungs diminished, SAT >95% on 4L NC. Weaned to 1L overnight but had an episode of chest "heaviness" so increased Oxygen and gave Dilaudid 0.2mg. See note.     Gastrointestinal: Cardiac diet, 1500cc FR, bowel sounds WNL, No BM/shift.     Genitourinary: Grant in place, UOP 950cc/shift.     Integumentary/other: HAPI prevention bundle in place; Last CHG bath: 08/26 Day shift.     Infusions: Heparin, Amio, Lasix per eMAR. Heparin therapeutic.     POC: Transfer, JOSE, DCCV    Patient progressing towards goals as tolerated, plan of care communicated and reviewed with Sherice Squires. All concerns addressed. Will continue to monitor.   A: Awake    RASS: Goal - 0  alert and calm Actual - 0  alert and calm   Restraint necessity: no  B: Breath   SBT: NA  C: Coordinate A & B, analgesics/sedatives   Pain: managed   SAT: Not intubated  D: Delirium   CAM-ICU: negative  E: Early Mobility   MOVE Screen: Pass   Activity order: Progressive Mobility  FAS: Feeding/Nutrition   Diet order: Cardiac Fluid restriction: 1500  T: Thrombus   DVT prophylaxis: pharmacologic  H: HOB Elevation   30 degrees: No  U: Ulcer Prophylaxis   GI: no  G: Glucose control   managed  S: Skin   Bundle compliance: yes  B: Bowel Function   constipation  I: Indwelling Catheters   Grant necessity: Yes   CVC necessity: Yes   IPAD offered: No  D: De-escalation Antibx   No  Plan for the day   JOSE, DCCV, Transfer  Family/Goals of care/Code Status   Code Status: Full Code   GOC: JOSE, " DCCV, future AV ablation and upgrade PM

## 2019-08-27 NOTE — SUBJECTIVE & OBJECTIVE
Interval History: No acute events overnight, NPO for repeat JOSE/DCCV today. Remains in afib w RVR in 120s.    Review of Systems   Constitution: Negative. Negative for chills and fever.   HENT: Negative.    Eyes: Negative.    Cardiovascular: Negative for chest pain, claudication and paroxysmal nocturnal dyspnea.   Respiratory: Negative for cough, shortness of breath and wheezing.    Endocrine: Negative.    Hematologic/Lymphatic: Does not bruise/bleed easily.   Skin: Negative for nail changes and rash.   Musculoskeletal: Negative.  Negative for back pain.   Gastrointestinal: Negative for abdominal pain, melena, nausea and vomiting.   Neurological: Negative for dizziness and headaches.   Psychiatric/Behavioral: Negative for altered mental status, depression and substance abuse.   Allergic/Immunologic: Negative.      Objective:     Vital Signs (Most Recent):  Temp: 98.2 °F (36.8 °C) (08/27/19 0705)  Pulse: (!) 118 (08/27/19 0844)  Resp: (!) 24 (08/27/19 0844)  BP: (!) 110/58 (08/27/19 0800)  SpO2: 97 % (08/27/19 0844) Vital Signs (24h Range):  Temp:  [98.2 °F (36.8 °C)-98.8 °F (37.1 °C)] 98.2 °F (36.8 °C)  Pulse:  [114-139] 118  Resp:  [13-28] 24  SpO2:  [85 %-100 %] 97 %  BP: ()/() 110/58     Weight: 71.1 kg (156 lb 12 oz)  Body mass index is 26.08 kg/m².     SpO2: 97 %  O2 Device (Oxygen Therapy): nasal cannula    Physical Exam   Constitutional: She is oriented to person, place, and time. She appears well-developed and well-nourished.   HENT:   Head: Normocephalic and atraumatic.   Eyes: Pupils are equal, round, and reactive to light. Conjunctivae and EOM are normal.   Neck: No JVD present.   Cardiovascular: Normal heart sounds and intact distal pulses. Exam reveals no gallop and no friction rub.   No murmur heard.  Afib, 120s   Pulmonary/Chest: Effort normal. No stridor. She has no wheezes. She has no rales. She exhibits no tenderness.   Abdominal: Soft. Bowel sounds are normal. She exhibits no distension  and no mass. There is no tenderness. There is no guarding.   Musculoskeletal: She exhibits no edema, tenderness or deformity.   Neurological: She is alert and oriented to person, place, and time.   Skin: Skin is warm and dry. No rash noted. No erythema.   Psychiatric: She has a normal mood and affect.       Significant Labs:   CMP:   Recent Labs   Lab 08/26/19  0230 08/26/19  1526 08/27/19  0236   * 129* 129*   K 3.9 4.2 3.9   CL 83* 84* 84*   CO2 33* 33* 32*   * 101 104   BUN 48* 45* 44*   CREATININE 1.9* 1.9* 1.8*   CALCIUM 9.1 9.4 9.3   PROT 7.0 7.2 7.0   ALBUMIN 2.5* 2.5* 2.4*   BILITOT 0.5 0.5 0.5   ALKPHOS 168* 169* 154*   AST 18 20 18   ALT 15 15 14   ANIONGAP 14 12 13   ESTGFRAFRICA 30.8* 30.8* 32.9*   EGFRNONAA 26.7* 26.7* 28.5*    and CBC:   Recent Labs   Lab 08/26/19  0230 08/27/19  0236   WBC 9.91 9.01   HGB 13.0 13.2   HCT 39.3 38.5   PLT 95* 107*       Significant Imaging: Echocardiogram:   Transthoracic echo (TTE) complete (Cupid Only):   Results for orders placed or performed during the hospital encounter of 08/20/19   Transthoracic echo (TTE) 2D with Color Flow   Result Value Ref Range    Ascending aorta 3.07 cm    STJ 3.25 cm    AV mean gradient 4 mmHg    Ao peak kevin 1.44 m/s    Ao VTI 17.27 cm    IVS 0.78 0.6 - 1.1 cm    LA size 4.90 cm    Left Atrium Major Axis 7.30 cm    Left Atrium Minor Axis 7.20 cm    LVIDD 6.51 (A) 3.5 - 6.0 cm    LVIDS 6.03 (A) 2.1 - 4.0 cm    LVOT diameter 2.09 cm    LVOT peak VTI 8.95 cm    PW 0.90 0.6 - 1.1 cm    MV Peak E Kevin 1.12 m/s    RA Major Axis 5.35 cm    RA Width 4.31 cm    RVDD 5.12 cm    Sinus 3.00 cm    TAPSE 0.92 cm    TR Max Kevin 2.98 m/s    TDI LATERAL 0.07 m/s    TDI SEPTAL 0.07 m/s    LA WIDTH 5.68 cm    LV Diastolic Volume 216.83 mL    LV Systolic Volume 181.73 mL    LVOT peak kevin 0.75 m/s    LV LATERAL E/E' RATIO 16.00 m/s    LV SEPTAL E/E' RATIO 16.00 m/s    FS 7 %    LA volume 171.51 cm3    LV mass 228.12 g    Left Ventricle Relative Wall  Thickness 0.28 cm    AV valve area 1.78 cm2    AV Velocity Ratio 0.52     AV index (prosthetic) 0.52     Mean e' 0.07 m/s    LVOT area 3.4 cm2    LVOT stroke volume 30.69 cm3    AV peak gradient 8 mmHg    E/E' ratio 16.00 m/s    LV Systolic Volume Index 96.7 mL/m2    LV Diastolic Volume Index 115.39 mL/m2    LA Volume Index 91.3 mL/m2    LV Mass Index 121 g/m2    Triscuspid Valve Regurgitation Peak Gradient 36 mmHg    BSA 1.95 m2    Right Atrial Pressure (from IVC) 15 mmHg    TV rest pulmonary artery pressure 51 mmHg    Narrative    · Moderate left ventricular enlargement.  · Severely decreased left ventricular systolic function. The estimated   ejection fraction is 15%  · Severe global hypokinetic wall motion.  · Septal wall has abnormal motion. Systolic and diastolic flattening of   the interventricular septum consistent with right ventricle pressure and   volume overload.  · Eccentric left ventricular hypertrophy.  · Indeterminate left ventricular diastolic function.  · Mildly reduced right ventricular systolic function.  · Severe left atrial enlargement.  · Moderate mitral regurgitation.  · Moderate tricuspid regurgitation.  · The estimated PA systolic pressure is 51 mm Hg. Pulmonary hypertension   present.  · Elevated central venous pressure (15 mm Hg).

## 2019-08-27 NOTE — PT/OT/SLP EVAL
Physical Therapy Evaluation    Patient Name:  Sherice Squires   MRN:  [id number]    Recommendations:     Discharge Recommendations:  home health PT   Discharge Equipment Recommendations: commode, wheelchair, manual   Barriers to discharge: Decreased caregiver support (lives alone)    Assessment:     Sherice Squires is a 68 y.o. female admitted with a medical diagnosis of Acute on chronic combined systolic and diastolic heart failure.  She presents with the following impairments/functional limitations:  weakness, impaired endurance, impaired self care skills, impaired functional mobilty, gait instability, impaired balance, decreased upper extremity function, decreased lower extremity function, impaired cardiopulmonary response to activity .    Pt eyad session well w/ good participation. PTA she was Brad w/ mobility using QC/rollator/ or RW. She is currently limited by the above listed deficits and requires Hay(X2 ppl) for transfers. She is appropriate for acute PT and will begin PT POC.    Rehab Prognosis: Good; patient would benefit from acute skilled PT services to address these deficits and reach maximum level of function.    Recent Surgery: Procedure(s) (LRB):  CARDIOVERSION (N/A) 5 Days Post-Op    Plan:     During this hospitalization, patient to be seen 4 x/week to address the identified rehab impairments via gait training, therapeutic activities, therapeutic exercises and progress toward the following goals:    · Plan of Care Expires:  09/26/19    Subjective     Chief Complaint: weakness  Patient/Family Comments/goals: to get stronger  Pain/Comfort:  · Pain Rating 1: 0/10    Patients cultural, spiritual, Mosque conflicts given the current situation: no    Living Environment:  Pt lives alone in a 1SH w/ 1 SOCORRO. She has a walk-in tub w/ a grab bar.   Prior to admission, patients level of function was Brad w/ use of QC, rollator, or RW only when needed due to fatigue.  Equipment used at home: cane, quad,  rollator, walker, rolling.  DME owned (not currently used): none.  Upon discharge, patient will have very limited assistance from her son. He checks on her daily but works.    Objective:     Communicated with nursing prior to session.  Patient found supine with blood pressure cuff, central line, tong catheter, oxygen, peripheral IV, pulse ox (continuous), telemetry  upon PT entry to room.    General Precautions: Standard, fall(cardiac)   Orthopedic Precautions:N/A   Braces: N/A     Exams:  · Cognitive Exam:  Patient is oriented to Person, Place, Time and Situation  · Gross Motor Coordination:  WFL  · Postural Exam:  Patient presented with the following abnormalities:    · -       Rounded shoulders  · -       Forward head  · Sensation:    · -       Intact  · RLE ROM: WFL  · RLE Strength: WFL except HF 2+/5, KE/KF 4/5  · LLE ROM: WFL   · LLE Strength: WFL except HF 4/5    Functional Mobility:  · Bed Mobility:   · Supine<>sit on bed w/ TotalA(x2ppl) for trunk and BLE  · Transfers:    · Sit<>stand to/from EOB w/ RW and Hay(x2) to rise  · Cueing for hand placement and forward lean  · Gait:   · 2 side steps towards HOB w/ RW and Hay(x2) for stability  · inc'd time required to step  · Balance:   · Static sit EOB w/ SBA  · Static stand w/ RW and CGA for safety      Therapeutic Activities and Exercises:   Pt was educated on PT role and POC. Pt verb understanding.    AM-PAC 6 CLICK MOBILITY  Total Score:9     Patient left supine with all lines intact, call button in reach and son present.    GOALS:   Multidisciplinary Problems     Physical Therapy Goals        Problem: Physical Therapy Goal    Goal Priority Disciplines Outcome Goal Variances Interventions   Physical Therapy Goal     PT, PT/OT Ongoing (interventions implemented as appropriate)     Description:  Goals to be met by: 9/10/19     Patient will increase functional independence with mobility by performin. Supine to sit with MInimal Assistance  2. Sit to  supine with MInimal Assistance  3. Sit to stand transfer with Stand-by Assistance  4. Bed to chair transfer with Stand-by Assistance using Rolling Walker  5. Gait  x 50 feet with Stand-by Assistance using Rolling Walker.                       History:     Past Medical History:   Diagnosis Date    Atrial fibrillation     Cardiomyopathy     CHF (congestive heart failure)     Hyperlipidemia     Hypertension     Ventricular tachycardia        Past Surgical History:   Procedure Laterality Date    CARDIAC DEFIBRILLATOR PLACEMENT      CARDIOVERSION N/A 8/22/2019    Performed by [facility] EP [facility]    CARDIOVERSION N/A 8/20/2019    Performed by [facility] at [facility]    CARDIOVERSION OR DEFIBRILLATION N/A 3/8/2019    Performed by Jo[facility] at [facility] EP [facility]    CHOLECYSTECTOMY      ECHOCARDIOGRAM,TRANSESOPHAGEAL N/A 8/20/2019    Performed by [facility] at [facility] EP [facility]    ECHOCARDIOGRAM,TRANSESOPHAGEAL N/A 3/8/2019    Performed by [facility] at [facility] EP [facility]    HYSTERECTOMY      JOINT REPLACEMENT  2009    rt knee replacment    pace maker  12/2010       Time Tracking:     PT Received On: 08/27/19  PT Start Time: 0956     PT Stop Time: 1023  PT Total Time (min): 27 min     Billable Minutes: Evaluation 10, Therapeutic Activity 17 and Total Time 27      Vane Maria, PT  08/27/2019

## 2019-08-27 NOTE — ASSESSMENT & PLAN NOTE
- return to sinus rhythm after dccv/candice 8/20/19, but quickly went back in A fib with RVR and failed repeat cardioversion 8/22/19. INR is 1.3 - EP plans to do another CANDICE guided DCCV today (has had one subtherapeutic PTT).  - Discussed putting patient back on Eliquis or another DOAC with EP - they want her to stay on Coumadin with Heparin  - Decreased Amio gtt to 0.5 mg/min yesterday  -Continue plan for Dig 125 mcg every M-W-F  - EP plans possible AVNA with BiV upgrade at a later time. Will keep patient on warfarin and heparin with INR goal of 2.

## 2019-08-27 NOTE — H&P
[facility]  Cardiology  History and Physical     Patient Name: Sherice Squires  MRN: [id number]  Admission Date: 8/20/2019  Code Status: Full Code   Attending Provider: Annamarie Soria MD   Primary Care Physician: Annamarie Soria MD  Principal Problem:Acute on chronic combined systolic and diastolic heart failure    Patient information was obtained from patient and ER records.     Subjective:     Chief Complaint:  Afib     HPI:  67 y/o woman history of NICM 20-25%, persistent atrial fibrillation on apixaban, [employer] ICD, CKD presenting with cardiogenic shock and acute decompensated heart failure in setting of atrial fibrillation w RVR, and high atrial fibrillation burden on device interrogation. Failed DCCV x 2.     - Given improved volume status, may have improved response to repeat DCCV.  - NPO for JOSE/DCCV today  - Please continue warfarin (remains subtherapeutic) with heparin bridging.    Dysphagia or odynophagia:  No  Liver Disease, esophageal disease, or known varices:  No  Upper GI Bleeding: No  Snoring:  No  Sleep Apnea:  No  Prior neck surgery or radiation:  No  History of anesthetic difficulties:  No  Family history of anesthetic difficulties:  No  Last oral intake:  12 hours ago  Able to move neck in all directions:  Yes     JOSE 8/20    · Severely decreased left ventricular systolic function. The estimated ejection fraction is 13%  · Moderately reduced right ventricular systolic function.  · Severe left atrial enlargement.  · Severe right atrial enlargement.  · Mild mitral sclerosis.  · Severe mitral regurgitation.  · Moderate tricuspid regurgitation.  · Normal appearing left atrial appendage. No thrombus is present in the appendage, visualized with echo contrasat.  · Transgastric views not performed due to patient instability           Past Medical History:   Diagnosis Date    Atrial fibrillation     Cardiomyopathy     CHF (congestive heart failure)     Hyperlipidemia      Hypertension     Ventricular tachycardia        Past Surgical History:   Procedure Laterality Date    CARDIAC DEFIBRILLATOR PLACEMENT      CARDIOVERSION N/A 8/22/2019    Performed by [facility] at [facility] EP LAB    CARDIOVERSION N/A 8/20/2019    Performed by [facility] at [facility] EP LAB    CARDIOVERSION OR DEFIBRILLATION N/A 3/8/2019    Performed by [facility] at [facility] EP LAB    CHOLECYSTECTOMY      ECHOCARDIOGRAM,TRANSESOPHAGEAL N/A 8/20/2019    Performed by [facility] at [facility] EP LAB    ECHOCARDIOGRAM,TRANSESOPHAGEAL N/A 3/8/2019    Performed by [facility] EP [facility]    HYSTERECTOMY      JOINT REPLACEMENT  2009    rt knee replacment    pace maker  12/2010       Review of patient's allergies indicates:   Allergen Reactions    Augmentin [amoxicillin-pot clavulanate] Swelling     Lip swelling      Penicillins      Other reaction(s): Swelling  Lip swelling         No current facility-administered medications on file prior to encounter.      Current Outpatient Medications on File Prior to Encounter   Medication Sig    apixaban (ELIQUIS) 5 mg Tab Take 1 tablet (5 mg total) by mouth 2 (two) times daily.    digoxin (DIGOX) 125 mcg tablet Take 1 tablet by mouth once daily    esomeprazole (NEXIUM) 20 MG capsule Take 20 mg by mouth before breakfast.    hydrALAZINE (APRESOLINE) 50 MG tablet TAKE 1 TABLET (50 MG TOTAL) BY MOUTH 3 (THREE) TIMES DAILY.    isosorbide mononitrate (IMDUR) 30 MG 24 hr tablet TAKE 1 TABLET (30 MG TOTAL) BY MOUTH ONCE DAILY.    metoprolol succinate (TOPROL-XL) 200 MG 24 hr tablet TAKE 1 TABLET (200 MG TOTAL) BY MOUTH 2 (TWO) TIMES DAILY.    potassium chloride SA (K-DUR,KLOR-CON) 20 MEQ tablet Take 1 tablet (20 mEq total) by mouth once daily.    sacubitril-valsartan (ENTRESTO)  mg per tablet Take 1 tablet by mouth 2 (two) times daily.    simvastatin (ZOCOR) 40 MG tablet TAKE 1 TABLET EVERY EVENING    spironolactone  (ALDACTONE) 25 MG tablet TAKE 1 TABLET EVERY DAY    torsemide (DEMADEX) 20 MG Tab Take 1 tablet (20 mg total) by mouth 2 (two) times daily.    metOLazone (ZAROXOLYN) 2.5 MG tablet Take it once today 30 min before your evening dose of Bumex.     Family History     Problem Relation (Age of Onset)    Arthritis Father    COPD Brother    Cancer Father    Drug abuse Sister    Hypertension Maternal Grandmother        Tobacco Use    Smoking status: Never Smoker    Smokeless tobacco: Never Used   Substance and Sexual Activity    Alcohol use: No     Comment: rarely    Drug use: No    Sexual activity: Not on file     Review of Systems   Constitution: Negative for chills, decreased appetite and diaphoresis.   HENT: Negative for congestion and ear discharge.    Eyes: Negative for blurred vision and discharge.   Cardiovascular: Negative for chest pain, dyspnea on exertion, irregular heartbeat, leg swelling and paroxysmal nocturnal dyspnea.   Respiratory: Negative for cough, hemoptysis and shortness of breath.    Gastrointestinal: Negative for abdominal pain.     Objective:     Vital Signs (Most Recent):  Temp: 98.2 °F (36.8 °C) (08/27/19 0705)  Pulse: (!) 127 (08/27/19 1000)  Resp: 20 (08/27/19 1000)  BP: 98/72 (08/27/19 1000)  SpO2: 98 % (08/27/19 1000) Vital Signs (24h Range):  Temp:  [98.2 °F (36.8 °C)-98.8 °F (37.1 °C)] 98.2 °F (36.8 °C)  Pulse:  [114-139] 127  Resp:  [13-28] 20  SpO2:  [85 %-100 %] 98 %  BP: ()/() 98/72     Weight: 71.1 kg (156 lb 12 oz)  Body mass index is 26.08 kg/m².    SpO2: 98 %  O2 Device (Oxygen Therapy): nasal cannula      Intake/Output Summary (Last 24 hours) at 8/27/2019 1142  Last data filed at 8/27/2019 1100  Gross per 24 hour   Intake 1260.01 ml   Output 1820 ml   Net -559.99 ml       Lines/Drains/Airways     Central Venous Catheter Line                 Percutaneous Central Line Insertion/Assessment - triple lumen  08/20/19 0300 right internal jugular 7 days          Drain                  Urethral Catheter 08/20/19 0400 7 days          Peripheral Intravenous Line                 Peripheral IV - Single Lumen 08/23/19 2230 22 G Posterior;Right Hand 3 days                Physical Exam   Constitutional: She is oriented to person, place, and time. She appears well-developed and well-nourished. No distress.   Eyes: Pupils are equal, round, and reactive to light. Conjunctivae are normal.   Neck: No tracheal deviation present. No thyromegaly present.   Cardiovascular: Normal heart sounds and intact distal pulses. An irregularly irregular rhythm present. Tachycardia present. Exam reveals no gallop and no friction rub.   No murmur heard.  Pulses:       Radial pulses are 2+ on the right side, and 2+ on the left side.        Femoral pulses are 2+ on the right side, and 2+ on the left side.  Pulmonary/Chest: Effort normal and breath sounds normal. No respiratory distress. She has no wheezes. She has no rales.   Abdominal: Soft. Bowel sounds are normal. She exhibits no distension. There is no tenderness.   Musculoskeletal: She exhibits no edema or deformity.   Neurological: She is alert and oriented to person, place, and time. No cranial nerve deficit. Coordination normal.   Skin: Skin is warm and dry. She is not diaphoretic.   Psychiatric: She has a normal mood and affect. Her behavior is normal.       Significant Labs:   BMP:   Recent Labs   Lab 08/26/19  0230 08/26/19  1526 08/27/19  0236   * 101 104   * 129* 129*   K 3.9 4.2 3.9   CL 83* 84* 84*   CO2 33* 33* 32*   BUN 48* 45* 44*   CREATININE 1.9* 1.9* 1.8*   CALCIUM 9.1 9.4 9.3   MG 1.8 2.4 2.1       Significant Imaging: Echocardiogram:   2D echo with color flow doppler:   Results for orders placed or performed in visit on 07/08/19   2D echo with color flow doppler   Result Value Ref Range    QEF 20 (A) 55 - 65    Mitral Valve Regurgitation MODERATE TO SEVERE (A)     Aortic Valve Regurgitation MILD     Est. PA Systolic Pressure 54.19  (A)     Tricuspid Valve Regurgitation MODERATE (A)     Narrative    Date of Procedure: 07/08/2019        TEST DESCRIPTION       General: The patient was in an irregularly irregular rhythm throughout the study. A catheter is present in the right-sided cardiac chambers.     Aorta: The aortic root is normal in size, measuring 2.8 cm at sinotubular junction and 2.9 cm at Sinuses of Valsalva. The proximal ascending aorta is normal in size, measuring 3.2 cm across.     Left Atrium: The left atrial volume index is severely enlarged, measuring 61.90 cc/m2.     Left Ventricle: The left ventricle is moderately enlarged, with an end-diastolic diameter of 5.9 cm, and an end-systolic diameter of 5.4 cm. Septal wall thickness is mildly increased, with the septum measuring 1.3 cm and the posterior wall measuring 1.0   cm across. Relative wall thickness was normal at 0.34, and the LV mass index was increased at 180.3 g/m2 consistent with eccentric left ventricular hypertrophy. There is global hypokinesis. Left ventricular systolic function appears severely depressed.   Visually estimated ejection fraction is 20-25%. The LV Doppler derived stroke volume equals 27.0 ccs.         Right Atrium: The right atrium is normal in size, measuring 4.9 cm in length and 4.2 cm in width in the apical view.     Right Ventricle: The right ventricle is normal in size measuring 4.5 cm at the base in the apical right ventricle-focused view. Global right ventricular systolic function appears normal. Tricuspid annular plane systolic excursion (TAPSE) is 1.1 cm. The   estimated PA systolic pressure is 54 mmHg.     Aortic Valve:  The aortic valve is normal in structure. The aortic valve is tri-leaflet in structure. Additionally, there is mild aortic regurgitation.     Mitral Valve:  The mitral valve is moderately sclerotic. There is moderate to severe mitral regurgitation. There is mitral annular calcification.     Tricuspid Valve:  There is moderate  tricuspid regurgitation.     Pulmonary Valve:  There is mild to moderate pulmonic regurgitation.     IVC: IVC is enlarged and collapses < 50% with a sniff, suggesting high right atrial pressure of 15 mmHg.     Intracavitary: There is no evidence of pericardial effusion, intracavity mass, thrombi, or vegetation.         CONCLUSIONS     1 - Severe left atrial enlargement.     2 - Moderate left ventricular enlargement.     3 - Eccentric hypertrophy.     4 - Severely depressed left ventricular systolic function (EF 20-25%).     5 - Normal right ventricular systolic function .     6 - Pulmonary hypertension. The estimated PA systolic pressure is 54 mmHg.     7 - Mild aortic regurgitation.     8 - Moderate to severe mitral regurgitation.     9 - Moderate tricuspid regurgitation.     10 - Mild to moderate pulmonic regurgitation.     11 - Increased central venous pressure.             This document has been electronically    SIGNED BY: Jalil Cabezas MD On: 07/08/2019 14:23     Assessment and Plan:     Atrial fibrillation with RVR  1. JOSE for evaluation of Atrial fibrillation  -No absolute contraindications of esophageal stricture, tumor, perforation, laceration,or diverticulum and/or active GI bleed  -The risks, benefits & alternatives of the procedure were explained to the patient.   -The risks of transesophageal echo include but are not limited to:  Dental trauma, esophageal trauma/perforation, bleeding, laryngospasm/brochospasm, aspiration, sore throat/hoarseness, & dislodgement of the endotracheal tube/nasogastric tube (where applicable).    -The risks of moderate sedation include hypotension, respiratory depression, arrhythmias, bronchospasm, & death.    -Informed consent was obtained. The patient is agreeable to proceed with the procedure and all questions and concerns addressed.    Case discussed with an attending in echocardiography lab.     Further recommendations per attending addendum        VTE Risk  Mitigation (From admission, onward)        Ordered     warfarin (COUMADIN) tablet 7.5 mg  Daily      08/25/19 1407     heparin 25,000 units in dextrose 5% 250 mL (100 units/mL) infusion LOW INTENSITY nomogram - [facility]  Continuous      08/20/19 0706     heparin 25,000 units in dextrose 5% (100 units/ml) IV bolus from bag - ADDITIONAL PRN BOLUS - 60 units/kg  As needed (PRN)      08/20/19 0706     heparin 25,000 units in dextrose 5% (100 units/ml) IV bolus from bag - ADDITIONAL PRN BOLUS - 30 units/kg  As needed (PRN)      08/20/19 0706     IP VTE HIGH RISK PATIENT  Once      08/20/19 0049          Quang Brownlee MD  Cardiology   [facility]

## 2019-08-28 NOTE — ASSESSMENT & PLAN NOTE
67 y/o woman history of NICM 20-25%, persistent atrial fibrillation on apixaban, [employer] ICD, CKD presenting with cardiogenic shock and acute decompensated heart failure in setting of atrial fibrillation w RVR, and high atrial fibrillation burden on device interrogation. Failed DCCV x 3.    S/p AVN ablation with upgrade to Bi-V ICD on 8/30/19  Symptomatically improved  Telemetry with  rhythm at a rate of 80 bpm  CXR ok  Labs stable  Continue warfarin for anticoagulation    - Sling to left arm - wear for 48 hours, then only at night for 6 weeks.  - NO HEPARIN PRODUCTS  - Doxycycline 100 mg BID for five days   - No lifting left elbow above shoulder height  - No lifting over 5 pounds  - No driving for 1 week and for 4 weeks if patient uses left arm to make turns  - Patient may shower in 48 hours, do not let beam of shower hit site directly and no scrubbing in area  - Follow up in device clinic in 1 week

## 2019-08-28 NOTE — ASSESSMENT & PLAN NOTE
- return to sinus rhythm after dccv/candice 8/20/19, but quickly went back in A fib with RVR and failed repeat cardioversion 8/22/19.   -w/p CANDICE/DCCV to NSR 8/27/19 but back in A Fib early this morning  - Discussed putting patient back on Eliquis or another DOAC with EP - they want her to stay on Coumadin with Heparin. INR 1.6 today  - Decreased Amio gtt to 0.5 mg/min 8/26/19  -Continue plan for Dig 125 mcg every M-W-F  - EP plans possible AVNA with BiV upgrade once INR therapeuitc. Will keep patient on warfarin and heparin with INR goal of 2.

## 2019-08-28 NOTE — NURSING TRANSFER
Nursing Transfer Note      8/27/2019     Transfer To: Room 309    Transfer via bed    Transfer with cardiac monitoring    Transported by RN and PCT accompaniment    Medicines sent: Lasix (extra bag); Heparin (infusing); Lasix (infusing); Amiodarone (infusing)    Chart send with patient: Yes    Notified: son @ bedside    Patient reassessed at: 08/27/2019 @ 2311    Upon arrival to floor: cardiac monitor applied, patient oriented to room, call bell in reach and bed in lowest position    Patient arrived on unit via bed accompanied by her family member, RN, and PCT from transferring unit. She appears to be in no distress at this time. She has IV to her right jugular and left hand. All meds infusing per MD orders, checked for accuracy. Skin checked, patient has small area of redness to her posterior right heel and ankle. No skin issues noted to sacral area or buttocks, skin protector in place. Purewick placed for patient comfort. Oriented to unit including call bell usage, fall precautions, CLABSI precautions, skin care,  Use of PureWick device, handwashing for all visitors and staff, and general questions answered. Will continue to monitor.

## 2019-08-28 NOTE — PLAN OF CARE
Problem: Occupational Therapy Goal  Goal: Occupational Therapy Goal  Goals to be met by: 09-09-19     Patient will increase functional independence with ADLs by performing:    UE Dressing with Stand-by Assistance.  Grooming while standing with Contact Guard Assistance.  Toileting from bedside commode with Minimal Assistance for hygiene and clothing management.   Supine to sit with Stand-by Assistance.  Stand pivot transfers with Contact Guard Assistance.  Toilet transfer to bedside commode with Contact Guard Assistance.  Pt. To be I with HEP to improve level of endurance     Outcome: Ongoing (interventions implemented as appropriate)  Goals remain appropriate.

## 2019-08-28 NOTE — PLAN OF CARE
Problem: Physical Therapy Goal  Goal: Physical Therapy Goal  Goals to be met by: 9/10/19     Patient will increase functional independence with mobility by performin. Supine to sit with MInimal Assistance  2. Sit to supine with MInimal Assistance  3. Sit to stand transfer with Stand-by Assistance  4. Bed to chair transfer with Stand-by Assistance using Rolling Walker  5. Gait  x 50 feet with Stand-by Assistance using Rolling Walker.      Outcome: Ongoing (interventions implemented as appropriate)  Goals reviewed and remain appropriate. Pt progressing towards goals.    Ariadna Herman, PT, DPT   2019  [phone]

## 2019-08-28 NOTE — PROGRESS NOTES
[facility]  Cardiac Electrophysiology  Progress Note    Admission Date: 8/20/2019  Code Status: Full Code   Attending Physician: Nimesh Montgomery Jr.,*   Expected Discharge Date: 8/30/2019  Principal Problem:Acute on chronic combined systolic and diastolic heart failure    Subjective:     Interval History: s/p JOSE/DCCV w conversion to NSR, since converted back to atrial fibrillation with rates 110s-120s. Disappointed about this development but otherwise no complaints.    Review of Systems   Constitution: Negative. Negative for chills and fever.   HENT: Negative.    Eyes: Negative.    Cardiovascular: Negative for chest pain, claudication and paroxysmal nocturnal dyspnea.   Respiratory: Negative for cough, shortness of breath and wheezing.    Endocrine: Negative.    Hematologic/Lymphatic: Does not bruise/bleed easily.   Skin: Negative for nail changes and rash.   Musculoskeletal: Negative.  Negative for back pain.   Gastrointestinal: Negative for abdominal pain, melena, nausea and vomiting.   Neurological: Negative for dizziness and headaches.   Psychiatric/Behavioral: Negative for altered mental status, depression and substance abuse.   Allergic/Immunologic: Negative.      Objective:     Vital Signs (Most Recent):  Temp: 97.8 °F (36.6 °C) (08/28/19 1151)  Pulse: (!) 128 (08/28/19 1200)  Resp: 18 (08/28/19 1151)  BP: 112/64 (08/28/19 1151)  SpO2: 96 % (08/28/19 1151) Vital Signs (24h Range):  Temp:  [97.2 °F (36.2 °C)-99.4 °F (37.4 °C)] 97.8 °F (36.6 °C)  Pulse:  [] 128  Resp:  [16-37] 18  SpO2:  [90 %-99 %] 96 %  BP: ()/(51-69) 112/64     Weight: 71.1 kg (156 lb 12 oz)  Body mass index is 26.08 kg/m².     SpO2: 96 %  O2 Device (Oxygen Therapy): room air    Physical Exam   Constitutional: She is oriented to person, place, and time. She appears well-developed and well-nourished.   HENT:   Head: Normocephalic and atraumatic.   Eyes: Pupils are equal, round, and reactive to light.  Conjunctivae and EOM are normal.   Neck: No JVD present.   Cardiovascular: Normal rate, regular rhythm, normal heart sounds and intact distal pulses. Exam reveals no gallop and no friction rub.   No murmur heard.  Pulmonary/Chest: Effort normal. No stridor. She has no wheezes. She has no rales. She exhibits no tenderness.   Abdominal: Soft. Bowel sounds are normal. She exhibits no distension and no mass. There is no tenderness. There is no guarding.   Musculoskeletal: She exhibits no edema, tenderness or deformity.   Neurological: She is alert and oriented to person, place, and time.   Skin: Skin is warm and dry. No rash noted. No erythema.   Psychiatric: She has a normal mood and affect.       Significant Labs:   CMP:   Recent Labs   Lab 08/27/19  0236 08/27/19  1512 08/28/19  0310   * 129* 131*  131*  131*  131*  131*  131*  131*   K 3.9 4.3 3.7  3.7  3.7  3.7  3.7  3.7  3.7   CL 84* 84* 84*  84*  84*  84*  84*  84*  84*   CO2 32* 31* 32*  32*  32*  32*  32*  32*  32*    95 89  89  89  89  89  89  89   BUN 44* 42* 41*  41*  41*  41*  41*  41*  41*   CREATININE 1.8* 1.8* 1.8*  1.8*  1.8*  1.8*  1.8*  1.8*  1.8*   CALCIUM 9.3 9.8 9.9  9.9  9.9  9.9  9.9  9.9  9.9   PROT 7.0 7.7 7.6  7.6  7.6  7.6  7.6  7.6  7.6   ALBUMIN 2.4* 2.6* 2.5*  2.5*  2.5*  2.5*  2.5*  2.5*  2.5*   BILITOT 0.5 0.6 0.6  0.6  0.6  0.6  0.6  0.6  0.6   ALKPHOS 154* 155* 150*  150*  150*  150*  150*  150*  150*   AST 18 20 20  20  20  20  20  20  20   ALT 14 14 13  13  13  13  13  13  13   ANIONGAP 13 14 15  15  15  15  15  15  15   ESTGFRAFRICA 32.9* 32.9* 32.9*  32.9*  32.9*  32.9*  32.9*  32.9*  32.9*   EGFRNONAA 28.5* 28.5* 28.5*  28.5*  28.5*  28.5*  28.5*  28.5*  28.5*    and CBC:   Recent Labs   Lab 08/27/19  0236 08/28/19  0310   WBC 9.01 8.64   HGB 13.2 13.4   HCT 38.5 39.0   * 127*       Significant Imaging:  Echocardiogram: JOSE 8/27:   · Limited astudy performed to clear MARY only. patient hypotensive.  · Normal appearing left atrial appendage. No thrombus is present in the appendage. Normal appendage velocities. Contrast used.    Assessment and Plan:     Atrial fibrillation with RVR  67 y/o woman history of NICM 20-25%, persistent atrial fibrillation on apixaban, [employer] ICD, CKD presenting with cardiogenic shock and acute decompensated heart failure in setting of atrial fibrillation w RVR, and high atrial fibrillation burden on device interrogation. Failed DCCV x 3.    - In setting of multiple non-sustained responses to DCCV, will plan for AV ken ablation and device upgrade to Bi-V 8/30  - NPO @ 0001 8/30 (ordered) for AVN ablation and device upgrade  - Please continue warfarin (remains subtherapeutic) with heparin bridging, with goal of INR 2-3 (most important to avoid supratherapeutic INR)  - Continue amiodarone 0.5mg/min, will consider PO conversion following procedure  - Patient has no EP indication for digoxin, will defer further use for CHF purposes to HTS      Case discussed with attending, Dr. Reece.    Thank you for this interesting consult. Electrophysiology consult service will continue to follow.      Bay Holliday MD  Cardiac Electrophysiology  [facility]

## 2019-08-28 NOTE — ASSESSMENT & PLAN NOTE
-had hesitancy on history prior to arrival and growing e. Coli in urine  -Completed course of Astreonam  -D/C'ed Juanita 8/27/19

## 2019-08-28 NOTE — PT/OT/SLP PROGRESS
"Occupational Therapy   Treatment    Name: Sherice Squires  MRN: [id number]  Admitting Diagnosis:  Acute on chronic combined systolic and diastolic heart failure  1 Day Post-Op    Recommendations:     Discharge Recommendations: Rehab  Discharge Equipment Recommendations:  Patient may possibly need RW, BSC, shower chair.    Assessment:     Sherice Squires is a 68 y.o. female with a medical diagnosis of Acute on chronic combined systolic and diastolic heart failure. Performance deficits affecting function are weakness, impaired self care skills, impaired balance, decreased coordination, impaired endurance, impaired functional mobilty, pain, decreased lower extremity function.  Patient tolerated session well and was motivated to participate in therapy.  Patient will continue to benefit from theapy.    Rehab Prognosis:  Good; patient would benefit from acute skilled OT services to address these deficits and reach maximum level of function.       Plan:     Patient to be seen 3 x/week to address the above listed problems via self-care/home management, therapeutic activities, therapeutic exercises  · Plan of Care Expires: 09/25/19  · Plan of Care Reviewed with: patient    Subjective     Patient reported "I'm feeling ok but tender under my toes"    Pain/Comfort:  · Pain Rating 1: (Patient indicated pain in L knee during bed mobility and standing)  · Pain Addressed 1: Reposition, Distraction    Objective:     Communicated with: Nursing prior to session.  Patient found supine with telemetry, PureWick, central line , peripheral line, upon OT entry to room.    General Precautions: Standard, fall   Orthopedic Precautions:N/A     Occupational Performance:     Bed Mobility:    · Patient completed Supine to Sit with maximal assistance  · Patient completed Sit to Supine with moderate assistance     Functional Mobility/Transfers:  · Patient completed Sit <> Stand Transfer 3x with moderate assistance of 2 with no assistive " device/hand-held assist       Activities of Daily Living:  · Grooming: SBA washing face sitting EOB  · Lower Body Dressing: total assist donning socks sitting EOB    [facility] 6 Click ADL: 15    Treatment & Education:  Therapist provided facilitation and instruction of proper body mechanics, hand placement, and sequencing of steps for functional mobility to improve performance in self care tasks.  Following training session, patient performed supine to sit with max assist, reaching activity using B UEs while sitting EOB with SBA, washing face with supervision, donning socks with total assist, sit <-> stand 3x with mod assist of 2, and sit <-> supine at end of session with mod assist.  Therapist provided verbal and tactile cues to facilitate upright postural alignment in sitting and standing and cues to facilitate hip/trunk flexion during reaching tasks sitting EOB to improve performance in lower body dressing.    Patient left supine with all lines intact, call button in reach and nursing notifiedEducation:      GOALS:   Multidisciplinary Problems     Occupational Therapy Goals        Problem: Occupational Therapy Goal    Goal Priority Disciplines Outcome Interventions   Occupational Therapy Goal     OT, PT/OT            Problem: Occupational Therapy Goal    Goal Priority Disciplines Outcome Interventions   Occupational Therapy Goal     OT, PT/OT Ongoing (interventions implemented as appropriate)    Description:  Goals to be met by: 09-09-19     Patient will increase functional independence with ADLs by performing:    UE Dressing with Stand-by Assistance.  Grooming while standing with Contact Guard Assistance.  Toileting from bedside commode with Minimal Assistance for hygiene and clothing management.   Supine to sit with Stand-by Assistance.  Stand pivot transfers with Contact Guard Assistance.  Toilet transfer to bedside commode with Contact Guard Assistance.  Pt. To be I with HEP to improve level of endurance                       Time Tracking:     OT Date of Treatment: 08/28/19  OT Start Time: 1024  OT Stop Time: 1053  OT Total Time (min): 29 min    Billable Minutes:Self Care/Home Management 10  Therapeutic Activity 19    Eliza Jean Baptiste OT  8/28/2019

## 2019-08-28 NOTE — PLAN OF CARE
Problem: Adult Inpatient Plan of Care  Goal: Plan of Care Review  Outcome: Revised  Pt free of falls/trauma/injuries.  Denies c/o SOB, CP, or discomfort.  Generalized skin remains CDI; Mild edema noted.  Pt being diuresed with Lasix gtt; diuresing well.   Wt remains stable.  Electrolytes replaced as ordered.  Pt on Amio and Heparin gtts.  Pt tolerating plan of care.

## 2019-08-28 NOTE — ASSESSMENT & PLAN NOTE
- 67 yo F with NIDCM EF 20% , NYHA FC III with admitted with afib with rvr and cardiogenic shock  - IABP placed 8/20 and pulled 8/23  -TTE with contrast 8/20: LVEDD 6.51, LVEF 15%, indeterminate diastolic function, severe LAE, trace AI, mod MR and TR, PAS 51 and IVC 15.   -Today: CVP: 5; SVO2: 62 ; CO: 4.2, CI 2.32 SVR: 1447. Transition Lasix drip to 80 mg IV BID. Cont Hydralazine 50 mg every 8 hours and continue Isordil 20 mg q 8 hours  -CTs done off pathway for possible LVAD/transplant evaluation. Patient seen by CTS, not considered a candidate for advanced options at this time HOWEVER would like to see how she does with rehab/PT/OT, and see if she can walk into clinic, to see if she could become a candidate in the near future.

## 2019-08-28 NOTE — PT/OT/SLP PROGRESS
"Physical Therapy Treatment    Patient Name:  Sherice Squires   MRN:  [id number]    Recommendations:     Discharge Recommendations:  rehabilitation facility   Discharge Equipment Recommendations: (TBD)   Barriers to discharge: Decreased caregiver support at current functional level    Assessment:     Sherice Squires is a 68 y.o. female admitted with a medical diagnosis of Acute on chronic combined systolic and diastolic heart failure.  She presents with the following impairments/functional limitations:  weakness, impaired functional mobilty, impaired endurance, gait instability, impaired balance, impaired self care skills, decreased lower extremity function, decreased ROM, impaired cardiopulmonary response to activity, pain. Pt requiring increased assist to complete functional mobility this date. Able to tolerate static standing trails but with LE pain and weakness noted, limiting further progression. Pt would continue to benefit from skilled acute PT in order to address current deficits and progress functional mobility.     Rehab Prognosis: Good; patient would benefit from acute skilled PT services to address these deficits and reach maximum level of function.    Recent Surgery: Procedure(s) (LRB):  CARDIOVERSION (N/A)  ECHOCARDIOGRAM, TRANSESOPHAGEAL (N/A) 1 Day Post-Op    Plan:     During this hospitalization, patient to be seen 4 x/week to address the identified rehab impairments via gait training, therapeutic activities, therapeutic exercises, neuromuscular re-education and progress toward the following goals:    · Plan of Care Expires:  09/26/19    Subjective     Chief Complaint: L knee pain with mobility   Patient/Family Comments/goals: "I'm sore," pt reporting during standing.  Pain/Comfort:  · Pain Rating 1: (reported L knee pain with mobility; did not rate)  · Pain Addressed 1: Reposition, Distraction, Cessation of Activity      Objective:     Communicated with RN prior to session.  Patient found supine with " telemetry, PureWick, peripheral IV, central line upon PT entry to room.     General Precautions: Standard, fall   Orthopedic Precautions:N/A   Braces: N/A     Functional Mobility:  · Bed Mobility:     · Scooting: total assistance and of 2 persons with drawsheet to HOB, x2 reps   · Supine to Sit: maximal assistance  · Cues provided for technique and sequencing   · Sit to Supine: moderate assistance  · Cues provided for technique and sequencing   · Transfers:     · Sit to Stand:  moderate assistance and of 2 persons with no AD and x3 reps from EOB  · With cues for hand placement and transfer technique   · Balance:   · static sitting: SBA-CGA    · dynamic sitting: CGA-Amari    · static standing: modAx2      AM-PAC 6 CLICK MOBILITY  Turning over in bed (including adjusting bedclothes, sheets and blankets)?: 2  Sitting down on and standing up from a chair with arms (e.g., wheelchair, bedside commode, etc.): 2  Moving from lying on back to sitting on the side of the bed?: 2  Moving to and from a bed to a chair (including a wheelchair)?: 1  Need to walk in hospital room?: 1  Climbing 3-5 steps with a railing?: 1  Basic Mobility Total Score: 9       Therapeutic Activities and Exercises:   Performed reaching in alternating directions off PHILLY with CGA-Amari for seated balance.    Completed static standing trials x3 with seated rest breaks between trials. Cues provided for upright posture and appropriate weight-shifts. Increased reliance on BUE support.   Noted to be soiled upon return to supine. PCT notified to assist with pt hygiene.     Patient left supine with all lines intact, call button in reach and RN and PCT notified..    GOALS:   Multidisciplinary Problems     Physical Therapy Goals        Problem: Physical Therapy Goal    Goal Priority Disciplines Outcome Goal Variances Interventions   Physical Therapy Goal     PT, PT/OT Ongoing (interventions implemented as appropriate)     Description:  Goals to be met by: 9/10/19      Patient will increase functional independence with mobility by performin. Supine to sit with MInimal Assistance  2. Sit to supine with MInimal Assistance  3. Sit to stand transfer with Stand-by Assistance  4. Bed to chair transfer with Stand-by Assistance using Rolling Walker  5. Gait  x 50 feet with Stand-by Assistance using Rolling Walker.                       Time Tracking:     PT Received On: 19  PT Start Time: 1024     PT Stop Time: 1053  PT Total Time (min): 29 min     Billable Minutes: Therapeutic Activity 10 and Neuromuscular Re-education 13   (co-tx with OT)    Treatment Type: Treatment  PT/PTA: PT     PTA Visit Number: 0     Ariadna Herman, PT, DPT   2019  [phone]

## 2019-08-28 NOTE — CARE UPDATE
Rapid Response Nurse Chart Check     Chart check completed, abnormal VS noted. Bedside RN Sharel contacted, no concerns   verbalized at this time, instructed to call [phone] for further concerns or assistance.

## 2019-08-28 NOTE — PLAN OF CARE
Problem: Adult Inpatient Plan of Care  Goal: Plan of Care Review  Outcome: Ongoing (interventions implemented as appropriate)  Pt remained in CMICU, with transfer orders. Pt AAOx4. Tearful throughout the day. JOSE completed. EKG in chart. 4 L NC. CVP 5-6. Grant d/c'd @ 1830. No BM on shift. Diet advanced to cardiac with 1500 cc FR. Pt complains pills get stuck when swallowing pills. Lasix, heparin and amio in place. POC updated with pt and pt's son @ bedside, all questions and concerns addressed.   A: Awake    RASS: Goal - 0  alert and calm Actual - 0  alert and calm   Restraint necessity: no  B: Breath   SBT: Not intubated  C: Coordinate A & B, analgesics/sedatives   Pain: managed   SAT: Not intubated  D: Delirium   CAM-ICU: negative  E: Early Mobility   MOVE Screen: Pass   Activity order: PT/OT orders  FAS: Feeding/Nutrition   Diet order: Cardiac Fluid restriction: 1500  T: Thrombus   DVT prophylaxis: pharmacologic  H: HOB Elevation   30 degrees: Yes   U: Ulcer Prophylaxis   GI: yes  G: Glucose control   managed  S: Skin   Bundle compliance: yes  B: Bowel Function   constipation  I: Indwelling Catheters   Grant necessity: No   CVC necessity: Yes   IPAD offered: Not appropriate  D: De-escalation Antibx   Yes  Plan for the day   JOSE  Family/Goals of care/Code Status   Code Status: Full Code   GOC: JOSE/advance diet/work with PT/OT orders      Problem: Fall Injury Risk  Goal: Absence of Fall and Fall-Related Injury    Intervention: Promote Injury-Free Environment  Pt worked with PT @ bedside, stood with assistance.

## 2019-08-28 NOTE — SUBJECTIVE & OBJECTIVE
Interval History: Pt in bed eating breakfast. Still feeling overall much better. Son at bedside  S/p DCCV yesterday, went back into A Fib early this am    Continuous Infusions:   amiodarone in dextrose 5% 0.5 mg/min (08/27/19 2200)    heparin (porcine) in D5W 14 Units/kg/hr (08/28/19 0302)     Scheduled Meds:   digoxin  0.125 mg Oral Every Mon, Wed, Fri    famotidine  20 mg Oral Daily    furosemide  80 mg Intravenous BID    hydrALAZINE  50 mg Oral Q8H    isosorbide dinitrate  20 mg Oral Q8H    magnesium oxide  400 mg Oral Daily    polyethylene glycol  17 g Oral Daily    potassium chloride  20 mEq Oral BID    senna-docusate 8.6-50 mg  2 tablet Oral BID    warfarin  7.5 mg Oral Daily     PRN Meds:heparin (PORCINE), heparin (PORCINE), ramelteon, silver sulfADIAZINE 1%, sodium chloride 0.9%    Review of patient's allergies indicates:   Allergen Reactions    Augmentin [amoxicillin-pot clavulanate] Swelling     Lip swelling      Penicillins      Other reaction(s): Swelling  Lip swelling       Objective:     Vital Signs (Most Recent):  Temp: 97.2 °F (36.2 °C) (08/28/19 0802)  Pulse: 89 (08/28/19 0802)  Resp: 16 (08/28/19 0802)  BP: 119/68 (08/28/19 0802)  SpO2: 95 % (08/28/19 0802) Vital Signs (24h Range):  Temp:  [97.2 °F (36.2 °C)-99.4 °F (37.4 °C)] 97.2 °F (36.2 °C)  Pulse:  [] 89  Resp:  [15-37] 16  SpO2:  [90 %-100 %] 95 %  BP: ()/(51-69) 119/68     Patient Vitals for the past 72 hrs (Last 3 readings):   Weight   08/27/19 0500 71.1 kg (156 lb 12 oz)   08/26/19 0500 71 kg (156 lb 8.4 oz)     Body mass index is 26.08 kg/m².      Intake/Output Summary (Last 24 hours) at 8/28/2019 1030  Last data filed at 8/28/2019 0925  Gross per 24 hour   Intake 1497 ml   Output 1285 ml   Net 212 ml       Hemodynamic Parameters:  CVP:  [5 mmHg] 5 mmHg    Telemetry: Reviewed    Physical Exam   Constitutional: She is oriented to person, place, and time. She appears well-developed and well-nourished.   HENT:    Head: Normocephalic and atraumatic.   Eyes: Pupils are equal, round, and reactive to light. Conjunctivae and EOM are normal.   Neck: Normal range of motion. Neck supple. No JVD present. No thyromegaly present.   RIJ TLC   Cardiovascular: Intact distal pulses.   Tachycardic, irregularly irregular   Pulmonary/Chest: Effort normal and breath sounds normal.   Abdominal: Soft. Bowel sounds are normal.   Musculoskeletal: Normal range of motion. She exhibits no edema.   Neurological: She is alert and oriented to person, place, and time.   Skin: Skin is warm and dry. Capillary refill takes 2 to 3 seconds.   Psychiatric: She has a normal mood and affect. Her behavior is normal. Judgment and thought content normal.       Significant Labs:  CBC:  Recent Labs   Lab 08/26/19  0230 08/27/19  0236 08/28/19  0310   WBC 9.91 9.01 8.64   RBC 4.43 4.51 4.58   HGB 13.0 13.2 13.4   HCT 39.3 38.5 39.0   PLT 95* 107* 127*   MCV 89 85 85   MCH 29.3 29.3 29.3   MCHC 33.1 34.3 34.4     BNP:  Recent Labs   Lab 08/26/19  0230   *     CMP:  Recent Labs   Lab 08/27/19  0236 08/27/19  1512 08/28/19  0310    95 89  89  89  89  89  89  89   CALCIUM 9.3 9.8 9.9  9.9  9.9  9.9  9.9  9.9  9.9   ALBUMIN 2.4* 2.6* 2.5*  2.5*  2.5*  2.5*  2.5*  2.5*  2.5*   PROT 7.0 7.7 7.6  7.6  7.6  7.6  7.6  7.6  7.6   * 129* 131*  131*  131*  131*  131*  131*  131*   K 3.9 4.3 3.7  3.7  3.7  3.7  3.7  3.7  3.7   CO2 32* 31* 32*  32*  32*  32*  32*  32*  32*   CL 84* 84* 84*  84*  84*  84*  84*  84*  84*   BUN 44* 42* 41*  41*  41*  41*  41*  41*  41*   CREATININE 1.8* 1.8* 1.8*  1.8*  1.8*  1.8*  1.8*  1.8*  1.8*   ALKPHOS 154* 155* 150*  150*  150*  150*  150*  150*  150*   ALT 14 14 13  13  13  13  13  13  13   AST 18 20 20  20  20  20  20  20  20   BILITOT 0.5 0.6 0.6  0.6  0.6  0.6  0.6  0.6  0.6      Coagulation:   Recent Labs   Lab 08/26/19  0230  08/27/19  0236 08/28/19  0310   INR 1.2 1.3* 1.6*   APTT 53.8* 47.0* 49.9*  49.9*     LDH:  No results for input(s): LDH in the last 72 hours.  Microbiology:  Microbiology Results (last 7 days)     Procedure Component Value Units Date/Time    Urine culture [[id number]]  (Abnormal)  (Susceptibility) Collected:  08/20/19 0422    Order Status:  Completed Specimen:  Urine Updated:  08/22/19 0451     Urine Culture, Routine ESCHERICHIA COLI  >100,000 cfu/ml      Narrative:       Preferred Collection Type->Urine, Clean Catch          I have reviewed all pertinent labs within the past 24 hours.    Estimated Creatinine Clearance: 29.6 mL/min (A) (based on SCr of 1.8 mg/dL (H)).    Diagnostic Results:  I have reviewed all pertinent imaging results/findings within the past 24 hours.

## 2019-08-28 NOTE — PROGRESS NOTES
[facility]  Heart Transplant  Progress Note    Patient Name: Sherice Squires  MRN: [id number]  Admission Date: 8/20/2019  Hospital Length of Stay: 8 days  Attending Physician: Nimesh Montgomery Jr.,*  Primary Care Provider: Annamarie Soria MD  Principal Problem:Acute on chronic combined systolic and diastolic heart failure    Subjective:     Interval History: Pt in bed eating breakfast. Still feeling overall much better. Son at bedside  S/p DCCV yesterday, went back into A Fib early this am    Continuous Infusions:   amiodarone in dextrose 5% 0.5 mg/min (08/27/19 2200)    heparin (porcine) in D5W 14 Units/kg/hr (08/28/19 0302)     Scheduled Meds:   digoxin  0.125 mg Oral Every Mon, Wed, Fri    famotidine  20 mg Oral Daily    furosemide  80 mg Intravenous BID    hydrALAZINE  50 mg Oral Q8H    isosorbide dinitrate  20 mg Oral Q8H    magnesium oxide  400 mg Oral Daily    polyethylene glycol  17 g Oral Daily    potassium chloride  20 mEq Oral BID    senna-docusate 8.6-50 mg  2 tablet Oral BID    warfarin  7.5 mg Oral Daily     PRN Meds:heparin (PORCINE), heparin (PORCINE), ramelteon, silver sulfADIAZINE 1%, sodium chloride 0.9%    Review of patient's allergies indicates:   Allergen Reactions    Augmentin [amoxicillin-pot clavulanate] Swelling     Lip swelling      Penicillins      Other reaction(s): Swelling  Lip swelling       Objective:     Vital Signs (Most Recent):  Temp: 97.2 °F (36.2 °C) (08/28/19 0802)  Pulse: 89 (08/28/19 0802)  Resp: 16 (08/28/19 0802)  BP: 119/68 (08/28/19 0802)  SpO2: 95 % (08/28/19 0802) Vital Signs (24h Range):  Temp:  [97.2 °F (36.2 °C)-99.4 °F (37.4 °C)] 97.2 °F (36.2 °C)  Pulse:  [] 89  Resp:  [15-37] 16  SpO2:  [90 %-100 %] 95 %  BP: ()/(51-69) 119/68     Patient Vitals for the past 72 hrs (Last 3 readings):   Weight   08/27/19 0500 71.1 kg (156 lb 12 oz)   08/26/19 0500 71 kg (156 lb 8.4 oz)     Body mass index is 26.08  kg/m².      Intake/Output Summary (Last 24 hours) at 8/28/2019 1030  Last data filed at 8/28/2019 0925  Gross per 24 hour   Intake 1497 ml   Output 1285 ml   Net 212 ml       Hemodynamic Parameters:  CVP:  [5 mmHg] 5 mmHg    Telemetry: Reviewed    Physical Exam   Constitutional: She is oriented to person, place, and time. She appears well-developed and well-nourished.   HENT:   Head: Normocephalic and atraumatic.   Eyes: Pupils are equal, round, and reactive to light. Conjunctivae and EOM are normal.   Neck: Normal range of motion. Neck supple. No JVD present. No thyromegaly present.   RIJ TLC   Cardiovascular: Intact distal pulses.   Tachycardic, irregularly irregular   Pulmonary/Chest: Effort normal and breath sounds normal.   Abdominal: Soft. Bowel sounds are normal.   Musculoskeletal: Normal range of motion. She exhibits no edema.   Neurological: She is alert and oriented to person, place, and time.   Skin: Skin is warm and dry. Capillary refill takes 2 to 3 seconds.   Psychiatric: She has a normal mood and affect. Her behavior is normal. Judgment and thought content normal.       Significant Labs:  CBC:  Recent Labs   Lab 08/26/19  0230 08/27/19  0236 08/28/19  0310   WBC 9.91 9.01 8.64   RBC 4.43 4.51 4.58   HGB 13.0 13.2 13.4   HCT 39.3 38.5 39.0   PLT 95* 107* 127*   MCV 89 85 85   MCH 29.3 29.3 29.3   MCHC 33.1 34.3 34.4     BNP:  Recent Labs   Lab 08/26/19  0230   *     CMP:  Recent Labs   Lab 08/27/19  0236 08/27/19  1512 08/28/19  0310    95 89  89  89  89  89  89  89   CALCIUM 9.3 9.8 9.9  9.9  9.9  9.9  9.9  9.9  9.9   ALBUMIN 2.4* 2.6* 2.5*  2.5*  2.5*  2.5*  2.5*  2.5*  2.5*   PROT 7.0 7.7 7.6  7.6  7.6  7.6  7.6  7.6  7.6   * 129* 131*  131*  131*  131*  131*  131*  131*   K 3.9 4.3 3.7  3.7  3.7  3.7  3.7  3.7  3.7   CO2 32* 31* 32*  32*  32*  32*  32*  32*  32*   CL 84* 84* 84*  84*  84*  84*  84*  84*  84*   BUN 44* 42* 41*  41*   41*  41*  41*  41*  41*   CREATININE 1.8* 1.8* 1.8*  1.8*  1.8*  1.8*  1.8*  1.8*  1.8*   ALKPHOS 154* 155* 150*  150*  150*  150*  150*  150*  150*   ALT 14 14 13  13  13  13  13  13  13   AST 18 20 20  20  20  20  20  20  20   BILITOT 0.5 0.6 0.6  0.6  0.6  0.6  0.6  0.6  0.6      Coagulation:   Recent Labs   Lab 08/26/19  0230 08/27/19  0236 08/28/19  0310   INR 1.2 1.3* 1.6*   APTT 53.8* 47.0* 49.9*  49.9*     LDH:  No results for input(s): LDH in the last 72 hours.  Microbiology:  Microbiology Results (last 7 days)     Procedure Component Value Units Date/Time    Urine culture [[id number]]  (Abnormal)  (Susceptibility) Collected:  08/20/19 0422    Order Status:  Completed Specimen:  Urine Updated:  08/22/19 0451     Urine Culture, Routine ESCHERICHIA COLI  >100,000 cfu/ml      Narrative:       Preferred Collection Type->Urine, Clean Catch          I have reviewed all pertinent labs within the past 24 hours.    Estimated Creatinine Clearance: 29.6 mL/min (A) (based on SCr of 1.8 mg/dL (H)).    Diagnostic Results:  I have reviewed all pertinent imaging results/findings within the past 24 hours.    Assessment and Plan:     Sherice Squires is a 67 yo AAF with PMHx significant for NIDCM / stage D HFrEF (LVEF 20-25%, LVEDD 5.9 cm) s/p dc ICD, AF, HTN, DLD, CKD who is admitted as a transfer from  ED for management of ADHF +/- possible cardiogenic shock.     Patient reports she presented to OSH with progressive/worsening shortness of breath on exertion, orthopnea, and peripheral swelling of approx 5 days duration associated with nausea and fatigue. Denies fever/chills/sweats, chest pain, diaphoresis, presyncope/syncope. Does report intermittent palpitations.      Patient was afebrile and normotensive on arrival (/55 mmHg) to OSH ED with pulses in the 120-130s in AF with RVR. Initial labs showed worsening FAMILIA on CKD (with Cr 3.8 from 2.7-3.1 this past week from previous  baseline of 1.3-1.4 in June 2019) as well as elevated T bili 2.3 and BNP >3000. CXR obtained without acute cardiopulmonary process. Patient ultimately transferred to [facility] for higher level of care.      Patient follows with [facility] clinic - currently on Entresto 97/103 mg BID, Toprol  mg daily, Aldactone 25 mg daily, and digoxin 0.125 mg daily. She was last seen by Dr Rodriguez on 8/6/19 who added metolazone 2.5 mg QHS before evening dose of Bumex to augment diuresis due to complaints of SOB / peripheral swelling at the time. She did not respond to this regimen and reports she was ultimately switched to Torsemide instead.      Patient is fully complaint with her medicines. Also adherent to fluid / salt restrictions with her hx of congestive heart failure.      States she was hospitalized only once before for ADHF within the past year.      Of note patient follows with Dr Reece of EP for her AF hx, last seen by him in 5/2019    * Acute on chronic combined systolic and diastolic heart failure  - 67 yo F with NIDCM EF 20% , NYHA FC III with admitted with afib with rvr and cardiogenic shock  - IABP placed 8/20 and pulled 8/23  -TTE with contrast 8/20: LVEDD 6.51, LVEF 15%, indeterminate diastolic function, severe LAE, trace AI, mod MR and TR, PAS 51 and IVC 15.   -Today: CVP: 5; SVO2: 62 ; CO: 4.2, CI 2.32 SVR: 1447. Transition Lasix drip to 80 mg IV BID. Cont Hydralazine 50 mg every 8 hours and continue Isordil 20 mg q 8 hours  -CTs done off pathway for possible LVAD/transplant evaluation. Patient seen by CTS, not considered a candidate for advanced options at this time HOWEVER would like to see how she does with rehab/PT/OT, and see if she can walk into clinic, to see if she could become a candidate in the near future.        Atrial fibrillation with RVR  - return to sinus rhythm after dccv/candice 8/20/19, but quickly went back in A fib with RVR and failed repeat cardioversion 8/22/19.   -w/p CANDICE/DCCV to NSR 8/27/19 but back  in A Fib early this morning  - Discussed putting patient back on Eliquis or another DOAC with EP - they want her to stay on Coumadin with Heparin. INR 1.6 today  - Decreased Amio gtt to 0.5 mg/min 8/26/19  -Continue plan for Dig 125 mcg every M-W-F  - EP plans possible AVNA with BiV upgrade once INR therapeuitc. Will keep patient on warfarin and heparin with INR goal of 2.       Cardiogenic shock  - See acute on chronic heart failure    Acute on chronic kidney failure  - Cr up to 4.0 from baseline 1.3-1.4 in June 2019, downtrending with improvement in cardiogenic shock    Automatic implantable cardioverter-defibrillator in situ  -S/P [employer] dual chamber ICD  - hx VT/VF per chart review.  - telemetry monitoring and repletion of electrolytes PRN.    Constipation  -on bowel regimen    Thrombocytopenia, unspecified  -Platelet count was trending down likely 2/2 IABP, low suspicion for HIT given 4Ts=2, HIT with OD < 0.4, BELGICA not required  -Platelet count improving    Chronic pain of left knee  -Long h/o of this for which she uses a cane, and more recently, a walker  -PT/OT  -PM+R consult regarding knee pain to assess ability to rehab after potential advanced options    Sepsis due to gram-negative urinary tract infection  -had hesitancy on history prior to arrival and growing e. Coli in urine  -Completed course of Astreonam  -D/C'jaylen Grant 8/27/19      Jenny Cabral PA-C  Heart Transplant  [facility]

## 2019-08-28 NOTE — ASSESSMENT & PLAN NOTE
67 y/o woman history of NICM 20-25%, persistent atrial fibrillation on apixaban, [employer] ICD, CKD presenting with cardiogenic shock and acute decompensated heart failure in setting of atrial fibrillation w RVR, and high atrial fibrillation burden on device interrogation. Failed DCCV x 3.    - Please continue warfarin (remains subtherapeutic) with heparin bridging. Would prefer avoiding NOACs for possible AVJ ablation with ICD upgrade.  - Patient has failed amiodarone, will discuss transition from IV amio to PO vs discontinuation   - In setting of multiple non-sustained responses to DCCV, will consider for PVI vs AV ken ablation and device upgrade to Bi-V  - in the interim, if arrhythmia causes hemodynamic instability, would proceed with additional attempts at emergent cardioversion as per ACLS protocol.

## 2019-08-28 NOTE — SUBJECTIVE & OBJECTIVE
Interval History: s/p JOSE/DCCV w conversion to NSR, since converted back to atrial fibrillation with rates 110s-120s. Disappointed about this development but otherwise no complaints.    Review of Systems   Constitution: Negative. Negative for chills and fever.   HENT: Negative.    Eyes: Negative.    Cardiovascular: Negative for chest pain, claudication and paroxysmal nocturnal dyspnea.   Respiratory: Negative for cough, shortness of breath and wheezing.    Endocrine: Negative.    Hematologic/Lymphatic: Does not bruise/bleed easily.   Skin: Negative for nail changes and rash.   Musculoskeletal: Negative.  Negative for back pain.   Gastrointestinal: Negative for abdominal pain, melena, nausea and vomiting.   Neurological: Negative for dizziness and headaches.   Psychiatric/Behavioral: Negative for altered mental status, depression and substance abuse.   Allergic/Immunologic: Negative.      Objective:     Vital Signs (Most Recent):  Temp: 97.8 °F (36.6 °C) (08/28/19 1151)  Pulse: (!) 128 (08/28/19 1200)  Resp: 18 (08/28/19 1151)  BP: 112/64 (08/28/19 1151)  SpO2: 96 % (08/28/19 1151) Vital Signs (24h Range):  Temp:  [97.2 °F (36.2 °C)-99.4 °F (37.4 °C)] 97.8 °F (36.6 °C)  Pulse:  [] 128  Resp:  [16-37] 18  SpO2:  [90 %-99 %] 96 %  BP: ()/(51-69) 112/64     Weight: 71.1 kg (156 lb 12 oz)  Body mass index is 26.08 kg/m².     SpO2: 96 %  O2 Device (Oxygen Therapy): room air    Physical Exam   Constitutional: She is oriented to person, place, and time. She appears well-developed and well-nourished.   HENT:   Head: Normocephalic and atraumatic.   Eyes: Pupils are equal, round, and reactive to light. Conjunctivae and EOM are normal.   Neck: No JVD present.   Cardiovascular: Normal rate, regular rhythm, normal heart sounds and intact distal pulses. Exam reveals no gallop and no friction rub.   No murmur heard.  Pulmonary/Chest: Effort normal. No stridor. She has no wheezes. She has no rales. She exhibits no  tenderness.   Abdominal: Soft. Bowel sounds are normal. She exhibits no distension and no mass. There is no tenderness. There is no guarding.   Musculoskeletal: She exhibits no edema, tenderness or deformity.   Neurological: She is alert and oriented to person, place, and time.   Skin: Skin is warm and dry. No rash noted. No erythema.   Psychiatric: She has a normal mood and affect.       Significant Labs:   CMP:   Recent Labs   Lab 08/27/19  0236 08/27/19  1512 08/28/19  0310   * 129* 131*  131*  131*  131*  131*  131*  131*   K 3.9 4.3 3.7  3.7  3.7  3.7  3.7  3.7  3.7   CL 84* 84* 84*  84*  84*  84*  84*  84*  84*   CO2 32* 31* 32*  32*  32*  32*  32*  32*  32*    95 89  89  89  89  89  89  89   BUN 44* 42* 41*  41*  41*  41*  41*  41*  41*   CREATININE 1.8* 1.8* 1.8*  1.8*  1.8*  1.8*  1.8*  1.8*  1.8*   CALCIUM 9.3 9.8 9.9  9.9  9.9  9.9  9.9  9.9  9.9   PROT 7.0 7.7 7.6  7.6  7.6  7.6  7.6  7.6  7.6   ALBUMIN 2.4* 2.6* 2.5*  2.5*  2.5*  2.5*  2.5*  2.5*  2.5*   BILITOT 0.5 0.6 0.6  0.6  0.6  0.6  0.6  0.6  0.6   ALKPHOS 154* 155* 150*  150*  150*  150*  150*  150*  150*   AST 18 20 20  20  20  20  20  20  20   ALT 14 14 13  13  13  13  13  13  13   ANIONGAP 13 14 15  15  15  15  15  15  15   ESTGFRAFRICA 32.9* 32.9* 32.9*  32.9*  32.9*  32.9*  32.9*  32.9*  32.9*   EGFRNONAA 28.5* 28.5* 28.5*  28.5*  28.5*  28.5*  28.5*  28.5*  28.5*    and CBC:   Recent Labs   Lab 08/27/19  0236 08/28/19  0310   WBC 9.01 8.64   HGB 13.2 13.4   HCT 38.5 39.0   * 127*       Significant Imaging: Echocardiogram: JOSE 8/27:   · Limited astudy performed to clear MARY only. patient hypotensive.  · Normal appearing left atrial appendage. No thrombus is present in the appendage. Normal appendage velocities. Contrast used.

## 2019-08-28 NOTE — PLAN OF CARE
Problem: Adult Inpatient Plan of Care  Goal: Plan of Care Review  Outcome: Ongoing (interventions implemented as appropriate)  Plan of care reviewed with patients. Patient remains free from injury. No acute events noted at this time. VS stable. Lasix gtt converted to IVP BID. Amio and Heparin gtt continued. Activity encouraged and clustered for rest periods. Call bell in reach. Bed locked and in lowest position. Will continue to monitor.

## 2019-08-28 NOTE — ASSESSMENT & PLAN NOTE
-Platelet count was trending down likely 2/2 IABP, low suspicion for HIT given 4Ts=2, HIT with OD < 0.4, BELGICA not required  -Platelet count improving

## 2019-08-28 NOTE — CARE UPDATE
Rapid Response Respiratory Therapy Proactive Rounding Note      Time of visit: 815    Code Status: Full Code   : 1950  Age: 68 y.o.  Weight:   Wt Readings from Last 1 Encounters:   19 71.1 kg (156 lb 12 oz)     Sex: female  Race: Black or    Bed: 309/309 A:   MRN: [id number]    SITUATION     Evaluated patient for: Respiratory Status    BACKGROUND     Patient has a past medical history of Atrial fibrillation, Cardiomyopathy, CHF (congestive heart failure), Hyperlipidemia, Hypertension, and Ventricular tachycardia.  Pulmonary Hx: None  Clinically Significant Surgical Hx: None    ASSESSMENT     Pulse: Pulse: 89 Respiratory rate: Resp: 16 Temperature: Temp: 97.2 °F (36.2 °C) BP: BP: 119/68 SpO2:SpO2: (!) 94 %   Level of Consciousness: Level of Consciousness (AVPU): alert  Respiratory Effort: Respiratory Effort: Normal, Unlabored  Expansion/Accessory Muscle Usage: Expansion/Accessory Muscles/Retractions: no use of accessory muscles, no retractions, expansion symmetric  All Lung Field Breath Sounds: All Lung Fields Breath Sounds: Anterior:, Lateral:, diminished  Cough Type: Cough Type: fair  Mobility at time of assessment: General Mobility: generalized weakness, mildly impaired  O2 Device/Concentration: O2 Device (Oxygen Therapy): room air   Flow (L/min): 0 Oxygen Concentration (%): 36  Most recent blood gas:   Recent Labs     19  0928   PH 7.494*   PCO2 44.4   PO2 30*   HCO3 34.2*   POCSATURATED 62*   BE 11     PF Ratio Calculator  P/F Ratio:    Current Respiratory Care Orders:   19 1600  Pulse Oximetry Q4H Every 4 hours (15 of 38 released)    Release    19 1348   19 1215  Oxygen Continuous Continuous     References: Oxygen Titration Protocol   Question Answer Comment   Device type: Low flow    Device: Nasal Cannula (1- 5 Liters)    LPM: 1-5    Titrate O2 per Oxygen Titration Protocol: Yes    To maintain SpO2 goal of: >= 90%    Notify MD of: Inability to achieve  desired SpO2; Sudden change in patient status and requires 20% increase in FiO2; Patient requires >60% FiO2        08/23/19 1214   Unscheduled  POCT Venous Blood Gas PRN Use PRN (0 of 37822 released)    Release   Comments: This test should be used for VBGs.  If using this order for other tests (K, creatinine, HCT, PT/INR, lactate etc)  ONLY do so in the case of an emergency or rapid response.   Notify Physician if: see parameters below.   Question: Component: Answer: Blood Gas           NIPPV: No  Surgical airway: No  ETCO2 monitored: ETCO2 (mmHg): 42 mmHg  Ambu at bedside: Ambu bag with the patient?: Yes, Adult Ambu    INTERVENTIONS/RECOMMENDATIONS   ?  On rapid list. Proactive rounding. Patient on room air. No shortness of breath. Will continue to monitor.    FOLLOW-UP     Please call back the Rapid Response RT, Teo Gonsalez, RRT at x [phone] for any questions or concerns.

## 2019-08-29 NOTE — PT/OT/SLP PROGRESS
Occupational Therapy   Treatment    Name: Sherice Squires  MRN: [id number]  Admitting Diagnosis:  Acute on chronic combined systolic and diastolic heart failure  2 Days Post-Op    Recommendations:     Discharge Recommendations: rehabilitation facility  Discharge Equipment Recommendations:  (TBD )  Barriers to discharge:  None    Assessment:     Sherice Squires is a 68 y.o. female with a medical diagnosis of Acute on chronic combined systolic and diastolic heart failure.  She presents with performance deficits affecting function are weakness, impaired endurance, impaired functional mobilty, impaired self care skills, gait instability, impaired cardiopulmonary response to activity, edema, decreased ROM, decreased upper extremity function, decreased lower extremity function, impaired balance, decreased safety awareness. Pt would benefit from continued skilled acute OT services in order to maximize independence and safety with ADLs and functional mobility to ensure safe return to PLOF in the least restrictive environment.    Rehab Prognosis:  Good; patient would benefit from acute skilled OT services to address these deficits and reach maximum level of function.       Plan:     Patient to be seen 3 x/week to address the above listed problems via self-care/home management, therapeutic activities, therapeutic exercises  · Plan of Care Expires: 09/25/19  · Plan of Care Reviewed with: patient    Subjective     Pain/Comfort:  · Pain Rating 1: (Pt reported L knee pain and B plantar foot pain ; pt did not rate )  · Pain Addressed 1: Reposition, Distraction, Cessation of Activity    Objective:     Communicated with: RN prior to session.  Patient found HOB elevated with telemetry, central line, peripheral IV, PureWick upon OT entry to room. Pt agreeable to therapy session.     General Precautions: Standard, fall   Orthopedic Precautions:N/A   Braces:       Occupational Performance:     Bed Mobility:    · Patient completed  Rolling/Turning to Left with  moderate assistance and with side rail  · Patient completed Rolling/Turning to Right with moderate assistance and with side rail  · Patient completed Scooting/Bridging with minimum assistance and with increased time for anterior scooting towards EOB   · Patient completed Supine to Sit with maximal assistance and with side rail  · With increased time required  · Pain L knee pain  · Patient completed Sit to Supine with maximal assistance   · Assist to bring B LEs into bed and sequencing     Functional Mobility/Transfers:  · Patient completed 3 reps Sit <> Stand Transfer with 1st rep: moderate assist x 2 persons , 2nd and 3rd reps with min A of 2 persons    with  hand-held assist   · Functional Mobility: pt attempted lateral side steps to the right with max A x 2 persons requiring B HHA   · Pt with increased difficulty weight shifting onto L LE due to OA pain, in order to advance R LE laterally.     Activities of Daily Living:  · Grooming: set-up A pt washed face while sitting EOB   · Lower Body Dressing: total assistance to don brief in supine and don B  socks     [facility] 6 Click ADL: 15    Treatment & Education:   - Pt educated on role of OT, POC, and goals for therapy.    - Patient and family aware of patient's deficits and therapy progression.   - Educated pt on being appropriate to transfer with nsg and PCT with mod A x 2 persons   - Static standing balance: min A x 2 persons with B HHA  - Time provided for therapeutic counseling and discussion of health disposition.   - Importance of OOB ax's with staff member assistance and sitting OOB majority of day.   - Pt completed ADLs and functional mobility for treatment session as noted above   - Pt verbalized understanding. Pt expressed no further concerns/questions.  - whiteboard updated     Patient left HOB elevated with all lines intact, call button in reach and RN notifiedEducation:      GOALS:   Multidisciplinary Problems      Occupational Therapy Goals        Problem: Occupational Therapy Goal    Goal Priority Disciplines Outcome Interventions   Occupational Therapy Goal     OT, PT/OT            Problem: Occupational Therapy Goal    Goal Priority Disciplines Outcome Interventions   Occupational Therapy Goal     OT, PT/OT Ongoing (interventions implemented as appropriate)    Description:  Goals to be met by: 09-09-19     Patient will increase functional independence with ADLs by performing:    UE Dressing with Stand-by Assistance.  Grooming while standing with Contact Guard Assistance.  Toileting from bedside commode with Minimal Assistance for hygiene and clothing management.   Supine to sit with Stand-by Assistance.  Stand pivot transfers with Contact Guard Assistance.  Toilet transfer to bedside commode with Contact Guard Assistance.  Pt. To be I with HEP to improve level of endurance                      Time Tracking:     OT Date of Treatment: 08/29/19  OT Start Time: 1421  OT Stop Time: 1454  OT Total Time (min): 33 min    Billable Minutes:Therapeutic Activity 15    Lucy Bean, OT  8/29/2019

## 2019-08-29 NOTE — NURSING
Called C&D, @ [phone], to follow up with Is[employer].  The rep stated he will bring it to the room shortly.

## 2019-08-29 NOTE — PLAN OF CARE
Problem: Occupational Therapy Goal  Goal: Occupational Therapy Goal  Goals to be met by: 09-09-19     Patient will increase functional independence with ADLs by performing:    UE Dressing with Stand-by Assistance.  Grooming while standing with Contact Guard Assistance.  Toileting from bedside commode with Minimal Assistance for hygiene and clothing management.   Supine to sit with Stand-by Assistance.  Stand pivot transfers with Contact Guard Assistance.  Toilet transfer to bedside commode with Contact Guard Assistance.  Pt. To be I with HEP to improve level of endurance     Outcome: Ongoing (interventions implemented as appropriate)  Continue POC     Lucy Bean OTR/L  Pager: [phone]  8/29/2019

## 2019-08-29 NOTE — CARE UPDATE
Rapid Response Nurse Chart Check     Chart check completed, abnormal VS noted. Bedside RN Prerna contacted, reports patient is currently getting a 1L bolus, no additional concerns verbalized at this time, instructed to call [phone] for further concerns or assistance.

## 2019-08-29 NOTE — PLAN OF CARE
Problem: Adult Inpatient Plan of Care  Goal: Plan of Care Review  Outcome: Ongoing (interventions implemented as appropriate)  Reviewed plan of care with patient.  Patient is alert, oriented x 4, assist x 1, and runs Afib/Sinus Tachycardia on the monitor.  Plan is for ablation and upgrade of Pacemaker on 8/30/19.  Amiodarone gtt 0.5 mg/min.  Heparin gtt 14 u/kg/hr, aPTT = 67.2 (goal 39-69), next aPTT am draw.  Hold Heparin at 0430 hours for procedure.  NaCl+ gtt at 100 ml hour, stop at 0359 hours.  Lasix 80 mg IVP BID discontinued.  K+ = 4.2, KCl+ SA CR 20 mEq BID.  Mg+ =2.2, Mg+Ox 400 mg oral daily.  Discontinued NaCl+ gtt at 5 ml/hr.  CVP = 5 (per night team). EKG for Afib completed.  OT/PT consult completed.  Daily weights.  Patient has remained free of falls/trauma/injury by using appropriate lighting, nonskid socks, by keeping area free of debris, call light within reach, family will remain at bedside, and frequent rounding of staff.  Patient and family verbalized understanding of all instructions.

## 2019-08-29 NOTE — PROGRESS NOTES
08/29/19 1156 08/29/19 1158   Vital Signs   Temp 97.8 °F (36.6 °C)  --    Temp src Oral  --    Pulse 105  --    Heart Rate Source Monitor  --    Resp 18  --    SpO2 (!) 92 %  --    Pulse Oximetry Type Intermittent  --    O2 Device (Oxygen Therapy) room air  --    BP (!) 74/50 (!) 76/50   MAP (mmHg) 58 59   BP Location Left arm Left arm   BP Method Automatic Manual   Patient Position Lying Lying     Called team.  Patient reports lightheadeness.  Family at bedside.  Ordered to give NaCl+ 250 ml x 2 hours with frequent BP assessment.

## 2019-08-29 NOTE — ANESTHESIA PREPROCEDURE EVALUATION
[facility]  Anesthesia Pre-Operative Evaluation         Patient Name: Sherice Squires  YOB: 1950  MRN: [id number]    SUBJECTIVE:     Pre-operative evaluation for Procedure(s) (LRB):  ABLATION, AVN (N/A)  INSERTION, CARDIAC PACEMAKER, BIVENTRICULAR, PERMANENT, AS UPGRADE (Left)     08/29/2019    Sherice Squires is a 68 y.o. female w/ a significant PMHx of NICM 20-25%, persistent atrial fibrillation on apixaban, [employer] ICD, CKD presenting with cardiogenic shock and acute decompensated heart failure in setting of atrial fibrillation w RVR, and high atrial fibrillation burden on device interrogation. Failed DCCV x 3. s/p JOSE/DCCV w conversion to NSR, since converted back to atrial fibrillation with rates 110s-120s.  Recurrent AF despite HF optimization and amio gtt. RFA of AVN and CRT-D upgrade on Friday. Amiodarone gtt 0.5 mg/min.  Heparin gtt  14 u/kg/hr, aPTT = 67.2 (goal 39-69).     Patient now presents for the above procedure(s).      LDA: None documented.       Percutaneous Central Line Insertion/Assessment - triple lumen  08/20/19 0300 right internal jugular (Active)   Dressing biopatch in place 8/29/2019  7:19 AM   Securement secured w/ sutures 8/29/2019  7:19 AM   Additional Site Signs no drainage;no streak formation;no palpable cord;no pain;no edema;no warmth;no erythema 8/28/2019  8:52 PM   Distal Patency/Care flushed w/o difficulty 8/29/2019  7:19 AM   Medial Patency/Care infusing 8/29/2019  7:19 AM   Proximal Patency/Care infusing 8/28/2019  8:52 PM   Waveform normal 8/27/2019  7:30 PM   Line Interventions line leveled/zeroed 8/27/2019  7:30 PM   Dressing Change Due 09/04/19 8/29/2019  7:19 AM   Daily Line Review Performed 8/29/2019  7:19 AM   Number of days: 9            Peripheral IV - Single Lumen 08/27/19 1953 Left;Posterior Wrist (Active)   Site Assessment Clean;Dry;Intact;No redness;No swelling 8/29/2019  7:19 AM   Line Status Infusing 8/29/2019  7:19 AM    Dressing Status Clean;Dry;Intact 8/29/2019  7:19 AM   Dressing Intervention Dressing reinforced 8/28/2019  8:52 PM   Dressing Change Due 08/31/19 8/29/2019  7:19 AM   Site Change Due 08/31/19 8/29/2019  7:19 AM   Reason Not Rotated Not due 8/29/2019  7:19 AM   Number of days: 1       Female External Urinary Catheter 08/27/19 1930 (Active)   Skin no redness;no breakdown 8/29/2019  7:19 AM   Tolerance no signs/symptoms of discomfort 8/29/2019  7:19 AM   Suction Continuous suction at 40 mmHg 8/29/2019  7:19 AM   Date of last wick change 08/28/19 8/29/2019  7:19 AM   Time of last wick change 0800 8/28/2019  8:52 PM   Output (mL) 150 mL 8/27/2019  9:00 PM   Number of days: 1       Prev airway: None documented.    Drips: None documented.   amiodarone in dextrose 5% 0.5 mg/min (08/29/19 0652)    heparin (porcine) in D5W 14 Units/kg/hr (08/29/19 0652)       Patient Active Problem List   Diagnosis    Dilated cardiomyopathy    Automatic implantable cardioverter-defibrillator in situ    Obesity    Hyperlipidemia    Degenerative joint disease of knee    Atrial fibrillation    PSVT (paroxysmal supraventricular tachycardia)    Congestive heart failure, NYHA class 3 and ACC/AHA stage C    severe Mitral regurg wih mild sclerosis    Atrial fibrillation with RVR    Acute on chronic combined systolic and diastolic heart failure    Acute on chronic kidney failure    Cardiogenic shock    Sepsis due to gram-negative urinary tract infection    Chronic pain of left knee    Thrombocytopenia, unspecified    Constipation    Impaired functional mobility and endurance       Review of patient's allergies indicates:   Allergen Reactions    Augmentin [amoxicillin-pot clavulanate] Swelling     Lip swelling      Penicillins      Other reaction(s): Swelling  Lip swelling         Current Inpatient Medications:   digoxin  0.125 mg Oral Every Mon, Wed, Fri    famotidine  20 mg Oral Daily    hydrALAZINE  50 mg Oral Q8H     isosorbide dinitrate  20 mg Oral Q8H    magnesium oxide  400 mg Oral Daily    polyethylene glycol  17 g Oral Daily    potassium chloride  20 mEq Oral BID    senna-docusate 8.6-50 mg  2 tablet Oral BID    warfarin  5 mg Oral Daily       No current facility-administered medications on file prior to encounter.      Current Outpatient Medications on File Prior to Encounter   Medication Sig Dispense Refill    apixaban (ELIQUIS) 5 mg Tab Take 1 tablet (5 mg total) by mouth 2 (two) times daily. 60 tablet 11    digoxin (DIGOX) 125 mcg tablet Take 1 tablet by mouth once daily 30 tablet 11    esomeprazole (NEXIUM) 20 MG capsule Take 20 mg by mouth before breakfast.      hydrALAZINE (APRESOLINE) 50 MG tablet TAKE 1 TABLET (50 MG TOTAL) BY MOUTH 3 (THREE) TIMES DAILY. 270 tablet 3    isosorbide mononitrate (IMDUR) 30 MG 24 hr tablet TAKE 1 TABLET (30 MG TOTAL) BY MOUTH ONCE DAILY. 90 tablet 3    metoprolol succinate (TOPROL-XL) 200 MG 24 hr tablet TAKE 1 TABLET (200 MG TOTAL) BY MOUTH 2 (TWO) TIMES DAILY. 180 tablet 3    potassium chloride SA (K-DUR,KLOR-CON) 20 MEQ tablet Take 1 tablet (20 mEq total) by mouth once daily. 30 tablet 3    sacubitril-valsartan (ENTRESTO)  mg per tablet Take 1 tablet by mouth 2 (two) times daily. 180 tablet 4    simvastatin (ZOCOR) 40 MG tablet TAKE 1 TABLET EVERY EVENING 90 tablet 3    spironolactone (ALDACTONE) 25 MG tablet TAKE 1 TABLET EVERY DAY 90 tablet 3    torsemide (DEMADEX) 20 MG Tab Take 1 tablet (20 mg total) by mouth 2 (two) times daily. 60 tablet 3    metOLazone (ZAROXOLYN) 2.5 MG tablet Take it once today 30 min before your evening dose of Bumex. 5 tablet 0       Past Surgical History:   Procedure Laterality Date    CARDIAC DEFIBRILLATOR PLACEMENT      CARDIOVERSION N/A 8/27/2019    Performed by [facility]    CARDIOVERSION N/A 8/22/2019    Per[facility] by [facility] at [facility]    CARDIOVERSION N/A 8/20/2019    Performed by  [facility] at [facility]    CARDIOVERSION OR DEFIBRILLATION N/A 3/8/2019    Performed by [facility] at [facility] EP [facility]    CHOLECYSTECTOMY      ECHOCARDIOGRAM, TRANSESOPHAGEAL N/A 8/27/2019    Performed by [facility] at [facility]    ECHOCARDIOGRAM,TRANSESOPHAGEAL N/A 8/20/2019    Performed by [facility]    ECHOCARDIOGRAM,TRANSESOPHAGEAL N/A 3/8/2019    Performed by [facility]    HYSTERECTOMY      JOINT REPLACEMENT  2009    rt knee replacment    pace maker  12/2010       Social History     Socioeconomic History    Marital status: Single     Spouse name: Not on file    Number of children: Not on file    Years of education: Not on file    Highest education level: Not on file   Occupational History    Not on file   Social Needs    Financial resource strain: Not on file    Food insecurity:     Worry: Not on file     Inability: Not on file    Transportation needs:     Medical: Not on file     Non-medical: Not on file   Tobacco Use    Smoking status: Never Smoker    Smokeless tobacco: Never Used   Substance and Sexual Activity    Alcohol use: No     Comment: rarely    Drug use: No    Sexual activity: Not on file   Lifestyle    Physical activity:     Days per week: Not on file     Minutes per session: Not on file    Stress: Not on file   Relationships    Social connections:     Talks on phone: Not on file     Gets together: Not on file     Attends Confucianist service: Not on file     Active member of club or organization: Not on file     Attends meetings of clubs or organizations: Not on file     Relationship status: Not on file   Other Topics Concern    Not on file   Social History Narrative    Not on file       OBJECTIVE:     Vital Signs Range (Last 24H):  Temp:  [36.6 °C (97.8 °F)-37.8 °C (100.1 °F)]   Pulse:  []   Resp:  [16-20]   BP: ()/(49-90)   SpO2:  [92 %-98 %]       Significant Labs:  Lab  Results   Component Value Date    WBC 9.96 08/29/2019    HGB 12.6 08/29/2019    HCT 37.8 08/29/2019     08/29/2019    CHOL 176 09/10/2014    TRIG 64 09/10/2014    HDL 63 09/10/2014    ALT 13 08/29/2019    AST 20 08/29/2019     (L) 08/29/2019    K 4.2 08/29/2019    CL 85 (L) 08/29/2019    CREATININE 2.2 (H) 08/29/2019    BUN 43 (H) 08/29/2019    CO2 30 (H) 08/29/2019    TSH 1.253 08/20/2019    INR 1.9 (H) 08/29/2019       Diagnostic Studies: No relevant studies.    EKG:   Results for orders placed or performed during the hospital encounter of 08/20/19   EKG 12-lead    Collection Time: 08/29/19  7:04 AM    Narrative    Test Reason : I48.91,    Vent. Rate : 121 BPM     Atrial Rate : 100 BPM     P-R Int : 000 ms          QRS Dur : 168 ms      QT Int : 438 ms       P-R-T Axes : 000 258 092 degrees     QTc Int : 621 ms    Atrial fibrillation with rapid ventricular response  Right bundle branch block  Abnormal ECG  When compared with ECG of 28-AUG-2019 06:45,  Nonspecific T wave abnormality now evident in Inferior leads    Referred By: PARISH LYN           Confirmed By:        2D ECHO:  TTE:  Results for orders placed or performed during the hospital encounter of 08/20/19   Transthoracic echo (TTE) 2D with Color Flow   Result Value Ref Range    Ascending aorta 3.07 cm    STJ 3.25 cm    AV mean gradient 4 mmHg    Ao peak kevin 1.44 m/s    Ao VTI 17.27 cm    IVS 0.78 0.6 - 1.1 cm    LA size 4.90 cm    Left Atrium Major Axis 7.30 cm    Left Atrium Minor Axis 7.20 cm    LVIDD 6.51 (A) 3.5 - 6.0 cm    LVIDS 6.03 (A) 2.1 - 4.0 cm    LVOT diameter 2.09 cm    LVOT peak VTI 8.95 cm    PW 0.90 0.6 - 1.1 cm    MV Peak E Kevin 1.12 m/s    RA Major Axis 5.35 cm    RA Width 4.31 cm    RVDD 5.12 cm    Sinus 3.00 cm    TAPSE 0.92 cm    TR Max Kevin 2.98 m/s    TDI LATERAL 0.07 m/s    TDI SEPTAL 0.07 m/s    LA WIDTH 5.68 cm    LV Diastolic Volume 216.83 mL    LV Systolic Volume 181.73 mL    LVOT peak kevin 0.75 m/s    LV LATERAL E/E'  RATIO 16.00 m/s    LV SEPTAL E/E' RATIO 16.00 m/s    FS 7 %    LA volume 171.51 cm3    LV mass 228.12 g    Left Ventricle Relative Wall Thickness 0.28 cm    AV valve area 1.78 cm2    AV Velocity Ratio 0.52     AV index (prosthetic) 0.52     Mean e' 0.07 m/s    LVOT area 3.4 cm2    LVOT stroke volume 30.69 cm3    AV peak gradient 8 mmHg    E/E' ratio 16.00 m/s    LV Systolic Volume Index 96.7 mL/m2    LV Diastolic Volume Index 115.39 mL/m2    LA Volume Index 91.3 mL/m2    LV Mass Index 121 g/m2    Triscuspid Valve Regurgitation Peak Gradient 36 mmHg    BSA 1.95 m2    Right Atrial Pressure (from IVC) 15 mmHg    TV rest pulmonary artery pressure 51 mmHg    Narrative    · Moderate left ventricular enlargement.  · Severely decreased left ventricular systolic function. The estimated   ejection fraction is 15%  · Severe global hypokinetic wall motion.  · Septal wall has abnormal motion. Systolic and diastolic flattening of   the interventricular septum consistent with right ventricle pressure and   volume overload.  · Eccentric left ventricular hypertrophy.  · Indeterminate left ventricular diastolic function.  · Mildly reduced right ventricular systolic function.  · Severe left atrial enlargement.  · Moderate mitral regurgitation.  · Moderate tricuspid regurgitation.  · The estimated PA systolic pressure is 51 mm Hg. Pulmonary hypertension   present.  · Elevated central venous pressure (15 mm Hg).          JOSE:  No results found for this or any previous visit.    ASSESSMENT/PLAN:                                                                                                                  08/29/2019  Sherice Squires is a 68 y.o., female.    Anesthesia Evaluation    I have reviewed the Patient Summary Reports.    I have reviewed the Nursing Notes.   I have reviewed the Medications.     Review of Systems  Cardiovascular:   Hypertension Dysrhythmias atrial fibrillation CHF    Renal/:   Chronic Renal Disease     Musculoskeletal:   Arthritis         Physical Exam  General:  Well nourished    Airway/Jaw/Neck:  Airway Findings: Mouth Opening: Normal Tongue: Normal  General Airway Assessment: Adult  Mallampati: II  TM Distance: Normal, at least 6 cm        Eyes/Ears/Nose:  EYES/EARS/NOSE FINDINGS: Normal   Dental:  Dental Findings: Periodontal disease, Severe, Lower Dentures, Upper Dentures    Chest/Lungs:  Chest/Lungs Clear    Heart/Vascular:  Heart Findings: Normal Heart murmur: negative Vascular Findings: Normal    Abdomen:  Abdomen Findings: Normal    Musculoskeletal:  Musculoskeletal Findings: Normal   Skin:  Skin Findings: Normal    Mental Status:  Mental Status Findings: Normal        Anesthesia Plan  Type of Anesthesia, risks & benefits discussed:  Anesthesia Type:  MAC  Patient's Preference:   Intra-op Monitoring Plan: standard ASA monitors  Intra-op Monitoring Plan Comments:   Post Op Pain Control Plan: per primary service following discharge from PACU, IV/PO Opioids PRN and multimodal analgesia  Post Op Pain Control Plan Comments:   Induction:   IV  Beta Blocker:       Beta Blocker Comments: Home med list shows BB, not currently on MAR for inpatient   Informed Consent: Patient understands risks and agrees with Anesthesia plan.  Questions answered. Anesthesia consent signed with patient.  ASA Score: 4     Day of Surgery Review of History & Physical:    H&P update referred to the surgeon.         Ready For Surgery From Anesthesia Perspective.

## 2019-08-29 NOTE — CARE UPDATE
Rapid Response Nurse Chart Check     Chart check completed, abnormal VS noted. Bedside RN Brenda contacted, no concerns   verbalized at this time, instructed to call [phone] for further concerns or assistance.

## 2019-08-29 NOTE — PT/OT/SLP PROGRESS
Physical Therapy Treatment    Patient Name:  Sherice Squires   MRN:  [id number]    Recommendations:     Discharge Recommendations:  rehabilitation facility   Discharge Equipment Recommendations: (TBD)   Barriers to discharge: Decreased caregiver support at current functional level    Assessment:     Sherice Squires is a 68 y.o. female admitted with a medical diagnosis of Acute on chronic combined systolic and diastolic heart failure.  She presents with the following impairments/functional limitations:  weakness, impaired functional mobilty, impaired endurance, gait instability, impaired balance, impaired self care skills, impaired cardiopulmonary response to activity, pain, decreased lower extremity function, decreased ROM, impaired joint extensibility. Pt progressing functional mobility, as she was able to increase independence with transfers and static standing balance this date. Attempted lateral steps but pt with increased L knee pain, limiting ability to maintain L stance in order to advance RLE. Pt would continue to benefit from skilled acute PT in order to address current deficits and progress functional mobility.     Rehab Prognosis: Good; patient would benefit from acute skilled PT services to address these deficits and reach maximum level of function.    Recent Surgery: Procedure(s) (LRB):  CARDIOVERSION (N/A)  ECHOCARDIOGRAM, TRANSESOPHAGEAL (N/A) 2 Days Post-Op    Plan:     During this hospitalization, patient to be seen 4 x/week to address the identified rehab impairments via gait training, therapeutic activities, therapeutic exercises, neuromuscular re-education and progress toward the following goals:    · Plan of Care Expires:  09/26/19    Subjective     Chief Complaint: L knee pain with mobility   Patient/Family Comments/goals: Pt tearful at beginning of session, reporting that she feels like she is a burden to the nurses.   Pain/Comfort:  · Pain Rating 1: (reported pain in L knee and B plantar feet;  did not rate)  · Pain Addressed 1: Reposition, Distraction, Cessation of Activity      Objective:     Communicated with RN prior to session.  Patient found supine with telemetry, central line, peripheral IV, PureWick upon PT entry to room.     General Precautions: Standard, fall   Orthopedic Precautions:N/A   Braces: N/A     Functional Mobility:  · Bed Mobility:     · Rolling Left:  moderate assistance with side rail  · Rolling Right: moderate assistance with side rail  · Seated scooting: anteriorly to EOB with Amari and increased time; cues for weight-shifts and technique  · Supine to Sit: maximal assistance with side rail  · With increased time and cues for sequencing  · Sit to Supine: maximal assistance  · With cues for sequencing   · Transfers:     · Sit to Stand:  1st rep with modAx2; 2nd and 3rd rep with minimum assistance and of 2 persons with no AD  · Cues for hand placement, transfer technique, and use of forward momentum to assist with ease of transfer   · Gait: attempted lateral steps along EOB with maxAx2 and HHAx2  · Impaired weight-shifting ability and decreased tolerance of L stance (2* L knee OA) in order to clear RLE for R swing   · Max cues and assist for appropriate weight-shifts, sequencing, and advancing LE   · Balance:   · sitting EOB: SBA-supervision    · static standing: minAx2    · dynamic standing: maxAx2      AM-PAC 6 CLICK MOBILITY  Turning over in bed (including adjusting bedclothes, sheets and blankets)?: 2  Sitting down on and standing up from a chair with arms (e.g., wheelchair, bedside commode, etc.): 2  Moving from lying on back to sitting on the side of the bed?: 2  Moving to and from a bed to a chair (including a wheelchair)?: 1  Need to walk in hospital room?: 1  Climbing 3-5 steps with a railing?: 1  Basic Mobility Total Score: 9       Therapeutic Activities and Exercises:  Active listening and encouragement provided during session 2* pt tearful about current condition and  reports feeling like she is a burden to others.   Pt reporting incontinent bowel episode earlier today and fearful of BM during mobility. Thus, completed rolling in B directions with modA to don brief prior to OOB mobility.   Performed standing trials x3 with seated rest breaks between trials. Education provided on mobility techniques.    Trial 1: static standing with minAx2. Attempted lateral steps with maxAx2, but difficulty weight-shifting appropriately to advance LEs. Increased pain in L knee   Trial 2: static standing with minAx2. Again attempted lateral steps with maxAx2. Able to clear LLE x1 but then with increased pain in L knee during L weight-shift for R swing, requiring return to sit   Trial 3: medial-lateral weight-shifts with modAx2. Pt with increased L knee pain 2* prior mobility attempts, limiting standing tolerance and ability to complete    Patient left supine with all lines intact, call button in reach and RN present..    GOALS:   Multidisciplinary Problems     Physical Therapy Goals        Problem: Physical Therapy Goal    Goal Priority Disciplines Outcome Goal Variances Interventions   Physical Therapy Goal     PT, PT/OT Ongoing (interventions implemented as appropriate)     Description:  Goals to be met by: 9/10/19     Patient will increase functional independence with mobility by performin. Supine to sit with MInimal Assistance  2. Sit to supine with MInimal Assistance  3. Sit to stand transfer with Stand-by Assistance  4. Bed to chair transfer with Stand-by Assistance using Rolling Walker  5. Gait  x 50 feet with Stand-by Assistance using Rolling Walker.                       Time Tracking:     PT Received On: 19  PT Start Time: 1420     PT Stop Time: 1458  PT Total Time (min): 38 min     Billable Minutes: Gait Training 15 and Neuromuscular Re-education 8   (co-tx with OT)    Treatment Type: Treatment  PT/PTA: PT     PTA Visit Number: 0     Ariadna Herman, PT, DPT    8/29/2019  [phone]

## 2019-08-29 NOTE — SUBJECTIVE & OBJECTIVE
Interval History: Tolerated AVN ablation with BiV ICD implant yesterday, reports mild shoulder discomfort but pain well controlled. Reports overall feeling better; no chest pain, dyspnea or edema. Reports no further symptoms or concerns at this time.    Review of Systems   All other systems reviewed and are negative.       Objective:     Vital Signs (Most Recent):  Temp: 98.4 °F (36.9 °C) (08/29/19 0719)  Pulse: 106 (08/29/19 1100)  Resp: 20 (08/29/19 1100)  BP: (!) 136/90 (08/29/19 0719)  SpO2: (!) 93 % (08/29/19 1100) Vital Signs (24h Range):  Temp:  [97.8 °F (36.6 °C)-100.1 °F (37.8 °C)] 98.4 °F (36.9 °C)  Pulse:  [] 106  Resp:  [16-20] 20  SpO2:  [92 %-98 %] 93 %  BP: ()/(49-90) 136/90     Weight: 71.1 kg (156 lb 12 oz)  Body mass index is 26.08 kg/m².     SpO2: (!) 93 %  O2 Device (Oxygen Therapy): room air    Physical Exam  Vital signs reviewed  Constitutional: Oriented to person, place, and time. Appears  well-developed and well-nourished.   HENT:   Head: Normocephalic and atraumatic.   Eyes: Pupils are equal, round, and reactive to light. EOM are normal.   Neck: Normal range of motion. No JVD present. Right neck TLC   Cardiovascular: Regular rate and rhythm, normal heart sounds and intact distal pulses. Exam reveals no gallop and no friction rub.   No murmur heard.  Pulmonary/Chest: Effort normal and breath sounds normal. No stridor. No respiratory distress. No wheezes, no rales. No chest wall tenderness present  Abdominal: Soft. Bowel sounds are normal.   Musculoskeletal: There is no edema present   Neurological: Alert and oriented to person, place, and time.   Skin: Skin is warm and dry.   Psychiatric: She has a normal mood and affect.     Significant Labs:     Recent Results (from the past 24 hour(s))   ISTAT PROCEDURE    Collection Time: 08/30/19 10:04 AM   Result Value Ref Range    POC PH 7.441 7.35 - 7.45    POC PCO2 43.2 35 - 45 mmHg    POC PO2 32 (LL) 40 - 60 mmHg    POC HCO3 29.4 (H) 24  - 28 mmol/L    POC BE 5 -2 to 2 mmol/L    POC SATURATED O2 64 (L) 95 - 100 %    POC TCO2 31 (H) 24 - 29 mmol/L    Sample VENOUS     Site Other     Allens Test N/A     DelSys Room Air    Comprehensive metabolic panel    Collection Time: 08/30/19  8:30 PM   Result Value Ref Range    Sodium 133 (L) 136 - 145 mmol/L    Potassium 4.0 3.5 - 5.1 mmol/L    Chloride 94 (L) 95 - 110 mmol/L    CO2 26 23 - 29 mmol/L    Glucose 88 70 - 110 mg/dL    BUN, Bld 42 (H) 8 - 23 mg/dL    Creatinine 2.2 (H) 0.5 - 1.4 mg/dL    Calcium 10.2 8.7 - 10.5 mg/dL    Total Protein 6.8 6.0 - 8.4 g/dL    Albumin 2.3 (L) 3.5 - 5.2 g/dL    Total Bilirubin 0.7 0.1 - 1.0 mg/dL    Alkaline Phosphatase 113 55 - 135 U/L    AST 34 10 - 40 U/L    ALT 18 10 - 44 U/L    Anion Gap 13 8 - 16 mmol/L    eGFR if African American 25.8 (A) >60 mL/min/1.73 m^2    eGFR if non African American 22.4 (A) >60 mL/min/1.73 m^2   Magnesium    Collection Time: 08/30/19  8:30 PM   Result Value Ref Range    Magnesium 2.3 1.6 - 2.6 mg/dL   Comprehensive metabolic panel    Collection Time: 08/31/19  3:57 AM   Result Value Ref Range    Sodium 133 (L) 136 - 145 mmol/L    Potassium 3.9 3.5 - 5.1 mmol/L    Chloride 94 (L) 95 - 110 mmol/L    CO2 25 23 - 29 mmol/L    Glucose 76 70 - 110 mg/dL    BUN, Bld 39 (H) 8 - 23 mg/dL    Creatinine 1.9 (H) 0.5 - 1.4 mg/dL    Calcium 10.1 8.7 - 10.5 mg/dL    Total Protein 6.5 6.0 - 8.4 g/dL    Albumin 2.1 (L) 3.5 - 5.2 g/dL    Total Bilirubin 0.5 0.1 - 1.0 mg/dL    Alkaline Phosphatase 111 55 - 135 U/L    AST 34 10 - 40 U/L    ALT 16 10 - 44 U/L    Anion Gap 14 8 - 16 mmol/L    eGFR if African American 30.8 (A) >60 mL/min/1.73 m^2    eGFR if non  26.7 (A) >60 mL/min/1.73 m^2   CBC auto differential    Collection Time: 08/31/19  3:57 AM   Result Value Ref Range    WBC 7.10 3.90 - 12.70 K/uL    RBC 4.24 4.00 - 5.40 M/uL    Hemoglobin 12.1 12.0 - 16.0 g/dL    Hematocrit 36.0 (L) 37.0 - 48.5 %    Mean Corpuscular Volume 85 82 - 98 fL     Mean Corpuscular Hemoglobin 28.5 27.0 - 31.0 pg    Mean Corpuscular Hemoglobin Conc 33.6 32.0 - 36.0 g/dL    RDW 17.7 (H) 11.5 - 14.5 %    Platelets 184 150 - 350 K/uL    MPV 12.5 9.2 - 12.9 fL    Immature Granulocytes 0.6 (H) 0.0 - 0.5 %    Gran # (ANC) 4.7 1.8 - 7.7 K/uL    Immature Grans (Abs) 0.04 0.00 - 0.04 K/uL    Lymph # 1.2 1.0 - 4.8 K/uL    Mono # 1.0 0.3 - 1.0 K/uL    Eos # 0.1 0.0 - 0.5 K/uL    Baso # 0.03 0.00 - 0.20 K/uL    nRBC 0 0 /100 WBC    Gran% 66.6 38.0 - 73.0 %    Lymph% 17.5 (L) 18.0 - 48.0 %    Mono% 13.5 4.0 - 15.0 %    Eosinophil% 1.4 0.0 - 8.0 %    Basophil% 0.4 0.0 - 1.9 %    Differential Method Automated    Comprehensive metabolic panel    Collection Time: 08/31/19  3:57 AM   Result Value Ref Range    Sodium 133 (L) 136 - 145 mmol/L    Potassium 3.9 3.5 - 5.1 mmol/L    Chloride 94 (L) 95 - 110 mmol/L    CO2 25 23 - 29 mmol/L    Glucose 76 70 - 110 mg/dL    BUN, Bld 39 (H) 8 - 23 mg/dL    Creatinine 1.9 (H) 0.5 - 1.4 mg/dL    Calcium 10.1 8.7 - 10.5 mg/dL    Total Protein 6.5 6.0 - 8.4 g/dL    Albumin 2.1 (L) 3.5 - 5.2 g/dL    Total Bilirubin 0.5 0.1 - 1.0 mg/dL    Alkaline Phosphatase 111 55 - 135 U/L    AST 34 10 - 40 U/L    ALT 16 10 - 44 U/L    Anion Gap 14 8 - 16 mmol/L    eGFR if African American 30.8 (A) >60 mL/min/1.73 m^2    eGFR if non  26.7 (A) >60 mL/min/1.73 m^2   Magnesium    Collection Time: 08/31/19  3:57 AM   Result Value Ref Range    Magnesium 2.2 1.6 - 2.6 mg/dL   Protime-INR    Collection Time: 08/31/19  4:43 AM   Result Value Ref Range    Prothrombin Time 29.3 (H) 9.0 - 12.5 sec    INR 3.0 (H) 0.8 - 1.2   ISTAT PROCEDURE    Collection Time: 08/31/19  5:10 AM   Result Value Ref Range    POC PH 7.475 (H) 7.35 - 7.45    POC PCO2 41.3 35 - 45 mmHg    POC PO2 41 40 - 60 mmHg    POC HCO3 30.4 (H) 24 - 28 mmol/L    POC BE 7 -2 to 2 mmol/L    POC SATURATED O2 80 (L) 95 - 100 %    POC TCO2 32 (H) 24 - 29 mmol/L    Sample VENOUS     Site RB     Allens Test  N/A     DelSys Room Air          Significant Imaging: I have reviewed all pertinent imaging studies from the last 24 hours

## 2019-08-29 NOTE — ASSESSMENT & PLAN NOTE
- return to sinus rhythm after dccv/candice 8/20/19, but quickly went back in A fib with RVR and failed repeat cardioversion 8/22/19.   -w/p CANDICE/DCCV to NSR 8/27/19 but back in A Fib 8/28/19 am  - Discussed putting patient back on Eliquis or another DOAC with EP - they want her to stay on Coumadin with Heparin. INR 1.9 today  - Decreased Amio gtt to 0.5 mg/min 8/26/19  - EP plans possible AVNA with BiV upgrade once INR therapeuitc. Will keep patient on warfarin and heparin with INR goal of 2.

## 2019-08-29 NOTE — PLAN OF CARE
Problem: Adult Inpatient Plan of Care  Goal: Plan of Care Review  Patient remained free of falls, trauma, injury, and skin breakdown. VSS; no patient complaints at this time. CVP and VBG obtained and documented.  R IJ dressing change performed; sterile field maintained. Patient tolerated well. Next IJ dressing change due 9/4/19. Fall precautions and 1500ml fluid restriction maintained. Plan of care reviewed; patient verbalized understanding. All questions and concerns addressed; will continue to monitor.

## 2019-08-29 NOTE — SUBJECTIVE & OBJECTIVE
Interval History: Eating breakfast this am. No complaints. Son at bedside    Continuous Infusions:   amiodarone in dextrose 5% 0.5 mg/min (08/29/19 0652)    heparin (porcine) in D5W 14 Units/kg/hr (08/29/19 0652)     Scheduled Meds:   digoxin  0.125 mg Oral Every Mon, Wed, Fri    famotidine  20 mg Oral Daily    hydrALAZINE  50 mg Oral Q8H    isosorbide dinitrate  20 mg Oral Q8H    magnesium oxide  400 mg Oral Daily    polyethylene glycol  17 g Oral Daily    potassium chloride  20 mEq Oral BID    senna-docusate 8.6-50 mg  2 tablet Oral BID    warfarin  5 mg Oral Daily     PRN Meds:heparin (PORCINE), heparin (PORCINE), ramelteon, silver sulfADIAZINE 1%, sodium chloride 0.9%    Review of patient's allergies indicates:   Allergen Reactions    Augmentin [amoxicillin-pot clavulanate] Swelling     Lip swelling      Penicillins      Other reaction(s): Swelling  Lip swelling       Objective:     Vital Signs (Most Recent):  Temp: 98.4 °F (36.9 °C) (08/29/19 0719)  Pulse: 106 (08/29/19 1100)  Resp: 20 (08/29/19 1100)  BP: (!) 136/90 (08/29/19 0719)  SpO2: (!) 93 % (08/29/19 1100) Vital Signs (24h Range):  Temp:  [97.8 °F (36.6 °C)-100.1 °F (37.8 °C)] 98.4 °F (36.9 °C)  Pulse:  [] 106  Resp:  [16-20] 20  SpO2:  [92 %-98 %] 93 %  BP: ()/(49-90) 136/90     Patient Vitals for the past 72 hrs (Last 3 readings):   Weight   08/27/19 0500 71.1 kg (156 lb 12 oz)     Body mass index is 26.08 kg/m².      Intake/Output Summary (Last 24 hours) at 8/29/2019 1113  Last data filed at 8/29/2019 0900  Gross per 24 hour   Intake 600 ml   Output 550 ml   Net 50 ml       Hemodynamic Parameters:  CVP:  [4 mmHg-5 mmHg] 5 mmHg    Telemetry: Reviewed    Physical Exam   Constitutional: She is oriented to person, place, and time. She appears well-developed and well-nourished.   HENT:   Head: Normocephalic and atraumatic.   Eyes: Pupils are equal, round, and reactive to light. Conjunctivae and EOM are normal.   Neck: Normal range  of motion. Neck supple. No JVD present. No thyromegaly present.   RIJ TLC   Cardiovascular: Intact distal pulses.   Tachycardic, irregularly irregular   Pulmonary/Chest: Effort normal and breath sounds normal.   Abdominal: Soft. Bowel sounds are normal.   Musculoskeletal: Normal range of motion. She exhibits no edema.   Neurological: She is alert and oriented to person, place, and time.   Skin: Skin is warm and dry. Capillary refill takes 2 to 3 seconds.   Psychiatric: She has a normal mood and affect. Her behavior is normal. Judgment and thought content normal.       Significant Labs:  CBC:  Recent Labs   Lab 08/27/19  0236 08/28/19  0310 08/29/19  0347   WBC 9.01 8.64 9.96   RBC 4.51 4.58 4.35   HGB 13.2 13.4 12.6   HCT 38.5 39.0 37.8   * 127* 152   MCV 85 85 87   MCH 29.3 29.3 29.0   MCHC 34.3 34.4 33.3     BNP:  Recent Labs   Lab 08/26/19  0230   *     CMP:  Recent Labs   Lab 08/28/19  0310 08/28/19  1429 08/29/19  0347   GLU 89  89  89  89  89  89  89 158* 103   CALCIUM 9.9  9.9  9.9  9.9  9.9  9.9  9.9 10.1 9.8   ALBUMIN 2.5*  2.5*  2.5*  2.5*  2.5*  2.5*  2.5* 2.8* 2.5*   PROT 7.6  7.6  7.6  7.6  7.6  7.6  7.6 8.1 7.5   *  131*  131*  131*  131*  131*  131* 130* 129*   K 3.7  3.7  3.7  3.7  3.7  3.7  3.7 4.3 4.2   CO2 32*  32*  32*  32*  32*  32*  32* 29 30*   CL 84*  84*  84*  84*  84*  84*  84* 85* 85*   BUN 41*  41*  41*  41*  41*  41*  41* 41* 43*   CREATININE 1.8*  1.8*  1.8*  1.8*  1.8*  1.8*  1.8* 2.0* 2.2*   ALKPHOS 150*  150*  150*  150*  150*  150*  150* 159* 136*   ALT 13  13  13  13  13  13  13 18 13   AST 20  20  20  20  20  20  20 21 20   BILITOT 0.6  0.6  0.6  0.6  0.6  0.6  0.6 0.6 0.6      Coagulation:   Recent Labs   Lab 08/27/19  0236 08/28/19  0310 08/29/19  0347   INR 1.3* 1.6* 1.9*   APTT 47.0* 49.9*  49.9* 67.2*     LDH:  No results for input(s): LDH in the last 72  hours.  Microbiology:  Microbiology Results (last 7 days)     ** No results found for the last 168 hours. **          I have reviewed all pertinent labs within the past 24 hours.    Estimated Creatinine Clearance: 24.2 mL/min (A) (based on SCr of 2.2 mg/dL (H)).    Diagnostic Results:  I have reviewed all pertinent imaging results/findings within the past 24 hours.

## 2019-08-29 NOTE — CODE/ RAPID DOCUMENTATION
"RAPID RESPONSE NURSE PROACTIVE ROUNDING NOTE     Time of Visit: 1530    Admit Date: 2019  LOS: 9  Code Status: Full Code   Date of Visit: 2019  : 1950  Age: 68 y.o.  Sex: female  Race: Black or   Bed: 309/309 A:   MRN: [id number]  Was the patient discharged from an ICU this admission? yes   Was the patient discharged from a PACU within last 24 hours?  no  Did the patient receive conscious sedation/general anesthesia in last 24 hours?  no  Was the patient in the ED within the past 24 hours?  no  Was the patient started on NIPPV within the past 24 hours?  no  Attending Physician: Nimesh Montgomery Jr.,*  Primary Service: Networked reference to record PCT     ASSESSMENT     Diagnosis: Acute on chronic combined systolic and diastolic heart failure    Abnormal Vital Signs: /85 (BP Location: Left arm, Patient Position: Lying)   Pulse (!) 120   Temp 97.8 °F (36.6 °C) (Oral)   Resp 18   Ht 5' 5" (1.651 m)   Wt 71.1 kg (156 lb 12 oz)   SpO2 (!) 94%   Breastfeeding? No   BMI 26.08 kg/m²      Clinical Issues: Circulatory    Patient  has a past medical history of Atrial fibrillation, Cardiomyopathy, CHF (congestive heart failure), Hyperlipidemia, Hypertension, and Ventricular tachycardia.     Pt resting comfortably. No signs of distress. /85 and .    INTERVENTIONS/ RECOMMENDATIONS     Closely monitor pt for hypotension.     Discussed plan of care with Jen ARMIJO    PHYSICIAN ESCALATION     Yes/No  no      Disposition: Remain in room 309.    FOLLOW-UP     Call back the Rapid Response Nurse, Jamila Moreno RN at [phone] for additional questions or concerns.          "

## 2019-08-29 NOTE — NURSING
OT/PT at bedside.  Patient is standing with 2 assists. HR = 144, will collect labs after consult is completed.

## 2019-08-29 NOTE — PROGRESS NOTES
RN performed IJ dressing change. No drainage noted and sterile field maintained. Pt tolerated well. No patient complaints at this time; will continue to monitor.

## 2019-08-29 NOTE — ASSESSMENT & PLAN NOTE
- 69 yo F with NIDCM EF 20% , NYHA FC III with admitted with afib with rvr and cardiogenic shock  - IABP placed 8/20 and pulled 8/23  -TTE with contrast 8/20: LVEDD 6.51, LVEF 15%, indeterminate diastolic function, severe LAE, trace AI, mod MR and TR, PAS 51 and IVC 15.   -Today: CVP: 5; SVO2: 59 ; CO: 3.8, CI 2.08 SVR: 1442. D/C IVP Lasix today.   -GDMT: Cont Hydralazine 50 mg every 8 hours and  Isordil 20 mg q 8 hours. Added low dose ACE yesterday but had episode of hypotension and Cr up today so will stop lisinopril. Continue plan for Dig 125 mcg every M-W-F  -CTs done off pathway for possible LVAD/transplant evaluation. Patient seen by CTS, not considered a candidate for advanced options at this time HOWEVER would like to see how she does with rehab/PT/OT, and see if she can walk into clinic, to see if she could become a candidate in the near future.  -PT/OT recommending Rehab at d/c

## 2019-08-29 NOTE — PLAN OF CARE
Plan remains for AVN ablation  And upgrade to BiV with Dr. Reece in the morning. Stop heparin gtt 6hrs prior to the procedure.          Betsy Calrk MD  PGY-4, Cardiology Fellow  EP Service

## 2019-08-29 NOTE — PROGRESS NOTES
08/29/19 1444   Vital Signs   Temp 97.8 °F (36.6 °C)   Temp src Oral   Pulse (!) 144  (w/OT/PT)   Heart Rate Source Monitor   Resp 18   SpO2 (!) 94 %   Pulse Oximetry Type Intermittent   O2 Device (Oxygen Therapy) room air   BP (!) 93/57   MAP (mmHg) 69   BP Location Left arm   BP Method Automatic   Patient Position Lying   OT/PT working with patient.  Patient is standing at bedside.

## 2019-08-29 NOTE — NURSING
Contact isolation kit placed on door.  Isolation precautions explained to patient.  Informed patient to ensure all guests wash their hands with soap and water after leaving the patient's room.

## 2019-08-29 NOTE — PLAN OF CARE
Problem: Physical Therapy Goal  Goal: Physical Therapy Goal  Goals to be met by: 9/10/19     Patient will increase functional independence with mobility by performin. Supine to sit with MInimal Assistance  2. Sit to supine with MInimal Assistance  3. Sit to stand transfer with Stand-by Assistance  4. Bed to chair transfer with Stand-by Assistance using Rolling Walker  5. Gait  x 50 feet with Stand-by Assistance using Rolling Walker.      Outcome: Ongoing (interventions implemented as appropriate)  Goals reviewed and remain appropriate. Pt progressing towards goals.    Ariadna Herman, PT, DPT   2019  [phone]

## 2019-08-29 NOTE — PROGRESS NOTES
[facility]  Heart Transplant  Progress Note    Patient Name: Sherice Squires  MRN: [id number]  Admission Date: 8/20/2019  Hospital Length of Stay: 9 days  Attending Physician: Nimesh Montgomery Jr.,*  Primary Care Provider: Annamarie Soria MD  Principal Problem:Acute on chronic combined systolic and diastolic heart failure    Subjective:     Interval History: Eating breakfast this am. No complaints. Son at bedside    Continuous Infusions:   amiodarone in dextrose 5% 0.5 mg/min (08/29/19 0652)    heparin (porcine) in D5W 14 Units/kg/hr (08/29/19 0652)     Scheduled Meds:   digoxin  0.125 mg Oral Every Mon, Wed, Fri    famotidine  20 mg Oral Daily    hydrALAZINE  50 mg Oral Q8H    isosorbide dinitrate  20 mg Oral Q8H    magnesium oxide  400 mg Oral Daily    polyethylene glycol  17 g Oral Daily    potassium chloride  20 mEq Oral BID    senna-docusate 8.6-50 mg  2 tablet Oral BID    warfarin  5 mg Oral Daily     PRN Meds:heparin (PORCINE), heparin (PORCINE), ramelteon, silver sulfADIAZINE 1%, sodium chloride 0.9%    Review of patient's allergies indicates:   Allergen Reactions    Augmentin [amoxicillin-pot clavulanate] Swelling     Lip swelling      Penicillins      Other reaction(s): Swelling  Lip swelling       Objective:     Vital Signs (Most Recent):  Temp: 98.4 °F (36.9 °C) (08/29/19 0719)  Pulse: 106 (08/29/19 1100)  Resp: 20 (08/29/19 1100)  BP: (!) 136/90 (08/29/19 0719)  SpO2: (!) 93 % (08/29/19 1100) Vital Signs (24h Range):  Temp:  [97.8 °F (36.6 °C)-100.1 °F (37.8 °C)] 98.4 °F (36.9 °C)  Pulse:  [] 106  Resp:  [16-20] 20  SpO2:  [92 %-98 %] 93 %  BP: ()/(49-90) 136/90     Patient Vitals for the past 72 hrs (Last 3 readings):   Weight   08/27/19 0500 71.1 kg (156 lb 12 oz)     Body mass index is 26.08 kg/m².      Intake/Output Summary (Last 24 hours) at 8/29/2019 1113  Last data filed at 8/29/2019 0900  Gross per 24 hour   Intake 600 ml   Output 550 ml   Net 50 ml        Hemodynamic Parameters:  CVP:  [4 mmHg-5 mmHg] 5 mmHg    Telemetry: Reviewed    Physical Exam   Constitutional: She is oriented to person, place, and time. She appears well-developed and well-nourished.   HENT:   Head: Normocephalic and atraumatic.   Eyes: Pupils are equal, round, and reactive to light. Conjunctivae and EOM are normal.   Neck: Normal range of motion. Neck supple. No JVD present. No thyromegaly present.   RIJ TLC   Cardiovascular: Intact distal pulses.   Tachycardic, irregularly irregular   Pulmonary/Chest: Effort normal and breath sounds normal.   Abdominal: Soft. Bowel sounds are normal.   Musculoskeletal: Normal range of motion. She exhibits no edema.   Neurological: She is alert and oriented to person, place, and time.   Skin: Skin is warm and dry. Capillary refill takes 2 to 3 seconds.   Psychiatric: She has a normal mood and affect. Her behavior is normal. Judgment and thought content normal.       Significant Labs:  CBC:  Recent Labs   Lab 08/27/19  0236 08/28/19  0310 08/29/19  0347   WBC 9.01 8.64 9.96   RBC 4.51 4.58 4.35   HGB 13.2 13.4 12.6   HCT 38.5 39.0 37.8   * 127* 152   MCV 85 85 87   MCH 29.3 29.3 29.0   MCHC 34.3 34.4 33.3     BNP:  Recent Labs   Lab 08/26/19  0230   *     CMP:  Recent Labs   Lab 08/28/19  0310 08/28/19  1429 08/29/19  0347   GLU 89  89  89  89  89  89  89 158* 103   CALCIUM 9.9  9.9  9.9  9.9  9.9  9.9  9.9 10.1 9.8   ALBUMIN 2.5*  2.5*  2.5*  2.5*  2.5*  2.5*  2.5* 2.8* 2.5*   PROT 7.6  7.6  7.6  7.6  7.6  7.6  7.6 8.1 7.5   *  131*  131*  131*  131*  131*  131* 130* 129*   K 3.7  3.7  3.7  3.7  3.7  3.7  3.7 4.3 4.2   CO2 32*  32*  32*  32*  32*  32*  32* 29 30*   CL 84*  84*  84*  84*  84*  84*  84* 85* 85*   BUN 41*  41*  41*  41*  41*  41*  41* 41* 43*   CREATININE 1.8*  1.8*  1.8*  1.8*  1.8*  1.8*  1.8* 2.0* 2.2*   ALKPHOS 150*  150*  150*  150*  150*  150*  150*  159* 136*   ALT 13  13  13  13  13  13  13 18 13   AST 20  20  20  20  20  20  20 21 20   BILITOT 0.6  0.6  0.6  0.6  0.6  0.6  0.6 0.6 0.6      Coagulation:   Recent Labs   Lab 08/27/19  0236 08/28/19  0310 08/29/19  0347   INR 1.3* 1.6* 1.9*   APTT 47.0* 49.9*  49.9* 67.2*     LDH:  No results for input(s): LDH in the last 72 hours.  Microbiology:  Microbiology Results (last 7 days)     ** No results found for the last 168 hours. **          I have reviewed all pertinent labs within the past 24 hours.    Estimated Creatinine Clearance: 24.2 mL/min (A) (based on SCr of 2.2 mg/dL (H)).    Diagnostic Results:  I have reviewed all pertinent imaging results/findings within the past 24 hours.    Assessment and Plan:     Sherice Squires is a 67 yo AAF with PMHx significant for NIDCM / stage D HFrEF (LVEF 20-25%, LVEDD 5.9 cm) s/p dc ICD, AF, HTN, DLD, CKD who is admitted as a transfer from  ED for management of ADHF +/- possible cardiogenic shock.     Patient reports she presented to OSH with progressive/worsening shortness of breath on exertion, orthopnea, and peripheral swelling of approx 5 days duration associated with nausea and fatigue. Denies fever/chills/sweats, chest pain, diaphoresis, presyncope/syncope. Does report intermittent palpitations.      Patient was afebrile and normotensive on arrival (/55 mmHg) to OSH ED with pulses in the 120-130s in AF with RVR. Initial labs showed worsening FAMILIA on CKD (with Cr 3.8 from 2.7-3.1 this past week from previous baseline of 1.3-1.4 in June 2019) as well as elevated T bili 2.3 and BNP >3000. CXR obtained without acute cardiopulmonary process. Patient ultimately transferred to [facility] for higher level of care.      Patient follows with [facility] clinic - currently on Entresto 97/103 mg BID, Toprol  mg daily, Aldactone 25 mg daily, and digoxin 0.125 mg daily. She was last seen by Dr Rodriguez on 8/6/19 who added metolazone 2.5 mg QHS before evening dose of  Bumex to augment diuresis due to complaints of SOB / peripheral swelling at the time. She did not respond to this regimen and reports she was ultimately switched to Torsemide instead.      Patient is fully complaint with her medicines. Also adherent to fluid / salt restrictions with her hx of congestive heart failure.      States she was hospitalized only once before for ADHF within the past year.      Of note patient follows with Dr Reece of EP for her AF hx, last seen by him in 5/2019    * Acute on chronic combined systolic and diastolic heart failure  - 67 yo F with NIDCM EF 20% , NYHA FC III with admitted with afib with rvr and cardiogenic shock  - IABP placed 8/20 and pulled 8/23  -TTE with contrast 8/20: LVEDD 6.51, LVEF 15%, indeterminate diastolic function, severe LAE, trace AI, mod MR and TR, PAS 51 and IVC 15.   -Today: CVP: 5; SVO2: 62 ; CO: 4.2, CI 2.32 SVR: 1447. Transition Lasix drip to 80 mg IV BID. Cont Hydralazine 50 mg every 8 hours and continue Isordil 20 mg q 8 hours  -CTs done off pathway for possible LVAD/transplant evaluation. Patient seen by CTS, not considered a candidate for advanced options at this time HOWEVER would like to see how she does with rehab/PT/OT, and see if she can walk into clinic, to see if she could become a candidate in the near future.        Atrial fibrillation with RVR  - return to sinus rhythm after dccv/candice 8/20/19, but quickly went back in A fib with RVR and failed repeat cardioversion 8/22/19.   -w/p CANDICE/DCCV to NSR 8/27/19 but back in A Fib early this morning  - Discussed putting patient back on Eliquis or another DOAC with EP - they want her to stay on Coumadin with Heparin. INR 1.6 today  - Decreased Amio gtt to 0.5 mg/min 8/26/19  -Continue plan for Dig 125 mcg every M-W-F  - EP plans possible AVNA with BiV upgrade once INR therapeuitc. Will keep patient on warfarin and heparin with INR goal of 2.       Cardiogenic shock  - See acute on chronic heart  failure    Acute on chronic kidney failure  - Cr up to 4.0 from baseline 1.3-1.4 in June 2019, downtrending with improvement in cardiogenic shock    Automatic implantable cardioverter-defibrillator in situ  -S/P [employer] dual chamber ICD  - hx VT/VF per chart review.  - telemetry monitoring and repletion of electrolytes PRN.    Constipation  -on bowel regimen    Thrombocytopenia, unspecified  -Platelet count was trending down likely 2/2 IABP, low suspicion for HIT given 4Ts=2, HIT with OD < 0.4, BELGICA not required  -Platelet count improving    Chronic pain of left knee  -Long h/o of this for which she uses a cane, and more recently, a walker  -PT/OT  -PM+R consult regarding knee pain to assess ability to rehab after potential advanced options    Sepsis due to gram-negative urinary tract infection  -had hesitancy on history prior to arrival and growing e. Coli in urine  -Completed course of Astreonam  -D/C'ed Juanita 8/27/19        Jenny Cabral PA-C  Heart Transplant  [facility]

## 2019-08-29 NOTE — PROGRESS NOTES
RN contacted MD Savage regarding patient's BP 82/49 and a MAP of 59.  RN to get CVP and VBG is ordered.  Pt is asymptomatic with no complaints at this time. Will continue to monitor.

## 2019-08-30 NOTE — PROGRESS NOTES
08/30/19 0515   Vital Signs   BP (!) 84/62   Ana ROSS made aware. Will recheck in 1 hour. Orders to hold isosorbide and hydralazine.

## 2019-08-30 NOTE — BRIEF OP NOTE
Patient is s/p AV node ablation and CRTD upgrade BS generator (SJM LV lead) :   Tolerated procedure well. No acute complication noted.  Post op care per protocol.  Will monitor in recovery on tele overnight  Cont coumadin with INR goal of 2-3  Vancomycin renally dosed 1 gm q24.   NO HEPARIN PRODUCTS  Switch to doxycycline 100 mg bid from tomorrow for 5 days   Dressing will be removed in AM by EP  Chest Xray (ordered)  Monitor groin access site right CFV    Other instruction:   ==============================  Sling to left arm - wear for 48 hours, then only at night for 6 weeks.  No lifting left elbow above shoulder height  No lifting over 5 pounds  No driving for 1 week and for 4 weeks if patient uses left arm to make turns  Do not let beam of shower/water hit site directly and no scrubbing in area  Follow up in device clinic in 1 week and with Ep clinic  in 3 months.  Notify Cardiology/EP increased redness, warmth, drainage, or re-opening of the wound   Please call [phone] option 1 during business hours or the main [facility] number and ask for on-call for device clinic after hours.

## 2019-08-30 NOTE — ASSESSMENT & PLAN NOTE
- Cr up to 4.0 from baseline 1.3-1.4 in June 2019  -downtrended with improvement in cardiogenic shock but back up over past few days. Held IV lasix yesterday and gave IVFs. Improved today, 2.6

## 2019-08-30 NOTE — PLAN OF CARE
Problem: Adult Inpatient Plan of Care  Goal: Plan of Care Review  Outcome: Ongoing (interventions implemented as appropriate)  Reviewed plan of care with patient.  Patient is alert, oriented x 4, assist x 1, and runs Afib/Sinus Tachycardia on the monitor.  Patient has remained NPO since midnight.  Plan is for ablation and upgrade of Pacemaker.  Amiodarone gtt infusing @ 0.5 mg/min.  Heparin gtt 14 u/kg/hr stopped at 0430 hours.  NaCl+ gtt at 100 ml hour, stopped at 0323 hours.  K+ = 4.0. Mg+ =2.2, Mg+Ox 400 mg oral daily.  NaCl+ Bolus 250 ml given at 0626 hours for hypotension.  CVP = 9 (per night team), and SVO2 = 64.  Cdiff Precautions discontinued.  Daily weights.  Patient has remained free of falls/trauma/injury by using appropriate lighting, nonskid socks, by keeping area free of debris, call light within reach, family will remain at bedside, and frequent rounding of staff.  Patient and family (Ousmane, son) verbalized understanding of all instructions.

## 2019-08-30 NOTE — PT/OT/SLP PROGRESS
Occupational Therapy      Patient Name:  Sherice Squires   MRN:  [id number]    Patient not seen today secondary to (Pt off floor for ablation. OT unable to return in PM.). Will follow-up at next scheduled session as able.    Lucy Bean, OTR/L  Pager: [phone]  8/30/2019

## 2019-08-30 NOTE — PROGRESS NOTES
D/C PLANNING NOTE    SW to pt's room on 8/29 to discuss PT/OT recs for IPR.  Pt presented as sitting up in bed with son at bedside.  Pt and son both presented as aao x4, calm, cooperative, and asking and answering questions appropriately.  SW reviewed IPR level of care, available facilities, and fact that PT/OT recs may change prior to d/c pending pt's progress.  Pt and son both verbalized understanding.  Pt stated she is willing to consider [facility], but may prefer IPR in [place].  SW agreed to f/u with list of facilities in [place] that are in-network with pt's insurance ([employer]).  Pt reported coping adequately and denied any other needs or concerns to SW.    SW returned to pt's room on 8/30.  Pt off unit for much of day for EP procedure.  SW spoke with son at bedside and provided list of in-network facilities in [place] ([facility], [facility], [facility], and [place]).  Son states he will review & discuss with pt this weekend.  SW will f/u Tuesday a.m. for pt preference.  SW providing ongoing psychosocial and counseling support, education, resources, assistance, and discharge planning as indicated.  SW following and remains available.

## 2019-08-30 NOTE — PT/OT/SLP PROGRESS
Physical Therapy      Patient Name:  Sherice Squires   MRN:  [id number]    Patient not seen today secondary to (Pt off floor for ablation. PT unable to return in PM.). Will follow-up at next scheduled session as able.    Ariadna Herman, PT, DPT   8/30/2019  [phone]

## 2019-08-30 NOTE — NURSING
Patient has remained NPO since midnight.  Heparin stopped at 0430 hours, NaCl+ gtt @ 100 ml/hr stopped at 0323 hours. NaCl+ Bolus 250 ml given at 0626 hours completed.  Amio gtt infusing at 0.5 mg/min.

## 2019-08-30 NOTE — ASSESSMENT & PLAN NOTE
- 67 yo F with NIDCM EF 20% , NYHA FC III with admitted with afib with rvr and cardiogenic shock  - IABP placed 8/20 and pulled 8/23  -TTE with contrast 8/20: LVEDD 6.51, LVEF 15%, indeterminate diastolic function, severe LAE, trace AI, mod MR and TR, PAS 51 and IVC 15.   -Today: CVP: 9; SVO2: 64 ; CO: 4.9, CI 2.7 SVR: 946. Stopped IV lasix yesterday due to overdiuresis   -GDMT: With recent hypotension and worsening kidney function, decreased hydralazine/isosorbide dinitrate and held doses this morning. Added low dose ACE 8/28/19 but had episode of hypotension and Cr up stopped. Continue plan for Dig 125 mcg every M-W-F  -CTs done off pathway for possible LVAD/transplant evaluation. Patient seen by CTS, not considered a candidate for advanced options at this time HOWEVER would like to see how she does with rehab/PT/OT, and see if she can walk into clinic, to see if she could become a candidate in the near future.  -PT/OT recommending Rehab at d/c

## 2019-08-30 NOTE — NURSING TRANSFER
Nursing Transfer Note      8/30/2019     Transfer 309    Transfer via bed    Transfer with  Ivf, tele monitor    Transported by 2 RN    Medicines sent: n/a    Chart send with patient: yes

## 2019-08-30 NOTE — CARE UPDATE
"RAPID RESPONSE NURSE PROACTIVE ROUNDING NOTE     Time of Visit: 09:30    Admit Date: 2019  LOS: 10  Code Status: Full Code   Date of Visit: 2019  : 1950  Age: 68 y.o.  Sex: female  Race: Black or   Bed: [facility] ROOM/[facility] E*:   MRN: [id number]  Was the patient discharged from an ICU this admission? yes   Was the patient discharged from a PACU within last 24 hours?  no  Did the patient receive conscious sedation/general anesthesia in last 24 hours?  no  Was the patient in the ED within the past 24 hours?  no  Was the patient started on NIPPV within the past 24 hours?  no  Attending Physician: Nimesh Montgomery Jr.,*  Primary Service: Networked reference to record PCT     ASSESSMENT     Diagnosis: Acute on chronic combined systolic and diastolic heart failure    Abnormal Vital Signs: BP (!) 102/50 (BP Location: Left arm, Patient Position: Lying)   Pulse (!) 112   Temp 97.7 °F (36.5 °C) (Oral)   Resp 16   Ht 5' 5" (1.651 m)   Wt 70 kg (154 lb 4.8 oz)   SpO2 (!) 94%   Breastfeeding? No   BMI 25.68 kg/m²      Clinical Issues: Circulatory    Patient  has a past medical history of Atrial fibrillation, Cardiomyopathy, CHF (congestive heart failure), Hyperlipidemia, Hypertension, and Ventricular tachycardia.    Patient AAOX4 and had been hypotensive overnight and scheduled for an ablation today. Currently normotensive bp 104/67 with no complaints.     INTERVENTIONS/ RECOMMENDATIONS     Continue current plan.     Discussed plan of care with RNPrerna.    PHYSICIAN ESCALATION     Yes/No  no    Orders received and case discussed with NA.    Disposition: Remain in room 309.    FOLLOW-UP     Call back the Rapid Response Nurse, Jacques Manzo RN at [phone] for additional questions or concerns.        "

## 2019-08-30 NOTE — NURSING
Called CAS Lozano PA-C, @ [phone], to confirm administration of morning medications. Okay to give Mg+ and Famotidine, held Digoxin.  Patient consumed 15 ml of water (used medicine cup to measure).  Ousmane, Son, at bedside.

## 2019-08-30 NOTE — PLAN OF CARE
Problem: Adult Inpatient Plan of Care  Goal: Plan of Care Review  Outcome: Ongoing (interventions implemented as appropriate)  Pt free of falls and injury during shift. POC reviewed with pt. Pt had two episodes of hypotension, see note. A-fib on telemetry. CVP 9. Heparin stopped at 0430 for Ablation. Pt NPO at MN.  No acute events noted at this time. No complaints. Educated pt why they are at risk for falls and to use call light for assistance ambulating. Yellow non-slip socks on pt. Bed low and locked, call light with in reach. Will continue to monitor.

## 2019-08-30 NOTE — PROGRESS NOTES
08/30/19 0616   Vital Signs   BP (!) 78/54   MAP (mmHg) 60   Ana ROSS made aware. Also notified MD that pt only had 200 of urine output, bladder scan showed 67 ml. Orders for 250 NS bolus over 1 hour.

## 2019-08-30 NOTE — NURSING TRANSFER
Nursing Transfer Note      8/30/2019     Transfer To: EP Lab    Transfer via stretcher    Transfer with cardiac monitoring    Transported by AIDA Oliveira Charge RN    Medicines sent: Amiodarone gtt @ 0.5 mg/min    Chart send with patient: Yes    Notified: hannah Romeo @ bedside    Fall and Allergy Bracelets on.  SVO2 drawn prior to transfer.   Groin cleaned and shaved, Pure Wic removed.

## 2019-08-30 NOTE — NURSING
Ousmane, son of patient requested updates on patient's status.  Called AIDA Oliveira Charge RN, @ [phone].  Notified patient he will receive a text.

## 2019-08-30 NOTE — ASSESSMENT & PLAN NOTE
- return to sinus rhythm after dccv/candice 8/20/19, but quickly went back in A fib with RVR and failed repeat cardioversion 8/22/19.   -w/p CANDICE/DCCV to NSR 8/27/19 but back in A Fib 8/28/19 am  - Discussed putting patient back on Eliquis or another DOAC with EP - they want her to stay on Coumadin. INR therapeutic at  2.1 today (goal INR 2-3)  - Decreased Amio gtt to 0.5 mg/min 8/26/19  - EP plans AVN Ablation with BiV upgrade today.

## 2019-08-30 NOTE — PROGRESS NOTES
[facility]  Heart Transplant  Progress Note    Patient Name: Sherice Squires  MRN: [id number]  Admission Date: 8/20/2019  Hospital Length of Stay: 10 days  Attending Physician: Nimesh Montgomery Jr.,*  Primary Care Provider: Annamarie Soria MD  Principal Problem:Acute on chronic combined systolic and diastolic heart failure    Subjective:     Interval History: Pt did not sleep much last night. Still in bed sleeping this am. No complaints.    Continuous Infusions:   amiodarone in dextrose 5% 0.5 mg/min (08/30/19 0627)     Scheduled Meds:   digoxin  0.125 mg Oral Every Mon, Wed, Fri    famotidine  20 mg Oral Daily    hydrALAZINE  25 mg Oral Q8H    isosorbide dinitrate  10 mg Oral Q8H    magnesium oxide  400 mg Oral Daily    warfarin  5 mg Oral Daily     PRN Meds:ramelteon, silver sulfADIAZINE 1%, sodium chloride 0.9%    Review of patient's allergies indicates:   Allergen Reactions    Augmentin [amoxicillin-pot clavulanate] Swelling     Lip swelling      Penicillins      Other reaction(s): Swelling  Lip swelling       Objective:     Vital Signs (Most Recent):  Temp: 97.7 °F (36.5 °C) (08/30/19 0800)  Pulse: (!) 112 (08/30/19 1000)  Resp: 16 (08/30/19 0800)  BP: (!) 102/50 (08/30/19 0800)  SpO2: (!) 94 % (08/30/19 0800) Vital Signs (24h Range):  Temp:  [97.7 °F (36.5 °C)-98.4 °F (36.9 °C)] 97.7 °F (36.5 °C)  Pulse:  [] 112  Resp:  [16-18] 16  SpO2:  [92 %-97 %] 94 %  BP: ()/(50-85) 102/50     Patient Vitals for the past 72 hrs (Last 3 readings):   Weight   08/30/19 0600 70 kg (154 lb 4.8 oz)     Body mass index is 25.68 kg/m².      Intake/Output Summary (Last 24 hours) at 8/30/2019 1125  Last data filed at 8/30/2019 1007  Gross per 24 hour   Intake 720 ml   Output 400 ml   Net 320 ml       Hemodynamic Parameters:  CVP:  [9 mmHg] 9 mmHg    Telemetry: Reviewed    Physical Exam   Constitutional: She is oriented to person, place, and time. She appears well-developed and  well-nourished.   HENT:   Head: Normocephalic and atraumatic.   Eyes: Pupils are equal, round, and reactive to light. Conjunctivae and EOM are normal.   Neck: Normal range of motion. Neck supple. No JVD present. No thyromegaly present.   RIJ TLC   Cardiovascular: Intact distal pulses.   Tachycardic, irregularly irregular   Pulmonary/Chest: Effort normal and breath sounds normal.   Abdominal: Soft. Bowel sounds are normal.   Musculoskeletal: Normal range of motion. She exhibits no edema.   Neurological: She is alert and oriented to person, place, and time.   Skin: Skin is warm and dry. Capillary refill takes 2 to 3 seconds.   Psychiatric: She has a normal mood and affect. Her behavior is normal. Judgment and thought content normal.       Significant Labs:  CBC:  Recent Labs   Lab 08/29/19 0347 08/29/19  1506 08/30/19  0403   WBC 9.96 11.03 8.38   RBC 4.35 4.68 4.26   HGB 12.6 13.7 12.3   HCT 37.8 39.4 35.6*    180 178   MCV 87 84 84   MCH 29.0 29.3 28.9   MCHC 33.3 34.8 34.6     BNP:  Recent Labs   Lab 08/26/19  0230   *     CMP:  Recent Labs   Lab 08/29/19 0347 08/29/19  1507 08/30/19  0403    106  106 83  83   CALCIUM 9.8 10.0  10.0 9.3  9.3   ALBUMIN 2.5* 2.6* 2.2*  2.2*   PROT 7.5 7.7 6.6  6.6   * 130*  130* 131*  131*   K 4.2 4.6  4.6 4.0  4.0   CO2 30* 28  28 28  28   CL 85* 87*  87* 90*  90*   BUN 43* 47*  47* 46*  46*   CREATININE 2.2* 2.8*  2.8* 2.6*  2.6*   ALKPHOS 136* 139* 117  117   ALT 13 16 15  15   AST 20 27 28  28   BILITOT 0.6 0.6 0.4  0.4      Coagulation:   Recent Labs   Lab 08/28/19  0310 08/29/19  0347 08/30/19  0403   INR 1.6* 1.9* 2.1*   APTT 49.9*  49.9* 67.2* 62.5*     LDH:  No results for input(s): LDH in the last 72 hours.  Microbiology:  Microbiology Results (last 7 days)     Procedure Component Value Units Date/Time    Clostridium difficile EIA [[id number]] Collected:  08/29/19 1406    Order Status:  Completed Specimen:  Stool Updated:   08/29/19 2203     C. diff Antigen Negative     C difficile Toxins A+B, EIA Negative     Comment: Testing not recommended for children <24 months old.             I have reviewed all pertinent labs within the past 24 hours.    Estimated Creatinine Clearance: 20.3 mL/min (A) (based on SCr of 2.6 mg/dL (H)).    Diagnostic Results:  I have reviewed all pertinent imaging results/findings within the past 24 hours.    Assessment and Plan:     Sherice Squires is a 67 yo AAF with PMHx significant for NIDCM / stage D HFrEF (LVEF 20-25%, LVEDD 5.9 cm) s/p dc ICD, AF, HTN, DLD, CKD who is admitted as a transfer from  ED for management of ADHF +/- possible cardiogenic shock.     Patient reports she presented to OSH with progressive/worsening shortness of breath on exertion, orthopnea, and peripheral swelling of approx 5 days duration associated with nausea and fatigue. Denies fever/chills/sweats, chest pain, diaphoresis, presyncope/syncope. Does report intermittent palpitations.      Patient was afebrile and normotensive on arrival (/55 mmHg) to OSH ED with pulses in the 120-130s in AF with RVR. Initial labs showed worsening FAMILIA on CKD (with Cr 3.8 from 2.7-3.1 this past week from previous baseline of 1.3-1.4 in June 2019) as well as elevated T bili 2.3 and BNP >3000. CXR obtained without acute cardiopulmonary process. Patient ultimately transferred to [facility] for higher level of care.      Patient follows with [facility] clinic - currently on Entresto 97/103 mg BID, Toprol  mg daily, Aldactone 25 mg daily, and digoxin 0.125 mg daily. She was last seen by Dr Rodriguez on 8/6/19 who added metolazone 2.5 mg QHS before evening dose of Bumex to augment diuresis due to complaints of SOB / peripheral swelling at the time. She did not respond to this regimen and reports she was ultimately switched to Torsemide instead.      Patient is fully complaint with her medicines. Also adherent to fluid / salt restrictions with her hx of congestive  heart failure.      States she was hospitalized only once before for ADHF within the past year.      Of note patient follows with Dr Reece of EP for her AF hx, last seen by him in 5/2019    * Acute on chronic combined systolic and diastolic heart failure  - 69 yo F with NIDCM EF 20% , NYHA FC III with admitted with afib with rvr and cardiogenic shock  - IABP placed 8/20 and pulled 8/23  -TTE with contrast 8/20: LVEDD 6.51, LVEF 15%, indeterminate diastolic function, severe LAE, trace AI, mod MR and TR, PAS 51 and IVC 15.   -Today: CVP: 9; SVO2: 64 ; CO: 4.9, CI 2.7 SVR: 946. Stopped IV lasix yesterday due to overdiuresis   -GDMT: With recent hypotension and worsening kidney function, decreased hydralazine/isosorbide dinitrate and held doses this morning. Added low dose ACE 8/28/19 but had episode of hypotension and Cr up stopped. Continue plan for Dig 125 mcg every M-W-F  -CTs done off pathway for possible LVAD/transplant evaluation. Patient seen by CTS, not considered a candidate for advanced options at this time HOWEVER would like to see how she does with rehab/PT/OT, and see if she can walk into clinic, to see if she could become a candidate in the near future.  -PT/OT recommending Rehab at d/c        Atrial fibrillation with RVR  - return to sinus rhythm after dccv/candice 8/20/19, but quickly went back in A fib with RVR and failed repeat cardioversion 8/22/19.   -w/p CANDICE/DCCV to NSR 8/27/19 but back in A Fib 8/28/19 am  - Discussed putting patient back on Eliquis or another DOAC with EP - they want her to stay on Coumadin. INR therapeutic at  2.1 today (goal INR 2-3)  - Decreased Amio gtt to 0.5 mg/min 8/26/19  - EP plans AVN Ablation with BiV upgrade today.        Cardiogenic shock  - See acute on chronic heart failure    Acute on chronic kidney failure  - Cr up to 4.0 from baseline 1.3-1.4 in June 2019, downtrending with improvement in cardiogenic shock    Automatic implantable cardioverter-defibrillator in  situ  -S/P [employer] dual chamber ICD  - hx VT/VF per chart review.  - telemetry monitoring and repletion of electrolytes PRN.    Constipation  -on bowel regimen    Thrombocytopenia, unspecified  -Platelet count was trending down likely 2/2 IABP, low suspicion for HIT given 4Ts=2, HIT with OD < 0.4, BELGICA not required  -Platelet count improving    Chronic pain of left knee  -Long h/o of this for which she uses a cane, and more recently, a walker  -PT/OT  -PM+R consult regarding knee pain to assess ability to rehab after potential advanced options    Sepsis due to gram-negative urinary tract infection  -had hesitancy on history prior to arrival and growing e. Coli in urine  -Completed course of Astreonam  -D/C'jaylen Grant 8/27/19      Jenny Cabral PA-C  Heart Transplant  [facility]

## 2019-08-30 NOTE — TRANSFER OF CARE
"Anesthesia Transfer of Care Note    Patient: Sherice Squires    Procedure(s) Performed: Procedure(s) (LRB):  ABLATION, AVN (N/A)  INSERTION, CARDIAC PACEMAKER, BIVENTRICULAR, PERMANENT, AS UPGRADE (Left)  Cardioversion or Defibrillation    Patient location: PACU    Anesthesia Type: general    Transport from OR: Transported from OR on 6-10 L/min O2 by face mask with adequate spontaneous ventilation    Post pain: adequate analgesia    Post assessment: no apparent anesthetic complications and tolerated procedure well    Post vital signs: stable    Level of consciousness: awake and alert    Nausea/Vomiting: no nausea/vomiting    Complications: none    Comments: Transferred to PACU on 2nd floor using monitor. Vital signs stable.       Last vitals:   Visit Vitals  BP 92/62 (BP Location: Left arm, Patient Position: Lying)   Pulse 82   Temp 36.2 °C (97.2 °F) (Temporal)   Resp 18   Ht 5' 5" (1.651 m)   Wt 70 kg (154 lb 4.8 oz)   SpO2 100%   Breastfeeding? No   BMI 25.68 kg/m²     "

## 2019-08-30 NOTE — PROGRESS NOTES
08/29/19 2355   Vital Signs   BP (!) 77/57   MAP (mmHg) 62   Ana ROSS made aware. Pt asleep, but does state she is dizzy. NS bolus still infusing from previous orders. Orders to recheck in one hour.

## 2019-08-31 PROBLEM — K59.00 CONSTIPATION: Status: RESOLVED | Noted: 2019-01-01 | Resolved: 2019-01-01

## 2019-08-31 PROBLEM — G89.29 CHRONIC PAIN OF LEFT KNEE: Status: RESOLVED | Noted: 2019-01-01 | Resolved: 2019-01-01

## 2019-08-31 PROBLEM — M25.562 CHRONIC PAIN OF LEFT KNEE: Status: RESOLVED | Noted: 2019-01-01 | Resolved: 2019-01-01

## 2019-08-31 NOTE — PROCEDURES
The base rate of the patient's [employer] Bi-ventricular ICD was increased to DDD 90. Underlying rhythm NSR. We will gradually reduce the rate to 60 bpm over time as the patient follows up in device clinic. LV threshold 1.8 V.    Elliot Vásquez MD  PGY-V

## 2019-08-31 NOTE — PLAN OF CARE
Problem: Adult Inpatient Plan of Care  Goal: Plan of Care Review  Outcome: Ongoing (interventions implemented as appropriate)  Reviewed plan of care with patient and Ousmane, son. Patient is alert, oriented x 4, assist x 1, and runs Paced on the monitor.  Ablation and upgrade of Pacemaker completed 8/30/19.  Amiodarone gtt infusing @ 0.5 mg/min.  Immobilize left arm with sling x 24 hours, then reapply every night x 2.  Right groin site accessed, clean, dry, intact.  K+ = 3.9, KCl+ SA CR Tab 20 mEq given once. Mg+ =2.2, Mg+Ox 400 mg oral daily. CVP = 8, SVO2 = 80.  Daily weights.  Patient has remained free of falls/trauma/injury by using appropriate lighting, nonskid socks, by keeping area free of debris, call light within reach, family will remain at bedside, and frequent rounding of staff.  Patient and family verbalized understanding of all instructions.

## 2019-08-31 NOTE — PROGRESS NOTES
08/31/19 1554   Vital Signs   Temp 98.2 °F (36.8 °C)   Temp src Oral   Pulse 96   Heart Rate Source Monitor   Resp 16   SpO2 95 %   Pulse Oximetry Type Intermittent   O2 Device (Oxygen Therapy) room air   BP 96/67   MAP (mmHg) 77   BP Location Right arm   BP Method Automatic   Patient Position Lying   Called PAVITHRA Camacho M.D. To verify administration of 1400 hour dose of hydralazine and Isosorbide.  Patient is asymptomatic, okay to give.

## 2019-08-31 NOTE — PROGRESS NOTES
[facility]  Cardiac Electrophysiology  Progress Note    Admission Date: 8/20/2019  Code Status: Full Code   Attending Physician: Nimesh Montgomery Jr.,*   Expected Discharge Date: 9/4/2019  Principal Problem:Acute on chronic combined systolic and diastolic heart failure    Subjective:     Interval History: Tolerated AVN ablation with BiV ICD implant yesterday, reports mild shoulder discomfort but pain well controlled. Reports overall feeling better; no chest pain, dyspnea or edema. Reports no further symptoms or concerns at this time.    Review of Systems   All other systems reviewed and are negative.       Objective:     Vital Signs (Most Recent):  Temp: 98.4 °F (36.9 °C) (08/29/19 0719)  Pulse: 106 (08/29/19 1100)  Resp: 20 (08/29/19 1100)  BP: (!) 136/90 (08/29/19 0719)  SpO2: (!) 93 % (08/29/19 1100) Vital Signs (24h Range):  Temp:  [97.8 °F (36.6 °C)-100.1 °F (37.8 °C)] 98.4 °F (36.9 °C)  Pulse:  [] 106  Resp:  [16-20] 20  SpO2:  [92 %-98 %] 93 %  BP: ()/(49-90) 136/90     Weight: 71.1 kg (156 lb 12 oz)  Body mass index is 26.08 kg/m².     SpO2: (!) 93 %  O2 Device (Oxygen Therapy): room air    Physical Exam  Vital signs reviewed  Constitutional: Oriented to person, place, and time. Appears  well-developed and well-nourished.   HENT:   Head: Normocephalic and atraumatic.   Eyes: Pupils are equal, round, and reactive to light. EOM are normal.   Neck: Normal range of motion. No JVD present.  Right neck TLC   Cardiovascular: Regular rate and rhythm, normal heart sounds and intact distal pulses. Exam reveals no gallop and no friction rub.   No murmur heard.  Pulmonary/Chest: Effort normal and breath sounds normal. No stridor. No respiratory distress. No wheezes, no rales. No chest wall tenderness present  Abdominal: Soft. Bowel sounds are normal.   Musculoskeletal: There is no edema present   Neurological: Alert and oriented to person, place, and time.   Skin: Skin is warm and dry.    Psychiatric: She has a normal mood and affect.     Significant Labs:     Recent Results (from the past 24 hour(s))   ISTAT PROCEDURE    Collection Time: 08/30/19 10:04 AM   Result Value Ref Range    POC PH 7.441 7.35 - 7.45    POC PCO2 43.2 35 - 45 mmHg    POC PO2 32 (LL) 40 - 60 mmHg    POC HCO3 29.4 (H) 24 - 28 mmol/L    POC BE 5 -2 to 2 mmol/L    POC SATURATED O2 64 (L) 95 - 100 %    POC TCO2 31 (H) 24 - 29 mmol/L    Sample VENOUS     Site Other     Allens Test N/A     DelSys Room Air    Comprehensive metabolic panel    Collection Time: 08/30/19  8:30 PM   Result Value Ref Range    Sodium 133 (L) 136 - 145 mmol/L    Potassium 4.0 3.5 - 5.1 mmol/L    Chloride 94 (L) 95 - 110 mmol/L    CO2 26 23 - 29 mmol/L    Glucose 88 70 - 110 mg/dL    BUN, Bld 42 (H) 8 - 23 mg/dL    Creatinine 2.2 (H) 0.5 - 1.4 mg/dL    Calcium 10.2 8.7 - 10.5 mg/dL    Total Protein 6.8 6.0 - 8.4 g/dL    Albumin 2.3 (L) 3.5 - 5.2 g/dL    Total Bilirubin 0.7 0.1 - 1.0 mg/dL    Alkaline Phosphatase 113 55 - 135 U/L    AST 34 10 - 40 U/L    ALT 18 10 - 44 U/L    Anion Gap 13 8 - 16 mmol/L    eGFR if African American 25.8 (A) >60 mL/min/1.73 m^2    eGFR if non African American 22.4 (A) >60 mL/min/1.73 m^2   Magnesium    Collection Time: 08/30/19  8:30 PM   Result Value Ref Range    Magnesium 2.3 1.6 - 2.6 mg/dL   Comprehensive metabolic panel    Collection Time: 08/31/19  3:57 AM   Result Value Ref Range    Sodium 133 (L) 136 - 145 mmol/L    Potassium 3.9 3.5 - 5.1 mmol/L    Chloride 94 (L) 95 - 110 mmol/L    CO2 25 23 - 29 mmol/L    Glucose 76 70 - 110 mg/dL    BUN, Bld 39 (H) 8 - 23 mg/dL    Creatinine 1.9 (H) 0.5 - 1.4 mg/dL    Calcium 10.1 8.7 - 10.5 mg/dL    Total Protein 6.5 6.0 - 8.4 g/dL    Albumin 2.1 (L) 3.5 - 5.2 g/dL    Total Bilirubin 0.5 0.1 - 1.0 mg/dL    Alkaline Phosphatase 111 55 - 135 U/L    AST 34 10 - 40 U/L    ALT 16 10 - 44 U/L    Anion Gap 14 8 - 16 mmol/L    eGFR if African American 30.8 (A) >60 mL/min/1.73 m^2    eGFR if  non  26.7 (A) >60 mL/min/1.73 m^2   CBC auto differential    Collection Time: 08/31/19  3:57 AM   Result Value Ref Range    WBC 7.10 3.90 - 12.70 K/uL    RBC 4.24 4.00 - 5.40 M/uL    Hemoglobin 12.1 12.0 - 16.0 g/dL    Hematocrit 36.0 (L) 37.0 - 48.5 %    Mean Corpuscular Volume 85 82 - 98 fL    Mean Corpuscular Hemoglobin 28.5 27.0 - 31.0 pg    Mean Corpuscular Hemoglobin Conc 33.6 32.0 - 36.0 g/dL    RDW 17.7 (H) 11.5 - 14.5 %    Platelets 184 150 - 350 K/uL    MPV 12.5 9.2 - 12.9 fL    Immature Granulocytes 0.6 (H) 0.0 - 0.5 %    Gran # (ANC) 4.7 1.8 - 7.7 K/uL    Immature Grans (Abs) 0.04 0.00 - 0.04 K/uL    Lymph # 1.2 1.0 - 4.8 K/uL    Mono # 1.0 0.3 - 1.0 K/uL    Eos # 0.1 0.0 - 0.5 K/uL    Baso # 0.03 0.00 - 0.20 K/uL    nRBC 0 0 /100 WBC    Gran% 66.6 38.0 - 73.0 %    Lymph% 17.5 (L) 18.0 - 48.0 %    Mono% 13.5 4.0 - 15.0 %    Eosinophil% 1.4 0.0 - 8.0 %    Basophil% 0.4 0.0 - 1.9 %    Differential Method Automated    Comprehensive metabolic panel    Collection Time: 08/31/19  3:57 AM   Result Value Ref Range    Sodium 133 (L) 136 - 145 mmol/L    Potassium 3.9 3.5 - 5.1 mmol/L    Chloride 94 (L) 95 - 110 mmol/L    CO2 25 23 - 29 mmol/L    Glucose 76 70 - 110 mg/dL    BUN, Bld 39 (H) 8 - 23 mg/dL    Creatinine 1.9 (H) 0.5 - 1.4 mg/dL    Calcium 10.1 8.7 - 10.5 mg/dL    Total Protein 6.5 6.0 - 8.4 g/dL    Albumin 2.1 (L) 3.5 - 5.2 g/dL    Total Bilirubin 0.5 0.1 - 1.0 mg/dL    Alkaline Phosphatase 111 55 - 135 U/L    AST 34 10 - 40 U/L    ALT 16 10 - 44 U/L    Anion Gap 14 8 - 16 mmol/L    eGFR if African American 30.8 (A) >60 mL/min/1.73 m^2    eGFR if non  26.7 (A) >60 mL/min/1.73 m^2   Magnesium    Collection Time: 08/31/19  3:57 AM   Result Value Ref Range    Magnesium 2.2 1.6 - 2.6 mg/dL   Protime-INR    Collection Time: 08/31/19  4:43 AM   Result Value Ref Range    Prothrombin Time 29.3 (H) 9.0 - 12.5 sec    INR 3.0 (H) 0.8 - 1.2   ISTAT PROCEDURE    Collection Time:  08/31/19  5:10 AM   Result Value Ref Range    POC PH 7.475 (H) 7.35 - 7.45    POC PCO2 41.3 35 - 45 mmHg    POC PO2 41 40 - 60 mmHg    POC HCO3 30.4 (H) 24 - 28 mmol/L    POC BE 7 -2 to 2 mmol/L    POC SATURATED O2 80 (L) 95 - 100 %    POC TCO2 32 (H) 24 - 29 mmol/L    Sample VENOUS     Site RB     Allens Test N/A     DelSys Room Air          Significant Imaging: I have reviewed all pertinent imaging studies from the last 24 hours      Assessment and Plan:     Atrial fibrillation with RVR  69 y/o woman history of NICM 20-25%, persistent atrial fibrillation on apixaban, [employer] ICD, CKD presenting with cardiogenic shock and acute decompensated heart failure in setting of atrial fibrillation w RVR, and high atrial fibrillation burden on device interrogation. Failed DCCV x 3.    S/p AVN ablation with upgrade to Bi-V ICD on 8/30/19  Symptomatically improved  Telemetry with  rhythm at a rate of 80 bpm  CXR ok  Labs stable  Continue warfarin for anticoagulation  Transition amio to PO (200 mg BID for 2 weeks then 200 mg daily), can continue digoxin (although AF will not effect her ventricular rate, she may benefit from atrial contractions)  Please d/c TLC ASAP to prevent device infection  Base rate set to 90 bpm and we will gradually reduce the heart rate towards 60 bpm  INR rising sharply, please hold today's dose and consider reducing the dose tomorrow (also consider PharmD recommends holding today's dose and resuming tomorrow)    - Sling to left arm - wear for 48 hours, then only at night for 6 weeks.  - NO HEPARIN PRODUCTS  - Doxycycline 100 mg BID for five days   - No lifting left elbow above shoulder height  - No lifting over 5 pounds  - No driving for 1 week and for 4 weeks if patient uses left arm to make turns  - Patient may shower in 48 hours, do not let beam of shower hit site directly and no scrubbing in area  - Follow up in device clinic in 1 week        Elliot Vásquez MD  Cardiac  Electrophysiology  [facility]

## 2019-08-31 NOTE — PROGRESS NOTES
08/31/19 1818   Vital Signs   Pulse 90   Heart Rate Source Monitor   O2 Device (Oxygen Therapy) room air   /64   MAP (mmHg) 76   BP Location Right arm   BP Method Automatic   Patient Position Lying   Patient is alert, oriented x 4.  Denies chest pain and SOB.  Patient's right groin site is open to air, no signs of bleeding, discoloration, or swelling noted. Pedal pulses 2+. Patient reports intermittent numbness of right thumb.  Ousmane, son, is at bedside.

## 2019-08-31 NOTE — PLAN OF CARE
Interval progress note:     Patient seen post CRTD upgrade, son / nursing at bedside. Patient comfortable, sleeping, no new complaints.     HR 70, /70 on Amiodarone 0.5mg/min   Discontinue heparin   Resume Diet   Closely monitor HD     Carla Perez M.D.  Page # [phone]  Cardiovascular Fellow PGY-V  [facility]

## 2019-08-31 NOTE — PLAN OF CARE
Problem: Physical Therapy Goal  Goal: Physical Therapy Goal  Goals to be met by: 9/10/19     Patient will increase functional independence with mobility by performin. Supine to sit with MInimal Assistance  2. Sit to supine with MInimal Assistance  3. Sit to stand transfer with Stand-by Assistance  4. Bed to chair transfer with Stand-by Assistance using Rolling Walker  5. Gait  x 50 feet with Stand-by Assistance using Rolling Walker.      Outcome: Ongoing (interventions implemented as appropriate)  Patient showed limited ability to bear weight though her L LE, which limits her ability to ambulate.

## 2019-08-31 NOTE — PLAN OF CARE
Problem: Adult Inpatient Plan of Care  Goal: Plan of Care Review  Outcome: Ongoing (interventions implemented as appropriate)  VS stable, plan of care reviewed with patient, L Arm immobilized in sling, pressure dressing in place to left chest wall, incision, ice pack in place. R groin site CDI, +1 pedal pulse bilateral. Purewick in place. Amiodarone infusing, received 2300 dose of vanc. Side rails x 3, bed alarm set, patient educated to call for assistance with ambulating. Son at bedside. Will continue to monitor

## 2019-08-31 NOTE — SUBJECTIVE & OBJECTIVE
Interval History:   No overnight issues, CVP 8 mmHg this morning. Held hydralazine / ISodil last night with soft SBP 90s post anesthesia.   Completed AVN ablation with BIV ICD implantation, L Arm immobilized in sling, pressure dressing in place to left chest wall, incision, ice pack in place. R groin site CDI,  Son at bedside.     Continuous Infusions:   amiodarone in dextrose 5% 0.5 mg/min (08/30/19 1822)     Scheduled Meds:   digoxin  0.125 mg Oral Every Mon, Wed, Fri    famotidine  20 mg Oral Daily    hydrALAZINE  25 mg Oral Q8H    isosorbide dinitrate  10 mg Oral Q8H    magnesium oxide  400 mg Oral Daily    vancomycin (VANCOCIN) IVPB  1,000 mg Intravenous Q24H    warfarin  5 mg Oral Daily     PRN Meds:acetaminophen, bupivacaine (PF) 0.25% (2.5 mg/ml), diphenhydrAMINE, fentaNYL, HYDROmorphone, iodixanol, lidocaine HCL 20 mg/ml (2%), promethazine (PHENERGAN) IVPB, ramelteon, sodium chloride 0.9%, vancomycin    Review of patient's allergies indicates:   Allergen Reactions    Augmentin [amoxicillin-pot clavulanate] Swelling     Lip swelling      Penicillins      Other reaction(s): Swelling  Lip swelling       Objective:     Vital Signs (Most Recent):  Temp: 97.7 °F (36.5 °C) (08/31/19 0337)  Pulse: 87 (08/31/19 0337)  Resp: 16 (08/31/19 0337)  BP: 109/68 (08/31/19 0337)  SpO2: 97 % (08/31/19 0337) Vital Signs (24h Range):  Temp:  [96.6 °F (35.9 °C)-98.3 °F (36.8 °C)] 97.7 °F (36.5 °C)  Pulse:  [] 87  Resp:  [15-18] 16  SpO2:  [91 %-100 %] 97 %  BP: ()/(50-70) 109/68     Patient Vitals for the past 72 hrs (Last 3 readings):   Weight   08/30/19 0600 70 kg (154 lb 4.8 oz)     Body mass index is 25.68 kg/m².      Intake/Output Summary (Last 24 hours) at 8/31/2019 0450  Last data filed at 8/31/2019 0300  Gross per 24 hour   Intake 500 ml   Output 725 ml   Net -225 ml       Hemodynamic Parameters:  CVP:  [8 mmHg] 8 mmHg    Telemetry: No telemetry events     Physical Exam   Constitutional: She is  oriented to person, place, and time. She appears well-developed and well-nourished.       HENT:   Head: Normocephalic and atraumatic.   Eyes: Pupils are equal, round, and reactive to light. Conjunctivae and EOM are normal.   Neck: Normal range of motion. Neck supple. No JVD present. No thyromegaly present.   RIJ TLC   Cardiovascular: Normal rate, regular rhythm, normal heart sounds and intact distal pulses. Exam reveals no gallop and no friction rub.   No murmur heard.  Tachycardic, irregularly irregular   Pulmonary/Chest: Effort normal and breath sounds normal.   Abdominal: Soft. Bowel sounds are normal. She exhibits no distension and no mass. There is no tenderness. There is no guarding.   Musculoskeletal: Normal range of motion. She exhibits no edema.   Neurological: She is alert and oriented to person, place, and time.   Skin: Skin is warm and dry. Capillary refill takes less than 2 seconds.   Psychiatric: She has a normal mood and affect. Her behavior is normal. Judgment and thought content normal.       Significant Labs:  CBC:  Recent Labs   Lab 08/29/19  1506 08/30/19  0403 08/31/19  0357   WBC 11.03 8.38 7.10   RBC 4.68 4.26 4.24   HGB 13.7 12.3 12.1   HCT 39.4 35.6* 36.0*    178 184   MCV 84 84 85   MCH 29.3 28.9 28.5   MCHC 34.8 34.6 33.6     BNP:  Recent Labs   Lab 08/26/19  0230   *     CMP:  Recent Labs   Lab 08/30/19  0403 08/30/19  2030 08/31/19  0357   GLU 83  83 88 76  76   CALCIUM 9.3  9.3 10.2 10.1  10.1   ALBUMIN 2.2*  2.2* 2.3* 2.1*  2.1*   PROT 6.6  6.6 6.8 6.5  6.5   *  131* 133* 133*  133*   K 4.0  4.0 4.0 3.9  3.9   CO2 28  28 26 25  25   CL 90*  90* 94* 94*  94*   BUN 46*  46* 42* 39*  39*   CREATININE 2.6*  2.6* 2.2* 1.9*  1.9*   ALKPHOS 117  117 113 111  111   ALT 15  15 18 16  16   AST 28  28 34 34  34   BILITOT 0.4  0.4 0.7 0.5  0.5      Coagulation:   Recent Labs   Lab 08/28/19  0310 08/29/19  0347 08/30/19  0403   INR 1.6* 1.9* 2.1*    APTT 49.9*  49.9* 67.2* 62.5*     LDH:  No results for input(s): LDH in the last 72 hours.  Microbiology:  Microbiology Results (last 7 days)     Procedure Component Value Units Date/Time    Clostridium difficile EIA [[id number]] Collected:  08/29/19 1406    Order Status:  Completed Specimen:  Stool Updated:  08/29/19 2203     C. diff Antigen Negative     C difficile Toxins A+B, EIA Negative     Comment: Testing not recommended for children <24 months old.             BMP:   Recent Labs   Lab 08/31/19  0357   GLU 76  76   *  133*   K 3.9  3.9   CL 94*  94*   CO2 25  25   BUN 39*  39*   CREATININE 1.9*  1.9*   CALCIUM 10.1  10.1   MG 2.2     Cardiac Markers: No results for input(s): CKMB, TROPONINT, MYOGLOBIN in the last 72 hours.  Coagulation:   Recent Labs   Lab 08/30/19  0403   INR 2.1*   APTT 62.5*     Microbiology Results (last 7 days)     Procedure Component Value Units Date/Time    Clostridium difficile EIA [[id number]] Collected:  08/29/19 1406    Order Status:  Completed Specimen:  Stool Updated:  08/29/19 2203     C. diff Antigen Negative     C difficile Toxins A+B, EIA Negative     Comment: Testing not recommended for children <24 months old.           I have reviewed all pertinent labs within the past 24 hours.    Estimated Creatinine Clearance: 27.8 mL/min (A) (based on SCr of 1.9 mg/dL (H)).    Diagnostic Results:  Echo: I have reviewed all pertinent results/findings within the past 24 hours and my personal findings are:  unchanged  EKG: I have reviewed all pertinent results/findings within the past 24 hours and my personal findings are: unchanged

## 2019-08-31 NOTE — ANESTHESIA POSTPROCEDURE EVALUATION
Anesthesia Post Evaluation    Patient: Sherice Squires    Procedure(s) Performed: Procedure(s) (LRB):  ABLATION, AVN (N/A)  INSERTION, CARDIAC PACEMAKER, BIVENTRICULAR, PERMANENT, AS UPGRADE (Left)  Cardioversion or Defibrillation    Final Anesthesia Type: general  Patient location during evaluation: PACU  Patient participation: Yes- Able to Participate  Level of consciousness: awake and alert  Post-procedure vital signs: reviewed and stable  Pain management: adequate  Airway patency: patent  PONV status at discharge: No PONV  Anesthetic complications: no      Cardiovascular status: blood pressure returned to baseline  Respiratory status: unassisted  Hydration status: euvolemic  Follow-up not needed.          Vitals Value Taken Time   /70 8/30/2019  6:50 PM   Temp 35.9 °C (96.6 °F) 8/30/2019  6:50 PM   Pulse 83 8/30/2019  7:00 PM   Resp 16 8/30/2019  6:50 PM   SpO2 91 % 8/30/2019  6:50 PM         Event Time     Out of Recovery 08/30/2019 18:23:52          Pain/Yaritza Score: Pain Rating Prior to Med Admin: 6 (8/29/2019  9:18 PM)  Pain Rating Post Med Admin: 0 (8/29/2019 10:18 PM)  Yaritza Score: 10 (8/30/2019  6:00 PM)

## 2019-08-31 NOTE — ASSESSMENT & PLAN NOTE
Underwent AVN ablation with upgrade to CRT.  - AVN ablation and BiV upgrade [employer] scientific generator with SJM LV lead:   - hx VT/VF per chart review.  - telemetry monitoring and repletion of electrolytes PRN.

## 2019-08-31 NOTE — PROGRESS NOTES
[facility]  Heart Transplant  Progress Note    Patient Name: Sherice Squires  MRN: [id number]  Admission Date: 8/20/2019  Hospital Length of Stay: 11 days  Attending Physician: Nimesh Montgomery Jr.,*  Primary Care Provider: Annamarie Soria MD  Principal Problem:Acute on chronic combined systolic and diastolic heart failure    Subjective:     Interval History:   No overnight issues, CVP 8 mmHg this morning. Held hydralazine / ISodil last night with soft SBP 90s post anesthesia.   Completed AVN ablation with BIV ICD implantation, L Arm immobilized in sling, pressure dressing in place to left chest wall, incision, ice pack in place. R groin site CDI,  Son at bedside.     Continuous Infusions:   amiodarone in dextrose 5% 0.5 mg/min (08/30/19 1822)     Scheduled Meds:   digoxin  0.125 mg Oral Every Mon, Wed, Fri    famotidine  20 mg Oral Daily    hydrALAZINE  25 mg Oral Q8H    isosorbide dinitrate  10 mg Oral Q8H    magnesium oxide  400 mg Oral Daily    vancomycin (VANCOCIN) IVPB  1,000 mg Intravenous Q24H    warfarin  5 mg Oral Daily     PRN Meds:acetaminophen, bupivacaine (PF) 0.25% (2.5 mg/ml), diphenhydrAMINE, fentaNYL, HYDROmorphone, iodixanol, lidocaine HCL 20 mg/ml (2%), promethazine (PHENERGAN) IVPB, ramelteon, sodium chloride 0.9%, vancomycin    Review of patient's allergies indicates:   Allergen Reactions    Augmentin [amoxicillin-pot clavulanate] Swelling     Lip swelling      Penicillins      Other reaction(s): Swelling  Lip swelling       Objective:     Vital Signs (Most Recent):  Temp: 97.7 °F (36.5 °C) (08/31/19 0337)  Pulse: 87 (08/31/19 0337)  Resp: 16 (08/31/19 0337)  BP: 109/68 (08/31/19 0337)  SpO2: 97 % (08/31/19 0337) Vital Signs (24h Range):  Temp:  [96.6 °F (35.9 °C)-98.3 °F (36.8 °C)] 97.7 °F (36.5 °C)  Pulse:  [] 87  Resp:  [15-18] 16  SpO2:  [91 %-100 %] 97 %  BP: ()/(50-70) 109/68     Patient Vitals for the past 72 hrs (Last 3 readings):   Weight    08/30/19 0600 70 kg (154 lb 4.8 oz)     Body mass index is 25.68 kg/m².      Intake/Output Summary (Last 24 hours) at 8/31/2019 0459  Last data filed at 8/31/2019 0300  Gross per 24 hour   Intake 500 ml   Output 725 ml   Net -225 ml       Hemodynamic Parameters:  CVP:  [8 mmHg] 8 mmHg    Telemetry: No telemetry events     Physical Exam   Constitutional: She is oriented to person, place, and time. She appears well-developed and well-nourished.       HENT:   Head: Normocephalic and atraumatic.   Eyes: Pupils are equal, round, and reactive to light. Conjunctivae and EOM are normal.   Neck: Normal range of motion. Neck supple. No JVD present. No thyromegaly present.   RIJ TLC   Cardiovascular: Normal rate, regular rhythm, normal heart sounds and intact distal pulses. Exam reveals no gallop and no friction rub.   No murmur heard.  Tachycardic, irregularly irregular   Pulmonary/Chest: Effort normal and breath sounds normal.   Abdominal: Soft. Bowel sounds are normal. She exhibits no distension and no mass. There is no tenderness. There is no guarding.   Musculoskeletal: Normal range of motion. She exhibits no edema.   Neurological: She is alert and oriented to person, place, and time.   Skin: Skin is warm and dry. Capillary refill takes less than 2 seconds.   Psychiatric: She has a normal mood and affect. Her behavior is normal. Judgment and thought content normal.       Significant Labs:  CBC:  Recent Labs   Lab 08/29/19  1506 08/30/19  0403 08/31/19  0357   WBC 11.03 8.38 7.10   RBC 4.68 4.26 4.24   HGB 13.7 12.3 12.1   HCT 39.4 35.6* 36.0*    178 184   MCV 84 84 85   MCH 29.3 28.9 28.5   MCHC 34.8 34.6 33.6     BNP:  Recent Labs   Lab 08/26/19  0230   *     CMP:  Recent Labs   Lab 08/30/19  0403 08/30/19  2030 08/31/19  0357   GLU 83  83 88 76  76   CALCIUM 9.3  9.3 10.2 10.1  10.1   ALBUMIN 2.2*  2.2* 2.3* 2.1*  2.1*   PROT 6.6  6.6 6.8 6.5  6.5   *  131* 133* 133*  133*   K 4.0  4.0  4.0 3.9  3.9   CO2 28  28 26 25  25   CL 90*  90* 94* 94*  94*   BUN 46*  46* 42* 39*  39*   CREATININE 2.6*  2.6* 2.2* 1.9*  1.9*   ALKPHOS 117  117 113 111  111   ALT 15  15 18 16  16   AST 28  28 34 34  34   BILITOT 0.4  0.4 0.7 0.5  0.5      Coagulation:   Recent Labs   Lab 08/28/19  0310 08/29/19  0347 08/30/19  0403   INR 1.6* 1.9* 2.1*   APTT 49.9*  49.9* 67.2* 62.5*     LDH:  No results for input(s): LDH in the last 72 hours.  Microbiology:  Microbiology Results (last 7 days)     Procedure Component Value Units Date/Time    Clostridium difficile EIA [[id number]] Collected:  08/29/19 1406    Order Status:  Completed Specimen:  Stool Updated:  08/29/19 2203     C. diff Antigen Negative     C difficile Toxins A+B, EIA Negative     Comment: Testing not recommended for children <24 months old.             BMP:   Recent Labs   Lab 08/31/19  0357   GLU 76  76   *  133*   K 3.9  3.9   CL 94*  94*   CO2 25  25   BUN 39*  39*   CREATININE 1.9*  1.9*   CALCIUM 10.1  10.1   MG 2.2     Cardiac Markers: No results for input(s): CKMB, TROPONINT, MYOGLOBIN in the last 72 hours.  Coagulation:   Recent Labs   Lab 08/30/19  0403   INR 2.1*   APTT 62.5*     Microbiology Results (last 7 days)     Procedure Component Value Units Date/Time    Clostridium difficile EIA [[id number]] Collected:  08/29/19 1406    Order Status:  Completed Specimen:  Stool Updated:  08/29/19 2203     C. diff Antigen Negative     C difficile Toxins A+B, EIA Negative     Comment: Testing not recommended for children <24 months old.           I have reviewed all pertinent labs within the past 24 hours.    Estimated Creatinine Clearance: 27.8 mL/min (A) (based on SCr of 1.9 mg/dL (H)).    Diagnostic Results:  Echo: I have reviewed all pertinent results/findings within the past 24 hours and my personal findings are:  unchanged  EKG: I have reviewed all pertinent results/findings within the past 24 hours and my personal findings  are: unchanged    Assessment and Plan:     Sherice Squires is a 67 yo AAF with PMHx significant for NIDCM / stage D HFrEF (LVEF 20-25%, LVEDD 5.9 cm) s/p dc ICD, AF, HTN, DLD, CKD who is admitted as a transfer from  ED for management of ADHF +/- possible cardiogenic shock.     Patient reports she presented to OSH with progressive/worsening shortness of breath on exertion, orthopnea, and peripheral swelling of approx 5 days duration associated with nausea and fatigue. Denies fever/chills/sweats, chest pain, diaphoresis, presyncope/syncope. Does report intermittent palpitations.      Patient was afebrile and normotensive on arrival (/55 mmHg) to OSH ED with pulses in the 120-130s in AF with RVR. Initial labs showed worsening FAMILIA on CKD (with Cr 3.8 from 2.7-3.1 this past week from previous baseline of 1.3-1.4 in June 2019) as well as elevated T bili 2.3 and BNP >3000. CXR obtained without acute cardiopulmonary process. Patient ultimately transferred to [facility] for higher level of care.      Patient follows with [facility] clinic - currently on Entresto 97/103 mg BID, Toprol  mg daily, Aldactone 25 mg daily, and digoxin 0.125 mg daily. She was last seen by Dr Rodriguez on 8/6/19 who added metolazone 2.5 mg QHS before evening dose of Bumex to augment diuresis due to complaints of SOB / peripheral swelling at the time. She did not respond to this regimen and reports she was ultimately switched to Torsemide instead.      Patient is fully complaint with her medicines. Also adherent to fluid / salt restrictions with her hx of congestive heart failure.      States she was hospitalized only once before for ADHF within the past year.      Of note patient follows with Dr Reece of EP for her AF hx, last seen by him in 5/2019    * Acute on chronic combined systolic and diastolic heart failure  67 yo F with NIDCM EF 20% , NYHA FC III with admitted with afib with rvr and cardiogenic shock   - IABP placed 8/20 and pulled 8/23   -TTE  with contrast 8/20: LVEDD 6.51, LVEF 15%, indeterminate diastolic function, severe LAE, trace AI, mod MR and TR, PAS 51 and IVC 15.    -Today: CVP: 8; SVO2: 80 ; CO: 7.6, CI 3.9 SVR: 778.     - Stopped IV lasix previously due to overdiuresis   -GDMT: With recent hypotension and worsening kidney function, decreased hydralazine/isosorbide dinitrate and held doses this morning. Added low dose ACE 8/28/19 but had episode of hypotension and Cr up stopped. Continue plan for Dig 125 mcg every M-W-F  -CTs done off pathway for possible LVAD/transplant evaluation. Patient seen by CTS, not considered a candidate for advanced options at this time HOWEVER would like to see how she does with rehab/PT/OT, and see if she can walk into clinic, to see if she could become a candidate in the near future.  -PT/OT recommending Rehab at d/c  - Will consider DC RIJ MML tomorrow if hemodynamics are no longer needed.       Thrombocytopenia, unspecified  -Platelet count was trending down likely 2/2 IABP, low suspicion for HIT given 4Ts=2, HIT with OD < 0.4, BELGICA not required  -Platelet count improving    Sepsis due to gram-negative urinary tract infection  -had hesitancy on history prior to arrival and growing e. Coli in urine  -Completed course of Astreonam  -D/C'ed [place] 8/27/19    Acute on chronic kidney failure  - Cr up to 4.0 from baseline 1.3-1.4 in June 2019  -downtrended with improvement in cardiogenic shock but back up over past few days. Held IV lasix yesterday and gave IVFs.   - Improved today, 2.6 -> 1.9    Atrial fibrillation with RVR  Atrial fibrillation with RVR s/p AVN ablation and BiV upgrade [employer] scientific generator with SJM LV lead:    - S/p failed multiple JOSE / DCCV most recent 08/27/19    - EP following, Dr. Reece  - DC all heparin products,  - DC Amio ggt, start Amiodarone 200mg qday  - Dc Vancomycin   - Hold Coumadin with INR 3.0, check INR daily   - Switch to doxycycline 100 mg bid for 5 days    Automatic  implantable cardioverter-defibrillator in situ  Underwent AVN ablation with upgrade to CRT.  - AVN ablation and BiV upgrade [employer] scientific generator with SJM LV lead:   - hx VT/VF per chart review.  - telemetry monitoring and repletion of electrolytes PRN.        Carla Chavez MD  Heart Transplant  [facility]

## 2019-08-31 NOTE — CARE UPDATE
Care Update     Called in for tachycardia. Ms. Sherice Squires having palpitation that lasted for less than a minutes. She denies symptoms of chest pain, shortness of breath and she has mild pain in left shoulder.     Tele: V-paced rhythm at rate  BPM and occasional HR ~ 105 BPM.   ECG: BiV-paced rhythm rate 103 BPM  Other vitals are stable.    A/P:  - Her BiV ICD is pacing at rate of 90 BPM  - She was loaded with Amiodarone IV (between 8/21-26 on 1 mg/min) = 8.6 gm and between 8/27-31 on 0.5 mg/min) = 3.6 which are roughly 12.2 gm.  - Currently on Amiodarone 200 mg PO daily and Dig 0.125 mcg MWF  - NO change in above plan, discussed with EP team     Rocio Garcia MD  Cardiology Fellow ([id number]-[id number])  Pager: [phone]

## 2019-08-31 NOTE — ASSESSMENT & PLAN NOTE
67 yo F with NIDCM EF 20% , NYHA FC III with admitted with afib with rvr and cardiogenic shock   - IABP placed 8/20 and pulled 8/23   -TTE with contrast 8/20: LVEDD 6.51, LVEF 15%, indeterminate diastolic function, severe LAE, trace AI, mod MR and TR, PAS 51 and IVC 15.    -Today: CVP: 8; SVO2: 64 ; CO: 4.9, CI 2.7 SVR: 946.     - Stopped IV lasix previously due to overdiuresis   -GDMT: With recent hypotension and worsening kidney function, decreased hydralazine/isosorbide dinitrate and held doses this morning. Added low dose ACE 8/28/19 but had episode of hypotension and Cr up stopped. Continue plan for Dig 125 mcg every M-W-F  -CTs done off pathway for possible LVAD/transplant evaluation. Patient seen by CTS, not considered a candidate for advanced options at this time HOWEVER would like to see how she does with rehab/PT/OT, and see if she can walk into clinic, to see if she could become a candidate in the near future.  -PT/OT recommending Rehab at d/c

## 2019-08-31 NOTE — ASSESSMENT & PLAN NOTE
Atrial fibrillation with RVR s/p AVN ablation and BiV upgrade [employer] scientific generator with SJM LV lead:    - S/p failed multiple JOSE / DCCV most recent 08/27/19    - EP following, Dr. Cardona  - DC all heparin products, continue Coumadin only,   - Decreased Amio gtt to 0.5 mg/min 8/26/19  - Switch to doxycycline 100 mg bid for 5 days

## 2019-08-31 NOTE — PROGRESS NOTES
08/31/19 0705   Vital Signs   Temp 97.5 °F (36.4 °C)   Temp src Oral   Pulse 84   Heart Rate Source Monitor   Resp 16   SpO2 (!) 90 %   Pulse Oximetry Type Intermittent   O2 Device (Oxygen Therapy) room air   BP (!) 95/58   MAP (mmHg) 72   BP Location Right arm   BP Method Automatic   Patient Position Lying   Patient is complaining of 5/10 incisional pain.  Tylenol given at 0341 hours.

## 2019-08-31 NOTE — PT/OT/SLP PROGRESS
Physical Therapy Treatment    Patient Name:  Sherice Squires   MRN:  [id number]    Recommendations:     Discharge Recommendations:  rehabilitation facility   Discharge Equipment Recommendations: (TBD pending progress)   Barriers to discharge: Inaccessible home and Decreased caregiver support    Assessment:     Sherice Squires is a 68 y.o. female admitted with a medical diagnosis of Acute on chronic combined systolic and diastolic heart failure.  She presents with the following impairments/functional limitations:  weakness, impaired endurance, impaired self care skills, impaired functional mobilty, gait instability, impaired balance, decreased lower extremity function, decreased upper extremity function, decreased ROM, decreased safety awareness, decreased coordination, edema, pain, impaired cardiopulmonary response to activity . Patient required max. Assistance to transfer from sit to stand and present with limited ability to bear weight through her left leg due to arthritic hip and knee.    Rehab Prognosis: Fair; patient would benefit from acute skilled PT services to address these deficits and reach maximum level of function.    Recent Surgery: Procedure(s) (LRB):  ABLATION, AVN (N/A)  INSERTION, CARDIAC PACEMAKER, BIVENTRICULAR, PERMANENT, AS UPGRADE (Left)  Cardioversion or Defibrillation 1 Day Post-Op    Plan:     During this hospitalization, patient to be seen 4 x/week to address the identified rehab impairments via gait training, therapeutic activities, therapeutic exercises, neuromuscular re-education and progress toward the following goals:    · Plan of Care Expires:  09/26/19    Subjective     Chief Complaint: left leg pain in standing and weakness  Patient/Family Comments/goals: to get stronger  Pain/Comfort:  · Pain Rating 1: (Pain with weight bearing only)  · Location - Side 1: Left  · Location - Orientation 1: generalized  · Location 1: knee  · Pain Addressed 1: Reposition, Distraction, Cessation of  Activity  · Pain Rating Post-Intervention 1: 0/10      Objective:     Communicated with nsg prior to session.  Patient found up in chair with telemetry, Matilde upon PT entry to room.     General Precautions: Standard, fall   Orthopedic Precautions:N/A   Braces: Sling and swathe     Functional Mobility:  · Transfers:     · Sit to Stand:  maximal assistance with hand-held assist  · Gait: 2 steps fwd/bwd x 2 trials with R HHA and max assistance.  · Balance: poor in standing.      AM-PAC 6 CLICK MOBILITY  Turning over in bed (including adjusting bedclothes, sheets and blankets)?: 2  Sitting down on and standing up from a chair with arms (e.g., wheelchair, bedside commode, etc.): 2  Moving from lying on back to sitting on the side of the bed?: 2  Moving to and from a bed to a chair (including a wheelchair)?: 2  Need to walk in hospital room?: 1  Climbing 3-5 steps with a railing?: 1  Basic Mobility Total Score: 10       Therapeutic Activities and Exercises:   Donned a second gown to cover her back. Weight shifting and static standing balance with R HHA with min assistance 2 x 2 minutes.  There ex in sitting: LAQ, HIP FLEX AND HEEL/TOE RAISES 2X12 REPS B LE.    Patient left up in chair with all lines intact, call button in reach and FAMILY present..    GOALS:   Multidisciplinary Problems     Physical Therapy Goals        Problem: Physical Therapy Goal    Goal Priority Disciplines Outcome Goal Variances Interventions   Physical Therapy Goal     PT, PT/OT Ongoing (interventions implemented as appropriate)     Description:  Goals to be met by: 9/10/19     Patient will increase functional independence with mobility by performin. Supine to sit with MInimal Assistance  2. Sit to supine with MInimal Assistance  3. Sit to stand transfer with Stand-by Assistance  4. Bed to chair transfer with Stand-by Assistance using Rolling Walker  5. Gait  x 50 feet with Stand-by Assistance using Rolling Walker.                        Time Tracking:     PT Received On: 08/31/19  PT Start Time: 1116     PT Stop Time: 1144  PT Total Time (min): 28 min     Billable Minutes: Therapeutic Activity 18 and Therapeutic Exercise 10    Treatment Type: Treatment  PT/PTA: PTA     PTA Visit Number: 1     Anish Ivory, PTA  08/31/2019

## 2019-08-31 NOTE — ASSESSMENT & PLAN NOTE
69 y/o woman history of NICM 20-25%, persistent atrial fibrillation on apixaban, [employer] ICD, CKD presenting with cardiogenic shock and acute decompensated heart failure in setting of atrial fibrillation w RVR, and high atrial fibrillation burden on device interrogation. Failed DCCV x 3.    S/p AVN ablation with upgrade to Bi-V ICD on 8/30/19  Continue warfarin for anticoagulation  Recommend amio PO (200 mg BID for 2 weeks then 200 mg daily), can continue digoxin (although AF will not effect her ventricular rate, she may benefit from atrial contractions)  Please d/c TLC today to prevent device infection  Base rate set to 90 bpm and we will gradually reduce the heart rate towards 60 bpm    - Sling to left arm - wear for 48 hours, then only at night for 6 weeks.  - NO HEPARIN PRODUCTS  - Doxycycline 100 mg BID for five days   - No lifting left elbow above shoulder height  - No lifting over 5 pounds  - No driving for 1 week and for 4 weeks if patient uses left arm to make turns  - Patient may shower in 48 hours, do not let beam of shower hit site directly and no scrubbing in area  - Follow up in device clinic in 1 week

## 2019-08-31 NOTE — NURSING
"Patient appears to be going in/out of Afib, and V-paced with HR = 102.  Ordered STAT EKG.  Patient is asymptomatic. Strip posted. Patient stated, "It feels a little like fluttering." /64, MAP 79.  PAVITHRA Camacho M.D. notified. SANDRA Garcia at bedside.  Per team Bi-ventricular ICD is running at 10 higher than set rate, but it is okay.  "

## 2019-08-31 NOTE — NURSING
Results for CRISTIANA OVERTON (MRN [id number]) as of 8/31/2019 11:06   Ref. Range 8/31/2019 03:57 8/31/2019 03:57 8/31/2019 04:43 8/31/2019 05:10 8/31/2019 09:45   Potassium Latest Ref Range: 3.5 - 5.1 mmol/L 3.9 3.9      PAVITHRA Camacho M.D. notified.  Orders to follow.

## 2019-08-31 NOTE — ASSESSMENT & PLAN NOTE
- Cr up to 4.0 from baseline 1.3-1.4 in June 2019  -downtrended with improvement in cardiogenic shock but back up over past few days. Held IV lasix yesterday and gave IVFs.   - Improved today, 2.6 -> 1.9

## 2019-09-01 PROBLEM — D69.6 THROMBOCYTOPENIA, UNSPECIFIED: Status: RESOLVED | Noted: 2019-01-01 | Resolved: 2019-01-01

## 2019-09-01 PROBLEM — N39.0 SEPSIS DUE TO GRAM-NEGATIVE URINARY TRACT INFECTION: Status: RESOLVED | Noted: 2019-01-01 | Resolved: 2019-01-01

## 2019-09-01 PROBLEM — A41.50 SEPSIS DUE TO GRAM-NEGATIVE URINARY TRACT INFECTION: Status: RESOLVED | Noted: 2019-01-01 | Resolved: 2019-01-01

## 2019-09-01 NOTE — NURSING
Received a call from telemetry of Patient alarming asystole. Patient still being paced on monitor. Pacer appears to be A/V paced. Patient lying in bed asleep and asymptomatic. MD on call notified. No new orderes placed at this time. Will continue to monitor.

## 2019-09-01 NOTE — SUBJECTIVE & OBJECTIVE
Interval History: Patient reports feeling well today. Post-op pain well controlled, reports no new symptoms or concerns.    Review of Systems   All other systems reviewed and are negative.    Objective:     Vital Signs (Most Recent):  Temp: 97.9 °F (36.6 °C) (09/01/19 0809)  Pulse: 97 (09/01/19 1100)  Resp: 16 (09/01/19 0809)  BP: 110/67 (09/01/19 0809)  SpO2: (!) 91 % (09/01/19 0809) Vital Signs (24h Range):  Temp:  [97.6 °F (36.4 °C)-98.6 °F (37 °C)] 97.9 °F (36.6 °C)  Pulse:  [] 97  Resp:  [16-17] 16  SpO2:  [91 %-95 %] 91 %  BP: ()/(55-67) 110/67     Weight: 70 kg (154 lb 5.2 oz)  Body mass index is 25.68 kg/m².     SpO2: (!) 91 %  O2 Device (Oxygen Therapy): room air    Physical Exam   Constitutional: She is oriented to person, place, and time. She appears well-developed and well-nourished. No distress.   HENT:   Head: Normocephalic and atraumatic.   Neck: No JVD present.   Right neck TLC   Cardiovascular: Normal rate, regular rhythm, normal heart sounds and intact distal pulses. Exam reveals no gallop and no friction rub.   No murmur heard.  PPM site c/d/i   Pulmonary/Chest: Effort normal and breath sounds normal. No respiratory distress. She has no wheezes. She has no rales.   Abdominal: Soft. Bowel sounds are normal. She exhibits no distension. There is no tenderness.   Musculoskeletal: She exhibits no edema.   Neurological: She is alert and oriented to person, place, and time.   Skin: She is not diaphoretic.       Significant Labs:     Recent Results (from the past 24 hour(s))   Comprehensive metabolic panel    Collection Time: 08/31/19  4:23 PM   Result Value Ref Range    Sodium 133 (L) 136 - 145 mmol/L    Potassium 4.0 3.5 - 5.1 mmol/L    Chloride 96 95 - 110 mmol/L    CO2 26 23 - 29 mmol/L    Glucose 105 70 - 110 mg/dL    BUN, Bld 37 (H) 8 - 23 mg/dL    Creatinine 1.9 (H) 0.5 - 1.4 mg/dL    Calcium 9.7 8.7 - 10.5 mg/dL    Total Protein 6.8 6.0 - 8.4 g/dL    Albumin 2.3 (L) 3.5 - 5.2 g/dL     Total Bilirubin 0.5 0.1 - 1.0 mg/dL    Alkaline Phosphatase 125 55 - 135 U/L    AST 38 10 - 40 U/L    ALT 21 10 - 44 U/L    Anion Gap 11 8 - 16 mmol/L    eGFR if African American 30.8 (A) >60 mL/min/1.73 m^2    eGFR if non  26.7 (A) >60 mL/min/1.73 m^2   Magnesium    Collection Time: 08/31/19  4:23 PM   Result Value Ref Range    Magnesium 2.2 1.6 - 2.6 mg/dL   Comprehensive metabolic panel    Collection Time: 09/01/19  6:04 AM   Result Value Ref Range    Sodium 136 136 - 145 mmol/L    Potassium 3.7 3.5 - 5.1 mmol/L    Chloride 99 95 - 110 mmol/L    CO2 28 23 - 29 mmol/L    Glucose 92 70 - 110 mg/dL    BUN, Bld 34 (H) 8 - 23 mg/dL    Creatinine 1.7 (H) 0.5 - 1.4 mg/dL    Calcium 9.3 8.7 - 10.5 mg/dL    Total Protein 6.4 6.0 - 8.4 g/dL    Albumin 2.2 (L) 3.5 - 5.2 g/dL    Total Bilirubin 0.8 0.1 - 1.0 mg/dL    Alkaline Phosphatase 110 55 - 135 U/L    AST 29 10 - 40 U/L    ALT 19 10 - 44 U/L    Anion Gap 9 8 - 16 mmol/L    eGFR if African American 35.2 (A) >60 mL/min/1.73 m^2    eGFR if non African American 30.6 (A) >60 mL/min/1.73 m^2   CBC auto differential    Collection Time: 09/01/19  6:04 AM   Result Value Ref Range    WBC 6.43 3.90 - 12.70 K/uL    RBC 3.87 (L) 4.00 - 5.40 M/uL    Hemoglobin 11.2 (L) 12.0 - 16.0 g/dL    Hematocrit 34.4 (L) 37.0 - 48.5 %    Mean Corpuscular Volume 89 82 - 98 fL    Mean Corpuscular Hemoglobin 28.9 27.0 - 31.0 pg    Mean Corpuscular Hemoglobin Conc 32.6 32.0 - 36.0 g/dL    RDW 18.1 (H) 11.5 - 14.5 %    Platelets 166 150 - 350 K/uL    MPV 12.5 9.2 - 12.9 fL    Immature Granulocytes 0.8 (H) 0.0 - 0.5 %    Gran # (ANC) 4.2 1.8 - 7.7 K/uL    Immature Grans (Abs) 0.05 (H) 0.00 - 0.04 K/uL    Lymph # 1.1 1.0 - 4.8 K/uL    Mono # 1.0 0.3 - 1.0 K/uL    Eos # 0.1 0.0 - 0.5 K/uL    Baso # 0.03 0.00 - 0.20 K/uL    nRBC 0 0 /100 WBC    Gran% 64.9 38.0 - 73.0 %    Lymph% 17.6 (L) 18.0 - 48.0 %    Mono% 15.1 (H) 4.0 - 15.0 %    Eosinophil% 1.1 0.0 - 8.0 %    Basophil% 0.5 0.0 - 1.9  %    Differential Method Automated    Comprehensive metabolic panel    Collection Time: 09/01/19  6:04 AM   Result Value Ref Range    Sodium 136 136 - 145 mmol/L    Potassium 3.7 3.5 - 5.1 mmol/L    Chloride 99 95 - 110 mmol/L    CO2 28 23 - 29 mmol/L    Glucose 92 70 - 110 mg/dL    BUN, Bld 34 (H) 8 - 23 mg/dL    Creatinine 1.7 (H) 0.5 - 1.4 mg/dL    Calcium 9.3 8.7 - 10.5 mg/dL    Total Protein 6.4 6.0 - 8.4 g/dL    Albumin 2.2 (L) 3.5 - 5.2 g/dL    Total Bilirubin 0.8 0.1 - 1.0 mg/dL    Alkaline Phosphatase 110 55 - 135 U/L    AST 29 10 - 40 U/L    ALT 19 10 - 44 U/L    Anion Gap 9 8 - 16 mmol/L    eGFR if African American 35.2 (A) >60 mL/min/1.73 m^2    eGFR if non African American 30.6 (A) >60 mL/min/1.73 m^2   Protime-INR    Collection Time: 09/01/19  6:04 AM   Result Value Ref Range    Prothrombin Time 22.2 (H) 9.0 - 12.5 sec    INR 2.3 (H) 0.8 - 1.2   Magnesium    Collection Time: 09/01/19  6:04 AM   Result Value Ref Range    Magnesium 2.2 1.6 - 2.6 mg/dL   ISTAT PROCEDURE    Collection Time: 09/01/19  7:37 AM   Result Value Ref Range    POC PH 7.412 7.35 - 7.45    POC PCO2 41.3 35 - 45 mmHg    POC PO2 31 (LL) 40 - 60 mmHg    POC HCO3 26.3 24 - 28 mmol/L    POC BE 2 -2 to 2 mmol/L    POC SATURATED O2 61 (L) 95 - 100 %    POC TCO2 28 24 - 29 mmol/L    Sample VENOUS     Site Other     Allens Test N/A     DelSys Room Air          Significant Imaging:     I have reviewed all pertinent imaging studies from the last 24 hours

## 2019-09-01 NOTE — SUBJECTIVE & OBJECTIVE
Interval History:   Yesterday became tachycardiac with BiV paced while getting up from bed to chair, patient w/ palpitations, evaluated by on call fellow who discussed with EP team, no recurrence of Afib and PPM functioning well.     Otherwise no further overnight events, son at bedside, patient in good spirits, looking forward to Rehab.   Negative 2.3L / Net negative 300cc, discussed with nursing why she had taken in 2L while on fluid restriction diet, they will monitor.     Continuous Infusions:  Scheduled Meds:   amiodarone  200 mg Oral Daily    digoxin  0.125 mg Oral Every Mon, Wed, Fri    doxycycline  100 mg Oral Q12H    famotidine  20 mg Oral Daily    hydrALAZINE  25 mg Oral Q8H    isosorbide dinitrate  10 mg Oral Q8H    magnesium oxide  400 mg Oral Daily     PRN Meds:acetaminophen, bupivacaine (PF) 0.25% (2.5 mg/ml), diphenhydrAMINE, fentaNYL, HYDROmorphone, iodixanol, lidocaine HCL 20 mg/ml (2%), promethazine (PHENERGAN) IVPB, ramelteon, sodium chloride 0.9%      Objective:     Vital Signs (Most Recent):  Temp: 97.9 °F (36.6 °C) (09/01/19 0809)  Pulse: 98 (09/01/19 0809)  Resp: 16 (09/01/19 0809)  BP: 110/67 (09/01/19 0809)  SpO2: (!) 91 % (09/01/19 0809) Vital Signs (24h Range):  Temp:  [97.6 °F (36.4 °C)-98.6 °F (37 °C)] 97.9 °F (36.6 °C)  Pulse:  [] 98  Resp:  [16-17] 16  SpO2:  [91 %-95 %] 91 %  BP: ()/(55-71) 110/67     Patient Vitals for the past 72 hrs (Last 3 readings):   Weight   09/01/19 0500 70 kg (154 lb 5.2 oz)   08/30/19 0600 70 kg (154 lb 4.8 oz)     Body mass index is 25.68 kg/m².      Intake/Output Summary (Last 24 hours) at 9/1/2019 0910  Last data filed at 9/1/2019 0500  Gross per 24 hour   Intake 1902.56 ml   Output 1500 ml   Net 402.56 ml       Hemodynamic Parameters:  CVP:  [7 mmHg] 7 mmHg    Telemetry: Biv paced HR , baseline artifact     Physical Exam   Constitutional: She is oriented to person, place, and time. She appears well-developed and well-nourished.        HENT:   Head: Normocephalic and atraumatic.   Eyes: Pupils are equal, round, and reactive to light. Conjunctivae and EOM are normal.   Neck: Normal range of motion. Neck supple. No JVD present. No thyromegaly present.   RIJ TLC   Cardiovascular: Normal rate, regular rhythm, normal heart sounds and intact distal pulses. Exam reveals no gallop and no friction rub.   No murmur heard.  Tachycardic, irregularly irregular   Pulmonary/Chest: Effort normal and breath sounds normal.   Abdominal: Soft. Bowel sounds are normal. She exhibits no distension and no mass. There is no tenderness. There is no guarding.   Musculoskeletal: Normal range of motion. She exhibits no edema.   Neurological: She is alert and oriented to person, place, and time.   Skin: Skin is warm and dry. Capillary refill takes less than 2 seconds.   Psychiatric: She has a normal mood and affect. Her behavior is normal. Judgment and thought content normal.       Significant Labs:  CBC:  Recent Labs   Lab 08/30/19  0403 08/31/19  0357 09/01/19  0604   WBC 8.38 7.10 6.43   RBC 4.26 4.24 3.87*   HGB 12.3 12.1 11.2*   HCT 35.6* 36.0* 34.4*    184 166   MCV 84 85 89   MCH 28.9 28.5 28.9   MCHC 34.6 33.6 32.6     BNP:  Recent Labs   Lab 08/26/19  0230   *     CMP:  Recent Labs   Lab 08/31/19  0357 08/31/19  1623 09/01/19  0604   GLU 76  76 105 92  92   CALCIUM 10.1  10.1 9.7 9.3  9.3   ALBUMIN 2.1*  2.1* 2.3* 2.2*  2.2*   PROT 6.5  6.5 6.8 6.4  6.4   *  133* 133* 136  136   K 3.9  3.9 4.0 3.7  3.7   CO2 25  25 26 28  28   CL 94*  94* 96 99  99   BUN 39*  39* 37* 34*  34*   CREATININE 1.9*  1.9* 1.9* 1.7*  1.7*   ALKPHOS 111  111 125 110  110   ALT 16  16 21 19  19   AST 34  34 38 29  29   BILITOT 0.5  0.5 0.5 0.8  0.8      Coagulation:   Recent Labs   Lab 08/28/19  0310 08/29/19  0347 08/30/19  0403 08/31/19  0443 09/01/19  0604   INR 1.6* 1.9* 2.1* 3.0* 2.3*   APTT 49.9*  49.9* 67.2* 62.5*  --   --       LDH:  No results for input(s): LDH in the last 72 hours.  Microbiology:  Microbiology Results (last 7 days)     Procedure Component Value Units Date/Time    Clostridium difficile EIA [[id number]] Collected:  08/29/19 1406    Order Status:  Completed Specimen:  Stool Updated:  08/29/19 2203     C. diff Antigen Negative     C difficile Toxins A+B, EIA Negative     Comment: Testing not recommended for children <24 months old.             BMP:   Recent Labs   Lab 09/01/19  0604   GLU 92  92     136   K 3.7  3.7   CL 99  99   CO2 28  28   BUN 34*  34*   CREATININE 1.7*  1.7*   CALCIUM 9.3  9.3   MG 2.2     Cardiac Markers: No results for input(s): CKMB, TROPONINT, MYOGLOBIN in the last 72 hours.  Coagulation:   Recent Labs   Lab 08/30/19  0403  09/01/19  0604   INR 2.1*   < > 2.3*   APTT 62.5*  --   --     < > = values in this interval not displayed.     Prealbumin: No results for input(s): PREALBUMIN in the last 72 hours.  I have reviewed all pertinent labs within the past 24 hours.    Estimated Creatinine Clearance: 31.1 mL/min (A) (based on SCr of 1.7 mg/dL (H)).    Diagnostic Results:  I have reviewed and interpreted all pertinent imaging results/findings within the past 24 hours.

## 2019-09-01 NOTE — PLAN OF CARE
Problem: Adult Inpatient Plan of Care  Goal: Plan of Care Review  Outcome: Ongoing (interventions implemented as appropriate)  Patient remained free of falls/injury/trauma throughout shift. Patient AAOx4. VSS. No complaints of chest pain or SOB.Patient wearing sling throughout night.Patient complaints of pain in sling PRN pain medication administered for full pain relief.  Fall risk and Sternal precautions reviewed and maintained with patient. POC reviewed with patient and son. Patient verbalized understanding. Will continue to monitor.

## 2019-09-01 NOTE — PLAN OF CARE
Problem: Adult Inpatient Plan of Care  Goal: Plan of Care Review  Outcome: Ongoing (interventions implemented as appropriate)  Plan of care updated with patient and family. No falls during shift. No skin breakdown. Maintained fall protocol. Pt resting comfortably. Pt has PRN Tylenol and Dilaudid for pain and breakthrough pain. PT following pt. Two person assist. Coumadin 5mg QD given for VTE prophylaxis. Addressed all issues throughout shift.

## 2019-09-01 NOTE — PROGRESS NOTES
[facility]  Heart Transplant  Progress Note    Patient Name: Sherice Squires  MRN: [id number]  Admission Date: 8/20/2019  Hospital Length of Stay: 12 days  Attending Physician: Nimesh Montgomery Jr.,*  Primary Care Provider: Annamarie Soria MD  Principal Problem:Acute on chronic combined systolic and diastolic heart failure    Subjective:     Interval History:   Yesterday became tachycardiac with BiV paced while getting up from bed to chair, patient w/ palpitations, evaluated by on call fellow who discussed with EP team, no recurrence of Afib and PPM functioning well.     Otherwise no further overnight events, son at bedside, patient in good spirits, looking forward to Rehab.   Negative 2.3L / Net negative 300cc, discussed with nursing why she had taken in 2L while on fluid restriction diet, they will monitor.     Continuous Infusions:  Scheduled Meds:   amiodarone  200 mg Oral Daily    digoxin  0.125 mg Oral Every Mon, Wed, Fri    doxycycline  100 mg Oral Q12H    famotidine  20 mg Oral Daily    hydrALAZINE  25 mg Oral Q8H    isosorbide dinitrate  10 mg Oral Q8H    magnesium oxide  400 mg Oral Daily     PRN Meds:acetaminophen, bupivacaine (PF) 0.25% (2.5 mg/ml), diphenhydrAMINE, fentaNYL, HYDROmorphone, iodixanol, lidocaine HCL 20 mg/ml (2%), promethazine (PHENERGAN) IVPB, ramelteon, sodium chloride 0.9%      Objective:     Vital Signs (Most Recent):  Temp: 97.9 °F (36.6 °C) (09/01/19 0809)  Pulse: 98 (09/01/19 0809)  Resp: 16 (09/01/19 0809)  BP: 110/67 (09/01/19 0809)  SpO2: (!) 91 % (09/01/19 0809) Vital Signs (24h Range):  Temp:  [97.6 °F (36.4 °C)-98.6 °F (37 °C)] 97.9 °F (36.6 °C)  Pulse:  [] 98  Resp:  [16-17] 16  SpO2:  [91 %-95 %] 91 %  BP: ()/(55-71) 110/67     Patient Vitals for the past 72 hrs (Last 3 readings):   Weight   09/01/19 0500 70 kg (154 lb 5.2 oz)   08/30/19 0600 70 kg (154 lb 4.8 oz)     Body mass index is 25.68 kg/m².      Intake/Output Summary (Last 24  hours) at 9/1/2019 0910  Last data filed at 9/1/2019 0500  Gross per 24 hour   Intake 1902.56 ml   Output 1500 ml   Net 402.56 ml       Hemodynamic Parameters:  CVP:  [7 mmHg] 7 mmHg    Telemetry: Biv paced HR , baseline artifact     Physical Exam   Constitutional: She is oriented to person, place, and time. She appears well-developed and well-nourished.       HENT:   Head: Normocephalic and atraumatic.   Eyes: Pupils are equal, round, and reactive to light. Conjunctivae and EOM are normal.   Neck: Normal range of motion. Neck supple. No JVD present. No thyromegaly present.   RIJ TLC   Cardiovascular: Normal rate, regular rhythm, normal heart sounds and intact distal pulses. Exam reveals no gallop and no friction rub.   No murmur heard.  Tachycardic, irregularly irregular   Pulmonary/Chest: Effort normal and breath sounds normal.   Abdominal: Soft. Bowel sounds are normal. She exhibits no distension and no mass. There is no tenderness. There is no guarding.   Musculoskeletal: Normal range of motion. She exhibits no edema.   Neurological: She is alert and oriented to person, place, and time.   Skin: Skin is warm and dry. Capillary refill takes less than 2 seconds.   Psychiatric: She has a normal mood and affect. Her behavior is normal. Judgment and thought content normal.       Significant Labs:  CBC:  Recent Labs   Lab 08/30/19  0403 08/31/19  0357 09/01/19  0604   WBC 8.38 7.10 6.43   RBC 4.26 4.24 3.87*   HGB 12.3 12.1 11.2*   HCT 35.6* 36.0* 34.4*    184 166   MCV 84 85 89   MCH 28.9 28.5 28.9   MCHC 34.6 33.6 32.6     BNP:  Recent Labs   Lab 08/26/19  0230   *     CMP:  Recent Labs   Lab 08/31/19  0357 08/31/19  1623 09/01/19  0604   GLU 76  76 105 92  92   CALCIUM 10.1  10.1 9.7 9.3  9.3   ALBUMIN 2.1*  2.1* 2.3* 2.2*  2.2*   PROT 6.5  6.5 6.8 6.4  6.4   *  133* 133* 136  136   K 3.9  3.9 4.0 3.7  3.7   CO2 25  25 26 28  28   CL 94*  94* 96 99  99   BUN 39*  39* 37*  34*  34*   CREATININE 1.9*  1.9* 1.9* 1.7*  1.7*   ALKPHOS 111  111 125 110  110   ALT 16  16 21 19  19   AST 34  34 38 29  29   BILITOT 0.5  0.5 0.5 0.8  0.8      Coagulation:   Recent Labs   Lab 08/28/19  0310 08/29/19  0347 08/30/19  0403 08/31/19  0443 09/01/19  0604   INR 1.6* 1.9* 2.1* 3.0* 2.3*   APTT 49.9*  49.9* 67.2* 62.5*  --   --      LDH:  No results for input(s): LDH in the last 72 hours.  Microbiology:  Microbiology Results (last 7 days)     Procedure Component Value Units Date/Time    Clostridium difficile EIA [[id number]] Collected:  08/29/19 1406    Order Status:  Completed Specimen:  Stool Updated:  08/29/19 2203     C. diff Antigen Negative     C difficile Toxins A+B, EIA Negative     Comment: Testing not recommended for children <24 months old.             BMP:   Recent Labs   Lab 09/01/19  0604   GLU 92  92     136   K 3.7  3.7   CL 99  99   CO2 28  28   BUN 34*  34*   CREATININE 1.7*  1.7*   CALCIUM 9.3  9.3   MG 2.2     Cardiac Markers: No results for input(s): CKMB, TROPONINT, MYOGLOBIN in the last 72 hours.  Coagulation:   Recent Labs   Lab 08/30/19  0403  09/01/19  0604   INR 2.1*   < > 2.3*   APTT 62.5*  --   --     < > = values in this interval not displayed.     Prealbumin: No results for input(s): PREALBUMIN in the last 72 hours.  I have reviewed all pertinent labs within the past 24 hours.    Estimated Creatinine Clearance: 31.1 mL/min (A) (based on SCr of 1.7 mg/dL (H)).    Diagnostic Results:  I have reviewed and interpreted all pertinent imaging results/findings within the past 24 hours.    Assessment and Plan:       * Acute on chronic combined systolic and diastolic heart failure  67 yo F with NIDCM EF 20% , NYHA FC III with admitted with afib with rvr and cardiogenic shock   - IABP placed 8/20 and pulled 8/23   -TTE with contrast 8/20: LVEDD 6.51, LVEF 15%, indeterminate diastolic function, severe LAE, trace AI, mod MR and TR, PAS 51 and IVC 15.     -Today: CVP: 6; SVO2: 61 ; CO: 4.2, CI 2.2 SVR: 1295    -Pending placement at Rehab, continue aggressive ambulation, up in chair, OOB (Fall precautions)  -Continue to hold diuresis, CVP 7 with negative balance  -Remove RIJ MML  -Discussed with nursing patient taking in +2L while she is on fluid restriction diet. Continue to address and educate.   -GDMT: With recent hypotension and worsening kidney function,    - Increased hydralazine/isosorbide dinitrate.    - Added low dose ACE 8/28/19 but had episode of hypotension and Cr up stopped.    - Continue plan for Dig 125 mcg every M-W-F  -CTs done off pathway for possible LVAD/transplant evaluation. Patient seen by CTS, not considered a candidate for advanced options at this time HOWEVER would like to see how she does with rehab/PT/OT, and see if she can walk into clinic, to see if she could become a candidate in the near future.      Acute on chronic kidney failure  - Cr up to 4.0 from baseline 1.3-1.4 in June 2019  -downtrended with improvement in cardiogenic shock but back up over past few days. Held IV lasix yesterday and gave IVFs.   - Improved today, 2.6 -> 1.9 -> 1.7    Atrial fibrillation with RVR  Atrial fibrillation with RVR s/p AVN ablation and BiV upgrade [place] scientific generator with SJM LV lead:    - S/p failed multiple JOSE / DCCV most recent 08/27/19    - EP following, Dr. Cardona  - DC all heparin products, continue Coumadin, dose held on 08/31/19, resume Coumadin 5mg tonight (INR 3.0 -> 2.3)  - Amiodarone 200mg qday  - Doxycycline 100 mg bid for 5 days  - DC RIJ MML, risk for infection         Automatic implantable cardioverter-defibrillator in situ  Underwent AVN ablation with upgrade to CRT.  - AVN ablation and BiV upgrade [place] scientific generator with SJM LV lead:   - LRL to 90 bpm today; will reduce by 10 bpm q month to goal 60 bpm.  - hx VT/VF per chart review.  - telemetry monitoring and repletion of electrolytes PRN.  - 1 wk in device  clinic, 4 mos in EP clinic            Carla Chavez MD  Heart Transplant  [email or web address]

## 2019-09-01 NOTE — ASSESSMENT & PLAN NOTE
Underwent AVN ablation with upgrade to CRT.  - AVN ablation and BiV upgrade [place] scientific generator with SJM LV lead:   - LRL to 90 bpm today; will reduce by 10 bpm q month to goal 60 bpm.  - hx VT/VF per chart review.  - telemetry monitoring and repletion of electrolytes PRN.  - 1 wk in device clinic, 4 mos in EP clinic

## 2019-09-01 NOTE — ASSESSMENT & PLAN NOTE
69 yo F with NIDCM EF 20% , NYHA FC III with admitted with afib with rvr and cardiogenic shock   - IABP placed 8/20 and pulled 8/23   -TTE with contrast 8/20: LVEDD 6.51, LVEF 15%, indeterminate diastolic function, severe LAE, trace AI, mod MR and TR, PAS 51 and IVC 15.    -Today: CVP: 6; SVO2: 61 ; CO: 4.2, CI 2.2 SVR: 1295    -Pending placement at Rehab, continue aggressive ambulation, up in chair, OOB (Fall precautions)  -Continue to hold diuresis, CVP 7 with negative balance  -Discussed with nursing patient taking in +2L while she is on fluid restriction diet. Continue to address and educate.   -GDMT: With recent hypotension and worsening kidney function,    - Decreased hydralazine/isosorbide dinitrate.    - Added low dose ACE 8/28/19 but had episode of hypotension and Cr up stopped.    - Continue plan for Dig 125 mcg every M-W-F  -CTs done off pathway for possible LVAD/transplant evaluation. Patient seen by CTS, not considered a candidate for advanced options at this time HOWEVER would like to see how she does with rehab/PT/OT, and see if she can walk into clinic, to see if she could become a candidate in the near future.

## 2019-09-01 NOTE — ASSESSMENT & PLAN NOTE
Atrial fibrillation with RVR s/p AVN ablation and BiV upgrade [place] scientific generator with SJM LV lead:    - S/p failed multiple JOSE / DCCV most recent 08/27/19    - EP following, Dr. Cardona  - DC all heparin products, continue Coumadin, dose held on 08/31/19, resume Coumadin 5mg tonight (INR 3.0 -> 2.3)  - Amiodarone 200mg qday  - Doxycycline 100 mg bid for 5 days

## 2019-09-01 NOTE — NURSING
Removed right IJ per nursing communication.  ZOFIA Vasques, removed dressing using alcohol wipes, removed 2 stitches. Patient positioned supine/flat.  Patient instructed to take a deep breath and hold, catheter removed, tip intact.  Pressure held x 10 minutes.  Petroleum gauze, 4x4, and Tegaderm applied to site. Patient will remain flat x 20 minutes more.  Ousmane son, at bedside.

## 2019-09-01 NOTE — ASSESSMENT & PLAN NOTE
- Cr up to 4.0 from baseline 1.3-1.4 in June 2019  -downtrended with improvement in cardiogenic shock but back up over past few days. Held IV lasix yesterday and gave IVFs.   - Improved today, 2.6 -> 1.9 -> 1.7

## 2019-09-02 PROBLEM — M10.9 GOUT: Status: ACTIVE | Noted: 2019-01-01

## 2019-09-02 NOTE — PLAN OF CARE
Problem: Adult Inpatient Plan of Care  Goal: Plan of Care Review  Outcome: Ongoing (interventions implemented as appropriate)  Pt remains free from falls and injuries. VSS. Pt c/o pain in left hand/wrist which is visibly swollen. MD aware. Pt and family at bedside educated on fluid restriction. Teach back  method utilized to ensure understanding. No further complaints at this time.

## 2019-09-02 NOTE — SUBJECTIVE & OBJECTIVE
Interval History:   Left wrist pain x 1 day. No history of gout or trauma. Given prednisone overnight. UA 13. No orthopnea or PND today. Called by radiology for a small nonocclusive thrombus in [facility]. Of note, CVC removed from [facility] yesterday.     Continuous Infusions:  Scheduled Meds:   amiodarone  200 mg Oral Daily    colchicine  0.6 mg Oral BID    digoxin  0.125 mg Oral Every Mon, Wed, Fri    doxycycline  100 mg Oral Q12H    famotidine  20 mg Oral Daily    hydrALAZINE  50 mg Oral Q8H    isosorbide dinitrate  20 mg Oral Q8H    magnesium oxide  400 mg Oral Daily    warfarin  5 mg Oral Daily     PRN Meds:acetaminophen, docusate sodium, ramelteon    Review of patient's allergies indicates:   Allergen Reactions    Augmentin [amoxicillin-pot clavulanate] Swelling     Lip swelling      Penicillins      Other reaction(s): Swelling  Lip swelling       Objective:     Vital Signs (Most Recent):  Temp: 98.3 °F (36.8 °C) (09/02/19 1201)  Pulse: 109 (09/02/19 1201)  Resp: 16 (09/02/19 1201)  BP: 120/82 (09/02/19 1201)  SpO2: 96 % (09/02/19 1201) Vital Signs (24h Range):  Temp:  [98.2 °F (36.8 °C)-98.6 °F (37 °C)] 98.3 °F (36.8 °C)  Pulse:  [] 109  Resp:  [16-20] 16  SpO2:  [93 %-98 %] 96 %  BP: ()/(55-82) 120/82     Patient Vitals for the past 72 hrs (Last 3 readings):   Weight   09/01/19 0500 70 kg (154 lb 5.2 oz)     Body mass index is 25.68 kg/m².      Intake/Output Summary (Last 24 hours) at 9/2/2019 1333  Last data filed at 9/2/2019 1200  Gross per 24 hour   Intake 958 ml   Output 1050 ml   Net -92 ml        Physical Exam   Constitutional: She appears well-developed and well-nourished. No distress.   Eyes: Conjunctivae are normal. Right eye exhibits no discharge. Left eye exhibits no discharge.   Neck: Normal range of motion. Neck supple. No JVD present.   Cardiovascular: Normal rate, regular rhythm and intact distal pulses. Exam reveals no gallop and no friction rub.   Murmur heard.  Pulmonary/Chest:  Effort normal and breath sounds normal. No stridor. No respiratory distress. She has no wheezes.   Abdominal: Soft. Bowel sounds are normal.   Musculoskeletal: Normal range of motion. She exhibits no edema or deformity.   Neurological: She is alert.   Skin: Skin is warm and dry. Capillary refill takes less than 2 seconds. She is not diaphoretic. No erythema.       Significant Labs:  CBC:  Recent Labs   Lab 09/01/19  0604 09/01/19  1135 09/02/19  0614   WBC 6.43 7.80 7.41   RBC 3.87* 4.29 3.89*   HGB 11.2* 12.2 11.3*   HCT 34.4* 38.1 34.1*    183 196   MCV 89 89 88   MCH 28.9 28.4 29.0   MCHC 32.6 32.0 33.1     BNP:  No results for input(s): BNP in the last 168 hours.    Invalid input(s): BNPTRIAGELBLO  CMP:  Recent Labs   Lab 08/31/19  1623 09/01/19  0604 09/02/19  0614    92  92 126*  127*   CALCIUM 9.7 9.3  9.3 9.4  9.4   ALBUMIN 2.3* 2.2*  2.2* 2.3*  2.3*   PROT 6.8 6.4  6.4 6.8  6.9   * 136  136 137  137   K 4.0 3.7  3.7 3.6  3.7   CO2 26 28  28 24  25   CL 96 99  99 101  101   BUN 37* 34*  34* 26*  26*   CREATININE 1.9* 1.7*  1.7* 1.5*  1.4   ALKPHOS 125 110  110 111  108   ALT 21 19  19 20  19   AST 38 29  29 27  25   BILITOT 0.5 0.8  0.8 0.9  0.9      Coagulation:   Recent Labs   Lab 08/28/19  0310 08/29/19  0347 08/30/19  0403 08/31/19  0443 09/01/19  0604 09/02/19  0614   INR 1.6* 1.9* 2.1* 3.0* 2.3* 1.9*   APTT 49.9*  49.9* 67.2* 62.5*  --   --   --      LDH:  No results for input(s): LDH in the last 72 hours.  Microbiology:  Microbiology Results (last 7 days)     Procedure Component Value Units Date/Time    Clostridium difficile EIA [[id number]] Collected:  08/29/19 1406    Order Status:  Completed Specimen:  Stool Updated:  08/29/19 2203     C. diff Antigen Negative     C difficile Toxins A+B, EIA Negative     Comment: Testing not recommended for children <24 months old.             I have reviewed all pertinent labs within the past 24 hours.    Estimated  Creatinine Clearance: 37.8 mL/min (based on SCr of 1.4 mg/dL).    Diagnostic Results:  CXR: No results found in the last 24 hours.  I have reviewed and interpreted all pertinent imaging results/findings within the past 24 hours.

## 2019-09-02 NOTE — PROGRESS NOTES
"[facility]  Adult Nutrition  Progress Note    SUMMARY       Recommendations    Recommendation:   1. Continue cardiac diet as tolerated.   2. If PO intake decreases < 50%, recommend Boost Plus TID.   3. RD to monitor & follow up.    Goals: PO intake > 50% x 7 days  Nutrition Goal Status: new  Communication of RD Recs: other (comment)(POC)    Reason for Assessment    Reason For Assessment: RD follow-up  Diagnosis: cardiac disease(CHF)  Relevant Medical History: HTN, HLD, cardiomyopathy, afib  Interdisciplinary Rounds: did not attend  General Information Comments: Pt reports improving appetite - states her appetite hadn't been doing well after surgery. Pt s/p AV node ablation 8/30. Pt states she has been eating ~50% of meals. She reports intake of 1-2 meals/day PTA and denies recent changes in intake. Pt endorses wt loss since admission, which she reports is mostly fluid. Pt is unsure of her dry wt/UBW. NFPE completed today, pt with mild age-appropriate wasting and does not meet criteria for malnutrition at this time.   Nutrition Discharge Planning: Adequate PO intake to meet needs    Nutrition Risk Screen    Nutrition Risk Screen: no indicators present    Nutrition/Diet History    Spiritual, Cultural Beliefs, Mormon Practices, Values that Affect Care: no    Anthropometrics    Temp: 98.3 °F (36.8 °C)  Height Method: Stated  Height: 5' 5" (165.1 cm)  Height (inches): 65 in  Weight Method: Bed Scale  Weight: 70 kg (154 lb 5.2 oz)  Weight (lb): 154.32 lb  Ideal Body Weight (IBW), Female: 125 lb  % Ideal Body Weight, Female (lb): 146.21 lb  BMI (Calculated): 30.5       Lab/Procedures/Meds    Pertinent Labs Reviewed: reviewed  Pertinent Labs Comments: BUN 26, Cr 1.5, GFR 41, Glu 126, Alb 2.3  Pertinent Medications Reviewed: reviewed  Pertinent Medications Comments: amiodarone, docusate, famotidine, warfarin    Estimated/Assessed Needs    Weight Used For Calorie Calculations: 70 kg (154 lb 5.2 " oz)  Energy Calorie Requirements (kcal): 1477 kcal/day  Energy Need Method: [place]  Protein Requirements: 70-84 g/day(1-1.2 g/kg)  Weight Used For Protein Calculations: 70 kg (154 lb 5.2 oz)  Fluid Requirements (mL): per MD or 1 mL/kcal     RDA Method (mL): 1477    Nutrition Prescription Ordered    Current Diet Order: Cardiac    Evaluation of Received Nutrient/Fluid Intake    I/O: -8.6L since admit  Energy Calories Required: meeting needs  Protein Required: meeting needs  Fluid Required: meeting needs  Comments: LBM 8/30  Tolerance: tolerating  % Intake of Estimated Energy Needs: 50 - 75 %  % Meal Intake: 50 - 75 %    Nutrition Risk    Level of Risk/Frequency of Follow-up: (1x/week)     Assessment and Plan    Nutrition Problem  Inadequate energy intake    Related to (etiology):   Decreased appetite s/p AV node ablation    Signs and Symptoms (as evidenced by):   Pt reports decreased appetite since surgery, variable PO documented % of meals    Interventions (treatment strategy):  Collaboration of care with other providers  Boost Plus TID if PO intake decreases < 50%    Nutrition Diagnosis Status:   New    Monitor and Evaluation    Food and Nutrient Intake: energy intake, food and beverage intake  Food and Nutrient Adminstration: diet order  Anthropometric Measurements: weight, weight change, body mass index  Biochemical Data, Medical Tests and Procedures: electrolyte and renal panel, gastrointestinal profile, glucose/endocrine profile, inflammatory profile, lipid profile  Nutrition-Focused Physical Findings: overall appearance     Malnutrition Assessment  Orbital Region (Subcutaneous Fat Loss): well nourished  Upper Arm Region (Subcutaneous Fat Loss): well nourished   Yazidi Region (Muscle Loss): mild depletion  Clavicle Bone Region (Muscle Loss): mild depletion  Dorsal Hand (Muscle Loss): mild depletion  Patellar Region (Muscle Loss): mild depletion  Posterior Calf Region (Muscle Loss): mild depletion      Nutrition Follow-Up    RD Follow-up?: Yes

## 2019-09-02 NOTE — PLAN OF CARE
Problem: Adult Inpatient Plan of Care  Goal: Plan of Care Review  Recommendations     Recommendation:   1. Continue cardiac diet as tolerated.   2. If PO intake decreases < 50%, recommend Boost Plus TID.   3. RD to monitor & follow up.     Goals: PO intake > 50% x 7 days  Nutrition Goal Status: new  Communication of RD Recs: other (comment)(POC)

## 2019-09-02 NOTE — PROGRESS NOTES
Pt complains of 9/10 pain ot right hand to wrist area, pt also reports tingling as well and  is diminished; pulses are present. Pt's BP is 96/61 map 72, and pt is due 20 isosorbide and 50 of hydralazine. Notified Dr. Dwayne Brownlee. MD ordered ultrasound of bilateral upper extremities to r/o DVT, Uric acid lab, and xray of wrist to r/o fracture. MD also ordered a one time dose of oxy 5 mg oral. Will continue to monitor closely.

## 2019-09-02 NOTE — PROGRESS NOTES
[facility]  Heart Transplant  Progress Note    Patient Name: Sherice Squires  MRN: [id number]  Admission Date: 8/20/2019  Hospital Length of Stay: 13 days  Attending Physician: Nimesh Montgomery Jr.,*  Primary Care Provider: Annamarie Soria MD  Principal Problem:Acute on chronic combined systolic and diastolic heart failure    Subjective:     Interval History:   Left wrist pain x 1 day. No history of gout or trauma. Given prednisone overnight. UA 13. No orthopnea or PND today. Called by radiology for a small nonocclusive thrombus in RIJ. Of note, CVC removed from RIJ yesterday.     Continuous Infusions:  Scheduled Meds:   amiodarone  200 mg Oral Daily    colchicine  0.6 mg Oral BID    digoxin  0.125 mg Oral Every Mon, Wed, Fri    doxycycline  100 mg Oral Q12H    famotidine  20 mg Oral Daily    hydrALAZINE  50 mg Oral Q8H    isosorbide dinitrate  20 mg Oral Q8H    magnesium oxide  400 mg Oral Daily    warfarin  5 mg Oral Daily     PRN Meds:acetaminophen, docusate sodium, ramelteon    Review of patient's allergies indicates:   Allergen Reactions    Augmentin [amoxicillin-pot clavulanate] Swelling     Lip swelling      Penicillins      Other reaction(s): Swelling  Lip swelling       Objective:     Vital Signs (Most Recent):  Temp: 98.3 °F (36.8 °C) (09/02/19 1201)  Pulse: 109 (09/02/19 1201)  Resp: 16 (09/02/19 1201)  BP: 120/82 (09/02/19 1201)  SpO2: 96 % (09/02/19 1201) Vital Signs (24h Range):  Temp:  [98.2 °F (36.8 °C)-98.6 °F (37 °C)] 98.3 °F (36.8 °C)  Pulse:  [] 109  Resp:  [16-20] 16  SpO2:  [93 %-98 %] 96 %  BP: ()/(55-82) 120/82     Patient Vitals for the past 72 hrs (Last 3 readings):   Weight   09/01/19 0500 70 kg (154 lb 5.2 oz)     Body mass index is 25.68 kg/m².      Intake/Output Summary (Last 24 hours) at 9/2/2019 1333  Last data filed at 9/2/2019 1200  Gross per 24 hour   Intake 958 ml   Output 1050 ml   Net -92 ml        Physical Exam   Constitutional: She  appears well-developed and well-nourished. No distress.   Eyes: Conjunctivae are normal. Right eye exhibits no discharge. Left eye exhibits no discharge.   Neck: Normal range of motion. Neck supple. No JVD present.   Cardiovascular: Normal rate, regular rhythm and intact distal pulses. Exam reveals no gallop and no friction rub.   Murmur heard.  Pulmonary/Chest: Effort normal and breath sounds normal. No stridor. No respiratory distress. She has no wheezes.   Abdominal: Soft. Bowel sounds are normal.   Musculoskeletal: Normal range of motion. She exhibits no edema or deformity.   Neurological: She is alert.   Skin: Skin is warm and dry. Capillary refill takes less than 2 seconds. She is not diaphoretic. No erythema.       Significant Labs:  CBC:  Recent Labs   Lab 09/01/19  0604 09/01/19  1135 09/02/19  0614   WBC 6.43 7.80 7.41   RBC 3.87* 4.29 3.89*   HGB 11.2* 12.2 11.3*   HCT 34.4* 38.1 34.1*    183 196   MCV 89 89 88   MCH 28.9 28.4 29.0   MCHC 32.6 32.0 33.1     BNP:  No results for input(s): BNP in the last 168 hours.    Invalid input(s): BNPTRIAGELBLO  CMP:  Recent Labs   Lab 08/31/19  1623 09/01/19  0604 09/02/19  0614    92  92 126*  127*   CALCIUM 9.7 9.3  9.3 9.4  9.4   ALBUMIN 2.3* 2.2*  2.2* 2.3*  2.3*   PROT 6.8 6.4  6.4 6.8  6.9   * 136  136 137  137   K 4.0 3.7  3.7 3.6  3.7   CO2 26 28  28 24  25   CL 96 99  99 101  101   BUN 37* 34*  34* 26*  26*   CREATININE 1.9* 1.7*  1.7* 1.5*  1.4   ALKPHOS 125 110  110 111  108   ALT 21 19  19 20  19   AST 38 29  29 27  25   BILITOT 0.5 0.8  0.8 0.9  0.9      Coagulation:   Recent Labs   Lab 08/28/19  0310 08/29/19  0347 08/30/19  0403 08/31/19  0443 09/01/19  0604 09/02/19  0614   INR 1.6* 1.9* 2.1* 3.0* 2.3* 1.9*   APTT 49.9*  49.9* 67.2* 62.5*  --   --   --      LDH:  No results for input(s): LDH in the last 72 hours.  Microbiology:  Microbiology Results (last 7 days)     Procedure Component Value Units  Date/Time    Clostridium difficile EIA [[id number]] Collected:  08/29/19 1406    Order Status:  Completed Specimen:  Stool Updated:  08/29/19 2203     C. diff Antigen Negative     C difficile Toxins A+B, EIA Negative     Comment: Testing not recommended for children <24 months old.             I have reviewed all pertinent labs within the past 24 hours.    Estimated Creatinine Clearance: 37.8 mL/min (based on SCr of 1.4 mg/dL).    Diagnostic Results:  CXR: No results found in the last 24 hours.  I have reviewed and interpreted all pertinent imaging results/findings within the past 24 hours.    Assessment and Plan:     Sherice Squires is a 69 yo AAF with PMHx significant for NIDCM / stage D HFrEF (LVEF 20-25%, LVEDD 5.9 cm) s/p dc ICD, AF, HTN, DLD, CKD who is admitted as a transfer from  ED for management of ADHF +/- possible cardiogenic shock.     Patient reports she presented to OSH with progressive/worsening shortness of breath on exertion, orthopnea, and peripheral swelling of approx 5 days duration associated with nausea and fatigue. Denies fever/chills/sweats, chest pain, diaphoresis, presyncope/syncope. Does report intermittent palpitations.      Patient was afebrile and normotensive on arrival (/55 mmHg) to OSH ED with pulses in the 120-130s in AF with RVR. Initial labs showed worsening FAMILIA on CKD (with Cr 3.8 from 2.7-3.1 this past week from previous baseline of 1.3-1.4 in June 2019) as well as elevated T bili 2.3 and BNP >3000. CXR obtained without acute cardiopulmonary process. Patient ultimately transferred to [facility] for higher level of care.      Patient follows with [facility] clinic - currently on Entresto 97/103 mg BID, Toprol  mg daily, Aldactone 25 mg daily, and digoxin 0.125 mg daily. She was last seen by Dr Rodriguez on 8/6/19 who added metolazone 2.5 mg QHS before evening dose of Bumex to augment diuresis due to complaints of SOB / peripheral swelling at the time. She did not respond to this  regimen and reports she was ultimately switched to Torsemide instead.      Patient is fully complaint with her medicines. Also adherent to fluid / salt restrictions with her hx of congestive heart failure.      States she was hospitalized only once before for ADHF within the past year.      Of note patient follows with Dr Reece of EP for her AF hx, last seen by him in 5/2019    * Acute on chronic combined systolic and diastolic heart failure  67 yo F with NIDCM EF 20% , NYHA FC III with admitted with afib with rvr and cardiogenic shock   - IABP placed 8/20 and pulled 8/23   -TTE with contrast 8/20: LVEDD 6.51, LVEF 15%, indeterminate diastolic function, severe LAE, trace AI, mod MR and TR, PAS 51 and IVC 15.    -Today: CVP: 6; SVO2: 61 ; CO: 4.2, CI 2.2 SVR: 1295    -Pending placement at Rehab, continue aggressive ambulation, up in chair, OOB (Fall precautions)  -GDMT: With recent hypotension and worsening kidney function,    - Decreased hydralazine/isosorbide dinitrate.    - Added low dose ACE 8/28/19 but had episode of hypotension and Cr up stopped.    - Continue plan for Dig 125 mcg every M-W-F  -CTs done off pathway for possible LVAD/transplant evaluation. Patient seen by CTS, not considered a candidate for advanced options at this time HOWEVER would like to see how she does with rehab/PT/OT, and see if she can walk into clinic, to see if she could become a candidate in the near future.    DVT in RIJ: at site of previous central line. On coumadin. INR 1.9    Gout  Uric acid 13  - Stop prednisone  - Start colchicine  - Radiograph unrevealing    Cardiogenic shock  - See acute on chronic heart failure    Acute on chronic kidney failure  - Cr up to 4.0 from baseline 1.3-1.4 in June 2019  -downtrended with improvement in cardiogenic shock but back up over past few days. Held IV lasix yesterday and gave IVFs.   - Improved today, 2.6 -> 1.9 -> 1.7    Atrial fibrillation with RVR  Atrial fibrillation with RVR s/p AVN  ablation and BiV upgrade [place] scientific generator with SJM LV lead:    - S/p failed multiple JOSE / DCCV most recent 08/27/19    - EP following, Dr. Cardona  - DC all heparin products, continue Coumadin, dose held on 08/31/19, resume Coumadin 5mg tonight (INR 3.0 -> 2.3)  - Amiodarone 200mg qday  - Doxycycline 100 mg bid for 5 days      Automatic implantable cardioverter-defibrillator in situ  Underwent AVN ablation with upgrade to CRT.  - AVN ablation and BiV upgrade [place] scientific generator with SJM LV lead:   - LRL to 90 bpm; will reduce by 10 bpm q month to goal 60 bpm.  - hx VT/VF per chart review.  - telemetry monitoring and repletion of electrolytes PRN.  - 1 wk in device clinic, 4 mos in EP clinic  - Discuss need for amio with EP tomorrow      Saroj Mccartney MD  Heart Transplant  [facility]

## 2019-09-02 NOTE — PLAN OF CARE
Problem: Adult Inpatient Plan of Care  Goal: Plan of Care Review  Outcome: Ongoing (interventions implemented as appropriate)  Pt free of falls/traumas/injuries. Skin remains clean, dry, and intact. Pt has tingling pain to left wrist hand area,xray,ultrasound and uric acid obtained.  Pt re-educated on importance of measuring accurate intake and out put; pt verbalized and demonstrates understanding. Reviewed plan of care with pt; and pt verbalized understanding.  Pt AAOX4, VSS, and  in no distress will continue to monitor.

## 2019-09-02 NOTE — ASSESSMENT & PLAN NOTE
Underwent AVN ablation with upgrade to CRT.  - AVN ablation and BiV upgrade [place] scientific generator with SJM LV lead:   - LRL to 90 bpm; will reduce by 10 bpm q month to goal 60 bpm.  - hx VT/VF per chart review.  - telemetry monitoring and repletion of electrolytes PRN.  - 1 wk in device clinic, 4 mos in EP clinic  - Discuss need for amio with EP tomorrow

## 2019-09-02 NOTE — PROGRESS NOTES
Pt continues to complain of left wrist pain and tingling, and it is increasing in pain 10/10. Notified Dr. Dwayne Brownlee pt's uric acid came back at 13.0. MD ordered 40 mg of prednisone daily for 4 days, and a one time order of morphine IV 2 mg. Because of giving IV morphine, and BP is 103/65 with a map of 77 per MD hold isorbide 20 mg and hydralazine 50 mg due now. Order implemented will continue to monitor.

## 2019-09-03 NOTE — PROGRESS NOTES
RN contacted MD Perez regarding pt's recent BP reading and her scheduled medications of 50 mg Hydralazine and 20 mg Isosorbide. MD stated to hold the 50mg Hydralazine and 20mg Isosorbide and instead administer 25mg Hydralazine and 10mg Isosorbide. RN to put in orders and administer new one time medications. MD stated that he would discuss the pt's scheduled medications with the day team. Pt is asymptomatic and has no complaints. Will continue to monitor.

## 2019-09-03 NOTE — PROGRESS NOTES
[facility]  Heart Transplant  Progress Note    Patient Name: Sherice Squires  MRN: [id number]  Admission Date: 8/20/2019  Hospital Length of Stay: 14 days  Attending Physician: Nimesh Montgomery Jr.,*  Primary Care Provider: Annamarie Soria MD  Principal Problem:Acute on chronic combined systolic and diastolic heart failure    Subjective:     Interval History: no acute events overnight. Patient reports continued left wrist pain. Denies dyspnea or chest pain. Dose of hydralazine and isosorbide decreased today.     Continuous Infusions:  Scheduled Meds:   amiodarone  200 mg Oral Daily    colchicine  0.6 mg Oral BID    digoxin  0.125 mg Oral Every Mon, Wed, Fri    doxycycline  100 mg Oral Q12H    famotidine  20 mg Oral Daily    hydrALAZINE  25 mg Oral Q8H    isosorbide dinitrate  10 mg Oral Q8H    magnesium oxide  400 mg Oral Daily    warfarin  2.5 mg Oral Once     PRN Meds:docusate sodium, ramelteon    Review of patient's allergies indicates:   Allergen Reactions    Augmentin [amoxicillin-pot clavulanate] Swelling     Lip swelling      Penicillins      Other reaction(s): Swelling  Lip swelling       Objective:     Vital Signs (Most Recent):  Temp: 96.9 °F (36.1 °C) (09/03/19 1123)  Pulse: 70 (09/03/19 1527)  Resp: 17 (09/03/19 1123)  BP: 104/61 (09/03/19 1123)  SpO2: 95 % (09/03/19 1123) Vital Signs (24h Range):  Temp:  [96.9 °F (36.1 °C)-98.1 °F (36.7 °C)] 96.9 °F (36.1 °C)  Pulse:  [70-90] 70  Resp:  [16-18] 17  SpO2:  [92 %-99 %] 95 %  BP: ()/(60-68) 104/61     Patient Vitals for the past 72 hrs (Last 3 readings):   Weight   09/01/19 0500 70 kg (154 lb 5.2 oz)     Body mass index is 25.68 kg/m².      Intake/Output Summary (Last 24 hours) at 9/3/2019 1618  Last data filed at 9/3/2019 1300  Gross per 24 hour   Intake 970 ml   Output 1200 ml   Net -230 ml        Physical Exam   Constitutional: She appears well-developed and well-nourished. No distress.   Eyes: Conjunctivae are  normal. Right eye exhibits no discharge. Left eye exhibits no discharge.   Neck: Normal range of motion. Neck supple. No JVD present.   Cardiovascular: Normal rate, regular rhythm, normal heart sounds and intact distal pulses. Exam reveals no gallop and no friction rub.   No murmur heard.  Pulmonary/Chest: Effort normal and breath sounds normal. No stridor. No respiratory distress. She has no wheezes.   Abdominal: Soft. Bowel sounds are normal.   Musculoskeletal: Normal range of motion. She exhibits no edema or deformity.   Left wrist pain   Neurological: She is alert.   Skin: Skin is warm and dry. Capillary refill takes less than 2 seconds. She is not diaphoretic. No erythema.   Nursing note and vitals reviewed.      Significant Labs:  CBC:  Recent Labs   Lab 09/01/19  1135 09/02/19  0614 09/03/19  0453   WBC 7.80 7.41 7.87   RBC 4.29 3.89* 3.92*   HGB 12.2 11.3* 11.1*   HCT 38.1 34.1* 35.0*    196 218   MCV 89 88 89   MCH 28.4 29.0 28.3   MCHC 32.0 33.1 31.7*     BNP:  No results for input(s): BNP in the last 168 hours.    Invalid input(s): BNPTRIAGELBLO  CMP:  Recent Labs   Lab 09/02/19  1454 09/03/19  0453 09/03/19  1533   * 100 112*   CALCIUM 10.0 9.6 9.3   ALBUMIN 2.5* 2.3* 2.5*   PROT 7.7 6.8 7.0    136 137   K 4.2 4.3 3.9   CO2 24 27 26    100 101   BUN 28* 28* 27*   CREATININE 1.5* 1.4 1.4   ALKPHOS 124 106 119   ALT 23 24 36   AST 32 32 50*   BILITOT 0.8 0.6 0.7      Coagulation:   Recent Labs   Lab 08/28/19  0310 08/29/19  0347 08/30/19  0403  09/01/19  0604 09/02/19  0614 09/03/19  0453   INR 1.6* 1.9* 2.1*   < > 2.3* 1.9* 2.4*   APTT 49.9*  49.9* 67.2* 62.5*  --   --   --   --     < > = values in this interval not displayed.     LDH:  No results for input(s): LDH in the last 72 hours.  Microbiology:  Microbiology Results (last 7 days)     Procedure Component Value Units Date/Time    Clostridium difficile EIA [[id number]] Collected:  08/29/19 1406    Order Status:  Completed  Specimen:  Stool Updated:  08/29/19 2203     C. diff Antigen Negative     C difficile Toxins A+B, EIA Negative     Comment: Testing not recommended for children <24 months old.             I have reviewed all pertinent labs within the past 24 hours.    Estimated Creatinine Clearance: 37.8 mL/min (based on SCr of 1.4 mg/dL).    Diagnostic Results:  I have reviewed and interpreted all pertinent imaging results/findings within the past 24 hours.    Assessment and Plan:     * Acute on chronic combined systolic and diastolic heart failure  67 yo F with NIDCM EF 20% , NYHA FC III with admitted with afib with rvr and cardiogenic shock   - IABP placed 8/20 and pulled 8/23   -TTE with contrast 8/20: LVEDD 6.51, LVEF 15%, indeterminate diastolic function, severe LAE, trace AI, mod MR and TR, PAS 51 and IVC 15.     - Decreased hydralazine to 25 mg Q8H and decrease isosorbide dinitrate to 10 mg Q8H  - Will try to add ARNI tomorrow if able with BPs  - Continue dig 125 mcg MWF  - Not a candidate for LVAD/OHTX at this time. Will need aggressive rehab and re-evaluation    Gout  Uric acid 13  - Stop prednisone  - Start colchicine  - Radiograph unrevealing    Impaired functional mobility and endurance  Rehab following discharge    Acute on chronic kidney failure  - Cr improving to 1.4  - Currently holding diuretics    Atrial fibrillation with RVR  Atrial fibrillation with RVR s/p AVN ablation and BiV upgrade [place] scientific generator with SJM LV lead:   - S/p failed multiple JOSE / DCCV most recent 08/27/19  - EP following, Dr. Cardona  - Amiodarone 200mg qday  - INR 2.4  - Doxycycline 100 mg bid for 5 days    Automatic implantable cardioverter-defibrillator in situ  Underwent AVN ablation with upgrade to CRT  - AVN ablation and BiV upgrade [place] scientific generator with SJM LV lead:   - LRL to 90 bpm; will reduce by 10 bpm q month to goal 60 bpm.  - 1 wk in device clinic, 4 mos in EP clinic        Saroj Mccartney MD  Heart  Transplant  [facility]

## 2019-09-03 NOTE — ASSESSMENT & PLAN NOTE
69 yo F with NIDCM EF 20% , NYHA FC III with admitted with afib with rvr and cardiogenic shock   - IABP placed 8/20 and pulled 8/23   -TTE with contrast 8/20: LVEDD 6.51, LVEF 15%, indeterminate diastolic function, severe LAE, trace AI, mod MR and TR, PAS 51 and IVC 15.     - Decreased hydralazine to 25 mg Q8H and decrease isosorbide dinitrate to 10 mg Q8H  - Will try to add ARNI tomorrow if able with Bps  - Continue dig 125 mcg MWF  - Not a candidate for LVAD/OHTX at this time. Will need aggressive rehab and re-evaluation

## 2019-09-03 NOTE — PT/OT/SLP PROGRESS
Occupational Therapy   Treatment    Name: Sherice Squires  MRN: [id number]  Admitting Diagnosis:  Acute on chronic combined systolic and diastolic heart failure  4 Days Post-Op    Recommendations:     Discharge Recommendations: rehabilitation facility  Discharge Equipment Recommendations:  (TBD as pt progresses)  Barriers to discharge:  None    Assessment:     Sherice Squires is a 68 y.o. female with a medical diagnosis of Acute on chronic combined systolic and diastolic heart failure.  She presents with performance deficits affecting function are weakness, impaired endurance, impaired functional mobilty, gait instability, impaired balance, impaired self care skills, pain, impaired cardiopulmonary response to activity, decreased ROM, decreased upper extremity function. Pt tolerated session well with significant improvement noted with functional mobility and transfers this date. Pt reported pain in L hand due to gout flare-up. Pt is an appropriate candidate for IP Rehab in order to improve overall functional (I)ce with mobility, transfers, bed mobility, and ADLs. Pt would benefit from continued skilled acute OT services in order to maximize independence and safety with ADLs and functional mobility to ensure safe return to PLOF in the least restrictive environment.    Rehab Prognosis:  Good; patient would benefit from acute skilled OT services to address these deficits and reach maximum level of function.       Plan:     Patient to be seen 4 x/week to address the above listed problems via self-care/home management, therapeutic activities, therapeutic exercises  · Plan of Care Expires: 09/25/19  · Plan of Care Reviewed with: patient, family    Subjective     Pain/Comfort:  · Pain Rating 1: (Pt did noit rate numerical value; L hand and digits 2/2 gout flare-up )  · Pain Addressed 1: Pre-medicate for activity, Distraction, Reposition    Objective:     Communicated with: RN prior to session.  Patient found HOB elevated  with telemetry, Matilde upon OT entry to room. Pt agreeable to therapy session. Pt's son present at beginning of therapy session.       General Precautions: Standard, fall   Orthopedic Precautions:N/A   Braces: (sling and swathe at night )     Occupational Performance:     Bed Mobility:    · Patient completed Rolling/Turning to Right with minimum assistance and with side rail  · Verbal cues provided for technique   · Patient completed Scooting/Bridging with contact guard assistance for anterior scooting towards EOB   · Increased time required with verbal cues provided for technique.   · Increased difficulty weight-bearing through L UE for seated scooting   · Patient completed Supine to Sit with contact guard assistance, with side rail and HOB elevated with increased time required.   · Verbal cues provided for technique.      Functional Mobility/Transfers:  · Patient completed x 3 reps Sit <> Stand Transfer (x2 reps from EOB x1 rep from bedside chair with min A x 2 persons (moderate assistance x 1) with B HHA  · Verbal cues for hand placement and technique   · Slight posterior lean during initial stance but able to achieve neutral position with B HHA  · No B knee buckling; slight guarding of L LE but improved with mobility  · Patient completed Bed <> Chair Transfer using Stand Pivot technique with minimum assistance x 2 persons with b/l HHA   · Functional Mobility: Pt completed 6ft with min A x 2 persons with B HHA with chair to follow.   · Pt with no LOB and improved stability and balance.      Activities of Daily Living:  · Upper Body Dressing: set-up A to don gown like robe while sitting EOB     · Lower Body Dressing: maximal assistance pt able to bring B LEs into figure 4 postiion with Mod A from therapist in order to access B  socks and pull up over heels while sitting EOB.     [facility] 6 Click ADL: 15    Treatment & Education:  Pt educated by therapist on:   - Pt educated on role of OT, POC, and goals for  therapy.    - RN and PCT notified of pt's level of assistance for transfers this date.   - BSC provided to pt's room for pt use with assistance from a staff member.   - Educated pt on being appropriate to transfer with nsg and PCT with mod A for stand pivot transfer.   - Time provided for therapeutic counseling and discussion of health disposition.   - Importance of OOB ax's with staff member assistance and sitting OOB majority of day.   - Pt completed ADLs and functional mobility for treatment session as noted above   - Pt verbalized understanding. Pt expressed no further concerns/questions.  - whiteboard updated     Patient left up in chair with all lines intact, call button in reach, RN notified and PCT  presentEducation:      GOALS:   Multidisciplinary Problems     Occupational Therapy Goals        Problem: Occupational Therapy Goal    Goal Priority Disciplines Outcome Interventions   Occupational Therapy Goal     OT, PT/OT            Problem: Occupational Therapy Goal    Goal Priority Disciplines Outcome Interventions   Occupational Therapy Goal     OT, PT/OT Ongoing (interventions implemented as appropriate)    Description:  Goals to be met by: 09-09-19     Patient will increase functional independence with ADLs by performing:    UE Dressing with Stand-by Assistance.  Grooming while standing with Contact Guard Assistance.  Toileting from bedside commode with Minimal Assistance for hygiene and clothing management.   Supine to sit with Stand-by Assistance.  Stand pivot transfers with Contact Guard Assistance.  Toilet transfer to bedside commode with Contact Guard Assistance.  Pt. To be I with HEP to improve level of endurance                      Time Tracking:     OT Date of Treatment: 09/03/19  OT Start Time: 1032  OT Stop Time: 1100  OT Total Time (min): 28 min    Billable Minutes:Self Care/Home Management 15  Therapeutic Activity 13    Lucy Bean, OT  9/3/2019

## 2019-09-03 NOTE — PLAN OF CARE
Discharge Planning:    [facility]    SW also sent referrals x2 to the [place] area    Will continue to follow along

## 2019-09-03 NOTE — ASSESSMENT & PLAN NOTE
Underwent AVN ablation with upgrade to CRT  - AVN ablation and BiV upgrade [place] scientific generator with SJM LV lead:   - LRL to 90 bpm; will reduce by 10 bpm q month to goal 60 bpm.  - 1 wk in device clinic, 4 mos in EP clinic

## 2019-09-03 NOTE — SUBJECTIVE & OBJECTIVE
Interval History: no acute events overnight. Patient reports continued left wrist pain. Denies dyspnea or chest pain. Dose of hydralazine and isosorbide decreased today.     Continuous Infusions:  Scheduled Meds:   amiodarone  200 mg Oral Daily    colchicine  0.6 mg Oral BID    digoxin  0.125 mg Oral Every Mon, Wed, Fri    doxycycline  100 mg Oral Q12H    famotidine  20 mg Oral Daily    hydrALAZINE  25 mg Oral Q8H    isosorbide dinitrate  10 mg Oral Q8H    magnesium oxide  400 mg Oral Daily    warfarin  2.5 mg Oral Once     PRN Meds:docusate sodium, ramelteon    Review of patient's allergies indicates:   Allergen Reactions    Augmentin [amoxicillin-pot clavulanate] Swelling     Lip swelling      Penicillins      Other reaction(s): Swelling  Lip swelling       Objective:     Vital Signs (Most Recent):  Temp: 96.9 °F (36.1 °C) (09/03/19 1123)  Pulse: 70 (09/03/19 1527)  Resp: 17 (09/03/19 1123)  BP: 104/61 (09/03/19 1123)  SpO2: 95 % (09/03/19 1123) Vital Signs (24h Range):  Temp:  [96.9 °F (36.1 °C)-98.1 °F (36.7 °C)] 96.9 °F (36.1 °C)  Pulse:  [70-90] 70  Resp:  [16-18] 17  SpO2:  [92 %-99 %] 95 %  BP: ()/(60-68) 104/61     Patient Vitals for the past 72 hrs (Last 3 readings):   Weight   09/01/19 0500 70 kg (154 lb 5.2 oz)     Body mass index is 25.68 kg/m².      Intake/Output Summary (Last 24 hours) at 9/3/2019 1618  Last data filed at 9/3/2019 1300  Gross per 24 hour   Intake 970 ml   Output 1200 ml   Net -230 ml        Physical Exam   Constitutional: She appears well-developed and well-nourished. No distress.   Eyes: Conjunctivae are normal. Right eye exhibits no discharge. Left eye exhibits no discharge.   Neck: Normal range of motion. Neck supple. No JVD present.   Cardiovascular: Normal rate, regular rhythm, normal heart sounds and intact distal pulses. Exam reveals no gallop and no friction rub.   No murmur heard.  Pulmonary/Chest: Effort normal and breath sounds normal. No stridor. No  respiratory distress. She has no wheezes.   Abdominal: Soft. Bowel sounds are normal.   Musculoskeletal: Normal range of motion. She exhibits no edema or deformity.   Left wrist pain   Neurological: She is alert.   Skin: Skin is warm and dry. Capillary refill takes less than 2 seconds. She is not diaphoretic. No erythema.   Nursing note and vitals reviewed.      Significant Labs:  CBC:  Recent Labs   Lab 09/01/19  1135 09/02/19  0614 09/03/19  0453   WBC 7.80 7.41 7.87   RBC 4.29 3.89* 3.92*   HGB 12.2 11.3* 11.1*   HCT 38.1 34.1* 35.0*    196 218   MCV 89 88 89   MCH 28.4 29.0 28.3   MCHC 32.0 33.1 31.7*     BNP:  No results for input(s): BNP in the last 168 hours.    Invalid input(s): BNPTRIAGELBLO  CMP:  Recent Labs   Lab 09/02/19  1454 09/03/19  0453 09/03/19  1533   * 100 112*   CALCIUM 10.0 9.6 9.3   ALBUMIN 2.5* 2.3* 2.5*   PROT 7.7 6.8 7.0    136 137   K 4.2 4.3 3.9   CO2 24 27 26    100 101   BUN 28* 28* 27*   CREATININE 1.5* 1.4 1.4   ALKPHOS 124 106 119   ALT 23 24 36   AST 32 32 50*   BILITOT 0.8 0.6 0.7      Coagulation:   Recent Labs   Lab 08/28/19  0310 08/29/19  0347 08/30/19  0403  09/01/19  0604 09/02/19  0614 09/03/19  0453   INR 1.6* 1.9* 2.1*   < > 2.3* 1.9* 2.4*   APTT 49.9*  49.9* 67.2* 62.5*  --   --   --   --     < > = values in this interval not displayed.     LDH:  No results for input(s): LDH in the last 72 hours.  Microbiology:  Microbiology Results (last 7 days)     Procedure Component Value Units Date/Time    Clostridium difficile EIA [[id number]] Collected:  08/29/19 1406    Order Status:  Completed Specimen:  Stool Updated:  08/29/19 7625     C. diff Antigen Negative     C difficile Toxins A+B, EIA Negative     Comment: Testing not recommended for children <24 months old.             I have reviewed all pertinent labs within the past 24 hours.    Estimated Creatinine Clearance: 37.8 mL/min (based on SCr of 1.4 mg/dL).    Diagnostic Results:  I have reviewed  and interpreted all pertinent imaging results/findings within the past 24 hours.

## 2019-09-03 NOTE — ASSESSMENT & PLAN NOTE
Atrial fibrillation with RVR s/p AVN ablation and BiV upgrade [place] scientific generator with SJM LV lead:   - S/p failed multiple JOSE / DCCV most recent 08/27/19  - EP following, Dr. Cardona  - Amiodarone 200mg qday  - INR 2.4  - Doxycycline 100 mg bid for 5 days

## 2019-09-03 NOTE — PLAN OF CARE
Problem: Occupational Therapy Goal  Goal: Occupational Therapy Goal  Goals to be met by: 09-09-19     Patient will increase functional independence with ADLs by performing:    UE Dressing with Stand-by Assistance.  Grooming while standing with Contact Guard Assistance.  Toileting from bedside commode with Minimal Assistance for hygiene and clothing management.   Supine to sit with Stand-by Assistance.  Stand pivot transfers with Contact Guard Assistance.  Toilet transfer to bedside commode with Contact Guard Assistance.  Pt. To be I with HEP to improve level of endurance     Outcome: Ongoing (interventions implemented as appropriate)  Continue POC   Pt is progressing well towards all goals    Lucy Bean, OTR/L  Pager: [phone]  9/3/2019

## 2019-09-03 NOTE — ANESTHESIA POSTPROCEDURE EVALUATION
Anesthesia Post Evaluation    Patient: Sherice Squires    Procedure(s) Performed: Procedure(s) (LRB):  CARDIOVERSION (N/A)  ECHOCARDIOGRAM, TRANSESOPHAGEAL (N/A)    Final Anesthesia Type: general  Patient location during evaluation: PACU  Patient participation: Yes- Able to Participate  Level of consciousness: awake and alert and oriented  Post-procedure vital signs: reviewed and stable  Pain management: adequate  Airway patency: patent  PONV status at discharge: No PONV  Anesthetic complications: no      Cardiovascular status: hemodynamically stable  Respiratory status: unassisted  Hydration status: euvolemic  Follow-up not needed.          Vitals Value Taken Time   BP 96/60 9/3/2019  5:17 AM   Temp 36.4 °C (97.6 °F) 9/3/2019  5:17 AM   Pulse 70 9/3/2019  5:17 AM   Resp 16 9/3/2019  5:17 AM   SpO2 99 % 9/3/2019  5:17 AM         No case tracking events are documented in the log.      Pain/Yaritza Score: Pain Rating Prior to Med Admin: 10 (9/2/2019  4:53 AM)  Pain Rating Post Med Admin: 5 (9/2/2019  5:03 AM)

## 2019-09-03 NOTE — PLAN OF CARE
Problem: Adult Inpatient Plan of Care  Goal: Plan of Care Review  Outcome: Ongoing (interventions implemented as appropriate)  Patient remained free of falls, trauma, injury, and skin breakdown. VSS; no patient complaints at this time. RN contacted MD regarding BP medications; one time doses ordered and administered. VS remained stable. Fall precautions maintained; frequent weight shifts encouraged. Plan of care reviewed; patient verbalized understanding. All questions and concerns addressed; will continue to monitor.

## 2019-09-04 NOTE — ASSESSMENT & PLAN NOTE
Atrial fibrillation with RVR s/p AVN ablation and BiV upgrade [place] scientific generator with SJM LV lead:   - S/p failed multiple JOSE / DCCV most recent 08/27/19  - EP following, Dr. Cardona  - Amiodarone 200mg qday  - INR 2.3  - Doxycycline 100 mg bid for 5 days (last day)

## 2019-09-04 NOTE — ASSESSMENT & PLAN NOTE
- Arrhythmia following  - S/p failed multiple JOSE / DCCV most recent 08/27/19  -AVN ablation 8/30 w/ upgrade to CRT, Doxycycline 100 mg bid for 5 days  -1 wk in device clinic, 4 mos in EP clinic

## 2019-09-04 NOTE — PROGRESS NOTES
[facility]  Physical Medicine & Rehab  Progress Note    Patient Name: Sherice Squires  MRN: [id number]  Admission Date: 8/20/2019  Length of Stay: 15 days  Attending Physician: Nimesh Montgomery Jr.,*    Subjective:     Principal Problem:Acute on chronic combined systolic and diastolic heart failure     (09/04/2019): Patient is seen for follow-up rehab evaluation and recommendations.  Participating with therapy.     Hospital Course:   8/23/19: PT eval not performed.  08/26/2019: OT-Bed mobility Mod-MaxA.  Sit to stand MaxA. ADLs not tested.  08/31/2019: Sit to stand MaxA.  Ambulated 2 steps MaxA.   09/03/2019: OT-Bed mobility Min-CGA.  Sit to stand CGA-Hay x 2 ppl and trxs Hay x 2 ppl.  Ambulated 6 ft Hay x 2 ppl w/ chair to follow.  UBD setupA and LBD MaxA.    Past Medical History:   Diagnosis Date    Atrial fibrillation     Cardiomyopathy     CHF (congestive heart failure)     Hyperlipidemia     Hypertension     Ventricular tachycardia      Past Surgical History:   Procedure Laterality Date    ABLATION, AVN N/A 8/30/2019    Performed by [facility] at [facility] EP LAB    CARDIAC DEFIBRILLATOR PLACEMENT      CARDIOVERSION N/A 8/27/2019    Performed by [facility] at [facility] EP [facility]    CARDIOVERSION N/A 8/22/2019    Performed by [facility] at [facility] EP [facility]    CARDIOVERSION N/A 8/20/2019    Performed by [facility] at [facility] EP [facility]    Cardioversion or Defibrillation  8/30/2019    Performed by [facility] at [facility] EP [facility]    CARDIOVERSION OR DEFIBRILLATION N/A 3/8/2019    Performed by [facility] at [facility] EP [facility]    CHOLECYSTECTOMY      ECHOCARDIOGRAM, TRANSESOPHAGEAL N/A 8/27/2019    Performed by [facility] Diagnostic Provider at [facility] EP [facility]    ECHOCARDIOGRAM,TRANSESOPHAGEAL N/A 8/20/2019    Performed by [facility] Provider at [facility] EP [facility]    ECHOCARDIOGRAM,TRANSESOPHAGEAL N/A 3/8/2019    Performed by [facility] Provider at [facility] EP [facility]     HYSTERECTOMY      INSERTION, CARDIAC PACEMAKER, BIVENTRICULAR, PERMANENT, AS UPGRADE Left 8/30/2019    Performed by [facility] at [facility]    JOINT REPLACEMENT  2009    rt knee replacment    pace maker  12/2010     Review of patient's allergies indicates:   Allergen Reactions    Augmentin [amoxicillin-pot clavulanate] Swelling     Lip swelling      Penicillins      Other reaction(s): Swelling  Lip swelling         Scheduled Medications:    amiodarone  200 mg Oral Daily    colchicine  0.6 mg Oral BID    digoxin  0.125 mg Oral Every Mon, Wed, Fri    doxycycline  100 mg Oral Q12H    famotidine  20 mg Oral Daily    hydrALAZINE  25 mg Oral Q8H    isosorbide dinitrate  10 mg Oral Q8H    magnesium oxide  400 mg Oral Daily       PRN Medications: docusate sodium, ramelteon    Family History     Problem Relation (Age of Onset)    Arthritis Father    COPD Brother    Cancer Father    Drug abuse Sister    Hypertension Maternal Grandmother        Tobacco Use    Smoking status: Never Smoker    Smokeless tobacco: Never Used   Substance and Sexual Activity    Alcohol use: No     Comment: rarely    Drug use: No    Sexual activity: Not on file     Review of Systems   Constitutional: Positive for activity change and fatigue. Negative for chills and fever.   HENT: Negative for trouble swallowing and voice change.    Eyes: Negative for photophobia and visual disturbance.   Respiratory: Negative for cough and shortness of breath.    Cardiovascular: Negative for chest pain and palpitations.   Gastrointestinal: Negative for nausea and vomiting.   Genitourinary: Negative for difficulty urinating and flank pain.   Musculoskeletal: Positive for arthralgias and gait problem.   Skin: Negative for color change and rash.   Neurological: Positive for weakness. Negative for speech difficulty and numbness.     Objective:     Vital Signs (Most Recent):  Temp: 97.6 °F (36.4 °C) (09/04/19 0809)  Pulse: 70 (09/04/19  0826)  Resp: 16 (09/04/19 0505)  BP: (!) 102/58 (09/04/19 0809)  SpO2: 95 % (09/04/19 0809)    Vital Signs (24h Range):  Temp:  [96.9 °F (36.1 °C)-98.6 °F (37 °C)] 97.6 °F (36.4 °C)  Pulse:  [68-70] 70  Resp:  [16-17] 16  SpO2:  [95 %-98 %] 95 %  BP: (102-118)/(58-71) 102/58     Body mass index is 25.68 kg/m².    Physical Exam   Constitutional: She is oriented to person, place, and time. She appears well-developed and well-nourished.   HENT:   Head: Normocephalic and atraumatic.   Eyes: Right eye exhibits no discharge. Left eye exhibits no discharge.   Neck: Neck supple.   Cardiovascular: Intact distal pulses.   Pulmonary/Chest: Effort normal. No respiratory distress.   Abdominal: Soft. There is no tenderness.   Musculoskeletal: She exhibits no edema or deformity.   Generalized deconditioning   Neurological: She is alert and oriented to person, place, and time.   Follows commands    Skin: Skin is warm and dry.   Psychiatric: She has a normal mood and affect. Her behavior is normal. Cognition and memory are impaired.   Vitals reviewed.      Diagnostic Results: Labs: Reviewed    Assessment/Plan:      * Acute on chronic combined systolic and diastolic heart failure  -HTS following  -HTS Decreased hydralazine to 25 mg Q8H and decrease isosorbide dinitrate to 10 mg Q8H  -LVAD tentatively scheduled for 9/25/19 per chart, but HTS states not a candidate for LVAD/OHTX at this time    Impaired functional mobility and endurance  Recommendations  -  Encourage mobility, OOB in chair at least 3 hours per day, and early ambulation as appropriate  -  PT/OT evaluate and treat  -  Pain management  -  Monitor for and prevent skin breakdown and pressure ulcers  · Early mobility, repositioning/weight shifting every 20-30 minutes when sitting, turn patient every 2 hours, proper mattress/overlay and chair cushioning, pressure relief/heel protector boots  -  DVT prophylaxis    -  Reviewed discharge options (IP rehab, SNF, HH therapy, and  OP therapy)    Atrial fibrillation with RVR  - Arrhythmia following  - S/p failed multiple JOSE / DCCV most recent 08/27/19  -AVN ablation 8/30 w/ upgrade to CRT, Doxycycline 100 mg bid for 5 days  -1 wk in device clinic, 4 mos in EP clinic    Reviewed case w/ PM&R staff. Concerned for low EF with poor endurance and ability to tolerate 3 hours/therapy in IRF. Will follow.     Perlita Mercado NP  Department of Physical Medicine & Rehab   [facility]

## 2019-09-04 NOTE — CONSULTS
Inpatient consult to Physical Medicine Rehab  Consult performed by: Perlita Mercado NP  Consult ordered by: Nimesh Montgomery Jr., MD  Reason for consult: assess rehab needs         Rehab team already following.      HALLEY Lange, FNP-C  Physical Medicine & Rehabilitation   09/04/2019

## 2019-09-04 NOTE — SUBJECTIVE & OBJECTIVE
Past Medical History:   Diagnosis Date    Atrial fibrillation     Cardiomyopathy     CHF (congestive heart failure)     Hyperlipidemia     Hypertension     Ventricular tachycardia      Past Surgical History:   Procedure Laterality Date    ABLATION, AVN N/A 8/30/2019    Performed by [facility] at [facility] EP [facility]    CARDIAC DEFIBRILLATOR PLACEMENT      CARDIOVERSION N/A 8/27/2019    Performed by [facility] at [facility] EP [facility]    CARDIOVERSION N/A 8/22/2019    Performed by [facility] at [facility] EP [facility]    CARDIOVERSION N/A 8/20/2019    Performed by [facility] at [facility] EP [facility]    Cardioversion or Defibrillation  8/30/2019    Performed by [facility] MD at [facility] EP [facility]    CARDIOVERSION OR DEFIBRILLATION N/A 3/8/2019    Performed by [facility]ece MD at [facility] EP [facility]    CHOLECYSTECTOMY      ECHOCARDIOGRAM, TRANSESOPHAGEAL N/A 8/27/2019    Performed by [facility] Provider at [facility] EP [facility]    ECHOCARDIOGRAM,TRANSESOPHAGEAL N/A 8/20/2019    Performed by [facility] Provider at [facility] EP [facility]    ECHOCARDIOGRAM,TRANSESOPHAGEAL N/A 3/8/2019    Performed by [facility] Provider at [facility] EP [facility]    HYSTERECTOMY      INSERTION, CARDIAC PACEMAKER, BIVENTRICULAR, PERMANENT, AS UPGRADE Left 8/30/2019    Performed by [facility] at [facility] EP [facility]    JOINT REPLACEMENT  2009    rt knee replacment    pace maker  12/2010     Review of patient's allergies indicates:   Allergen Reactions    Augmentin [amoxicillin-pot clavulanate] Swelling     Lip swelling      Penicillins      Other reaction(s): Swelling  Lip swelling         Scheduled Medications:    amiodarone  200 mg Oral Daily    colchicine  0.6 mg Oral BID    digoxin  0.125 mg Oral Every Mon, Wed, Fri    doxycycline  100 mg Oral Q12H    famotidine  20 mg Oral Daily    hydrALAZINE  25 mg Oral Q8H    isosorbide dinitrate  10 mg Oral Q8H    magnesium oxide  400 mg Oral Daily       PRN Medications: docusate sodium,  ramelteon    Family History     Problem Relation (Age of Onset)    Arthritis Father    COPD Brother    Cancer Father    Drug abuse Sister    Hypertension Maternal Grandmother        Tobacco Use    Smoking status: Never Smoker    Smokeless tobacco: Never Used   Substance and Sexual Activity    Alcohol use: No     Comment: rarely    Drug use: No    Sexual activity: Not on file     Review of Systems   Constitutional: Positive for activity change and fatigue. Negative for chills and fever.   HENT: Negative for trouble swallowing and voice change.    Eyes: Negative for photophobia and visual disturbance.   Respiratory: Negative for cough and shortness of breath.    Cardiovascular: Negative for chest pain and palpitations.   Gastrointestinal: Negative for nausea and vomiting.   Genitourinary: Negative for difficulty urinating and flank pain.   Musculoskeletal: Positive for arthralgias and gait problem.   Skin: Negative for color change and rash.   Neurological: Positive for weakness. Negative for speech difficulty and numbness.     Objective:     Vital Signs (Most Recent):  Temp: 97.6 °F (36.4 °C) (09/04/19 0809)  Pulse: 70 (09/04/19 0826)  Resp: 16 (09/04/19 0505)  BP: (!) 102/58 (09/04/19 0809)  SpO2: 95 % (09/04/19 0809)    Vital Signs (24h Range):  Temp:  [96.9 °F (36.1 °C)-98.6 °F (37 °C)] 97.6 °F (36.4 °C)  Pulse:  [68-70] 70  Resp:  [16-17] 16  SpO2:  [95 %-98 %] 95 %  BP: (102-118)/(58-71) 102/58     Body mass index is 25.68 kg/m².    Physical Exam   Constitutional: She is oriented to person, place, and time. She appears well-developed and well-nourished.   HENT:   Head: Normocephalic and atraumatic.   Eyes: Right eye exhibits no discharge. Left eye exhibits no discharge.   Neck: Neck supple.   Cardiovascular: Intact distal pulses.   Pulmonary/Chest: Effort normal. No respiratory distress.   Abdominal: Soft. There is no tenderness.   Musculoskeletal: She exhibits no edema or deformity.   Generalized  deconditioning      Neurological: She is alert and oriented to person, place, and time.   Follows commands    Skin: Skin is warm and dry.   Psychiatric: She has a normal mood and affect. Her behavior is normal. Cognition and memory are impaired.   Vitals reviewed.    NEUROLOGICAL EXAMINATION:     MENTAL STATUS   Oriented to person, place, and time.       Diagnostic Results: Labs: Reviewed

## 2019-09-04 NOTE — ASSESSMENT & PLAN NOTE
Underwent AVN ablation with upgrade to CRT  - AVN ablation and BiV upgrade [place] scientific generator with SJM LV lead  - 1 wk in device clinic

## 2019-09-04 NOTE — SUBJECTIVE & OBJECTIVE
Interval History:   20 beat run of NSVT overnight. No symptoms. Only complaint this AM is continued hand and wrist pain.     Continuous Infusions:  Scheduled Meds:   amiodarone  200 mg Oral Daily    colchicine  0.6 mg Oral BID    digoxin  0.125 mg Oral Every Mon, Wed, Fri    doxycycline  100 mg Oral Q12H    famotidine  20 mg Oral Daily    hydrALAZINE  25 mg Oral Q8H    isosorbide dinitrate  10 mg Oral Q8H    magnesium oxide  400 mg Oral Daily     PRN Meds:docusate sodium, ramelteon    Review of patient's allergies indicates:   Allergen Reactions    Augmentin [amoxicillin-pot clavulanate] Swelling     Lip swelling      Penicillins      Other reaction(s): Swelling  Lip swelling       Objective:     Vital Signs (Most Recent):  Temp: 97.6 °F (36.4 °C) (09/04/19 0809)  Pulse: 70 (09/04/19 0826)  Resp: 16 (09/04/19 0505)  BP: (!) 102/58 (09/04/19 0809)  SpO2: 95 % (09/04/19 0809) Vital Signs (24h Range):  Temp:  [96.9 °F (36.1 °C)-98.6 °F (37 °C)] 97.6 °F (36.4 °C)  Pulse:  [68-70] 70  Resp:  [16-18] 16  SpO2:  [95 %-98 %] 95 %  BP: (102-118)/(58-71) 102/58     No data found.  Body mass index is 25.68 kg/m².      Intake/Output Summary (Last 24 hours) at 9/4/2019 0850  Last data filed at 9/4/2019 0600  Gross per 24 hour   Intake 950 ml   Output 950 ml   Net 0 ml     Telemetry: 20 beat run NSVT    Physical Exam   Constitutional: She appears well-developed and well-nourished. No distress.   Neck: Normal range of motion. Neck supple. JVD (10 cmHO2) present.   Cardiovascular: Normal rate, regular rhythm, normal heart sounds and intact distal pulses. Exam reveals no gallop and no friction rub.   No murmur heard.  Pulmonary/Chest: Effort normal and breath sounds normal. No stridor. No respiratory distress. She has no wheezes.   Musculoskeletal: Normal range of motion. She exhibits no edema or deformity.   Left wrist pain and warmth. Edema of left hand. Decrease ROM.   Skin: She is not diaphoretic.   Nursing note and  vitals reviewed.      Significant Labs:  CBC:  Recent Labs   Lab 09/02/19  0614 09/03/19  0453 09/04/19  0334   WBC 7.41 7.87 8.33   RBC 3.89* 3.92* 3.86*   HGB 11.3* 11.1* 11.1*   HCT 34.1* 35.0* 34.8*    218 243   MCV 88 89 90   MCH 29.0 28.3 28.8   MCHC 33.1 31.7* 31.9*     BNP:  No results for input(s): BNP in the last 168 hours.    Invalid input(s): BNPTRIAGELBLO  CMP:  Recent Labs   Lab 09/03/19  0453 09/03/19  1533 09/04/19  0334    112* 82  85  84   CALCIUM 9.6 9.3 9.5  9.5  9.5   ALBUMIN 2.3* 2.5* 2.5*  2.4*   PROT 6.8 7.0 6.8  6.7    137 138  139  139   K 4.3 3.9 4.2  4.1  4.1   CO2 27 26 25  26  26    101 102  102  102   BUN 28* 27* 25*  25*  25*   CREATININE 1.4 1.4 1.4  1.3  1.3   ALKPHOS 106 119 114  119   ALT 24 36 31  32   AST 32 50* 42*  40   BILITOT 0.6 0.7 0.8  0.8      Coagulation:   Recent Labs   Lab 08/29/19  0347 08/30/19  0403  09/02/19  0614 09/03/19  0453 09/04/19  0334   INR 1.9* 2.1*   < > 1.9* 2.4* 2.3*   APTT 67.2* 62.5*  --   --   --   --     < > = values in this interval not displayed.     LDH:  No results for input(s): LDH in the last 72 hours.  Microbiology:  Microbiology Results (last 7 days)     Procedure Component Value Units Date/Time    Clostridium difficile EIA [[id number]] Collected:  08/29/19 1407    Order Status:  Completed Specimen:  Stool Updated:  08/29/19 2203     C. diff Antigen Negative     C difficile Toxins A+B, EIA Negative     Comment: Testing not recommended for children <24 months old.             I have reviewed all pertinent labs within the past 24 hours.    Estimated Creatinine Clearance: 40.7 mL/min (based on SCr of 1.3 mg/dL).    Diagnostic Results:  I have reviewed and interpreted all pertinent imaging results/findings within the past 24 hours.

## 2019-09-04 NOTE — PLAN OF CARE
Problem: Occupational Therapy Goal  Goal: Occupational Therapy Goal  Goals to be met by: 09-09-19     Patient will increase functional independence with ADLs by performing:    UE Dressing with Stand-by Assistance.  Grooming while standing with Contact Guard Assistance.  Goal revised:Toileting from toilet with Minimal Assistance for hygiene and clothing management.   Supine to sit with Stand-by Assistance.  Stand pivot transfers with Contact Guard Assistance.  Goal revised: Toilet transfer to toilet  with Contact Guard Assistance.  Pt. To be I with HEP to improve level of endurance     Outcome: Ongoing (interventions implemented as appropriate)  Goals updated 9/4/19    Lucy Bean OTR/L  Pager: [phone]  9/4/2019

## 2019-09-04 NOTE — PLAN OF CARE
Problem: Adult Inpatient Plan of Care  Goal: Plan of Care Review  Outcome: Ongoing (interventions implemented as appropriate)  Isosorbide dinitrate changed to 10mg q 8 hrs. Hydralazine changed to 25mg q8hrs. Consult put in for physical medical rehab. Pt turned q2 hrs and educated on importance of it. Pt in bed at lowest position, with wheels locked, 2x upper side rails raised, call light within reach. VSS, no falls, no pain, or SOB of breath reported. Will continue to monitor.

## 2019-09-04 NOTE — PROGRESS NOTES
[facility]  Heart Transplant  Progress Note    Patient Name: Sherice Squires  MRN: [id number]  Admission Date: 8/20/2019  Hospital Length of Stay: 15 days  Attending Physician: Nimesh Montgomery Jr.,*  Primary Care Provider: Annamarie Soria MD  Principal Problem:Acute on chronic combined systolic and diastolic heart failure    Subjective:     Interval History:   20 beat run of NSVT overnight. No symptoms. Only complaint this AM is continued hand and wrist pain.     Continuous Infusions:  Scheduled Meds:   amiodarone  200 mg Oral Daily    colchicine  0.6 mg Oral BID    digoxin  0.125 mg Oral Every Mon, Wed, Fri    doxycycline  100 mg Oral Q12H    famotidine  20 mg Oral Daily    hydrALAZINE  25 mg Oral Q8H    isosorbide dinitrate  10 mg Oral Q8H    magnesium oxide  400 mg Oral Daily     PRN Meds:docusate sodium, ramelteon    Review of patient's allergies indicates:   Allergen Reactions    Augmentin [amoxicillin-pot clavulanate] Swelling     Lip swelling      Penicillins      Other reaction(s): Swelling  Lip swelling       Objective:     Vital Signs (Most Recent):  Temp: 97.6 °F (36.4 °C) (09/04/19 0809)  Pulse: 70 (09/04/19 0826)  Resp: 16 (09/04/19 0505)  BP: (!) 102/58 (09/04/19 0809)  SpO2: 95 % (09/04/19 0809) Vital Signs (24h Range):  Temp:  [96.9 °F (36.1 °C)-98.6 °F (37 °C)] 97.6 °F (36.4 °C)  Pulse:  [68-70] 70  Resp:  [16-18] 16  SpO2:  [95 %-98 %] 95 %  BP: (102-118)/(58-71) 102/58     No data found.  Body mass index is 25.68 kg/m².      Intake/Output Summary (Last 24 hours) at 9/4/2019 0850  Last data filed at 9/4/2019 0600  Gross per 24 hour   Intake 950 ml   Output 950 ml   Net 0 ml     Telemetry: 20 beat run NSVT    Physical Exam   Constitutional: She appears well-developed and well-nourished. No distress.   Neck: Normal range of motion. Neck supple. JVD (10 cmHO2) present.   Cardiovascular: Normal rate, regular rhythm, normal heart sounds and intact distal pulses. Exam  reveals no gallop and no friction rub.   No murmur heard.  Pulmonary/Chest: Effort normal and breath sounds normal. No stridor. No respiratory distress. She has no wheezes.   Musculoskeletal: Normal range of motion. She exhibits no edema or deformity.   Left wrist pain and warmth. Edema of left hand. Decrease ROM.   Skin: She is not diaphoretic.   Nursing note and vitals reviewed.      Significant Labs:  CBC:  Recent Labs   Lab 09/02/19  0614 09/03/19 0453 09/04/19  0334   WBC 7.41 7.87 8.33   RBC 3.89* 3.92* 3.86*   HGB 11.3* 11.1* 11.1*   HCT 34.1* 35.0* 34.8*    218 243   MCV 88 89 90   MCH 29.0 28.3 28.8   MCHC 33.1 31.7* 31.9*     BNP:  No results for input(s): BNP in the last 168 hours.    Invalid input(s): BNPTRIAGELBLO  CMP:  Recent Labs   Lab 09/03/19 0453 09/03/19  1533 09/04/19  0334    112* 82  85  84   CALCIUM 9.6 9.3 9.5  9.5  9.5   ALBUMIN 2.3* 2.5* 2.5*  2.4*   PROT 6.8 7.0 6.8  6.7    137 138  139  139   K 4.3 3.9 4.2  4.1  4.1   CO2 27 26 25  26  26    101 102  102  102   BUN 28* 27* 25*  25*  25*   CREATININE 1.4 1.4 1.4  1.3  1.3   ALKPHOS 106 119 114  119   ALT 24 36 31  32   AST 32 50* 42*  40   BILITOT 0.6 0.7 0.8  0.8      Coagulation:   Recent Labs   Lab 08/29/19  0347 08/30/19  0403  09/02/19  0614 09/03/19  0453 09/04/19  0334   INR 1.9* 2.1*   < > 1.9* 2.4* 2.3*   APTT 67.2* 62.5*  --   --   --   --     < > = values in this interval not displayed.     LDH:  No results for input(s): LDH in the last 72 hours.  Microbiology:  Microbiology Results (last 7 days)     Procedure Component Value Units Date/Time    Clostridium difficile EIA [[id number]] Collected:  08/29/19 1406    Order Status:  Completed Specimen:  Stool Updated:  08/29/19 2203     C. diff Antigen Negative     C difficile Toxins A+B, EIA Negative     Comment: Testing not recommended for children <24 months old.             I have reviewed all pertinent labs within the past 24  hours.    Estimated Creatinine Clearance: 40.7 mL/min (based on SCr of 1.3 mg/dL).    Diagnostic Results:  I have reviewed and interpreted all pertinent imaging results/findings within the past 24 hours.    Assessment and Plan:     Sherice Squires is a 67 yo AAF with PMHx significant for NIDCM / stage D HFrEF (LVEF 20-25%, LVEDD 5.9 cm) s/p dc ICD, AF, HTN, DLD, CKD who is admitted as a transfer from  ED for management of ADHF +/- possible cardiogenic shock.     Patient reports she presented to OSH with progressive/worsening shortness of breath on exertion, orthopnea, and peripheral swelling of approx 5 days duration associated with nausea and fatigue. Denies fever/chills/sweats, chest pain, diaphoresis, presyncope/syncope. Does report intermittent palpitations.      Patient was afebrile and normotensive on arrival (/55 mmHg) to OSH ED with pulses in the 120-130s in AF with RVR. Initial labs showed worsening FAMILIA on CKD (with Cr 3.8 from 2.7-3.1 this past week from previous baseline of 1.3-1.4 in June 2019) as well as elevated T bili 2.3 and BNP >3000. CXR obtained without acute cardiopulmonary process. Patient ultimately transferred to [facility] for higher level of care.      Patient follows with [facility] clinic - currently on Entresto 97/103 mg BID, Toprol  mg daily, Aldactone 25 mg daily, and digoxin 0.125 mg daily. She was last seen by Dr Rodriguez on 8/6/19 who added metolazone 2.5 mg QHS before evening dose of Bumex to augment diuresis due to complaints of SOB / peripheral swelling at the time. She did not respond to this regimen and reports she was ultimately switched to Torsemide instead.      Patient is fully complaint with her medicines. Also adherent to fluid / salt restrictions with her hx of congestive heart failure.      States she was hospitalized only once before for ADHF within the past year.      Of note patient follows with Dr Reece of  for her AF hx, last seen by him in 5/2019    * Acute on chronic  combined systolic and diastolic heart failure  67 yo F with NIDCM EF 20% , NYHA FC III with admitted with afib with rvr and cardiogenic shock   - IABP placed 8/20 and pulled 8/23   -TTE with contrast 8/20: LVEDD 6.51, LVEF 15%, indeterminate diastolic function, severe LAE, trace AI, mod MR and TR, PAS 51 and IVC 15.     - Start Entresto 49/51 mg PO BID today (home dose 97/103)  - Stopping hydralazine and isosorbide dinitrate  - Continue dig 125 mcg MWF  - Not a candidate for LVAD/OHTX at this time. Will need aggressive rehab and re-evaluation    Gout  Uric acid 13  - Stop prednisone  - Start colchicine  - Radiograph unrevealing    Impaired functional mobility and endurance  Rehab following discharge    Acute on chronic kidney failure  Renal function stable  - Holding diuretics at this time    Atrial fibrillation with RVR  Atrial fibrillation with RVR s/p AVN ablation and BiV upgrade [place] scientific generator with SJM LV lead:   - S/p failed multiple JOSE / DCCV most recent 08/27/19  - EP following, Dr. Cardona  - Amiodarone 200mg qday  - INR 2.3  - Doxycycline 100 mg bid for 5 days (last day)    Automatic implantable cardioverter-defibrillator in situ  Underwent AVN ablation with upgrade to CRT  - AVN ablation and BiV upgrade [place] scientific generator with SJM LV lead  - 1 wk in device clinic        Saroj Mccartney MD  Heart Transplant  [facility]

## 2019-09-04 NOTE — PT/OT/SLP PROGRESS
Physical Therapy Treatment    Patient Name:  Sherice Squires   MRN:  [id number]  Admitting Diagnosis:  Acute on chronic combined systolic and diastolic heart failure   Recent Surgery: Procedure(s) (LRB):  ABLATION, AVN (N/A)  INSERTION, CARDIAC PACEMAKER, BIVENTRICULAR, PERMANENT, AS UPGRADE (Left)  Cardioversion or Defibrillation 5 Days Post-Op  Admit Date: 8/20/2019  Length of Stay: 15 days    Recommendations:     Discharge Recommendations:  rehabilitation facility   Discharge Equipment Recommendations: other (see comments)(tbd; potentially RW pending progress)   Barriers to discharge: None    Assessment:     Sherice Squires is a 68 y.o. female admitted with a medical diagnosis of Acute on chronic combined systolic and diastolic heart failure.  She presents with the following impairments/functional limitations:  weakness, impaired endurance, impaired functional mobilty, gait instability, decreased lower extremity function, impaired cardiopulmonary response to activity. Pt tolerated session extremely well today. Pt was able to ambulate 24 ft today with CGA, a mobility skill that the pt was previously unable to do. Pt had some difficulty with performing 180 degree turn and required hand over hand assist with guiding walker. Pt did not demonstrate any instances of LE buckling or LOB throughout ambulation trial. Pt requires some cueing for efficient body mechanics when performing sit <> stand transfer and overly relies on arms to assist with ascending into a full stand.  Pt's strength and mobility continues to improve and pt seems to be motivated to continue with skilled PT services. Due to this, pt would be an excellent candidate for inpatient rehabilitation services.    Rehab Prognosis: Good; patient would benefit from acute skilled PT services to address these deficits and reach maximum level of function.    Recent Surgery: Procedure(s) (LRB):  ABLATION, AVN (N/A)  INSERTION, CARDIAC PACEMAKER, BIVENTRICULAR,  "PERMANENT, AS UPGRADE (Left)  Cardioversion or Defibrillation 5 Days Post-Op    Plan:     During this hospitalization, patient to be seen 4 x/week to address the identified rehab impairments via gait training, therapeutic activities, therapeutic exercises, neuromuscular re-education and progress toward the following goals:    · Plan of Care Expires:  09/26/19    Subjective     RN notified prior to session. PCT present upon PT entrance into room.    Chief Complaint: "Oh I don't know about that but I don't want it to go down that I'm refusing" re: walking  Patient/Family Comments/goals: return home safely  Pain/Comfort:  · Pain Rating 1: other (see comments)(pt did not rate)  · Location 1: other (see comments)(buttocks)  · Pain Addressed 1: Reposition, Distraction  · Pain Rating Post-Intervention 1: other (see comments)(pt did not rate)      Objective:     Patient found HOB elevated with: telemetry, PureWick   Mental Status: Patient is oriented to AxOx4 and follows multi-step commands. Patient is Alert and Cooperative during session.    General Precautions: Standard, Cardiac fall   Orthopedic Precautions:N/A   Braces: N/A   Body mass index is 25.68 kg/m².  Oxygen Device: Room Air      Outcome Measures:  AM-PAC 6 CLICK MOBILITY  Turning over in bed (including adjusting bedclothes, sheets and blankets)?: 3  Sitting down on and standing up from a chair with arms (e.g., wheelchair, bedside commode, etc.): 3  Moving from lying on back to sitting on the side of the bed?: 3  Moving to and from a bed to a chair (including a wheelchair)?: 3  Need to walk in hospital room?: 3  Climbing 3-5 steps with a railing?: 2  Basic Mobility Total Score: 17     Functional Mobility:  Additional staff present: none present  Bed Mobility:   · Rolling/Turning to Right: stand by assistance  · Scooting to HOB via supine bridge: stand by assistance  · Supine to Sit: stand by assistance; from Rt side of bed  · Scooting anteriorly to EOB to have " both feet planted on floor: contact guard assistance  · Sit to Supine: minimum assistance; to Rt side of bed  · For LE mgmt    Sitting Balance at Edge of Bed:   Assistance Level Required: Stand-by Assistance      Transfers:   · Sit <> Stand Transfer: contact guard assistance with rolling walker   · Stand <> Sit Transfer: contact guard assistance with rolling walker   · [id number] from EOB      Gait:  · Patient ambulated: 24 ft   · Patient required: contact guard  · Patient used:  rolling walker   · Comments: Pt extremely anxious but performed well    Stairs:  N/a 2/2 pt status    Therapeutic Activities & Exercises:     Education:  All questions answered within PT scope of practice  PT role in POC  Safe to perform step transfer to/from chair CGA and RW w/ nursing/PCT  Importance of OOB tolerance to improve lung ventilation and expansion as well as strengthen postural musculature  RW management (proper distance and hand placement during sit <> stand transfer)  RW ordered for pt room    Patient left HOB elevated with all lines intact, call button in reach, RN/PCT notified and RN present.   Lt HOB initially not elevating and Lt foot rail not able to be place in upright position. RN notified and able to assist pt. HOB able to elevate.    GOALS:   Multidisciplinary Problems     Physical Therapy Goals        Problem: Physical Therapy Goal    Goal Priority Disciplines Outcome Goal Variances Interventions   Physical Therapy Goal     PT, PT/OT Ongoing (interventions implemented as appropriate)     Description:  Goals to be met by: 9/10/19     Patient will increase functional independence with mobility by performin. Supine to sit with MInimal Assistance - met 19  1a. Supine to sit with SBA   2. Sit to supine with MInimal Assistance - met 19  2a. Sit to supine with SBA   3. Sit to stand transfer with Stand-by Assistance  4. Bed to chair transfer with Stand-by Assistance using Rolling Walker  5. Gait  x 50 feet  with Stand-by Assistance using Rolling Walker.                      Time Tracking:     PT Received On: 09/04/19  PT Start Time: 1406     PT Stop Time: 1435  PT Total Time (min): 29 min       Billable Minutes:   · Gait Training 13 and Therapeutic Activity 16    Treatment Type: Treatment  PT/PTA: PT       Iona Gomez PT, DPT  9/4/2019  Pager: [phone]

## 2019-09-04 NOTE — ASSESSMENT & PLAN NOTE
-HTS following  -HTS Decreased hydralazine to 25 mg Q8H and decrease isosorbide dinitrate to 10 mg Q8H  -LVAD tentatively scheduled for 9/25/19 per chart, but HTS states not a candidate for LVAD/OHTX at this time

## 2019-09-04 NOTE — PT/OT/SLP PROGRESS
Occupational Therapy   Treatment    Name: Sherice Squires  MRN: [id number]  Admitting Diagnosis:  Acute on chronic combined systolic and diastolic heart failure  5 Days Post-Op    Recommendations:     Discharge Recommendations: rehabilitation facility  Discharge Equipment Recommendations:  (TBD as pt progresses)  Barriers to discharge:  None    Assessment:     Sherice Squires is a 68 y.o. female with a medical diagnosis of Acute on chronic combined systolic and diastolic heart failure.  She presents with performance deficits affecting function are weakness, impaired functional mobilty, gait instability, impaired endurance, impaired cardiopulmonary response to activity, decreased lower extremity function, decreased upper extremity function, impaired balance, impaired self care skills, pain. Pt tolerated session well and demonstrated significant improvement with functional mobility and transfers this date. Pt is an appropriate candidate for IP Rehab in order to improve overall functional (I)ce with mobility, transfers, and self-care tasks. Pt would benefit from continued skilled acute OT services in order to maximize independence and safety with ADLs and functional mobility to ensure safe return to PLOF in the least restrictive environment.    Rehab Prognosis:  Good; patient would benefit from acute skilled OT services to address these deficits and reach maximum level of function.       Plan:     Patient to be seen 4 x/week to address the above listed problems via self-care/home management, therapeutic exercises, therapeutic activities, neuromuscular re-education  · Plan of Care Expires: 09/25/19  · Plan of Care Reviewed with: patient, son    Subjective     Pain/Comfort:  · Pain Rating 1: (pt did not rate; pt reported L hand pain )  · Pain Addressed 1: Distraction    Objective:     Communicated with: RN prior to session.  Patient found up in chair with telemetry, PureWick upon OT entry to room. Pt agreeable to therapy  "session.  Pt stated, "How much longer do I have to stay up?"     General Precautions: Standard, fall   Orthopedic Precautions:N/A   Braces:   n/a    Occupational Performance:     Bed Mobility:    · Not performed this date. Pt found and left UIC     Functional Mobility/Transfers:  · Patient completed Sit <> Stand Transfer with min A from bedside chair (low sitting surface) with  rolling walker   · Min verbal cues provided for hand placement  · Functional Mobility: Pt engaging in functional mobility in preparation for household distances approx ~6ft forward/backwards with CGA using RW  in order to maximize functional activity tolerance and standing balance required for engagement in occupations of choice.   · Pt reported slight fatigue but no overt LOB   · Min verbal cues for AD management    Activities of Daily Living:  · Lower Body Dressing: minimum assistance   · Pt performed figure 4 position while sitting UIC in order to access B LEs in preparation for LB dressing (pants, underwear, socks, and shoes)     [facility] 6 Click ADL: 15    Treatment & Education:  - Pt educated on role of OT, POC, and goals for therapy.     - Educated pt on being appropriate to transfer with nsg and PCT with  Min A using RW  - Time provided for therapeutic counseling and discussion of health disposition.   - Importance of OOB ax's with staff member assistance and sitting OOB majority of day.   - Pt completed ADLs and functional mobility for treatment session as noted above   - OT provided pt with yellow theraputty and reviewed handout with pt in order to improve pt's L  and FM strength. Pt verbalized understanding and demonstrated understanding via teach back method   - Pt verbalized understanding. Pt expressed no further concerns/questions.  - whiteboard updated     Patient left up in chair with all lines intact and call button in reachEducation:  , RN notified     GOALS:   Multidisciplinary Problems     Occupational Therapy Goals     "    Problem: Occupational Therapy Goal    Goal Priority Disciplines Outcome Interventions   Occupational Therapy Goal     OT, PT/OT            Problem: Occupational Therapy Goal    Goal Priority Disciplines Outcome Interventions   Occupational Therapy Goal     OT, PT/OT Ongoing (interventions implemented as appropriate)    Description:  Goals to be met by: 09-09-19     Patient will increase functional independence with ADLs by performing:    UE Dressing with Stand-by Assistance.  Grooming while standing with Contact Guard Assistance.  Goal revised:Toileting from toilet with Minimal Assistance for hygiene and clothing management.   Supine to sit with Stand-by Assistance.  Stand pivot transfers with Contact Guard Assistance.  Goal revised: Toilet transfer to toilet  with Contact Guard Assistance.  Pt. To be I with HEP to improve level of endurance                       Time Tracking:     OT Date of Treatment: 09/04/19  OT Start Time: 1105 OT returned: 1548   OT Stop Time: 1126 OT ended: 1558  OT Total Time (min): 21 min + 10 min  = 31 min total time     Billable Minutes:Therapeutic Activity 21  Therapeutic Exercise 10    Lucy Bean OT  9/4/2019

## 2019-09-04 NOTE — PLAN OF CARE
Problem: Physical Therapy Goal  Goal: Physical Therapy Goal  Goals to be met by: 9/10/19     Patient will increase functional independence with mobility by performin. Supine to sit with MInimal Assistance - met 19  1a. Supine to sit with SBA   2. Sit to supine with MInimal Assistance - met 19  2a. Sit to supine with SBA   3. Sit to stand transfer with Stand-by Assistance  4. Bed to chair transfer with Stand-by Assistance using Rolling Walker  5. Gait  x 50 feet with Stand-by Assistance using Rolling Walker.      Outcome: Ongoing (interventions implemented as appropriate)    Discharge Recommendation: Rehab.  2 goals met today. PT goals still appropriate.  Appropriate transfer level with nursing staff: Bed <> Chair:  Step Transfer with contact guard assistance with Rolling Walker.    Iona Gomez PT, DPT  2019  Pager: [phone]

## 2019-09-04 NOTE — ASSESSMENT & PLAN NOTE
67 yo F with NIDCM EF 20% , NYHA FC III with admitted with afib with rvr and cardiogenic shock   - IABP placed 8/20 and pulled 8/23   -TTE with contrast 8/20: LVEDD 6.51, LVEF 15%, indeterminate diastolic function, severe LAE, trace AI, mod MR and TR, PAS 51 and IVC 15.     - Start Entresto 49/51 mg PO BID today (home dose 97/103)  - Stopping hydralazine and isosorbide dinitrate  - Continue dig 125 mcg MWF  - Not a candidate for LVAD/OHTX at this time. Will need aggressive rehab and re-evaluation

## 2019-09-04 NOTE — PLAN OF CARE
Problem: Adult Inpatient Plan of Care  Goal: Plan of Care Review  Outcome: Ongoing (interventions implemented as appropriate)  Patient remained free of falls, trauma, injury, and skin breakdown. VSS; no patient complaints at this time. Pt had approximately 15 beats of VTach; MD notified and labs ordered. Pt remained asymptomatic with stable VS. Fall precautions maintained; frequent weight shifts encouraged. Plan of care reviewed; patient verbalized understanding. All questions and concerns addressed; will continue to monitor.

## 2019-09-04 NOTE — PROGRESS NOTES
RN contacted MD Perez regarding patient's episode of Vtach. Pt had approximately 15 beats of VT. Provider notified; BMP and Magnesium lab draws ordered.  Pt remained asymptomatic and VSS.  No patient complaints at this time. Will continue to monitor.

## 2019-09-05 NOTE — NURSING
Pt had 6 beat run of Vtach on monitor. Notified Dr. Mccartney. Pt was asymptomatic at time of run. No orders received at this time. Will continue to monitor.

## 2019-09-05 NOTE — ASSESSMENT & PLAN NOTE
Underwent AVN ablation with upgrade to CRT  - AVN ablation and BiV upgrade [place] scientific generator with SJM LV lead  - 1 wk in device clinic

## 2019-09-05 NOTE — ASSESSMENT & PLAN NOTE
Atrial fibrillation with RVR s/p AVN ablation and BiV upgrade [place] scientific generator with SJM LV lead:   - EP following, Dr. Cardona  - Amiodarone 200mg qday  - INR 2.4

## 2019-09-05 NOTE — PROGRESS NOTES
[facility]  Physical Medicine & Rehab  Progress Note    Patient Name: Sherice Squires  MRN: [id number]  Admission Date: 8/20/2019  Length of Stay: 16 days  Attending Physician: Nimesh Montgomery Jr.,*    Subjective:     Principal Problem:Acute on chronic combined systolic and diastolic heart failure    Hospital Course:   8/23/19: PT eval not performed.  08/26/2019: OT-Bed mobility Mod-MaxA.  Sit to stand MaxA. ADLs not tested.  08/31/2019: Sit to stand MaxA.  Ambulated 2 steps MaxA.   09/03/2019: OT-Bed mobility Min-CGA.  Sit to stand CGA-Hay x 2 ppl and trxs Hay x 2 ppl.  Ambulated 6 ft Hay x 2 ppl w/ chair to follow.  UBD setupA and LBD MaxA.  09/04/2019: Bed mobility SBA-Hay.  Sit to stand Hay and transfers CGA & RW.  Ambulated 24 ft CGA & RW.  LBD Hay.    Interval History 9/5/2019:  Patient is seen for follow-up rehab evaluation and recommendations: Participating w/ therapy.    HPI, Past Medical, Family, and Social History remains the same as documented in the initial encounter.    Scheduled Medications:    amiodarone  200 mg Oral Daily    colchicine  0.6 mg Oral Daily    digoxin  0.125 mg Oral Every Mon, Wed, Fri    famotidine  20 mg Oral Daily    magnesium oxide  400 mg Oral Daily    sacubitril-valsartan  1 tablet Oral BID    warfarin  2.5 mg Oral Every Tues, Thurs, Sat, Sun    warfarin  5 mg Oral Every Mon, Wed, Fri       Diagnostic Results: Labs: Reviewed    PRN Medications: docusate sodium, ramelteon    Review of Systems   Constitutional: Positive for activity change and fatigue. Negative for fever.   HENT: Negative for trouble swallowing and voice change.    Respiratory: Negative for cough and shortness of breath.    Cardiovascular: Negative for chest pain and palpitations.   Gastrointestinal: Negative for nausea and vomiting.   Musculoskeletal: Positive for arthralgias and gait problem.   Skin: Negative for color change and rash.   Neurological: Positive for weakness.  Negative for speech difficulty and numbness.     Objective:     Vital Signs (Most Recent):  Temp: 97.8 °F (36.6 °C) (09/05/19 0720)  Pulse: 70 (09/05/19 0800)  Resp: 18 (09/05/19 0720)  BP: 98/68 (09/05/19 0720)  SpO2: 97 % (09/05/19 0720)    Vital Signs (24h Range):  Temp:  [96.4 °F (35.8 °C)-98.9 °F (37.2 °C)] 97.8 °F (36.6 °C)  Pulse:  [68-73] 70  Resp:  [16-18] 18  SpO2:  [96 %-99 %] 97 %  BP: ()/(66-77) 98/68     Physical Exam   Constitutional: She is oriented to person, place, and time. She appears well-developed and well-nourished.   HENT:   Head: Normocephalic and atraumatic.   Eyes: Right eye exhibits no discharge. Left eye exhibits no discharge.   Neck: Neck supple.   Cardiovascular: Intact distal pulses.   Pulmonary/Chest: Effort normal. No respiratory distress.   On RA    Abdominal: Soft. There is no tenderness.   Musculoskeletal: She exhibits no edema or deformity.   Generalized deconditioning  L hand decreased ROM and pain w/ edema   Neurological: She is alert and oriented to person, place, and time.   Follows commands    Skin: Skin is warm and dry.   Psychiatric: She has a normal mood and affect. Her behavior is normal. Cognition and memory are impaired.   Vitals reviewed.    Assessment/Plan:      * Acute on chronic combined systolic and diastolic heart failure  -HTS following  -HTS Decreased hydralazine to 25 mg Q8H and decrease isosorbide dinitrate to 10 mg Q8H  -LVAD tentatively scheduled for 9/25/19 per chart, but HTS states not a candidate for LVAD/OHTX at this time    Impaired functional mobility and endurance  Recommendations  -  Encourage mobility, OOB in chair at least 3 hours per day, and early ambulation as appropriate  -  PT/OT evaluate and treat  -  Pain management  -  Monitor for and prevent skin breakdown and pressure ulcers  · Early mobility, repositioning/weight shifting every 20-30 minutes when sitting, turn patient every 2 hours, proper mattress/overlay and chair cushioning,  pressure relief/heel protector boots  -  DVT prophylaxis    -  Reviewed discharge options (IP rehab, SNF, HH therapy, and OP therapy)    Acute on chronic kidney failure  -downtrending with improvement in cardiogenic shock    Atrial fibrillation with RVR  - Arrhythmia following  - S/p failed multiple JOSE / DCCV most recent 08/27/19  -AVN ablation 8/30 w/ upgrade to CRT, Doxycycline 100 mg bid for 5 days  -1 wk in device clinic, 4 mos in EP clinic    Automatic implantable cardioverter-defibrillator in situ  -20 beat run of NSVT on 9/3 w/ no symptoms per HTS    Recommend Inpatient Rehab.       Perlita Mercado NP  Department of Physical Medicine & Rehab   [facility]

## 2019-09-05 NOTE — PLAN OF CARE
Problem: Adult Inpatient Plan of Care  Goal: Plan of Care Review  Outcome: Ongoing (interventions implemented as appropriate)  Plan of care discussed with patient. Patient able to ambulate x2 assist and walker, fall precautions in place. Patient has no complaints of pain. Discussed medications and care. Still having diarrhea, stool sample sent. Patient has no questions at this time. Plan is to d/c possibly tomorrow to a rehab. Will continue to monitor.

## 2019-09-05 NOTE — PLAN OF CARE
Problem: Occupational Therapy Goal  Goal: Occupational Therapy Goal  Goals to be met by: 09-09-19     Patient will increase functional independence with ADLs by performing:    UE Dressing with Stand-by Assistance.  Grooming while standing with Contact Guard Assistance.  Goal revised:Toileting from toilet with Minimal Assistance for hygiene and clothing management.   Supine to sit with Stand-by Assistance.  Stand pivot transfers with Contact Guard Assistance. MET 9/5  Goal revised: Toilet transfer to toilet  with Contact Guard Assistance.  Pt. To be I with HEP to improve level of endurance      Outcome: Ongoing (interventions implemented as appropriate)  Pt is progressing well towards all goals   Continue POC     Lucy Bean OTR/L  Pager: [phone]  9/5/2019

## 2019-09-05 NOTE — SUBJECTIVE & OBJECTIVE
Interval History 9/5/2019:  Patient is seen for follow-up rehab evaluation and recommendations: Participating w/ therapy.    HPI, Past Medical, Family, and Social History remains the same as documented in the initial encounter.    Scheduled Medications:    amiodarone  200 mg Oral Daily    colchicine  0.6 mg Oral Daily    digoxin  0.125 mg Oral Every Mon, Wed, Fri    famotidine  20 mg Oral Daily    magnesium oxide  400 mg Oral Daily    sacubitril-valsartan  1 tablet Oral BID    warfarin  2.5 mg Oral Every Tues, Thurs, Sat, Sun    warfarin  5 mg Oral Every Mon, Wed, Fri       Diagnostic Results: Labs: Reviewed    PRN Medications: docusate sodium, ramelteon    Review of Systems   Constitutional: Positive for activity change and fatigue. Negative for fever.   HENT: Negative for trouble swallowing and voice change.    Respiratory: Negative for cough and shortness of breath.    Cardiovascular: Negative for chest pain and palpitations.   Gastrointestinal: Negative for nausea and vomiting.   Musculoskeletal: Positive for arthralgias and gait problem.   Skin: Negative for color change and rash.   Neurological: Positive for weakness. Negative for speech difficulty and numbness.     Objective:     Vital Signs (Most Recent):  Temp: 97.8 °F (36.6 °C) (09/05/19 0720)  Pulse: 70 (09/05/19 0800)  Resp: 18 (09/05/19 0720)  BP: 98/68 (09/05/19 0720)  SpO2: 97 % (09/05/19 0720)    Vital Signs (24h Range):  Temp:  [96.4 °F (35.8 °C)-98.9 °F (37.2 °C)] 97.8 °F (36.6 °C)  Pulse:  [68-73] 70  Resp:  [16-18] 18  SpO2:  [96 %-99 %] 97 %  BP: ()/(66-77) 98/68     Physical Exam   Constitutional: She is oriented to person, place, and time. She appears well-developed and well-nourished.   HENT:   Head: Normocephalic and atraumatic.   Eyes: Right eye exhibits no discharge. Left eye exhibits no discharge.   Neck: Neck supple.   Cardiovascular: Intact distal pulses.   Pulmonary/Chest: Effort normal. No respiratory distress.   On RA     Abdominal: Soft. There is no tenderness.   Musculoskeletal: She exhibits no edema or deformity.   Generalized deconditioning      Neurological: She is alert and oriented to person, place, and time.   Follows commands    Skin: Skin is warm and dry.   Psychiatric: She has a normal mood and affect. Her behavior is normal. Cognition and memory are impaired.   Vitals reviewed.    NEUROLOGICAL EXAMINATION:     MENTAL STATUS   Oriented to person, place, and time.

## 2019-09-05 NOTE — PLAN OF CARE
Problem: Adult Inpatient Plan of Care  Goal: Plan of Care Review  Outcome: Ongoing (interventions implemented as appropriate)  Patient remained free of injuries, falls, and trauma. VS stable. No complaints of pain mentioned. Left arm immoblized at night using sling. Intake and Output being monitored using purewick. Patient tolerating entresto. Colchicine starting in the am. Discharge pending rehab placement. Plan of care reviewed with patient. Patient verbalized understanding. All questions and concerns addressed. Will continue to monitor.

## 2019-09-05 NOTE — PT/OT/SLP PROGRESS
Physical Therapy Treatment    Patient Name:  Sherice Squires   MRN:  [id number]    Recommendations:     Discharge Recommendations:  rehabilitation facility   Discharge Equipment Recommendations: walker, rolling, commode   Barriers to discharge: Decreased caregiver support at current functional level    Assessment:     Sherice Squires is a 68 y.o. female admitted with a medical diagnosis of Acute on chronic combined systolic and diastolic heart failure.  She presents with the following impairments/functional limitations:  weakness, gait instability, impaired endurance, impaired balance, decreased upper extremity function, decreased lower extremity function, impaired cardiopulmonary response to activity, impaired self care skills, impaired functional mobilty. Notable improvement in functional mobility with pt requiring less assist to complete mobility and ambulating increased distance this date. However, remains limited with mobility 2* impaired endurance, generalized weakness, and overall deconditioning. Cues provided throughout session in order to maintain ppm precautions with LUE. Pt would continue to benefit from skilled acute PT in order to address current deficits and progress functional mobility.     Rehab Prognosis: Good; patient would benefit from acute skilled PT services to address these deficits and reach maximum level of function.    Recent Surgery: Procedure(s) (LRB):  ABLATION, AVN (N/A)  INSERTION, CARDIAC PACEMAKER, BIVENTRICULAR, PERMANENT, AS UPGRADE (Left)  Cardioversion or Defibrillation 6 Days Post-Op    Plan:     During this hospitalization, patient to be seen 4 x/week to address the identified rehab impairments via gait training, therapeutic activities, therapeutic exercises, neuromuscular re-education and progress toward the following goals:    · Plan of Care Expires:  09/26/19    Subjective     Chief Complaint: diarrhea   Patient/Family Comments/goals: progress (I) with mobility      Pain/Comfort:  · Pain Rating 1: 0/10      Objective:     Communicated with RN prior to session.  Patient found up in chair with telemetry, Jacquieck upon PT entry to room.     General Precautions: Standard, fall, pacemaker   Orthopedic Precautions:N/A   Braces: Sling and swathe(at night)     Functional Mobility:  · Transfers:     · Sit to Stand:  minimum assistance with HHA x1 rep from chair, RW x1 rep from BSC, RW x1 rep from chair  · Toilet Transfer: chair>BSC with Amari and B HHA using Stand Pivot; BSC>chair with RW and CGA using stand pivot  · Gait: ~60 ft. with RW and CGA  · Chair follow throughout for safety but no seated rest break needed  · Demo'd decreased rossi, decreased step length, impaired weight-shifting ability (decreased weight-shift to L), increased time in double stance, and decreased toe-floor clearance  · Cues for decreased pressure through LUE in order to maintain ppm precautions; minimal pressure noted when assessed by PT       AM-PAC 6 CLICK MOBILITY  Turning over in bed (including adjusting bedclothes, sheets and blankets)?: 3  Sitting down on and standing up from a chair with arms (e.g., wheelchair, bedside commode, etc.): 3  Moving from lying on back to sitting on the side of the bed?: 3  Moving to and from a bed to a chair (including a wheelchair)?: 3  Need to walk in hospital room?: 3  Climbing 3-5 steps with a railing?: 2  Basic Mobility Total Score: 17       Therapeutic Activities and Exercises:   Discussed ppm precautions with cues provided throughout mobility for minimal use of LUE. Pt verbalized and demonstrated understanding.   Completed seated toileting on BSC with increased time needed to void. Hygiene completed in standing with use of RW and CGA for standing balance.     Patient left up in chair with all lines intact, call button in reach and RN notified..    GOALS:   Multidisciplinary Problems     Physical Therapy Goals        Problem: Physical Therapy Goal    Goal Priority  Disciplines Outcome Goal Variances Interventions   Physical Therapy Goal     PT, PT/OT Ongoing (interventions implemented as appropriate)     Description:  Goals to be met by: 9/10/19     Patient will increase functional independence with mobility by performin. Supine to sit with MInimal Assistance - met 19  1a. Supine to sit with SBA   2. Sit to supine with MInimal Assistance - met 19  2a. Sit to supine with SBA   3. Sit to stand transfer with Stand-by Assistance  4. Bed to chair transfer with Stand-by Assistance using Rolling Walker  5. Gait  x 50 feet with Stand-by Assistance using Rolling Walker.                        Time Tracking:     PT Received On: 19  PT Start Time: 1411     PT Stop Time: 1449  PT Total Time (min): 38 min     Billable Minutes: Gait Training 15 and Therapeutic Activity 23    Treatment Type: Treatment  PT/PTA: PT     PTA Visit Number: 0     Ariadna Herman, PT, DPT   2019  [phone]

## 2019-09-05 NOTE — PROGRESS NOTES
[facility]  Heart Transplant  Progress Note    Patient Name: Sherice Squires  MRN: [id number]  Admission Date: 8/20/2019  Hospital Length of Stay: 16 days  Attending Physician: Nimesh Montgomery Jr.,*  Primary Care Provider: Annamarie Soria MD  Principal Problem:Acute on chronic combined systolic and diastolic heart failure    Subjective:     Interval History: no acute events overnight. Started on entresto last night. Patient reporting 2-3 loose BMs. No fevers. No abd pain. Wrist mildly improved.    Continuous Infusions:  Scheduled Meds:   amiodarone  200 mg Oral Daily    colchicine  0.6 mg Oral Daily    digoxin  0.125 mg Oral Every Mon, Wed, Fri    famotidine  20 mg Oral Daily    magnesium oxide  400 mg Oral Daily    sacubitril-valsartan  1 tablet Oral BID    warfarin  2.5 mg Oral Every Tues, Thurs, Sat, Sun    warfarin  5 mg Oral Every Mon, Wed, Fri     PRN Meds:docusate sodium, ramelteon    Review of patient's allergies indicates:   Allergen Reactions    Augmentin [amoxicillin-pot clavulanate] Swelling     Lip swelling      Penicillins      Other reaction(s): Swelling  Lip swelling       Objective:     Vital Signs (Most Recent):  Temp: 97.8 °F (36.6 °C) (09/05/19 0720)  Pulse: 70 (09/05/19 0800)  Resp: 18 (09/05/19 0720)  BP: 98/68 (09/05/19 0720)  SpO2: 97 % (09/05/19 0720) Vital Signs (24h Range):  Temp:  [96.4 °F (35.8 °C)-98.9 °F (37.2 °C)] 97.8 °F (36.6 °C)  Pulse:  [68-73] 70  Resp:  [16-18] 18  SpO2:  [96 %-99 %] 97 %  BP: ()/(66-77) 98/68     No data found.  Body mass index is 25.68 kg/m².      Intake/Output Summary (Last 24 hours) at 9/5/2019 0841  Last data filed at 9/5/2019 0700  Gross per 24 hour   Intake 1020 ml   Output 1250 ml   Net -230 ml       Physical Exam   Constitutional: She appears well-developed and well-nourished. No distress.   Neck: Normal range of motion. Neck supple. JVD (12 cmHO2) present.   Cardiovascular: Normal rate, regular rhythm, normal heart  sounds and intact distal pulses. Exam reveals no gallop and no friction rub.   No murmur heard.  Pulmonary/Chest: Effort normal and breath sounds normal. No stridor. No respiratory distress. She has no wheezes.   Musculoskeletal: Normal range of motion. She exhibits no edema or deformity.   Left wrist pain and warmth. Edema of left hand improved. Decreased ROM.    Skin: Skin is warm and dry. She is not diaphoretic.   Nursing note and vitals reviewed.      Significant Labs:  CBC:  Recent Labs   Lab 09/03/19  0453 09/04/19  0334 09/05/19  0353   WBC 7.87 8.33 6.12   RBC 3.92* 3.86* 4.11   HGB 11.1* 11.1* 11.7*   HCT 35.0* 34.8* 37.2    243 256   MCV 89 90 91   MCH 28.3 28.8 28.5   MCHC 31.7* 31.9* 31.5*     BNP:  No results for input(s): BNP in the last 168 hours.    Invalid input(s): BNPTRIAGELBLO  CMP:  Recent Labs   Lab 09/03/19  1533 09/04/19 0334 09/05/19  0353   * 82  85  84 92   CALCIUM 9.3 9.5  9.5  9.5 9.2   ALBUMIN 2.5* 2.5*  2.4* 2.5*   PROT 7.0 6.8  6.7 6.9    138  139  139 139   K 3.9 4.2  4.1  4.1 4.1   CO2 26 25  26  26 25    102  102  102 101   BUN 27* 25*  25*  25* 25*   CREATININE 1.4 1.4  1.3  1.3 1.4   ALKPHOS 119 114  119 125   ALT 36 31  32 33   AST 50* 42*  40 35   BILITOT 0.7 0.8  0.8 0.7      Coagulation:   Recent Labs   Lab 08/30/19  0403  09/03/19  0453 09/04/19 0334 09/05/19  0353   INR 2.1*   < > 2.4* 2.3* 2.4*   APTT 62.5*  --   --   --   --     < > = values in this interval not displayed.     LDH:  No results for input(s): LDH in the last 72 hours.  Microbiology:  Microbiology Results (last 7 days)     Procedure Component Value Units Date/Time    Clostridium difficile EIA [[id number]] Collected:  08/29/19 1406    Order Status:  Completed Specimen:  Stool Updated:  08/29/19 2203     C. diff Antigen Negative     C difficile Toxins A+B, EIA Negative     Comment: Testing not recommended for children <24 months old.             I have reviewed  all pertinent labs within the past 24 hours.    Estimated Creatinine Clearance: 37.8 mL/min (based on SCr of 1.4 mg/dL).    Diagnostic Results:  I have reviewed and interpreted all pertinent imaging results/findings within the past 24 hours.    Assessment and Plan:       * Acute on chronic combined systolic and diastolic heart failure  69 yo F with NIDCM EF 20% , NYHA FC III with admitted with afib with rvr and cardiogenic shock   - IABP placed 8/20 and pulled 8/23   -TTE with contrast 8/20: LVEDD 6.51, LVEF 15%, indeterminate diastolic function, severe LAE, trace AI, mod MR and TR, PAS 51 and IVC 15.     - Increase dose of Entresto 49/51 mg PO BID starting this evening (home dose 97/103)  - Continue dig 125 mcg MWF  - Not a candidate for LVAD/OHTX at this time. Will need aggressive rehab and re-evaluation    Gout  Uric acid 13  - Continue colchicine  - Radiograph unrevealing    Impaired functional mobility and endurance  Rehab following discharge    Acute on chronic kidney failure  Renal function stable  - Holding diuretics at this time    Atrial fibrillation with RVR  Atrial fibrillation with RVR s/p AVN ablation and BiV upgrade [place] scientific generator with SJM LV lead:   - EP following, Dr. Cardona  - Amiodarone 200mg qday  - INR 2.4    Automatic implantable cardioverter-defibrillator in situ  Underwent AVN ablation with upgrade to CRT  - AVN ablation and BiV upgrade [place] scientific generator with SJM LV lead  - Patient to be evaluated tomorrow by EP device rep        Saroj Mccartney MD  Heart Transplant  [facility]

## 2019-09-05 NOTE — ASSESSMENT & PLAN NOTE
67 yo F with NIDCM EF 20% , NYHA FC III with admitted with afib with rvr and cardiogenic shock   - IABP placed 8/20 and pulled 8/23   -TTE with contrast 8/20: LVEDD 6.51, LVEF 15%, indeterminate diastolic function, severe LAE, trace AI, mod MR and TR, PAS 51 and IVC 15.     - Continue Entresto 49/51 mg PO BID today (home dose 97/103)  - Continue dig 125 mcg MWF  - Not a candidate for LVAD/OHTX at this time. Will need aggressive rehab and re-evaluation

## 2019-09-05 NOTE — PLAN OF CARE
Problem: Physical Therapy Goal  Goal: Physical Therapy Goal  Goals to be met by: 9/10/19     Patient will increase functional independence with mobility by performin. Supine to sit with MInimal Assistance - met 19  1a. Supine to sit with SBA   2. Sit to supine with MInimal Assistance - met 19  2a. Sit to supine with SBA   3. Sit to stand transfer with Stand-by Assistance  4. Bed to chair transfer with Stand-by Assistance using Rolling Walker  5. Gait  x 50 feet with Stand-by Assistance using Rolling Walker.       Outcome: Ongoing (interventions implemented as appropriate)  Goals reviewed and remain appropriate. Pt progressing towards goals.    Ariadna Herman, PT, DPT   2019  [phone]

## 2019-09-05 NOTE — PLAN OF CARE
Problem: Adult Inpatient Plan of Care  Goal: Plan of Care Review  Outcome: Ongoing (interventions implemented as appropriate)  Plan of care updated with patient. No falls during shift. No skin breakdown. Maintained fall protocol. Pt receiving amiodarone QD. PT/OT following pt. One-two person assist. Pending discharge to rehab facility. Addressed all issues throughout shift.

## 2019-09-05 NOTE — PT/OT/SLP PROGRESS
Occupational Therapy   Treatment    Name: Sherice Squires  MRN: [id number]  Admitting Diagnosis:  Acute on chronic combined systolic and diastolic heart failure  6 Days Post-Op    Recommendations:     Discharge Recommendations: rehabilitation facility  Discharge Equipment Recommendations:  commode  Barriers to discharge:  None    Assessment:     Sherice Squires is a 68 y.o. female with a medical diagnosis of Acute on chronic combined systolic and diastolic heart failure.  She presents with performance deficits affecting function are impaired functional mobilty, weakness, impaired endurance, gait instability, impaired balance, impaired self care skills, decreased lower extremity function, decreased upper extremity function, impaired cardiopulmonary response to activity, decreased safety awareness.  Pt tolerated session well and has overall demonstrated imporvment with functional mobility, ADLs skills, and transfers. However, pt is not at her PLOF and is not safe to return to the home environment at current functional level. 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    Rehab Prognosis:  Good; patient would benefit from acute skilled OT services to address these deficits and reach maximum level of function.       Plan:     Patient to be seen 4 x/week to address the above listed problems via self-care/home management, therapeutic activities, therapeutic exercises, neuromuscular re-education  · Plan of Care Expires: 09/25/19  · Plan of Care Reviewed with: patient    Subjective     Pain/Comfort:  Pain Rating 1: 0/10  Pain Addressed 1: Pre-medicate for activity  Pain Rating Post-Intervention 1: 0/10    Objective:     Communicated with: RN prior to  session.  Patient found HOB elevated with telemetry, aMtilde upon OT entry to room. Pt agreeable to therapy session.     General Precautions: Standard, fall, pacemaker   Orthopedic Precautions:N/A   Braces: (sling at night )     Occupational Performance:     Bed Mobility:    · Patient completed Scooting/Bridging with stand by assistance and for anterior scooting towards EOB   · Patient completed Supine to Sit with contact guard assistance, with side rail and HOB elevated      Functional Mobility/Transfers:  · Patient completed Sit <> Stand Transfer from EOB with contact guard assistance  with  rolling walker   · Patient completed Toilet Transfer functional ambulation with step transfer technique with moderate assistance x 3 trials with  grab bars (using R UE)  · Functional Mobility: Pt engaging in functional mobility in preparation for  household distances approx 12ft within pt's room  with CGA using RW  in order to maximize functional activity tolerance and standing balance required for engagement in occupations of choice.   · Pt with no LOB despite slight gait instability.   · Slight fatigue reported but no SOB   · Good safety awareness navigating within pt's room     Activities of Daily Living:  · Grooming: supervision with set-up A   · Pt performed oral care of dentured while standing at the sink with CGA initially for standing balance but progressed to SBA   · Pt washed face standing at sink; slight fatigue noted due to pt resting forearms on sink countertop  · Pt tolerated standing at sink for ~10mins with overall SBA   · Bathing: minimum assistance   · Pt washed under B UEs,  upper arms (avoiding PPM site), stomach, and  [place] area while sitting on bathroom toilet for energy conservation.   · Pt required assistance for back.   · Upper Body Dressing: set-up A   · To don clean gown in front while seated on toilet   · Toileting: maximal assistance for hygiene performed in standing with pt utilizing RW for  stability      [facility] 6 Click ADL: 18    Treatment & Education:  - Pt educated on role of OT, POC, and goals for therapy.    - Pt able to verbalize PPM precautions independently   - PPM maintained during therapy session   - Educated pt on being appropriate to transfer with nsg and PCT with x 1 person assist for transfers using RW  - Pt instructed to sit UIC for all meals with assistance from musing staff for transfer   - Time provided for therapeutic counseling and discussion of health disposition.   - Importance of OOB ax's with staff member assistance and sitting OOB majority of day.   - Pt completed ADLs and functional mobility for treatment session as noted above   - Pt verbalized understanding. Pt expressed no further concerns/questions.  - whiteboard updated     Patient left up in chair with all lines intact, call button in reach and RN notifiedEducation:      GOALS:   Multidisciplinary Problems     Occupational Therapy Goals        Problem: Occupational Therapy Goal    Goal Priority Disciplines Outcome Interventions   Occupational Therapy Goal     OT, PT/OT            Problem: Occupational Therapy Goal    Goal Priority Disciplines Outcome Interventions   Occupational Therapy Goal     OT, PT/OT Ongoing (interventions implemented as appropriate)    Description:  Goals to be met by: 09-09-19     Patient will increase functional independence with ADLs by performing:    UE Dressing with Stand-by Assistance.  Grooming while standing with Contact Guard Assistance.  Goal revised:Toileting from toilet with Minimal Assistance for hygiene and clothing management.   Supine to sit with Stand-by Assistance.  Stand pivot transfers with Contact Guard Assistance. MET 9/5  Goal revised: Toilet transfer to toilet  with Contact Guard Assistance.  Pt. To be I with HEP to improve level of endurance                        Time Tracking:     OT Date of Treatment: 09/05/19  OT Start Time: 0955  OT Stop Time: 1037  OT Total Time (min):  42 min    Billable Minutes:Self Care/Home Management 40    Lucy Bean, OT  9/5/2019

## 2019-09-05 NOTE — SUBJECTIVE & OBJECTIVE
Interval History: no acute events overnight. Started on entresto last night. Patient reporting 2-3 loose BMs. No fevers. No abd pain. Wrist mildly improved.    Continuous Infusions:  Scheduled Meds:   amiodarone  200 mg Oral Daily    colchicine  0.6 mg Oral Daily    digoxin  0.125 mg Oral Every Mon, Wed, Fri    famotidine  20 mg Oral Daily    magnesium oxide  400 mg Oral Daily    sacubitril-valsartan  1 tablet Oral BID    warfarin  2.5 mg Oral Every Tues, Thurs, Sat, Sun    warfarin  5 mg Oral Every Mon, Wed, Fri     PRN Meds:docusate sodium, ramelteon    Review of patient's allergies indicates:   Allergen Reactions    Augmentin [amoxicillin-pot clavulanate] Swelling     Lip swelling      Penicillins      Other reaction(s): Swelling  Lip swelling       Objective:     Vital Signs (Most Recent):  Temp: 97.8 °F (36.6 °C) (09/05/19 0720)  Pulse: 70 (09/05/19 0800)  Resp: 18 (09/05/19 0720)  BP: 98/68 (09/05/19 0720)  SpO2: 97 % (09/05/19 0720) Vital Signs (24h Range):  Temp:  [96.4 °F (35.8 °C)-98.9 °F (37.2 °C)] 97.8 °F (36.6 °C)  Pulse:  [68-73] 70  Resp:  [16-18] 18  SpO2:  [96 %-99 %] 97 %  BP: ()/(66-77) 98/68     No data found.  Body mass index is 25.68 kg/m².      Intake/Output Summary (Last 24 hours) at 9/5/2019 0841  Last data filed at 9/5/2019 0700  Gross per 24 hour   Intake 1020 ml   Output 1250 ml   Net -230 ml       Physical Exam   Constitutional: She appears well-developed and well-nourished. No distress.   Neck: Normal range of motion. Neck supple. JVD (12 cmHO2) present.   Cardiovascular: Normal rate, regular rhythm, normal heart sounds and intact distal pulses. Exam reveals no gallop and no friction rub.   No murmur heard.  Pulmonary/Chest: Effort normal and breath sounds normal. No stridor. No respiratory distress. She has no wheezes.   Musculoskeletal: Normal range of motion. She exhibits no edema or deformity.   Left wrist pain and warmth. Edema of left hand improved. Decreased ROM.     Skin: Skin is warm and dry. She is not diaphoretic.   Nursing note and vitals reviewed.      Significant Labs:  CBC:  Recent Labs   Lab 09/03/19  0453 09/04/19 0334 09/05/19 0353   WBC 7.87 8.33 6.12   RBC 3.92* 3.86* 4.11   HGB 11.1* 11.1* 11.7*   HCT 35.0* 34.8* 37.2    243 256   MCV 89 90 91   MCH 28.3 28.8 28.5   MCHC 31.7* 31.9* 31.5*     BNP:  No results for input(s): BNP in the last 168 hours.    Invalid input(s): BNPTRIAGELBLO  CMP:  Recent Labs   Lab 09/03/19  1533 09/04/19 0334 09/05/19 0353   * 82  85  84 92   CALCIUM 9.3 9.5  9.5  9.5 9.2   ALBUMIN 2.5* 2.5*  2.4* 2.5*   PROT 7.0 6.8  6.7 6.9    138  139  139 139   K 3.9 4.2  4.1  4.1 4.1   CO2 26 25  26  26 25    102  102  102 101   BUN 27* 25*  25*  25* 25*   CREATININE 1.4 1.4  1.3  1.3 1.4   ALKPHOS 119 114  119 125   ALT 36 31  32 33   AST 50* 42*  40 35   BILITOT 0.7 0.8  0.8 0.7      Coagulation:   Recent Labs   Lab 08/30/19  0403  09/03/19  0453 09/04/19 0334 09/05/19 0353   INR 2.1*   < > 2.4* 2.3* 2.4*   APTT 62.5*  --   --   --   --     < > = values in this interval not displayed.     LDH:  No results for input(s): LDH in the last 72 hours.  Microbiology:  Microbiology Results (last 7 days)     Procedure Component Value Units Date/Time    Clostridium difficile EIA [[id number]] Collected:  08/29/19 1406    Order Status:  Completed Specimen:  Stool Updated:  08/29/19 2203     C. diff Antigen Negative     C difficile Toxins A+B, EIA Negative     Comment: Testing not recommended for children <24 months old.             I have reviewed all pertinent labs within the past 24 hours.    Estimated Creatinine Clearance: 37.8 mL/min (based on SCr of 1.4 mg/dL).    Diagnostic Results:  I have reviewed and interpreted all pertinent imaging results/findings within the past 24 hours.

## 2019-09-06 PROBLEM — M25.542 ARTHRALGIA OF HAND, LEFT: Status: ACTIVE | Noted: 2019-01-01

## 2019-09-06 NOTE — PROGRESS NOTES
"UPDATE    On 9/4, MARIA spoke with Nusrat ([phone]) in admissions at [facility].  Nusrat confirms they received IPR referral and reports they have Epic access to review updated records.  Per Nusrat, pt looks like a good candidate for IPR and they will submit for insurance auth once anticipated d/c date is known.    Lisbet ([phone]) with [facility] reports they are still reviewing pt's records and requested that SW fax updated PT/OT notes; SW faxed records.    Per HTS rounds this afternoon, EP reports pt is due for device check at end of this week and they can perform check before pt discharges.  Pt's next f/u will then be in 1 month.  HTS team reports pt will be ready for d/c tomorrow if rehab can accept pt.    MARIA spoke with pt in room on afternoon of 9/4.  Pt presents as sitting up in bedside chair, aao x4, calm, cooperative, and asking and answering questions appropriately.  Pt states she had episode of diarrhea this morning, but otherwise feels "okay."  Pt states she discussed IPR more with family and her preferred facility is [facility].  SW explained to pt that EP will see pt for device/site check prior to d/c & then pt's next f/u will be in 1 month. SW also advised that doctors think pt will be medically ready for txfer to IPR in next 1-2 days.   Pt verbalizes understanding.  Pt reports her son went home today to "take care of some things," but should be back tomorrow.  SW discussed transportation at time of txfer and pt states son can transport her.  Pt reports coping adequately at this time and denies any needs or concerns to SW.      MARIA updated Nusrat at [facility] this afternoon on pt's dispo.  Nusrat reports they prefer to have pts who are transferring from [facility] to discharge early in the day so they do not arrive to their facility too late.  Nusrat reports she will review pt's referral with her medical director today, then submit for insurance auth.  Nusrat requests to plan for d/c Friday morning.  " SW notified pt and HTS team.  SW providing ongoing psychosocial and counseling support, education, resources, assistance, and discharge planning as indicated.  SW following and remains available.

## 2019-09-06 NOTE — ASSESSMENT & PLAN NOTE
Underwent AVN ablation with upgrade to CRT  - AVN ablation and BiV upgrade [employer] generator with SJM LV lead  - EP follow up

## 2019-09-06 NOTE — ASSESSMENT & PLAN NOTE
67 yo F with NIDCM EF 20% , NYHA FC III with admitted with afib with rvr and cardiogenic shock   - IABP placed 8/20 and pulled 8/23   -TTE with contrast 8/20: LVEDD 6.51, LVEF 15%, indeterminate diastolic function, severe LAE, trace AI, mod MR and TR, PAS 51 and IVC 15.     - Increase Entresto 97/103 mg PO BID today (home dose 97/103)  - Continue dig 125 mcg MWF  - Not a candidate for LVAD/OHTX at this time. Will need aggressive rehab and re-evaluation

## 2019-09-06 NOTE — SUBJECTIVE & OBJECTIVE
Interval History: no acute events overnight. Good urine output. Denies orthopnea or chest pain. She continues to have significant weakness and working with PT/OT.    Continuous Infusions:  Scheduled Meds:   amiodarone  200 mg Oral Daily    colchicine  0.6 mg Oral Daily    digoxin  0.125 mg Oral Every Mon, Wed, Fri    famotidine  20 mg Oral Daily    magnesium oxide  400 mg Oral Daily    sacubitril-valsartan  1 tablet Oral BID    spironolactone  25 mg Oral Daily    warfarin  2.5 mg Oral Every Mon, Wed, Fri, Sun    [START ON 9/7/2019] warfarin  5 mg Oral Every Tues, Thurs, Sat     PRN Meds:docusate sodium, ramelteon    Review of patient's allergies indicates:   Allergen Reactions    Augmentin [amoxicillin-pot clavulanate] Swelling     Lip swelling      Penicillins      Other reaction(s): Swelling  Lip swelling       Objective:     Vital Signs (Most Recent):  Temp: 97.6 °F (36.4 °C) (09/06/19 1138)  Pulse: 70 (09/06/19 1400)  Resp: 16 (09/06/19 1138)  BP: (!) 88/58 (09/06/19 1138)  SpO2: 97 % (09/06/19 1138) Vital Signs (24h Range):  Temp:  [97.3 °F (36.3 °C)-98.6 °F (37 °C)] 97.6 °F (36.4 °C)  Pulse:  [69-71] 70  Resp:  [16-18] 16  SpO2:  [94 %-98 %] 97 %  BP: ()/(58-72) 88/58     Patient Vitals for the past 72 hrs (Last 3 readings):   Weight   09/06/19 0800 73.8 kg (162 lb 11.2 oz)     Body mass index is 27.07 kg/m².      Intake/Output Summary (Last 24 hours) at 9/6/2019 1541  Last data filed at 9/6/2019 1500  Gross per 24 hour   Intake 365 ml   Output 1500 ml   Net -1135 ml       Physical Exam   Constitutional: She appears well-developed and well-nourished. No distress.   Neck: Normal range of motion. Neck supple. JVD (12 cmHO2) present.   Cardiovascular: Normal rate, regular rhythm, normal heart sounds and intact distal pulses. Exam reveals no gallop and no friction rub.   No murmur heard.  Pulmonary/Chest: Effort normal and breath sounds normal. No stridor. No respiratory distress. She has no  wheezes.   Musculoskeletal: Normal range of motion. She exhibits no edema or deformity.   Left wrist pain and warmth. Edema of left hand improved. Decreased ROM.    Skin: Skin is warm and dry. She is not diaphoretic.   Nursing note and vitals reviewed.      Significant Labs:  CBC:  Recent Labs   Lab 09/04/19 0334 09/05/19 0353 09/06/19  0301   WBC 8.33 6.12 6.59   RBC 3.86* 4.11 3.97*   HGB 11.1* 11.7* 11.5*   HCT 34.8* 37.2 34.7*    256 249   MCV 90 91 87   MCH 28.8 28.5 29.0   MCHC 31.9* 31.5* 33.1     BNP:  No results for input(s): BNP in the last 168 hours.    Invalid input(s): BNPTRIAGELBLO  CMP:  Recent Labs   Lab 09/04/19 0334 09/05/19 0353 09/06/19  0302   GLU 82  85  84 92 94   CALCIUM 9.5  9.5  9.5 9.2 9.0   ALBUMIN 2.5*  2.4* 2.5* 2.3*   PROT 6.8  6.7 6.9 6.4     139  139 139 138   K 4.2  4.1  4.1 4.1 3.5   CO2 25  26  26 25 21*     102  102 101 104   BUN 25*  25*  25* 25* 23   CREATININE 1.4  1.3  1.3 1.4 1.2   ALKPHOS 114  119 125 113   ALT 31  32 33 31   AST 42*  40 35 28   BILITOT 0.8  0.8 0.7 0.5      Coagulation:   Recent Labs   Lab 09/04/19 0334 09/05/19 0353 09/06/19  0301   INR 2.3* 2.4* 2.8*     LDH:  No results for input(s): LDH in the last 72 hours.  Microbiology:  Microbiology Results (last 7 days)     ** No results found for the last 168 hours. **          I have reviewed all pertinent labs within the past 24 hours.    Estimated Creatinine Clearance: 45.1 mL/min (based on SCr of 1.2 mg/dL).    Diagnostic Results:  I have reviewed and interpreted all pertinent imaging results/findings within the past 24 hours.

## 2019-09-06 NOTE — SUBJECTIVE & OBJECTIVE
Past Medical History:   Diagnosis Date    Atrial fibrillation     Cardiomyopathy     CHF (congestive heart failure)     Hyperlipidemia     Hypertension     Ventricular tachycardia        Past Surgical History:   Procedure Laterality Date    ABLATION, AVN N/A 8/30/2019    Performed by [facility] at [facility]    CARDIAC DEFIBRILLATOR PLACEMENT      CARDIOVERSION N/A 8/27/2019    Performed by [facility]ece MD at [facility] EP [facility]    CARDIOVERSION N/A 8/22/2019    Performed by [facility] at [facility] EP [facility]    CARDIOVERSION N/A 8/20/2019    Performed by Jose Reece MD at [facility] EP [facility]    Cardioversion or Defibrillation  8/30/2019    Performed by Jose [facility]ece MD at [facility] EP [facility]    CARDIOVERSION OR DEFIBRILLATION N/A 3/8/2019    Performed by Jose Reece MD at [facility] EP [facility]    CHOLECYSTECTOMY      ECHOCARDIOGRAM, TRANSESOPHAGEAL N/A 8/27/2019    Performed by [facility] Diagnostic Provider at [facility] EP [facility]    ECHOCARDIOGRAM,TRANSESOPHAGEAL N/A 8/20/2019    Performed by [facility] Diagnostic Provider at [facility] EP [facility]    ECHOCARDIOGRAM,TRANSESOPHAGEAL N/A 3/8/2019    Performed by [facility] Diagnostic Provider at [facility] EP [facility]    HYSTERECTOMY      INSERTION, CARDIAC PACEMAKER, BIVENTRICULAR, PERMANENT, AS UPGRADE Left 8/30/2019    Performed by Jose Reece MD at [facility] EP [facility]    JOINT REPLACEMENT  2009    rt knee replacment    pace maker  12/2010         There is no immunization history on file for this patient.    Review of patient's allergies indicates:   Allergen Reactions    Augmentin [amoxicillin-pot clavulanate] Swelling     Lip swelling      Penicillins      Other reaction(s): Swelling  Lip swelling       Current Facility-Administered Medications   Medication Frequency    amiodarone tablet 200 mg Daily    colchicine capsule/tablet 0.6 mg Daily    digoxin tablet 0.125 mg Every Mon, Wed, Fri    docusate sodium capsule 50 mg Daily PRN    famotidine tablet 20 mg Daily    magnesium oxide tablet  400 mg Daily    ramelteon tablet 8 mg Nightly PRN    sacubitril-valsartan 49-51 mg per tablet 1 tablet BID    spironolactone tablet 25 mg Daily    warfarin (COUMADIN) tablet 2.5 mg Every Mon, Wed, Fri, Sun    [START ON 9/7/2019] warfarin (COUMADIN) tablet 5 mg Every Tues, Thurs, Sat     Family History     Problem Relation (Age of Onset)    Arthritis Father    COPD Brother    Cancer Father    Drug abuse Sister    Hypertension Maternal Grandmother        Tobacco Use    Smoking status: Never Smoker    Smokeless tobacco: Never Used   Substance and Sexual Activity    Alcohol use: No     Comment: rarely    Drug use: No    Sexual activity: Not on file     Review of Systems   Constitutional: Negative for chills and fever.   Respiratory: Negative for apnea, cough, chest tightness, shortness of breath and wheezing.    Cardiovascular: Negative for chest pain, palpitations and leg swelling.   Gastrointestinal: Positive for constipation. Negative for abdominal pain, nausea and vomiting.   Genitourinary: Negative for difficulty urinating and dysuria.   Musculoskeletal: Positive for arthralgias and joint swelling (Mild wrist swelling). Negative for myalgias.   Skin: Negative for color change and rash.   Neurological: Negative for dizziness, syncope, weakness and headaches.   Psychiatric/Behavioral: Negative for confusion and decreased concentration. The patient is not nervous/anxious.      Objective:     Vital Signs (Most Recent):  Temp: 97.6 °F (36.4 °C) (09/06/19 1138)  Pulse: 70 (09/06/19 1138)  Resp: 16 (09/06/19 1138)  BP: (!) 88/58 (09/06/19 1138)  SpO2: 97 % (09/06/19 1138)  O2 Device (Oxygen Therapy): room air (09/06/19 0742) Vital Signs (24h Range):  Temp:  [97.3 °F (36.3 °C)-98.6 °F (37 °C)] 97.6 °F (36.4 °C)  Pulse:  [65-71] 70  Resp:  [16-18] 16  SpO2:  [94 %-99 %] 97 %  BP: ()/(58-72) 88/58     Weight: 73.8 kg (162 lb 11.2 oz) (09/06/19 0800)  Body mass index is 27.07 kg/m².  Body surface area is 1.84  meters squared.      Intake/Output Summary (Last 24 hours) at 9/6/2019 1209  Last data filed at 9/6/2019 0900  Gross per 24 hour   Intake 365 ml   Output 1200 ml   Net -835 ml       Physical Exam   Constitutional: She is oriented to person, place, and time and well-developed, well-nourished, and in no distress. No distress.   HENT:   Head: Normocephalic and atraumatic.   Eyes: EOM are normal. No scleral icterus.   Neck: Normal range of motion. Neck supple. No thyromegaly present.   Cardiovascular: Normal rate, regular rhythm, normal heart sounds and intact distal pulses.  Exam reveals no gallop and no friction rub.    No murmur heard.  Pulmonary/Chest: Effort normal and breath sounds normal. No respiratory distress. She has no wheezes. She exhibits no tenderness.   Small surgical incision noted on left anterior chest        Right Side Rheumatological Exam     Examination finds the shoulder, elbow, wrist and knee normal.    Shoulder Exam   Tenderness Location: no tenderness    Knee Exam     Range of Motion   The patient has normal right knee ROM.    Left Side Rheumatological Exam     Examination finds the shoulder, elbow and knee normal.    The patient is tender to palpation of the wrist and 2nd PIP.    She has swelling of the wrist    Joint Exam Comments   Wrist: Tenderness most notably on the 1st CMC joint, 1st DIP, 2nd PIP, Reduced ROM of 1st finger. Swelling noted on 1st DIP, 2nd MCP, PIP joint.     Shoulder Exam   Tenderness Location: no tenderness    Knee Exam     Range of Motion   The patient has normal left knee ROM.      Neurological: She is alert and oriented to person, place, and time.   Skin: Skin is warm and dry. No rash noted.     Musculoskeletal: Normal range of motion. She exhibits no edema.         Significant Labs:  CBC:   Recent Labs   Lab 09/06/19  0301   WBC 6.59   HGB 11.5*   HCT 34.7*        Uric Acid: No results for input(s): URICACID in the last 24 hours.    Significant  Imaging:      Xray of Left Wrist 09/01  EXAMINATION:  XR WRIST COMPLETE 3 VIEWS LEFT    CLINICAL HISTORY:  tingling extreme pain;    TECHNIQUE:  PA, lateral, and oblique views of the left wrist were performed.    COMPARISON:  None    FINDINGS:  There is no fracture or osseous destruction.  There are degenerative changes at the interphalangeal joint of the thumb   Impression:       As above       Imaging results within the past 24 hours have been reviewed.

## 2019-09-06 NOTE — PLAN OF CARE
Received phonecall from angelo at \[facility]\"" regarding lack of support for IPR auth.     Faxed updated progress notes from HTS, PMR, PT/OT to Post-Acute fax number provided [phone].    Called P2P line to discuss, was informed give the RN a couple hours to review the updated clinicals, she will notify if a P2P is still warrented    P2p LINE [phone]    Updated SW and Fellow [phone]

## 2019-09-06 NOTE — PROGRESS NOTES
UPDATE    Nusrat ([phone]) with [facility] (ph: [phone], f: [phone]) reported to SW this morning that they submitted for auth for IPR yesterday but have not heard anything back from [facility] yet.  TATIANNA Price reported this morning that [facility] contacted her to request additional records for review before making IPR determination.  FITO Price sent clinicals and is awaiting update from [facility].  Per FITO Price, MD may need to do [id number] with [facility] if they do not approve IPR after review of additional records.    Per HTS rounds this afternoon, Dr. Montgomery recommends holding txfer until Monday since IPR auth is still pending and team is making a few medication adjustments today.  SW spoke with pt at bedside this afternoon.  Pt presents as sitting up in bedside chair, aao x4, pleasant, calm, cooperative, and asking and answering questions appropriately.  Pt verbalizes understanding that insurance auth for IPR is still pending and that MD recommends holding txfer until Monday.  Pt reports she is in agreement with this plan.  Pt reports coping adequately at this time and denies any needs or concerns to SW.    SW updated Nusrat with [facility] on pt's dispo.  SW will f/u on Monday re: status of IPR auth and will coordinate pt's txfer once IPR is approved.  SW providing ongoing psychosocial and counseling support, education, resources, assistance, and discharge planning as indicated.  SW following and remains available.

## 2019-09-06 NOTE — PLAN OF CARE
Problem: Occupational Therapy Goal  Goal: Occupational Therapy Goal  Goals to be met by: 09-09-19     Patient will increase functional independence with ADLs by performing:    UE Dressing with Stand-by Assistance.  Grooming while standing with Contact Guard Assistance.  Goal revised:Toileting from toilet with Minimal Assistance for hygiene and clothing management.   Supine to sit with Stand-by Assistance.  Stand pivot transfers with Contact Guard Assistance. MET 9/5  Goal revised: Toilet transfer to toilet  with Contact Guard Assistance.  Pt. To be I with HEP to improve level of endurance       Outcome: Ongoing (interventions implemented as appropriate)  Continue POC     Lucy Bean OTR/L  Pager: [phone]  9/6/2019

## 2019-09-06 NOTE — ASSESSMENT & PLAN NOTE
"Patient is a 68 year old AAF with NICM s/p IABP and CRT, Afib s/p AVN ablation, CKD, osteoarthritis who is being managed for CHF. She developed new onset of left hand wrist and thumb pain, swelling  and stiffness on 09/01, pain and stiffness haven't improved despite treatment of gout    Labs: Uric acid 13, ESR >120, CRP 38    Imaging: Wrist Xray 09/01 "There is no fracture or osseous destruction.  There are degenerative changes at the interphalangeal joint of the thumb"    Differential includes polyarticular gout (elevated uric acid, pain and swelling) vs osteoarthritis (given her previous history, age, persistent pain and tenderness most notably on 1st basal CMC and DIP.       Plan  -Received intraarticular steroid shot in left wrist  -RA Panel  -Hand Xray pending   -Continue colchicine 0.6 mg daily  -Will arrange for f/u at clinic on discharge   -Gout diet         "

## 2019-09-06 NOTE — PLAN OF CARE
Problem: Adult Inpatient Plan of Care  Goal: Plan of Care Review  Outcome: Ongoing (interventions implemented as appropriate)  Patient remained free of injuries, falls, and trauma. VS stable. No complaints of pain mentioned. Left arm immoblized at night using sling. Intake and Output being monitored using purewick. Patient tolerating entresto. Lactoferrin fecal sample pending. Discharge pending rehab placement. Plan of care reviewed with patient. Patient verbalized understanding. All questions and concerns addressed. Will continue to monitor.

## 2019-09-06 NOTE — HPI
Sherice Squires is a 68 year old AAF with NICM s/p IABP and CRT, Afib s/p AVN ablation, CKD, osteoarthritis who was transferred on for further evaluation of heart failure. Patient is being managed for CHF, she underwent an IABP in 08/20 which was pulled on 08/23 and a recently placed biventricular pacer. She also had  furosemide infusion for a few days. On 09/01, she started to develop new onset left wrist pain and swelling radiating to her thumb, with some paresthesia in tips of her fingers. Her symptoms were thought to be due to gout and was given only one dose of prednisone due to concern for new iimplanter pacemaker. She was then started on colchicine load dose (1.2) and 0.6mg maintenance daily (received 6 doses); wrist xray was unremarkable and uric acid was elevated at 13 at that time. Patient states swelling has improved but pain is persistent and aggravated with range of motion. She also has some stiffness which prevents her from wearing her dentures. She has a history of osteoarthritis in both knees, had a right knee replacement several years ago, takes tylenol at home which helps. No other joint is causing her pain. She denies history of gout, alcohol, seafood or red meat consumption. Denies rash, hair changes or history of autoimmune disease in family.

## 2019-09-06 NOTE — PT/OT/SLP PROGRESS
"Occupational Therapy   Treatment    Name: Sherice Squires  MRN: [id number]  Admitting Diagnosis:  Acute on chronic combined systolic and diastolic heart failure  7 Days Post-Op    Recommendations:     Discharge Recommendations: rehabilitation facility  Discharge Equipment Recommendations:  walker, rolling, commode  Barriers to discharge:  None    Assessment:     Sherice Squires is a 68 y.o. female with a medical diagnosis of Acute on chronic combined systolic and diastolic heart failure.  She presents with performance deficits affecting function are weakness, impaired functional mobilty, impaired endurance, gait instability, impaired balance, impaired self care skills, impaired cardiopulmonary response to activity, pain. Pt tolerated session well with significant improvement in mobility and transfers. Pt overall is progressing well towards all goals but is not yet at PLOF. Pt would benefit from continued skilled acute OT services in order to maximize independence and safety with ADLs and functional mobility to ensure safe return to PLOF in the least restrictive environment.      Rehab Prognosis:  Good; patient would benefit from acute skilled OT services to address these deficits and reach maximum level of function.       Plan:     Patient to be seen 4 x/week to address the above listed problems via self-care/home management, therapeutic activities, therapeutic exercises  · Plan of Care Expires: 09/25/19  · Plan of Care Reviewed with: patient    Subjective     Pain/Comfort:  · Pain Rating 1: 0/10(Pt did reported "numbness in B knees" during functional mobility )  · Pain Addressed 1: Distraction    Objective:     Communicated with: RN prior to session.  Patient found up in chair with telemetry, PureWick upon OT entry to room. Pt agreeable to therapy session    General Precautions: Standard, fall, pacemaker   Orthopedic Precautions:N/A   Braces: Sling and swathe(at night)     Occupational Performance:     Bed Mobility: " "   · NP, pt found sitting [facility]    Functional Mobility/Transfers:  · Patient completed Sit <> Stand Transfer from bedside chair  with minimum assistance  with  rolling walker   · Min A provided in order to maintain PPM precautions   · Functional Mobility: Pt engaging in functional mobility to simulate household distances approx ~60ft  with CGA using RW  in order to maximize functional activity tolerance and standing balance required for engagement in occupations of choice.   · Pt with no LOB   · Pt reported "numbness in B knees during mobility"  · Pt reported fatigue upon returning to pt's room     Activities of Daily Living:  · Upper Body Dressing: minimum assistance to don/doff gown like robe       [facility] 6 Click ADL: 18    Treatment & Education:  - Pt educated on role of OT, POC, and goals for therapy.    - Educated pt on being appropriate to transfer with nsg and PCT with x 1 person assist using RW  - Time provided for therapeutic counseling and discussion of health disposition.   - Importance of OOB ax's with staff member assistance and sitting OOB majority of day.   - Pt verbalized understanding. Pt expressed no further concerns/questions.  - whiteboard updated     Patient left up in chair with all lines intact, call button in reach and RN notifiedEducation:      GOALS:   Multidisciplinary Problems     Occupational Therapy Goals        Problem: Occupational Therapy Goal    Goal Priority Disciplines Outcome Interventions   Occupational Therapy Goal     OT, PT/OT            Problem: Occupational Therapy Goal    Goal Priority Disciplines Outcome Interventions   Occupational Therapy Goal     OT, PT/OT Ongoing (interventions implemented as appropriate)    Description:  Goals to be met by: 09-09-19     Patient will increase functional independence with ADLs by performing:    UE Dressing with Stand-by Assistance.  Grooming while standing with Contact Guard Assistance.  Goal revised:Toileting from toilet with Minimal " Assistance for hygiene and clothing management.   Supine to sit with Stand-by Assistance.  Stand pivot transfers with Contact Guard Assistance. MET 9/5  Goal revised: Toilet transfer to toilet  with Contact Guard Assistance.  Pt. To be I with HEP to improve level of endurance                        Time Tracking:     OT Date of Treatment: 09/06/19  OT Start Time: 1431  OT Stop Time: 1450  OT Total Time (min): 19 min    Billable Minutes:Therapeutic Activity 15    Lucy Bean, OT  9/6/2019

## 2019-09-06 NOTE — PROGRESS NOTES
D/C PLANNING NOTE    SW met with pt and son in pt's room on 9/3 to f/u on preferred facilities for inpatient rehab.  Pt and son request that SW make referrals to [facility] & [place].  Pt states she is supposed to have a f/u visit from last week's EP procedure in about one week and wants to know if she will be able to come to this appt from rehab.  SW explained that if pt is at [facility] in [place], she would likely need family to transport her to the appt, or to have appt scheduled for after she discharges from rehab.  SW will discuss with both facilities to confirm how appt would be handled.  Pt and son also agreeable to referral being made to [facility] in [place] in case she needs to remain close to main campus to get to her f/u appt.    SW sent referrals to [facility] (ph: [phone], f: [phone]) and [place] (ph: [phone], f: [phone]).  Both facilities are reviewing pt's paperwork.  MARIA discussed EP f/u with multidisciplinary team in HTS rounds today.  Dr. Mccartney states he will discuss with EP team as to when appt needs to be and as to whether pt could see EP on a day that they have clinic in [place].  TATIANNA Price will make referral to [facility] this afternoon.  SW providing ongoing psychosocial and counseling support, education, resources, assistance, and discharge planning as indicated.  SW following and remains available.

## 2019-09-06 NOTE — PROGRESS NOTES
[facility]  Heart Transplant  Progress Note    Patient Name: Sherice Squires  MRN: [id number]  Admission Date: 8/20/2019  Hospital Length of Stay: 17 days  Attending Physician: Nimesh Montgomery Jr.,*  Primary Care Provider: Annamarie Soria MD  Principal Problem:Acute on chronic combined systolic and diastolic heart failure    Subjective:     Interval History: no acute events overnight. Good urine output. Denies orthopnea or chest pain. She continues to have significant weakness and working with PT/OT.    Continuous Infusions:  Scheduled Meds:   amiodarone  200 mg Oral Daily    colchicine  0.6 mg Oral Daily    digoxin  0.125 mg Oral Every Mon, Wed, Fri    famotidine  20 mg Oral Daily    magnesium oxide  400 mg Oral Daily    sacubitril-valsartan  1 tablet Oral BID    spironolactone  25 mg Oral Daily    warfarin  2.5 mg Oral Every Mon, Wed, Fri, Sun    [START ON 9/7/2019] warfarin  5 mg Oral Every Tues, Thurs, Sat     PRN Meds:docusate sodium, ramelteon    Review of patient's allergies indicates:   Allergen Reactions    Augmentin [amoxicillin-pot clavulanate] Swelling     Lip swelling      Penicillins      Other reaction(s): Swelling  Lip swelling       Objective:     Vital Signs (Most Recent):  Temp: 97.6 °F (36.4 °C) (09/06/19 1138)  Pulse: 70 (09/06/19 1400)  Resp: 16 (09/06/19 1138)  BP: (!) 88/58 (09/06/19 1138)  SpO2: 97 % (09/06/19 1138) Vital Signs (24h Range):  Temp:  [97.3 °F (36.3 °C)-98.6 °F (37 °C)] 97.6 °F (36.4 °C)  Pulse:  [69-71] 70  Resp:  [16-18] 16  SpO2:  [94 %-98 %] 97 %  BP: ()/(58-72) 88/58     Patient Vitals for the past 72 hrs (Last 3 readings):   Weight   09/06/19 0800 73.8 kg (162 lb 11.2 oz)     Body mass index is 27.07 kg/m².      Intake/Output Summary (Last 24 hours) at 9/6/2019 1541  Last data filed at 9/6/2019 1500  Gross per 24 hour   Intake 365 ml   Output 1500 ml   Net -1135 ml       Physical Exam   Constitutional: She appears well-developed and  well-nourished. No distress.   Neck: Normal range of motion. Neck supple. JVD (12 cmHO2) present.   Cardiovascular: Normal rate, regular rhythm, normal heart sounds and intact distal pulses. Exam reveals no gallop and no friction rub.   No murmur heard.  Pulmonary/Chest: Effort normal and breath sounds normal. No stridor. No respiratory distress. She has no wheezes.   Musculoskeletal: Normal range of motion. She exhibits no edema or deformity.   Left wrist pain and warmth. Edema of left hand improved. Decreased ROM.    Skin: Skin is warm and dry. She is not diaphoretic.   Nursing note and vitals reviewed.      Significant Labs:  CBC:  Recent Labs   Lab 09/04/19 0334 09/05/19 0353 09/06/19  0301   WBC 8.33 6.12 6.59   RBC 3.86* 4.11 3.97*   HGB 11.1* 11.7* 11.5*   HCT 34.8* 37.2 34.7*    256 249   MCV 90 91 87   MCH 28.8 28.5 29.0   MCHC 31.9* 31.5* 33.1     BNP:  No results for input(s): BNP in the last 168 hours.    Invalid input(s): BNPTRIAGELBLO  CMP:  Recent Labs   Lab 09/04/19 0334 09/05/19 0353 09/06/19  0302   GLU 82  85  84 92 94   CALCIUM 9.5  9.5  9.5 9.2 9.0   ALBUMIN 2.5*  2.4* 2.5* 2.3*   PROT 6.8  6.7 6.9 6.4     139  139 139 138   K 4.2  4.1  4.1 4.1 3.5   CO2 25  26  26 25 21*     102  102 101 104   BUN 25*  25*  25* 25* 23   CREATININE 1.4  1.3  1.3 1.4 1.2   ALKPHOS 114  119 125 113   ALT 31  32 33 31   AST 42*  40 35 28   BILITOT 0.8  0.8 0.7 0.5      Coagulation:   Recent Labs   Lab 09/04/19 0334 09/05/19 0353 09/06/19  0301   INR 2.3* 2.4* 2.8*     LDH:  No results for input(s): LDH in the last 72 hours.  Microbiology:  Microbiology Results (last 7 days)     ** No results found for the last 168 hours. **          I have reviewed all pertinent labs within the past 24 hours.    Estimated Creatinine Clearance: 45.1 mL/min (based on SCr of 1.2 mg/dL).    Diagnostic Results:  I have reviewed and interpreted all pertinent imaging results/findings within  the past 24 hours.    Assessment and Plan:     Sherice Squires is a 67 yo AAF with PMHx significant for NIDCM / stage D HFrEF (LVEF 20-25%, LVEDD 5.9 cm) s/p dc ICD, AF, HTN, DLD, CKD who is admitted as a transfer from  ED for management of ADHF +/- possible cardiogenic shock.     Patient reports she presented to OSH with progressive/worsening shortness of breath on exertion, orthopnea, and peripheral swelling of approx 5 days duration associated with nausea and fatigue. Denies fever/chills/sweats, chest pain, diaphoresis, presyncope/syncope. Does report intermittent palpitations.      Patient was afebrile and normotensive on arrival (/55 mmHg) to OSH ED with pulses in the 120-130s in AF with RVR. Initial labs showed worsening FAMILIA on CKD (with Cr 3.8 from 2.7-3.1 this past week from previous baseline of 1.3-1.4 in June 2019) as well as elevated T bili 2.3 and BNP >3000. CXR obtained without acute cardiopulmonary process. [facility] ultimately [facility] higher level of care.      Patient follows with [facility] clinic - currently on Entresto 97/103 mg BID, Toprol  mg daily, Aldactone 25 mg daily, and digoxin 0.125 mg daily. She was last seen by Dr Rodriguez on 8/6/19 who added metolazone 2.5 mg QHS before evening dose of Bumex to augment diuresis due to complaints of SOB / peripheral swelling at the time. She did not respond to this regimen and reports she was ultimately switched to Torsemide instead.      Patient is fully complaint with her medicines. Also adherent to fluid / salt restrictions with her hx of congestive heart failure.      States she was hospitalized only once before for ADHF within the past year.      Of note patient follows with Dr Reece of EP for her AF hx, last seen by him in 5/2019    * Acute on chronic combined systolic and diastolic heart failure  67 yo F with NIDCM EF 20% , NYHA FC III with admitted with afib with rvr and cardiogenic shock   - IABP placed 8/20 and pulled  8/23   -TTE with contrast 8/20: LVEDD 6.51, LVEF 15%, indeterminate diastolic function, severe LAE, trace AI, mod MR and TR, PAS 51 and IVC 15.     - Increase Entresto 97/103 mg PO BID today (home dose 97/103)  - Continue dig 125 mcg MWF  - Not a candidate for LVAD/OHTX at this time. Will need aggressive rehab and re-evaluation    Atrial fibrillation with RVR  Atrial fibrillation with RVR s/p AVN ablation and BiV upgrade [place] scientific generator with SJM LV lead:   - Seen by device nurse today. F/u EP outpatient.   - Amiodarone 200mg qday  - INR 2.8    Automatic implantable cardioverter-defibrillator in situ  Underwent AVN ablation with upgrade to CRT  - AVN ablation and BiV upgrade [place] scientific generator with SJM LV lead  - EP follow up    Gout  Uric acid 13  - Continue colchicine  - Radiograph unrevealing  - Rheum consulted    Impaired functional mobility and endurance  Rehab following discharge    Acute on chronic kidney failure  Renal function stable  - Holding diuretics at this time      Saroj Mccartney MD  Heart Transplant  [facility]

## 2019-09-06 NOTE — ASSESSMENT & PLAN NOTE
Atrial fibrillation with RVR s/p AVN ablation and BiV upgrade [place] scientific generator with SJM LV lead:   - Seen by device nurse today. F/u EP outpatient.   - Amiodarone 200mg qday  - INR 2.8

## 2019-09-06 NOTE — PROGRESS NOTES
Arrhythmia Department    Patient being discharged to skilled rehab facility s/p pacemaker upgrade. Orders to check wound check prior to patient leaving.     Wound site healing well. CDI. No S&S of infection noted. Educated patient on wound care to site, S&S of infection, arm restrictions, and follow up scheduling.     Patient verbalized understanding to all of the above.

## 2019-09-07 NOTE — PLAN OF CARE
Problem: Adult Inpatient Plan of Care  Goal: Plan of Care Review  Patient remains free from falls and injuries through out shift. Patient AAO and VSS. Patient denies chest pain and SOB. Torsemide given to diuresis. Purewick in place. Patient ambulating short distances in [place] w/ walker and standby assistance. Plan for discharge on Monday to rehab.  Plan of care reviewed with patient. Patient verbalizes understanding of plan.  Will continue to monitor.

## 2019-09-07 NOTE — SUBJECTIVE & OBJECTIVE
Interval History: no acute events overnight. Wrist/hand improved    Continuous Infusions:  Scheduled Meds:   amiodarone  200 mg Oral Daily    colchicine  0.6 mg Oral Daily    digoxin  0.125 mg Oral Every Mon, Wed, Fri    famotidine  20 mg Oral Daily    magnesium oxide  400 mg Oral Daily    sacubitril-valsartan  1 tablet Oral BID    spironolactone  25 mg Oral Daily    torsemide  40 mg Oral Once    warfarin  2.5 mg Oral Every Mon, Wed, Fri, Sun    warfarin  5 mg Oral Every Tues, Thurs, Sat     PRN Meds:docusate sodium, ramelteon    Review of patient's allergies indicates:   Allergen Reactions    Augmentin [amoxicillin-pot clavulanate] Swelling     Lip swelling      Penicillins      Other reaction(s): Swelling  Lip swelling       Objective:     Vital Signs (Most Recent):  Temp: 98.1 °F (36.7 °C) (09/07/19 1122)  Pulse: 70 (09/07/19 1122)  Resp: 16 (09/07/19 1122)  BP: 97/61 (09/07/19 1122)  SpO2: 95 % (09/07/19 1122) Vital Signs (24h Range):  Temp:  [97.7 °F (36.5 °C)-98.7 °F (37.1 °C)] 98.1 °F (36.7 °C)  Pulse:  [69-70] 70  Resp:  [16-17] 16  SpO2:  [95 %-98 %] 95 %  BP: ()/(52-75) 97/61     Patient Vitals for the past 72 hrs (Last 3 readings):   Weight   09/07/19 0500 74.5 kg (164 lb 3.9 oz)   09/06/19 0800 73.8 kg (162 lb 11.2 oz)     Body mass index is 27.33 kg/m².      Intake/Output Summary (Last 24 hours) at 9/7/2019 1145  Last data filed at 9/7/2019 1000  Gross per 24 hour   Intake 480 ml   Output 1300 ml   Net -820 ml     Physical Exam   Constitutional: She appears well-developed and well-nourished. No distress.   Neck: Normal range of motion. Neck supple. JVD (5 cm above clavical sitting up) present.   Cardiovascular: Normal rate, regular rhythm and normal heart sounds. Exam reveals no gallop and no friction rub.   No murmur heard.  Pulmonary/Chest: Effort normal and breath sounds normal. No stridor. No respiratory distress. She has no wheezes.   Musculoskeletal:   Left wrist ROM improved    Skin: Skin is warm and dry. She is not diaphoretic.   Nursing note and vitals reviewed.      Significant Labs:  CBC:  Recent Labs   Lab 09/05/19 0353 09/06/19 0301 09/07/19 0254   WBC 6.12 6.59 6.66   RBC 4.11 3.97* 4.44   HGB 11.7* 11.5* 12.7   HCT 37.2 34.7* 40.8    249 266   MCV 91 87 92   MCH 28.5 29.0 28.6   MCHC 31.5* 33.1 31.1*     BNP:  No results for input(s): BNP in the last 168 hours.    Invalid input(s): BNPTRIAGELBLO  CMP:  Recent Labs   Lab 09/05/19 0353 09/06/19 0302 09/07/19 0254   GLU 92 94 111*   CALCIUM 9.2 9.0 9.2   ALBUMIN 2.5* 2.3* 2.7*   PROT 6.9 6.4 7.1    138 139   K 4.1 3.5 4.3   CO2 25 21* 25    104 102   BUN 25* 23 27*   CREATININE 1.4 1.2 1.5*   ALKPHOS 125 113 129   ALT 33 31 30   AST 35 28 26   BILITOT 0.7 0.5 0.5      Coagulation:   Recent Labs   Lab 09/05/19 0353 09/06/19 0301 09/07/19 0254   INR 2.4* 2.8* 2.7*     LDH:  No results for input(s): LDH in the last 72 hours.  Microbiology:  Microbiology Results (last 7 days)     ** No results found for the last 168 hours. **          I have reviewed all pertinent labs within the past 24 hours.    Estimated Creatinine Clearance: 36.3 mL/min (A) (based on SCr of 1.5 mg/dL (H)).    Diagnostic Results:  I have reviewed and interpreted all pertinent imaging results/findings within the past 24 hours.

## 2019-09-07 NOTE — PROGRESS NOTES
[facility]  Heart Transplant  Progress Note    Patient Name: Sherice Squires  MRN: [id number]  Admission Date: 8/20/2019  Hospital Length of Stay: 18 days  Attending Physician: Nimesh Montgomery Jr.,*  Primary Care Provider: Annamarie Soria MD  Principal Problem:Acute on chronic combined systolic and diastolic heart failure    Subjective:     Interval History: no acute events overnight. Wrist/hand improved    Continuous Infusions:  Scheduled Meds:   amiodarone  200 mg Oral Daily    colchicine  0.6 mg Oral Daily    digoxin  0.125 mg Oral Every Mon, Wed, Fri    famotidine  20 mg Oral Daily    magnesium oxide  400 mg Oral Daily    sacubitril-valsartan  1 tablet Oral BID    spironolactone  25 mg Oral Daily    torsemide  40 mg Oral Once    warfarin  2.5 mg Oral Every Mon, Wed, Fri, Sun    warfarin  5 mg Oral Every Tues, Thurs, Sat     PRN Meds:docusate sodium, ramelteon    Review of patient's allergies indicates:   Allergen Reactions    Augmentin [amoxicillin-pot clavulanate] Swelling     Lip swelling      Penicillins      Other reaction(s): Swelling  Lip swelling       Objective:     Vital Signs (Most Recent):  Temp: 98.1 °F (36.7 °C) (09/07/19 1122)  Pulse: 70 (09/07/19 1122)  Resp: 16 (09/07/19 1122)  BP: 97/61 (09/07/19 1122)  SpO2: 95 % (09/07/19 1122) Vital Signs (24h Range):  Temp:  [97.7 °F (36.5 °C)-98.7 °F (37.1 °C)] 98.1 °F (36.7 °C)  Pulse:  [69-70] 70  Resp:  [16-17] 16  SpO2:  [95 %-98 %] 95 %  BP: ()/(52-75) 97/61     Patient Vitals for the past 72 hrs (Last 3 readings):   Weight   09/07/19 0500 74.5 kg (164 lb 3.9 oz)   09/06/19 0800 73.8 kg (162 lb 11.2 oz)     Body mass index is 27.33 kg/m².      Intake/Output Summary (Last 24 hours) at 9/7/2019 1145  Last data filed at 9/7/2019 1000  Gross per 24 hour   Intake 480 ml   Output 1300 ml   Net -820 ml     Physical Exam   Constitutional: She appears well-developed and well-nourished. No distress.   Neck: Normal range of  motion. Neck supple. JVD (5 cm above clavical sitting up) present.   Cardiovascular: Normal rate, regular rhythm and normal heart sounds. Exam reveals no gallop and no friction rub.   No murmur heard.  Pulmonary/Chest: Effort normal and breath sounds normal. No stridor. No respiratory distress. She has no wheezes.   Musculoskeletal:   Left wrist ROM improved   Skin: Skin is warm and dry. She is not diaphoretic.   Nursing note and vitals reviewed.      Significant Labs:  CBC:  Recent Labs   Lab 09/05/19 0353 09/06/19 0301 09/07/19 0254   WBC 6.12 6.59 6.66   RBC 4.11 3.97* 4.44   HGB 11.7* 11.5* 12.7   HCT 37.2 34.7* 40.8    249 266   MCV 91 87 92   MCH 28.5 29.0 28.6   MCHC 31.5* 33.1 31.1*     BNP:  No results for input(s): BNP in the last 168 hours.    Invalid input(s): BNPTRIAGELBLO  CMP:  Recent Labs   Lab 09/05/19 0353 09/06/19 0302 09/07/19 0254   GLU 92 94 111*   CALCIUM 9.2 9.0 9.2   ALBUMIN 2.5* 2.3* 2.7*   PROT 6.9 6.4 7.1    138 139   K 4.1 3.5 4.3   CO2 25 21* 25    104 102   BUN 25* 23 27*   CREATININE 1.4 1.2 1.5*   ALKPHOS 125 113 129   ALT 33 31 30   AST 35 28 26   BILITOT 0.7 0.5 0.5      Coagulation:   Recent Labs   Lab 09/05/19 0353 09/06/19 0301 09/07/19  0254   INR 2.4* 2.8* 2.7*     LDH:  No results for input(s): LDH in the last 72 hours.  Microbiology:  Microbiology Results (last 7 days)     ** No results found for the last 168 hours. **          I have reviewed all pertinent labs within the past 24 hours.    Estimated Creatinine Clearance: 36.3 mL/min (A) (based on SCr of 1.5 mg/dL (H)).    Diagnostic Results:  I have reviewed and interpreted all pertinent imaging results/findings within the past 24 hours.    Assessment and Plan:     * Acute on chronic combined systolic and diastolic heart failure  69 yo F with NIDCM EF 20% , NYHA FC III with admitted with afib with rvr and cardiogenic shock   - IABP placed 8/20 and pulled 8/23   -TTE with contrast 8/20: LVEDD 6.51,  LVEF 15%, indeterminate diastolic function, severe LAE, trace AI, mod MR and TR, PAS 51 and IVC 15.     - Continue Entresto 97/103 mg PO BID today (home dose 97/103)  - Continue dig 125 mcg MWF  - Start Torsemide 40 mg  - Not a candidate for LVAD/OHTX at this time. Will need aggressive rehab and re-evaluation    Gout  Uric acid 13  - Continue colchicine  - Radiograph unrevealing  - Rheum following. S/p injection    Impaired functional mobility and endurance  Rehab following discharge    Acute on chronic kidney failure  Renal function stable  - Renally dose meds    Atrial fibrillation with RVR  Atrial fibrillation with RVR s/p AVN ablation and BiV upgrade [place] scientific generator with SJM LV lead:   - Seen by device nurse today. F/u EP outpatient.   - Amiodarone 200mg qday  - INR 2.7    Automatic implantable cardioverter-defibrillator in situ  Underwent AVN ablation with upgrade to CRT  - AVN ablation and BiV upgrade [place] scientific generator with SJM LV lead  - EP follow up outpatient      Saroj Mccartney MD  Heart Transplant  [facility]

## 2019-09-07 NOTE — CONSULTS
[facility]  Rheumatology  Consult Note    Patient Name: Sherice Squires  MRN: [id number]  Admission Date: 8/20/2019  Hospital Length of Stay: 17 days  Code Status: Full Code   Attending Provider: Nimesh Montgomery Jr.,*  Primary Care Physician: Annamarie Soria MD  Principal Problem:Acute on chronic combined systolic and diastolic heart failure    Consults  Subjective:     HPI: Sherice Squires is a 68 year old AAF with NICM s/p IABP and CRT, Afib s/p AVN ablation, CKD, osteoarthritis who was transferred on for further evaluation of heart failure. Patient is being managed for CHF, she underwent an IABP in 08/20 which was pulled on 08/23 and a recently placed biventricular pacer. She also had  furosemide infusion for a few days. On 09/01, she started to develop new onset left wrist pain and swelling radiating to her thumb, with some paresthesia in tips of her fingers. Her symptoms were thought to be due to gout and was given only one dose of prednisone due to concern for new iimplanter pacemaker. She was then started on colchicine load dose (1.2) and 0.6mg maintenance daily (received 6 doses); wrist xray was unremarkable and uric acid was elevated at 13 at that time. Patient states swelling has improved but pain is persistent and aggravated with range of motion. She also has some stiffness which prevents her from wearing her dentures. She has a history of osteoarthritis in both knees, had a right knee replacement several years ago, takes tylenol at home which helps. No other joint is causing her pain. She denies history of gout, alcohol, seafood or red meat consumption. Denies rash, hair changes or history of autoimmune disease in family.     Past Medical History:   Diagnosis Date    Atrial fibrillation     Cardiomyopathy     CHF (congestive heart failure)     Hyperlipidemia     Hypertension     Ventricular tachycardia        Past Surgical History:   Procedure Laterality Date    ABLATION, AVN  N/A 8/30/2019    Performed by Jose Reece MD at [facility] EP [facility]    CARDIAC DEFIBRILLATOR PLACEMENT      CARDIOVERSION N/A 8/27/2019    Performed by Jose [facility] MD at [facility]    CARDIOVERSION N/A 8/22/2019    Performed by [facility] at [facility]    CARDIOVERSION N/A 8/20/2019    Performed by Jose Reece MD at [facility]    Cardioversion or Defibrillation  8/30/2019    Performed by Jose Reece MD at [facility]    CARDIOVERSION OR DEFIBRILLATION N/A 3/8/2019    Performed by Jose Reece MD at [facility]    CHOLECYSTECTOMY      ECHOCARDIOGRAM, TRANSESOPHAGEAL N/A 8/27/2019    Performed by [facility] at [facility]    ECHOCARDIOGRAM,TRANSESOPHAGEAL N/A 8/20/2019    Performed by [facility] at [facility]    ECHOCARDIOGRAM,TRANSESOPHAGEAL N/A 3/8/2019    Performed by [facility] at [facility]    HYSTERECTOMY      INSERTION, CARDIAC PACEMAKER, BIVENTRICULAR, PERMANENT, AS UPGRADE Left 8/30/2019    Performed by Jose Reece MD at [facility]    JOINT REPLACEMENT  2009    rt knee replacment    pace maker  12/2010         There is no immunization history on file for this patient.    Review of patient's allergies indicates:   Allergen Reactions    Augmentin [amoxicillin-pot clavulanate] Swelling     Lip swelling      Penicillins      Other reaction(s): Swelling  Lip swelling       Current Facility-Administered Medications   Medication Frequency    amiodarone tablet 200 mg Daily    colchicine capsule/tablet 0.6 mg Daily    digoxin tablet 0.125 mg Every Mon, Wed, Fri    docusate sodium capsule 50 mg Daily PRN    famotidine tablet 20 mg Daily    magnesium oxide tablet 400 mg Daily    ramelteon tablet 8 mg Nightly PRN    sacubitril-valsartan 49-51 mg per tablet 1 tablet BID    spironolactone tablet 25 mg Daily    warfarin (COUMADIN) tablet 2.5 mg Every Mon, Wed, Fri, Sun    [START ON 9/7/2019] warfarin (COUMADIN) tablet 5  mg Every Tues, Thurs, Sat     Family History     Problem Relation (Age of Onset)    Arthritis Father    COPD Brother    Cancer Father    Drug abuse Sister    Hypertension Maternal Grandmother        Tobacco Use    Smoking status: Never Smoker    Smokeless tobacco: Never Used   Substance and Sexual Activity    Alcohol use: No     Comment: rarely    Drug use: No    Sexual activity: Not on file     Review of Systems   Constitutional: Negative for chills and fever.   Respiratory: Negative for apnea, cough, chest tightness, shortness of breath and wheezing.    Cardiovascular: Negative for chest pain, palpitations and leg swelling.   Gastrointestinal: Positive for constipation. Negative for abdominal pain, nausea and vomiting.   Genitourinary: Negative for difficulty urinating and dysuria.   Musculoskeletal: Positive for arthralgias and joint swelling (Mild wrist swelling). Negative for myalgias.   Skin: Negative for color change and rash.   Neurological: Negative for dizziness, syncope, weakness and headaches.   Psychiatric/Behavioral: Negative for confusion and decreased concentration. The patient is not nervous/anxious.      Objective:     Vital Signs (Most Recent):  Temp: 97.6 °F (36.4 °C) (09/06/19 1138)  Pulse: 70 (09/06/19 1138)  Resp: 16 (09/06/19 1138)  BP: (!) 88/58 (09/06/19 1138)  SpO2: 97 % (09/06/19 1138)  O2 Device (Oxygen Therapy): room air (09/06/19 0742) Vital Signs (24h Range):  Temp:  [97.3 °F (36.3 °C)-98.6 °F (37 °C)] 97.6 °F (36.4 °C)  Pulse:  [65-71] 70  Resp:  [16-18] 16  SpO2:  [94 %-99 %] 97 %  BP: ()/(58-72) 88/58     Weight: 73.8 kg (162 lb 11.2 oz) (09/06/19 0800)  Body mass index is 27.07 kg/m².  Body surface area is 1.84 meters squared.      Intake/Output Summary (Last 24 hours) at 9/6/2019 1209  Last data filed at 9/6/2019 0900  Gross per 24 hour   Intake 365 ml   Output 1200 ml   Net -835 ml       Physical Exam   Constitutional: She is oriented to person, place, and time and  well-developed, well-nourished, and in no distress. No distress.   HENT:   Head: Normocephalic and atraumatic.   Eyes: EOM are normal. No scleral icterus.   Neck: Normal range of motion. Neck supple. No thyromegaly present.   Cardiovascular: Normal rate, regular rhythm, normal heart sounds and intact distal pulses.  Exam reveals no gallop and no friction rub.    No murmur heard.  Pulmonary/Chest: Effort normal and breath sounds normal. No respiratory distress. She has no wheezes. She exhibits no tenderness.   Small surgical incision noted on left anterior chest        Right Side Rheumatological Exam     Examination finds the shoulder, elbow, wrist and knee normal.    Shoulder Exam   Tenderness Location: no tenderness    Knee Exam     Range of Motion   The patient has normal right knee ROM.    Left Side Rheumatological Exam     Examination finds the shoulder, elbow and knee normal.    The patient is tender to palpation of the wrist and 2nd PIP.    She has swelling of the wrist    Joint Exam Comments   Wrist: Tenderness most notably on the 1st CMC joint, 1st DIP, 2nd PIP, Reduced ROM of 1st finger. Swelling noted on 1st DIP, 2nd MCP, PIP joint.     Shoulder Exam   Tenderness Location: no tenderness    Knee Exam     Range of Motion   The patient has normal left knee ROM.      Neurological: She is alert and oriented to person, place, and time.   Skin: Skin is warm and dry. No rash noted.     Musculoskeletal: Normal range of motion. She exhibits no edema.         Significant Labs:  CBC:   Recent Labs   Lab 09/06/19  0301   WBC 6.59   HGB 11.5*   HCT 34.7*        Uric Acid: No results for input(s): URICACID in the last 24 hours.    Significant Imaging:      Xray of Left Wrist 09/01  EXAMINATION:  XR WRIST COMPLETE 3 VIEWS LEFT    CLINICAL HISTORY:  tingling extreme pain;    TECHNIQUE:  PA, lateral, and oblique views of the left wrist were performed.    COMPARISON:  None    FINDINGS:  There is no fracture or osseous  "destruction.  There are degenerative changes at the interphalangeal joint of the thumb   Impression:       As above       Imaging results within the past 24 hours have been reviewed.    Assessment/Plan:     Arthralgia of hand, left  Patient is a 68 year old AAF with NICM s/p IABP and CRT, Afib s/p AVN ablation, CKD, osteoarthritis who is being managed for CHF. She developed new onset of left hand wrist and thumb pain, swelling  and stiffness on 09/01, pain and stiffness haven't improved despite treatment of gout    Labs: Uric acid 13, ESR >120, CRP 38    Imaging: Wrist Xray 09/01 "There is no fracture or osseous destruction.  There are degenerative changes at the interphalangeal joint of the thumb"    Differential includes polyarticular gout (elevated uric acid, pain and swelling) vs osteoarthritis (given her previous history, age, persistent pain and tenderness most notably on 1st basal CMC and DIP.       Plan  -Received intraarticular steroid shot in left wrist  -RA Panel  -Hand Xray pending   -Continue colchicine 0.6 mg daily  -Will arrange for f/u at clinic on discharge   -Gout diet             Thank you for your consult. I will sign off. Please contact us if you have any additional questions.    Denia Christensen MD  Rheumatology  [facility]  "

## 2019-09-07 NOTE — PROGRESS NOTES
Pt IV was due to be changed 9/5/19. Pt was scheduled for D/C today. Moved to Monday. Called MD Zia for IV extension. Explained if it flushes and no signs of infection/infiltration. It is okay to keep until Monday.

## 2019-09-07 NOTE — PLAN OF CARE
Problem: Adult Inpatient Plan of Care  Goal: Plan of Care Review  Outcome: Ongoing (interventions implemented as appropriate)  Patient remained free of injuries, falls, and trauma. VS stable. No complaints of pain mentioned. Left arm immoblized at night using sling. Intake and Output being monitored using purewick. Pt entresto increased per order. Aldactone started today.Rheumatology was consulted and pt received cortisone to left hand. Discharge scheduled for Monday. Plan of care reviewed with patient. Patient verbalized understanding. All questions and concerns addressed. Will continue to monitor.

## 2019-09-07 NOTE — PROGRESS NOTES
Arthrocentesis  Date/Time: 9/6/2019 7:38 PM  Performed by: Murphy Ag MD  Authorized by: Bo Washington MD   Consent Done: Yes  Consent: Verbal consent obtained.  Consent given by: patient  Patient understanding: patient states understanding of the procedure being performed  Patient consent: the patient's understanding of the procedure matches consent given  Procedure consent: procedure consent matches procedure scheduled  Relevant documents: relevant documents present and verified  Site marked: the operative site was marked  Patient identity confirmed: verbally with patient  Indications: joint swelling and pain   Body area: wrist  Joint: left wrist  Local anesthesia used: yes    Anesthesia:  Local anesthesia used: yes  Local Anesthetic: lidocaine 1% without epinephrine  Anesthetic total: 1 mL  Patient sedated: no  Complications: No  Specimens: No  Comments: Patient tolerated left wrist injection well.

## 2019-09-08 PROBLEM — N18.9 ACUTE ON CHRONIC KIDNEY FAILURE: Status: ACTIVE | Noted: 2019-01-01

## 2019-09-08 PROBLEM — N17.9 ACUTE ON CHRONIC KIDNEY FAILURE: Status: ACTIVE | Noted: 2019-01-01

## 2019-09-08 NOTE — ASSESSMENT & PLAN NOTE
67 yo F with NIDCM EF 20% , NYHA FC III with admitted with afib with rvr and cardiogenic shock   - IABP placed 8/20 and pulled 8/23   -TTE with contrast 8/20: LVEDD 6.51, LVEF 15%, indeterminate diastolic function, severe LAE, trace AI, mod MR and TR, PAS 51 and IVC 15.     - Decrease Entresto 97/103 mg PO BID today (home dose 97/103)  - Continue dig 125 mcg MWF  - Not a candidate for LVAD/OHTX at this time. Will need aggressive rehab and re-evaluation

## 2019-09-08 NOTE — PLAN OF CARE
Problem: Adult Inpatient Plan of Care  Goal: Plan of Care Review  Outcome: Ongoing (interventions implemented as appropriate)  Pt remained free of falls, trauma, injury. VSS. AOx4. Skin CDI. Pt in arm sling for sleep. No complaints or events through out night. Reviewed plan of care with pt; answered all questions. Pt tolerating plan of care. Will continue to monitor.

## 2019-09-08 NOTE — PLAN OF CARE
Problem: Adult Inpatient Plan of Care  Goal: Plan of Care Review  Patient remains free from falls and injuries through out shift. Patient AAO and VSS. Patient denies chest pain and SOB. Purewick in place. Patient ambulating short distances in [place] w/ walker and standby assistance. Plan for discharge on Monday to rehab.  Plan of care reviewed with patient. Patient verbalizes understanding of plan.  Will continue to monitor.

## 2019-09-08 NOTE — PROGRESS NOTES
[facility]  Heart Transplant  Progress Note    Patient Name: Sherice Squires  MRN: [id number]  Admission Date: 8/20/2019  Hospital Length of Stay: 19 days  Attending Physician: Nimesh Montgomery Jr.,*  Primary Care Provider: Annamraie Soria MD  Principal Problem:Acute on chronic combined systolic and diastolic heart failure    Subjective:     Interval History: no acute events overnight. Patient denies any new complaints. Decreased BMs to 2 per day and soft. Denies diarrhea. Hand with mild improvement. Given torsemide yesterday with good urine output.    Continuous Infusions:  Scheduled Meds:   amiodarone  200 mg Oral Daily    digoxin  0.125 mg Oral Every Mon, Wed, Fri    famotidine  20 mg Oral Daily    magnesium oxide  400 mg Oral Daily    sacubitril-valsartan  1 tablet Oral BID    spironolactone  25 mg Oral Daily    warfarin  2.5 mg Oral Every Mon, Wed, Fri, Sun    warfarin  5 mg Oral Every Tues, Thurs, Sat     PRN Meds:docusate sodium, ramelteon    Review of patient's allergies indicates:   Allergen Reactions    Augmentin [amoxicillin-pot clavulanate] Swelling     Lip swelling      Penicillins      Other reaction(s): Swelling  Lip swelling       Objective:     Vital Signs (Most Recent):  Temp: 98.9 °F (37.2 °C) (09/08/19 1126)  Pulse: 70 (09/08/19 1126)  Resp: 16 (09/08/19 1126)  BP: (!) 94/58 (09/08/19 1126)  SpO2: 98 % (09/08/19 1126) Vital Signs (24h Range):  Temp:  [97.8 °F (36.6 °C)-99.1 °F (37.3 °C)] 98.9 °F (37.2 °C)  Pulse:  [70-82] 70  Resp:  [16-18] 16  SpO2:  [94 %-99 %] 98 %  BP: ()/(56-70) 94/58     Patient Vitals for the past 72 hrs (Last 3 readings):   Weight   09/08/19 0500 73.5 kg (162 lb 0.6 oz)   09/07/19 0500 74.5 kg (164 lb 3.9 oz)   09/06/19 0800 73.8 kg (162 lb 11.2 oz)     Body mass index is 26.96 kg/m².      Intake/Output Summary (Last 24 hours) at 9/8/2019 1153  Last data filed at 9/8/2019 0500  Gross per 24 hour   Intake 480 ml   Output 1500 ml   Net  -1020 ml     Physical Exam   Constitutional: She appears well-developed and well-nourished. No distress.   Neck: Normal range of motion. Neck supple. JVD (2 cm above clavical sitting up at 70 degrees) present.   Cardiovascular: Normal rate, regular rhythm and normal heart sounds. Exam reveals no gallop and no friction rub.   No murmur heard.  Pulmonary/Chest: Effort normal and breath sounds normal. No stridor. No respiratory distress. She has no wheezes.   Musculoskeletal:   Left wrist ROM improved   Skin: Skin is warm and dry. She is not diaphoretic.   Nursing note and vitals reviewed.      Significant Labs:  CBC:  Recent Labs   Lab 09/06/19  0301 09/07/19 0254 09/08/19 0338   WBC 6.59 6.66 7.27   RBC 3.97* 4.44 3.85*   HGB 11.5* 12.7 11.1*   HCT 34.7* 40.8 35.6*    266 222   MCV 87 92 93   MCH 29.0 28.6 28.8   MCHC 33.1 31.1* 31.2*     BNP:  No results for input(s): BNP in the last 168 hours.    Invalid input(s): BNPTRIAGELBLO  CMP:  Recent Labs   Lab 09/06/19  0302 09/07/19 0254 09/08/19 0338   GLU 94 111* 103   CALCIUM 9.0 9.2 8.6*   ALBUMIN 2.3* 2.7* 2.6*   PROT 6.4 7.1 6.6    139 138   K 3.5 4.3 3.8   CO2 21* 25 25    102 103   BUN 23 27* 30*   CREATININE 1.2 1.5* 1.7*   ALKPHOS 113 129 120   ALT 31 30 26   AST 28 26 21   BILITOT 0.5 0.5 0.4      Coagulation:   Recent Labs   Lab 09/06/19  0301 09/07/19 0254 09/08/19 0337   INR 2.8* 2.7* 2.5*     LDH:  No results for input(s): LDH in the last 72 hours.  Microbiology:  Microbiology Results (last 7 days)     ** No results found for the last 168 hours. **          I have reviewed all pertinent labs within the past 24 hours.    Estimated Creatinine Clearance: 31.8 mL/min (A) (based on SCr of 1.7 mg/dL (H)).    Diagnostic Results:  I have reviewed and interpreted all pertinent imaging results/findings within the past 24 hours.    Assessment and Plan:     * Acute on chronic combined systolic and diastolic heart failure  67 yo F with NIDCM EF  20% , NYHA FC III with admitted with afib with rvr and cardiogenic shock   - IABP placed 8/20 and pulled 8/23   -TTE with contrast 8/20: LVEDD 6.51, LVEF 15%, indeterminate diastolic function, severe LAE, trace AI, mod MR and TR, PAS 51 and IVC 15.     - Decrease Entresto 97/103 mg PO BID today (home dose 97/103)  - Continue dig 125 mcg MWF  - Not a candidate for LVAD/OHTX at this time. Will need aggressive rehab and re-evaluation    Acute on chronic kidney failure  Cr 1.7 today after increase Entresto and given torsemide x 1 yesterday  - Holding diuretic today  - Decrease entresto  - Monitor renal function and I&Os  - Renally dose meds    Gout  Uric acid 13  - Continue colchicine  - Radiograph unrevealing  - F/u with rheum outpatient    Impaired functional mobility and endurance  Rehab following discharge    Atrial fibrillation with RVR  Atrial fibrillation with RVR s/p AVN ablation and BiV upgrade [place] scientific generator with SJM LV lead:   - Seen by device nurse today. F/u EP outpatient.   - Amiodarone 200mg qday  - INR 2.5    Automatic implantable cardioverter-defibrillator in situ  Underwent AVN ablation with upgrade to CRT  - AVN ablation and BiV upgrade [place] scientific generator with SJM LV lead  - EP follow up    Saroj Mccartney MD  Heart Transplant  [facility]

## 2019-09-08 NOTE — ASSESSMENT & PLAN NOTE
Atrial fibrillation with RVR s/p AVN ablation and BiV upgrade [place] scientific generator with SJM LV lead:   - Seen by device nurse today. F/u EP outpatient.   - Amiodarone 200mg qday  - INR 2.5

## 2019-09-08 NOTE — SUBJECTIVE & OBJECTIVE
Interval History: no acute events overnight. Patient denies any new complaints. Decreased BMs to 2 per day and soft. Denies diarrhea. Hand with mild improvement. Given torsemide yesterday with good urine output.    Continuous Infusions:  Scheduled Meds:   amiodarone  200 mg Oral Daily    digoxin  0.125 mg Oral Every Mon, Wed, Fri    famotidine  20 mg Oral Daily    magnesium oxide  400 mg Oral Daily    sacubitril-valsartan  1 tablet Oral BID    spironolactone  25 mg Oral Daily    warfarin  2.5 mg Oral Every Mon, Wed, Fri, Sun    warfarin  5 mg Oral Every Tues, Thurs, Sat     PRN Meds:docusate sodium, ramelteon    Review of patient's allergies indicates:   Allergen Reactions    Augmentin [amoxicillin-pot clavulanate] Swelling     Lip swelling      Penicillins      Other reaction(s): Swelling  Lip swelling       Objective:     Vital Signs (Most Recent):  Temp: 98.9 °F (37.2 °C) (09/08/19 1126)  Pulse: 70 (09/08/19 1126)  Resp: 16 (09/08/19 1126)  BP: (!) 94/58 (09/08/19 1126)  SpO2: 98 % (09/08/19 1126) Vital Signs (24h Range):  Temp:  [97.8 °F (36.6 °C)-99.1 °F (37.3 °C)] 98.9 °F (37.2 °C)  Pulse:  [70-82] 70  Resp:  [16-18] 16  SpO2:  [94 %-99 %] 98 %  BP: ()/(56-70) 94/58     Patient Vitals for the past 72 hrs (Last 3 readings):   Weight   09/08/19 0500 73.5 kg (162 lb 0.6 oz)   09/07/19 0500 74.5 kg (164 lb 3.9 oz)   09/06/19 0800 73.8 kg (162 lb 11.2 oz)     Body mass index is 26.96 kg/m².      Intake/Output Summary (Last 24 hours) at 9/8/2019 1153  Last data filed at 9/8/2019 0500  Gross per 24 hour   Intake 480 ml   Output 1500 ml   Net -1020 ml     Physical Exam   Constitutional: She appears well-developed and well-nourished. No distress.   Neck: Normal range of motion. Neck supple. JVD (2 cm above clavical sitting up at 70 degrees) present.   Cardiovascular: Normal rate, regular rhythm and normal heart sounds. Exam reveals no gallop and no friction rub.   No murmur heard.  Pulmonary/Chest:  Effort normal and breath sounds normal. No stridor. No respiratory distress. She has no wheezes.   Musculoskeletal:   Left wrist ROM improved   Skin: Skin is warm and dry. She is not diaphoretic.   Nursing note and vitals reviewed.      Significant Labs:  CBC:  Recent Labs   Lab 09/06/19  0301 09/07/19  0254 09/08/19  0338   WBC 6.59 6.66 7.27   RBC 3.97* 4.44 3.85*   HGB 11.5* 12.7 11.1*   HCT 34.7* 40.8 35.6*    266 222   MCV 87 92 93   MCH 29.0 28.6 28.8   MCHC 33.1 31.1* 31.2*     BNP:  No results for input(s): BNP in the last 168 hours.    Invalid input(s): BNPTRIAGELBLO  CMP:  Recent Labs   Lab 09/06/19  0302 09/07/19 0254 09/08/19  0338   GLU 94 111* 103   CALCIUM 9.0 9.2 8.6*   ALBUMIN 2.3* 2.7* 2.6*   PROT 6.4 7.1 6.6    139 138   K 3.5 4.3 3.8   CO2 21* 25 25    102 103   BUN 23 27* 30*   CREATININE 1.2 1.5* 1.7*   ALKPHOS 113 129 120   ALT 31 30 26   AST 28 26 21   BILITOT 0.5 0.5 0.4      Coagulation:   Recent Labs   Lab 09/06/19  0301 09/07/19 0254 09/08/19  0337   INR 2.8* 2.7* 2.5*     LDH:  No results for input(s): LDH in the last 72 hours.  Microbiology:  Microbiology Results (last 7 days)     ** No results found for the last 168 hours. **          I have reviewed all pertinent labs within the past 24 hours.    Estimated Creatinine Clearance: 31.8 mL/min (A) (based on SCr of 1.7 mg/dL (H)).    Diagnostic Results:  I have reviewed and interpreted all pertinent imaging results/findings within the past 24 hours.

## 2019-09-08 NOTE — ASSESSMENT & PLAN NOTE
Cr 1.7 today after increase Entresto and given torsemide x 1 yesterday  - Holding diuretic today  - Decrease entresto  - Monitor renal function and I&Os  - Renally dose meds

## 2019-09-09 NOTE — PLAN OF CARE
Problem: Adult Inpatient Plan of Care  Goal: Plan of Care Review  Outcome: Ongoing (interventions implemented as appropriate)  Plan of care reviewed with pt. MD made changes to entresto this shift. purewick in place. Pain management for gout pain left wrist per pt request. Pt paced on the monitor. Pt possible placement to SNF. Pt remains free from injury at this time. No further questions at this time. Will continue to monitor.

## 2019-09-09 NOTE — PROGRESS NOTES
[facility]  Heart Transplant  Progress Note    Patient Name: Sherice Squires  MRN: [id number]  Admission Date: 8/20/2019  Hospital Length of Stay: 20 days  Attending Physician: Nimesh Montgomery Jr.,*  Primary Care Provider: Annamarie Soria MD  Principal Problem:Acute on chronic combined systolic and diastolic heart failure    Subjective:     Interval History: no acute events overnight. Patient denies orthopnea or PND. +160 cc in past 24 hours.    Continuous Infusions:  Scheduled Meds:   amiodarone  200 mg Oral Daily    digoxin  0.125 mg Oral Every Mon, Wed, Fri    famotidine  20 mg Oral Daily    magnesium oxide  400 mg Oral Daily    sacubitril-valsartan  1 tablet Oral BID    spironolactone  25 mg Oral Daily    warfarin  2.5 mg Oral Every Mon, Wed, Fri, Sun    warfarin  5 mg Oral Every Tues, Thurs, Sat     PRN Meds:acetaminophen, docusate sodium, ramelteon    Review of patient's allergies indicates:   Allergen Reactions    Augmentin [amoxicillin-pot clavulanate] Swelling     Lip swelling      Penicillins      Other reaction(s): Swelling  Lip swelling       Objective:     Vital Signs (Most Recent):  Temp: 97.2 °F (36.2 °C) (09/09/19 1126)  Pulse: 70 (09/09/19 1126)  Resp: 16 (09/09/19 1126)  BP: 111/70 (09/09/19 1126)  SpO2: 95 % (09/09/19 1126) Vital Signs (24h Range):  Temp:  [97.2 °F (36.2 °C)-99.8 °F (37.7 °C)] 97.2 °F (36.2 °C)  Pulse:  [55-72] 70  Resp:  [14-16] 16  SpO2:  [95 %-98 %] 95 %  BP: ()/(54-70) 111/70     Patient Vitals for the past 72 hrs (Last 3 readings):   Weight   09/09/19 0600 78.6 kg (173 lb 4.5 oz)   09/08/19 0500 73.5 kg (162 lb 0.6 oz)   09/07/19 0500 74.5 kg (164 lb 3.9 oz)     Body mass index is 28.84 kg/m².      Intake/Output Summary (Last 24 hours) at 9/9/2019 1139  Last data filed at 9/9/2019 0900  Gross per 24 hour   Intake 660 ml   Output 400 ml   Net 260 ml     Physical Exam   Constitutional: She appears well-developed and well-nourished. No  distress.   Neck: Normal range of motion. Neck supple. No JVD (2 cm above clavical sitting up at 70 degrees) present.   Cardiovascular: Normal rate, regular rhythm and normal heart sounds. Exam reveals no gallop and no friction rub.   No murmur heard.  Pulmonary/Chest: Effort normal and breath sounds normal. No stridor. No respiratory distress. She has no wheezes.   Musculoskeletal: She exhibits no edema.   Left wrist ROM improved   Skin: Skin is warm and dry. She is not diaphoretic.   Nursing note and vitals reviewed.      Significant Labs:  CBC:  Recent Labs   Lab 09/07/19 0254 09/08/19 0338 09/09/19 0338   WBC 6.66 7.27 6.77   RBC 4.44 3.85* 3.91*   HGB 12.7 11.1* 11.3*   HCT 40.8 35.6* 36.0*    222 198   MCV 92 93 92   MCH 28.6 28.8 28.9   MCHC 31.1* 31.2* 31.4*     BNP:  No results for input(s): BNP in the last 168 hours.    Invalid input(s): BNPTRIAGELBLO  CMP:  Recent Labs   Lab 09/07/19 0254 09/08/19 0338 09/09/19  0038 09/09/19 0338   * 103 110 101   CALCIUM 9.2 8.6* 8.9 8.9   ALBUMIN 2.7* 2.6*  --  2.7*   PROT 7.1 6.6  --  6.6    138 136 141   K 4.3 3.8 3.8 4.3   CO2 25 25 24 26    103 102 105   BUN 27* 30* 28* 28*   CREATININE 1.5* 1.7* 1.5* 1.4   ALKPHOS 129 120  --  109   ALT 30 26  --  25   AST 26 21  --  19   BILITOT 0.5 0.4  --  0.4      Coagulation:   Recent Labs   Lab 09/07/19 0254 09/08/19 0337 09/09/19 0338   INR 2.7* 2.5* 2.4*     LDH:  No results for input(s): LDH in the last 72 hours.  Microbiology:  Microbiology Results (last 7 days)     ** No results found for the last 168 hours. **          I have reviewed all pertinent labs within the past 24 hours.    Estimated Creatinine Clearance: 39.8 mL/min (based on SCr of 1.4 mg/dL).    Diagnostic Results:  I have reviewed and interpreted all pertinent imaging results/findings within the past 24 hours.    Assessment and Plan:     Sherice Squires is a 67 yo AAF with PMHx significant for NIDCM / stage D HFrEF (LVEF  20-25%, LVEDD 5.9 cm) s/p dc ICD, AF, HTN, DLD, CKD who is admitted as a transfer from  ED for management of ADHF +/- possible cardiogenic shock.     Patient reports she presented to OSH with progressive/worsening shortness of breath on exertion, orthopnea, and peripheral swelling of approx 5 days duration associated with nausea and fatigue. Denies fever/chills/sweats, chest pain, diaphoresis, presyncope/syncope. Does report intermittent palpitations.      Patient was afebrile and normotensive on arrival (/55 mmHg) to OSH ED with pulses in the 120-130s in AF with RVR. Initial labs showed worsening FAMILIA on CKD (with Cr 3.8 from 2.7-3.1 this past week from previous baseline of 1.3-1.4 in June 2019) as well as elevated T bili 2.3 and BNP >3000. CXR obtained without acute cardiopulmonary process. Patient ultimately transferred to [facility] for higher level of care.      Patient follows with [facility] clinic - currently on Entresto 97/103 mg BID, Toprol  mg daily, Aldactone 25 mg daily, and digoxin 0.125 mg daily. She was last seen by Dr Rodriguez on 8/6/19 who added metolazone 2.5 mg QHS before evening dose of Bumex to augment diuresis due to complaints of SOB / peripheral swelling at the time. She did not respond to this regimen and reports she was ultimately switched to Torsemide instead.      Patient is fully complaint with her medicines. Also adherent to fluid / salt restrictions with her hx of congestive heart failure.      States she was hospitalized only once before for ADHF within the past year.      Of note patient follows with Dr Reece of EP for her AF hx, last seen by him in 5/2019    * Acute on chronic combined systolic and diastolic heart failure  69 yo F with NIDCM EF 20% , NYHA FC III with admitted with afib with rvr and cardiogenic shock   - IABP placed 8/20 and pulled 8/23   -TTE with contrast 8/20: LVEDD 6.51, LVEF 15%, indeterminate diastolic function, severe LAE, trace AI, mod MR and TR, PAS 51 and IVC  15.     - Increase Entresto to 97/103 mg PO BID today (home dose 97/103)  - Continue dig 125 mcg MWF  - Not a candidate for LVAD/OHTX at this time. Will need aggressive rehab and re-evaluation    Acute on chronic kidney failure  Cr 1.7 today after increase Entresto and given torsemide x 1 yesterday  - Holding diuretic today  - Decrease entresto  - Monitor renal function and I&Os  - Renally dose meds    Gout  Uric acid 13  - Continue colchicine  - Radiograph unrevealing  - F/u with rheum outpatient    Impaired functional mobility and endurance  Rehab following discharge    Atrial fibrillation with RVR  Atrial fibrillation with RVR s/p AVN ablation and BiV upgrade [place] scientific generator with SJM LV lead:   - Seen by device nurse today. F/u EP outpatient.   - Amiodarone 200mg qday  - INR 2.5    Automatic implantable cardioverter-defibrillator in situ  Underwent AVN ablation with upgrade to CRT  - AVN ablation and BiV upgrade [place] scientific generator with SJM LV lead  - EP follow up        Saroj Mccartney MD  Heart Transplant  [facility]

## 2019-09-09 NOTE — PLAN OF CARE
Problem: Physical Therapy Goal  Goal: Physical Therapy Goal  Goals to be met by: 9/10/19     Patient will increase functional independence with mobility by performin. Supine to sit with MInimal Assistance - met 19  1a. Supine to sit with SBA   2. Sit to supine with MInimal Assistance - met 19  2a. Sit to supine with SBA   3. Sit to stand transfer with Stand-by Assistance - met   4. Bed to chair transfer with Stand-by Assistance using Rolling Walker  5. Gait  x 50 feet with Stand-by Assistance using Rolling Walker.       Outcome: Ongoing (interventions implemented as appropriate)    Discharge Recommendation: Rehab.  1 goals met today. PT goals still appropriate.  Appropriate transfer level with nursing staff: Bed <> Chair:  Step Transfer with contact guard assistance with Rolling Walker.    Iona Gomez PT, DPT  2019  Pager: [phone]

## 2019-09-09 NOTE — SUBJECTIVE & OBJECTIVE
Interval History: no acute events overnight. Patient denies orthopnea or PND. +160 cc in past 24 hours.    Continuous Infusions:  Scheduled Meds:   amiodarone  200 mg Oral Daily    digoxin  0.125 mg Oral Every Mon, Wed, Fri    famotidine  20 mg Oral Daily    magnesium oxide  400 mg Oral Daily    sacubitril-valsartan  1 tablet Oral BID    spironolactone  25 mg Oral Daily    warfarin  2.5 mg Oral Every Mon, Wed, Fri, Sun    warfarin  5 mg Oral Every Tues, Thurs, Sat     PRN Meds:acetaminophen, docusate sodium, ramelteon    Review of patient's allergies indicates:   Allergen Reactions    Augmentin [amoxicillin-pot clavulanate] Swelling     Lip swelling      Penicillins      Other reaction(s): Swelling  Lip swelling       Objective:     Vital Signs (Most Recent):  Temp: 97.2 °F (36.2 °C) (09/09/19 1126)  Pulse: 70 (09/09/19 1126)  Resp: 16 (09/09/19 1126)  BP: 111/70 (09/09/19 1126)  SpO2: 95 % (09/09/19 1126) Vital Signs (24h Range):  Temp:  [97.2 °F (36.2 °C)-99.8 °F (37.7 °C)] 97.2 °F (36.2 °C)  Pulse:  [55-72] 70  Resp:  [14-16] 16  SpO2:  [95 %-98 %] 95 %  BP: ()/(54-70) 111/70     Patient Vitals for the past 72 hrs (Last 3 readings):   Weight   09/09/19 0600 78.6 kg (173 lb 4.5 oz)   09/08/19 0500 73.5 kg (162 lb 0.6 oz)   09/07/19 0500 74.5 kg (164 lb 3.9 oz)     Body mass index is 28.84 kg/m².      Intake/Output Summary (Last 24 hours) at 9/9/2019 1139  Last data filed at 9/9/2019 0900  Gross per 24 hour   Intake 660 ml   Output 400 ml   Net 260 ml     Physical Exam   Constitutional: She appears well-developed and well-nourished. No distress.   Neck: Normal range of motion. Neck supple. No JVD (2 cm above clavical sitting up at 70 degrees) present.   Cardiovascular: Normal rate, regular rhythm and normal heart sounds. Exam reveals no gallop and no friction rub.   No murmur heard.  Pulmonary/Chest: Effort normal and breath sounds normal. No stridor. No respiratory distress. She has no wheezes.    Musculoskeletal: She exhibits no edema.   Left wrist ROM improved   Skin: Skin is warm and dry. She is not diaphoretic.   Nursing note and vitals reviewed.      Significant Labs:  CBC:  Recent Labs   Lab 09/07/19 0254 09/08/19 0338 09/09/19 0338   WBC 6.66 7.27 6.77   RBC 4.44 3.85* 3.91*   HGB 12.7 11.1* 11.3*   HCT 40.8 35.6* 36.0*    222 198   MCV 92 93 92   MCH 28.6 28.8 28.9   MCHC 31.1* 31.2* 31.4*     BNP:  No results for input(s): BNP in the last 168 hours.    Invalid input(s): BNPTRIAGELBLO  CMP:  Recent Labs   Lab 09/07/19 0254 09/08/19 0338 09/09/19 0038 09/09/19 0338   * 103 110 101   CALCIUM 9.2 8.6* 8.9 8.9   ALBUMIN 2.7* 2.6*  --  2.7*   PROT 7.1 6.6  --  6.6    138 136 141   K 4.3 3.8 3.8 4.3   CO2 25 25 24 26    103 102 105   BUN 27* 30* 28* 28*   CREATININE 1.5* 1.7* 1.5* 1.4   ALKPHOS 129 120  --  109   ALT 30 26  --  25   AST 26 21  --  19   BILITOT 0.5 0.4  --  0.4      Coagulation:   Recent Labs   Lab 09/07/19 0254 09/08/19 0337 09/09/19 0338   INR 2.7* 2.5* 2.4*     LDH:  No results for input(s): LDH in the last 72 hours.  Microbiology:  Microbiology Results (last 7 days)     ** No results found for the last 168 hours. **          I have reviewed all pertinent labs within the past 24 hours.    Estimated Creatinine Clearance: 39.8 mL/min (based on SCr of 1.4 mg/dL).    Diagnostic Results:  I have reviewed and interpreted all pertinent imaging results/findings within the past 24 hours.

## 2019-09-09 NOTE — PLAN OF CARE
Problem: Occupational Therapy Goal  Goal: Occupational Therapy Goal  Goals to be met by: 09-09-19     Patient will increase functional independence with ADLs by performing:    UE Dressing with Stand-by Assistance.  Grooming while standing with Contact Guard Assistance.  Goal revised:Toileting from toilet with Minimal Assistance for hygiene and clothing management. - MET 9/9  Goal revised: toileting from toilet with supervision for hygiene and clothing management.  Supine to sit with Stand-by Assistance.  Stand pivot transfers with Contact Guard Assistance. MET 9/5  Goal revised: Toilet transfer to toilet  with Contact Guard Assistance.  Pt. To be I with HEP to improve level of endurance       Outcome: Ongoing (interventions implemented as appropriate)  Continue POC     Lucy Bean OTR/L  Pager: [phone]  9/9/2019

## 2019-09-09 NOTE — PROGRESS NOTES
"[facility]  Adult Nutrition  Progress Note    SUMMARY       Recommendations    Recommendation:   1. Continue cardiac diet as tolerated.   2. If PO intake decreases < 50%, recommend Boost Plus TID.   3. RD to monitor & follow up.    Goals: PO intake > 50% x 7 days  Nutrition Goal Status: goal met  Communication of RD Recs: other (comment)(POC)    Reason for Assessment    Reason For Assessment: RD follow-up  Diagnosis: cardiac disease(CHF)  Relevant Medical History: HTN, HLD, cardiomyopathy, afib  Interdisciplinary Rounds: attended  General Information Comments: Pt reports good appetite this week and continues to report eating ~50% of meals. Wt gain noted in the past week, possibly fluid changes. NFPE 9/2 indicates mild age-appropriate wasting, pt does not meet criteria for malnutrition at this time.  Nutrition Discharge Planning: Adequate PO intake to meet needs    Nutrition Risk Screen    Nutrition Risk Screen: no indicators present    Nutrition/Diet History    Spiritual, Cultural Beliefs, Jew Practices, Values that Affect Care: no    Anthropometrics    Temp: 97.8 °F (36.6 °C)  Height Method: Stated  Height: 5' 5" (165.1 cm)  Height (inches): 65 in  Weight Method: Standard Scale  Weight: 78.6 kg (173 lb 4.5 oz)  Weight (lb): 173.28 lb  Ideal Body Weight (IBW), Female: 125 lb  % Ideal Body Weight, Female (lb): 146.21 lb  BMI (Calculated): 30.5       Lab/Procedures/Meds    Pertinent Labs Reviewed: reviewed  Pertinent Labs Comments: BUN 28, GFR 44.5, Alb 2.7  Pertinent Medications Reviewed: reviewed  Pertinent Medications Comments: amiodarone, docusate, famotidine, spironolactone, warfarin    Estimated/Assessed Needs    Weight Used For Calorie Calculations: 78.6 kg (173 lb 4.5 oz)  Energy Calorie Requirements (kcal): 1580 kcal/day  Energy Need Method: Jose C Wright  Protein Requirements: 79-95 g/day(1-1.2 g/kg)  Weight Used For Protein Calculations: 78.6 kg (173 lb 4.5 oz)  Fluid " Requirements (mL): per MD or 1 mL/kcal     RDA Method (mL): 1580    Nutrition Prescription Ordered    Current Diet Order: Cardiac    Evaluation of Received Nutrient/Fluid Intake    I/O: -11.7L since admit  Energy Calories Required: meeting needs  Protein Required: meeting needs  Fluid Required: meeting needs  Comments: LBM 9/8  Tolerance: tolerating  % Intake of Estimated Energy Needs: 75 - 100 %  % Meal Intake: 75 - 100 %    Nutrition Risk    Level of Risk/Frequency of Follow-up: (1x/week)     Assessment and Plan    Nutrition Problem  Inadequate energy intake     Related to (etiology):   Decreased appetite s/p AV node ablation     Signs and Symptoms (as evidenced by):   Pt reports decreased appetite since surgery, variable PO documented % of meals     Interventions (treatment strategy):  Collaboration of care with other providers  Boost Plus TID if PO intake decreases < 50%     Nutrition Diagnosis Status:   Improving    Monitor and Evaluation    Food and Nutrient Intake: energy intake, food and beverage intake  Food and Nutrient Adminstration: diet order  Anthropometric Measurements: weight, weight change, body mass index  Biochemical Data, Medical Tests and Procedures: electrolyte and renal panel, gastrointestinal profile, glucose/endocrine profile, inflammatory profile, lipid profile  Nutrition-Focused Physical Findings: overall appearance     Malnutrition Assessment  Orbital Region (Subcutaneous Fat Loss): well nourished  Upper Arm Region (Subcutaneous Fat Loss): well nourished   [place] Region (Muscle Loss): mild depletion  Clavicle Bone Region (Muscle Loss): mild depletion  Dorsal Hand (Muscle Loss): mild depletion  Patellar Region (Muscle Loss): mild depletion  Posterior Calf Region (Muscle Loss): mild depletion     Nutrition Follow-Up    RD Follow-up?: Yes

## 2019-09-09 NOTE — PLAN OF CARE
Problem: Adult Inpatient Plan of Care  Goal: Plan of Care Review  Outcome: Ongoing (interventions implemented as appropriate)  Pt remained free of falls, trauma, injury. AOx4. Skin CDI. Pt had one episode of v tach see note. Arm placed in sling for sleep. Reviewed plan of care with pt; answered all questions. Pt tolerating plan of care. Will continue to monitor.

## 2019-09-09 NOTE — PROGRESS NOTES
"Pt had 8 beat run v tach. VSS. Pt stated "felt palpitations". Notified [facility].. Orders placed for 20 meq potassium chloride once and BMP. Replacements given and lab pending.   "

## 2019-09-09 NOTE — PT/OT/SLP PROGRESS
Occupational Therapy   Treatment    Name: Sherice Squires  MRN: [id number]  Admitting Diagnosis:  Acute on chronic combined systolic and diastolic heart failure  10 Days Post-Op    Recommendations:     Discharge Recommendations: rehabilitation facility  Discharge Equipment Recommendations:  walker, rolling, commode  Barriers to discharge:  None    Assessment:     Sherice Squires is a 68 y.o. female with a medical diagnosis of Acute on chronic combined systolic and diastolic heart failure.  She presents with performance deficits affecting function are weakness, impaired functional mobilty, gait instability, impaired endurance, impaired balance, impaired self care skills, impaired cardiopulmonary response to activity. Pt tolerated session well and is progressing well towards all goals. Pt is not yet functioning at her PLOF and would greatly benefit from IP Rehab prior to returning to the home environment     Rehab Prognosis:  Good; patient would benefit from acute skilled OT services to address these deficits and reach maximum level of function.       Plan:     Patient to be seen 4 x/week to address the above listed problems via self-care/home management, therapeutic activities, therapeutic exercises  · Plan of Care Expires: 09/25/19  · Plan of Care Reviewed with: patient    Subjective     Pain/Comfort:  · Pain Rating 1: (Pt reported L hand pain; pt did not rate numerical value )  · Location - Side 1: Left  · Location - Orientation 1: generalized  · Location 1: hand  · Pain Addressed 1: Distraction    Objective:     Communicated with: RN prior to session.  Patient found up in chair with telemetry upon OT entry to room. Pt agreeable to therapy session.     General Precautions: Standard, fall, pacemaker   Orthopedic Precautions:N/A   Braces: Sling and swathe at night     Occupational Performance:     Bed Mobility:    Not performed this date.     Functional Mobility/Transfers:  · Patient completed Sit <> Stand Transfer  from bedside chair with stand by assistance  with  rolling walker   · Increased time required to complete sit > stand   · Slight difficulty using L UE during transfer due to pain   · Patient completed Toilet Transfer with functional ambulation to toilet and step transfer technique with stand by assistance with  BSC over toilet seat and RW  · Functional Mobility: Pt engaging in functional mobility to simulate household distances approx 80ft with initially CGA progressing to SBA using RW  in order to maximize functional activity tolerance and standing balance required for engagement in occupations of choice.   · Pt with no LOB but slight instability during directional changes.   · Pt required x2 standing rest breaks and slow pacing throughout     Activities of Daily Living:  · Grooming: stand by assistance pt perform hand hygiene standing at the sink   · Lower Body Dressing: stand by assistance pt able to pull up socks while sitting UIC via figure 4 position. Min A to bring R LE into figure 4 position   · Toileting: minimum assistance for clothing management and set-up A for hygiene performing in standing with CGA for standing balance.       [facility] 6 Click ADL: 20    Treatment & Education:  - Pt educated on role of OT, POC, and goals for therapy.    - pt educated on potential sternal precautions if pt was to receive advance options (OHT vs LVAD)   - Patient and family aware of patient's deficits and therapy progression.   - Educated pt on being appropriate to transfer with nsg and PCT with x 1 person assist using RW.   - Time provided for therapeutic counseling and discussion of health disposition.   - Importance of OOB ax's with staff member assistance and sitting OOB majority of day.   - Pt completed ADLs and functional mobility for treatment session as noted above   - Pt verbalized understanding. Pt expressed no further concerns/questions.  - whiteboard updated     Patient left up in chair with all lines intact,  call button in reach and Rn notifiedEducation:      GOALS:   Multidisciplinary Problems     Occupational Therapy Goals        Problem: Occupational Therapy Goal    Goal Priority Disciplines Outcome Interventions   Occupational Therapy Goal     OT, PT/OT            Problem: Occupational Therapy Goal    Goal Priority Disciplines Outcome Interventions   Occupational Therapy Goal     OT, PT/OT Ongoing (interventions implemented as appropriate)    Description:  Goals to be met by: 09-09-19     Patient will increase functional independence with ADLs by performing:    UE Dressing with Stand-by Assistance.  Grooming while standing with Contact Guard Assistance.  Goal revised:Toileting from toilet with Minimal Assistance for hygiene and clothing management. - MET 9/9  Goal revised: toileting from toilet with supervision for hygiene and clothing management.  Supine to sit with Stand-by Assistance.  Stand pivot transfers with Contact Guard Assistance. MET 9/5  Goal revised: Toilet transfer to toilet  with Contact Guard Assistance.  Pt. To be I with HEP to improve level of endurance                         Time Tracking:     OT Date of Treatment: 09/09/19  OT Start Time: 1053  OT Stop Time: 1121  OT Total Time (min): 28 min    Billable Minutes:Self Care/Home Management 10  Therapeutic Activity 15    Lucy Bean OT  9/9/2019

## 2019-09-09 NOTE — ASSESSMENT & PLAN NOTE
67 yo F with NIDCM EF 20% , NYHA FC III with admitted with afib with rvr and cardiogenic shock   - IABP placed 8/20 and pulled 8/23   -TTE with contrast 8/20: LVEDD 6.51, LVEF 15%, indeterminate diastolic function, severe LAE, trace AI, mod MR and TR, PAS 51 and IVC 15.     - Increase Entresto to 97/103 mg PO BID today (home dose 97/103)  - Continue dig 125 mcg MWF  - Not a candidate for LVAD/OHTX at this time. Will need aggressive rehab and re-evaluation

## 2019-09-09 NOTE — PT/OT/SLP PROGRESS
Physical Therapy Treatment    Patient Name:  Sherice Squires   MRN:  [id number]  Admitting Diagnosis:  Acute on chronic combined systolic and diastolic heart failure   Recent Surgery: Procedure(s) (LRB):  ABLATION, AVN (N/A)  INSERTION, CARDIAC PACEMAKER, BIVENTRICULAR, PERMANENT, AS UPGRADE (Left)  Cardioversion or Defibrillation 10 Days Post-Op  Admit Date: 8/20/2019  Length of Stay: 20 days    Recommendations:     Discharge Recommendations:  rehabilitation facility   Discharge Equipment Recommendations: walker, rolling, commode   Barriers to discharge: None    Assessment:     Sherice Squires is a 68 y.o. female admitted with a medical diagnosis of Acute on chronic combined systolic and diastolic heart failure.  She presents with the following impairments/functional limitations:  impaired functional mobilty, weakness, gait instability, impaired endurance, impaired balance, impaired cardiopulmonary response to activity, pain, decreased lower extremity function. Pt tolerated session well today. Pt continues to be motivated to improve and demonstrates good willingness to participate in PT sessions. Pt and PCT reported the pt ambulated w/ PCT in the hallway during the weekend to retain current mobility status. Today, pt demonstrated improved cardiovascular and musculoskeletal endurance with activity and had no c/o SOB throughout ambulation bout. Pt does ambulate with a decreased rossi, which is most likely done for energy conservation. Pt still has deficits in functional mobility, however. Pt was unable to complete the 5x sit to stand test today due to fatigue after performing only 2 stands. Pt stood two times in 98 seconds, well below the normative value (11.4 sec) for her age range for test completion. This indicates that the pt still has an extreme deficit in mobility and continues to be at an increased risk for falls. Pt's increased amount of time for performing two stands was partially due to patient utilizing  "BUE, instead of proper body mechanics and BLE, to ascend into stand. Pt was instructed on proper body mechanics and utilizing momentum to decrease the amount of effort needed to stand, but pt with poor understanding and utilization of body mechanics. Pt continues to be an excellent candidate for inpatient rehabilitation.     Rehab Prognosis: Good; patient would benefit from acute skilled PT services to address these deficits and reach maximum level of function.    Recent Surgery: Procedure(s) (LRB):  ABLATION, AVN (N/A)  INSERTION, CARDIAC PACEMAKER, BIVENTRICULAR, PERMANENT, AS UPGRADE (Left)  Cardioversion or Defibrillation 10 Days Post-Op    Plan:     During this hospitalization, patient to be seen 4 x/week to address the identified rehab impairments via gait training, therapeutic activities, therapeutic exercises, neuromuscular re-education and progress toward the following goals:    · Plan of Care Expires:  09/26/19    Subjective     RN notified prior to session. No additional parties present upon PT entrance into room.    Chief Complaint: "I'm gonna need some help to get cleaned up" - pt to PT upon PT entering room and finding patient on BSC  Patient/Family Comments/goals: go to rehab so pt can discharge hoem safely  Pain/Comfort:  Pain Rating 1: 8/10(Pt w/ c/o Lt hand pain when pushing up to stand, pain decreased after cessation of use of Lt hand)  Location - Side 1: Left  Location - Orientation 1: generalized  Location 1: hand  Pain Addressed 1: Cessation of Activity, Nurse notified  Pain Rating Post-Intervention 1: 6/10      Objective:     Patient found on bedside commode with: telemetry   Mental Status: Patient is oriented to AxOx4 and follows multi-step commands. Patient is Alert and Cooperative during session.    General Precautions: Standard, Cardiac fall, pacemaker   Orthopedic Precautions:N/A   Braces: Sling and swathe(at night)   Body mass index is 28.84 kg/m².  Oxygen Device: Room Air    Outcome " "Measures:  AM-PAC 6 CLICK MOBILITY  Turning over in bed (including adjusting bedclothes, sheets and blankets)?: 3  Sitting down on and standing up from a chair with arms (e.g., wheelchair, bedside commode, etc.): 3  Moving from lying on back to sitting on the side of the bed?: 3  Moving to and from a bed to a chair (including a wheelchair)?: 3  Need to walk in hospital room?: 3  Climbing 3-5 steps with a railing?: 2  Basic Mobility Total Score: 17     Functional Mobility:  Additional staff present: none    Transfers:   · Sit <> Stand Transfer: stand by assistance with no assistive device   · [id number] from bedside commode  · Stand <> Sit Transfer: stand by assistance with no assistive device   · Bedside commode <> Chair Transfer: Step Transfer technique with contact guard assistance with no assistive device   · Chair on patient's Lt  Five Times Sit to Stand Test:  How long the patient takes to completed 5 sit to stand form chair with arms folded across chest: Pt only able to complete 2 reps in 98 secs   -Pt was limited 2/2 pain in Lt hand due to overuse of arms to assist with ascending into full stand  "Inability to perform test in less than 13.6 seconds indicates increased disability and Morbidity." (Antonella 2000).  Normative values for community dwelling elderly:   60-70y/o: 11.4 seconds  70-80y/o: 12.6 seconds  80-90y/o: 14.8 seconds      Gait:  · Patient ambulated: 100 ft   · Patient required: contact guard  · Patient used:  rolling walker   · Gait Pattern observed: swing through  · Gait Deviation(s): decreased step length, wide base of support, flexed posture and decreased rossi  · Impairments due to: decreased strength and decreased endurance  · Comments: Pt had no c/o SOB throughout ambulation. Pt w/ no LOB with vertical/horizontal head turns, performing 180 degree turns, and navigating hallway obstacles.    Stairs:  Not assessed 2/2 pt status    Education:  All questions answered within PT scope of " practice  PT role in POC  Safe to perform step transfer to/from chair CGA and RW w/ nursing/PCT  Importance of OOB tolerance to improve lung ventilation and expansion as well as strengthen postural musculature     Patient left up in chair with all lines intact, call button in reach and RN notified.    GOALS:   Multidisciplinary Problems     Physical Therapy Goals        Problem: Physical Therapy Goal    Goal Priority Disciplines Outcome Goal Variances Interventions   Physical Therapy Goal     PT, PT/OT Ongoing (interventions implemented as appropriate)     Description:  Goals to be met by: 9/10/19     Patient will increase functional independence with mobility by performin. Supine to sit with MInimal Assistance - met 19  1a. Supine to sit with SBA   2. Sit to supine with MInimal Assistance - met 19  2a. Sit to supine with SBA   3. Sit to stand transfer with Stand-by Assistance - met   4. Bed to chair transfer with Stand-by Assistance using Rolling Walker  5. Gait  x 50 feet with Stand-by Assistance using Rolling Walker.                       Time Tracking:     PT Received On: 19  PT Start Time: 854     PT Stop Time: 919  PT Total Time (min): 25 min       Billable Minutes:   · Gait Training 15 and Therapeutic Activity 10    Treatment Type: Treatment  PT/PTA: PT       Iona Gomez PT, DPT  2019   Pager: [phone]

## 2019-09-09 NOTE — NURSING
Pt sitting up in bedside chair, no family at bedside.   Pt unaware that she is still being considered for LVAD if she is able to be rehab'd physically. Pt's preVAD folder was lost when pt transferred from ICU, supplied with another folder.   Informed pt that I will follow her outpatient and see her in clinic.

## 2019-09-09 NOTE — PROGRESS NOTES
"Pt son called RN regarding pt SNF placement and stated "her home health said they would be able to care for pt." RN communicated that to Shy Price with case management. Mary phone number [phone]. Will continue to monitor.   "

## 2019-09-10 NOTE — PT/OT/SLP PROGRESS
Physical Therapy Treatment    Patient Name:  Sherice Squires   MRN:  [id number]    Recommendations:     Discharge Recommendations:  rehabilitation facility   Discharge Equipment Recommendations: commode, walker, rolling   Barriers to discharge: Decreased caregiver support    Assessment:     Sherice Squires is a 68 y.o. female admitted with a medical diagnosis of Acute on chronic combined systolic and diastolic heart failure.  She presents with the following impairments/functional limitations:  weakness, gait instability, impaired endurance, impaired balance, impaired self care skills, impaired functional mobilty, impaired cardiopulmonary response to activity, pain, decreased upper extremity function. Pt requiring Amari to complete transfer to stand from standard chair height, but was able to perform gait and dynamic standing tasks without physical assist. Increasing gait distance but with further progression limited 2* impaired endurance and generalized weakness. Pt would continue to benefit from skilled acute PT in order to address current deficits and progress functional mobility.     Rehab Prognosis: Good; patient would benefit from acute skilled PT services to address these deficits and reach maximum level of function.    Recent Surgery: Procedure(s) (LRB):  ABLATION, AVN (N/A)  INSERTION, CARDIAC PACEMAKER, BIVENTRICULAR, PERMANENT, AS UPGRADE (Left)  Cardioversion or Defibrillation 11 Days Post-Op    Plan:     During this hospitalization, patient to be seen 4 x/week to address the identified rehab impairments via gait training, therapeutic activities, therapeutic exercises, neuromuscular re-education and progress toward the following goals:    · Plan of Care Expires:  09/26/19    Subjective     Chief Complaint: L wrist pain   Patient/Family Comments/goals: Pt pleasant and agreeable throughout session, motivated to participate in therapy.   Pain/Comfort:  · Pain Rating 1: (reported pain in L wrist; did not  rate)  · Pain Addressed 1: Reposition, Distraction, Cessation of Activity, Nurse notified      Objective:     Communicated with RN prior to session.  Patient found up in chair with telemetry, PureWick upon PT entry to room.     General Precautions: Standard, fall, pacemaker   Orthopedic Precautions:N/A   Braces: Sling and swathe(at night)     Functional Mobility:  · Transfers:     · Sit to Stand:  minimum assistance with rolling walker from bedside chair  · Gait: 110 ft. x2 with RW and CGA  ? Demo'd decreased rossi, decreased step length, impaired weight-shifting ability, increased time in double stance, and decreased toe-floor clearance  ? Cues for decreased pressure through LUE in order to maintain ppm precautions      AM-PAC 6 CLICK MOBILITY  Turning over in bed (including adjusting bedclothes, sheets and blankets)?: 3  Sitting down on and standing up from a chair with arms (e.g., wheelchair, bedside commode, etc.): 3  Moving from lying on back to sitting on the side of the bed?: 3  Moving to and from a bed to a chair (including a wheelchair)?: 3  Need to walk in hospital room?: 3  Climbing 3-5 steps with a railing?: 2  Basic Mobility Total Score: 17       Therapeutic Activities and Exercises:   Completed functional mobility as described above.   Performed self-care tasks at bathroom sink with SBA with intermittent UE support.  Reviewed seated therex to be completed (I) 3x daily: AP, LAQ, hip flex. Pt verbalized and demonstrated understanding.   White board updated.    Patient left up in chair with all lines intact, call button in reach and RN notified..    GOALS:   Multidisciplinary Problems     Physical Therapy Goals        Problem: Physical Therapy Goal    Goal Priority Disciplines Outcome Goal Variances Interventions   Physical Therapy Goal     PT, PT/OT Ongoing (interventions implemented as appropriate)     Description:  Goals to be met by: 9/10/19     Patient will increase functional independence with  mobility by performin. Supine to sit with MInimal Assistance - met 19  1a. Supine to sit with SBA   2. Sit to supine with MInimal Assistance - met 19  2a. Sit to supine with SBA   3. Sit to stand transfer with Stand-by Assistance - met   3a. Sit to stand with Mod-I  4. Bed to chair transfer with Stand-by Assistance using Rolling Walker  5. Gait  x 50 feet with Stand-by Assistance using Rolling Walker.                          Time Tracking:     PT Received On: 09/10/19  PT Start Time: 908     PT Stop Time: 935  PT Total Time (min): 27 min     Billable Minutes: Gait Training 13 and Therapeutic Activity 14    Treatment Type: Treatment  PT/PTA: PT     PTA Visit Number: 0     Ariadna Herman PT, DPT   9/10/2019  [phone]

## 2019-09-10 NOTE — PLAN OF CARE
Problem: Adult Inpatient Plan of Care  Goal: Plan of Care Review  Outcome: Ongoing (interventions implemented as appropriate)  Plan of care reviewed with pt. Pt remains mekhi of falls/injury/trauma. VSS-paced HR 70 on monitor. Acetaminophen given for gout pain to wrist (6). Sling applied to left arm. Plan to d/c to SNF. Pt resting comfortably. Will continue to monitor.

## 2019-09-10 NOTE — PLAN OF CARE
Problem: Physical Therapy Goal  Goal: Physical Therapy Goal  Goals to be met by: 9/10/19     Patient will increase functional independence with mobility by performin. Supine to sit with MInimal Assistance - met 19  1a. Supine to sit with SBA   2. Sit to supine with MInimal Assistance - met 19  2a. Sit to supine with SBA   3. Sit to stand transfer with Stand-by Assistance - met   3a. Sit to stand with Mod-I  4. Bed to chair transfer with Stand-by Assistance using Rolling Walker  5. Gait  x 50 feet with Stand-by Assistance using Rolling Walker.        Outcome: Ongoing (interventions implemented as appropriate)  Goals reviewed and remain appropriate. Pt progressing towards goals.    Ariadna Herman, PT, DPT   9/10/2019  [phone]

## 2019-09-10 NOTE — SUBJECTIVE & OBJECTIVE
Interval History: no acute events. Awaiting placement    Continuous Infusions:  Scheduled Meds:   amiodarone  200 mg Oral Daily    digoxin  0.125 mg Oral Every Mon, Wed, Fri    famotidine  20 mg Oral Daily    furosemide  20 mg Oral Daily    magnesium oxide  400 mg Oral Daily    sacubitril-valsartan  1 tablet Oral BID    spironolactone  25 mg Oral Daily    warfarin  2.5 mg Oral Every Mon, Wed, Fri, Sun    warfarin  5 mg Oral Every Tues, Thurs, Sat     PRN Meds:acetaminophen, docusate sodium, ramelteon    Review of patient's allergies indicates:   Allergen Reactions    Augmentin [amoxicillin-pot clavulanate] Swelling     Lip swelling      Penicillins      Other reaction(s): Swelling  Lip swelling       Objective:     Vital Signs (Most Recent):  Temp: 97.8 °F (36.6 °C) (09/10/19 0709)  Pulse: 72 (09/10/19 1117)  Resp: 20 (09/10/19 0709)  BP: 99/64 (09/10/19 0709)  SpO2: 96 % (09/10/19 0709) Vital Signs (24h Range):  Temp:  [97.8 °F (36.6 °C)-98.3 °F (36.8 °C)] 97.8 °F (36.6 °C)  Pulse:  [69-72] 72  Resp:  [16-20] 20  SpO2:  [95 %-99 %] 96 %  BP: ()/(50-64) 99/64     Patient Vitals for the past 72 hrs (Last 3 readings):   Weight   09/10/19 0709 74.2 kg (163 lb 9.3 oz)   09/09/19 0600 78.6 kg (173 lb 4.5 oz)   09/08/19 0500 73.5 kg (162 lb 0.6 oz)     Body mass index is 27.22 kg/m².      Intake/Output Summary (Last 24 hours) at 9/10/2019 1140  Last data filed at 9/10/2019 0800  Gross per 24 hour   Intake 1320 ml   Output 200 ml   Net 1120 ml       Physical Exam   Constitutional: She appears well-developed and well-nourished. No distress.   Neck: Normal range of motion. Neck supple. No JVD present.   Cardiovascular: Normal rate, regular rhythm and normal heart sounds. Exam reveals no gallop and no friction rub.   No murmur heard.  Pulmonary/Chest: Effort normal and breath sounds normal. No stridor. No respiratory distress. She has no wheezes.   Musculoskeletal: She exhibits no edema.   Left wrist ROM  improved   Skin: Skin is warm and dry. She is not diaphoretic.   Nursing note and vitals reviewed.      Significant Labs:  CBC:  Recent Labs   Lab 09/08/19  0338 09/09/19  0338 09/10/19  0302   WBC 7.27 6.77 5.97   RBC 3.85* 3.91* 3.79*   HGB 11.1* 11.3* 10.9*   HCT 35.6* 36.0* 35.0*    198 192   MCV 93 92 92   MCH 28.8 28.9 28.8   MCHC 31.2* 31.4* 31.1*     BNP:  Recent Labs   Lab 09/10/19  0302   BNP 1,145*     CMP:  Recent Labs   Lab 09/08/19 0338 09/09/19  0038 09/09/19  0338 09/10/19  0302    110 101 101   CALCIUM 8.6* 8.9 8.9 8.8   ALBUMIN 2.6*  --  2.7* 2.7*   PROT 6.6  --  6.6 6.7    136 141 140   K 3.8 3.8 4.3 4.2   CO2 25 24 26 25    102 105 104   BUN 30* 28* 28* 27*   CREATININE 1.7* 1.5* 1.4 1.4   ALKPHOS 120  --  109 104   ALT 26  --  25 21   AST 21  --  19 18   BILITOT 0.4  --  0.4 0.4      Coagulation:   Recent Labs   Lab 09/08/19  0337 09/09/19  0338 09/10/19  0302   INR 2.5* 2.4* 2.3*     LDH:  No results for input(s): LDH in the last 72 hours.  Microbiology:  Microbiology Results (last 7 days)     ** No results found for the last 168 hours. **          I have reviewed all pertinent labs within the past 24 hours.    Estimated Creatinine Clearance: 38.8 mL/min (based on SCr of 1.4 mg/dL).    Diagnostic Results:  I have reviewed and interpreted all pertinent imaging results/findings within the past 24 hours.

## 2019-09-10 NOTE — ASSESSMENT & PLAN NOTE
67 yo F with NIDCM EF 20% , NYHA FC III with admitted with afib with rvr and cardiogenic shock   - IABP placed 8/20 and pulled 8/23   -TTE with contrast 8/20: LVEDD 6.51, LVEF 15%, indeterminate diastolic function, severe LAE, trace AI, mod MR and TR, PAS 51 and IVC 15.     - Continue Entresto to 97/103 mg PO BID today (home dose 97/103)  - Continue dig 125 mcg MWF  - Not a candidate for LVAD/OHTX at this time. Will need aggressive rehab and re-evaluation. Patient awaiting placement. Medically stable for discharge at this time.

## 2019-09-10 NOTE — PROGRESS NOTES
[facility]  Heart Transplant  Progress Note    Patient Name: Sherice Squires  MRN: [id number]  Admission Date: 8/20/2019  Hospital Length of Stay: 21 days  Attending Physician: Nadir Gutierrez MD  Primary Care Provider: Annamarie Soria MD  Principal Problem:Acute on chronic combined systolic and diastolic heart failure    Subjective:     Interval History: no acute events. Awaiting placement    Continuous Infusions:  Scheduled Meds:   amiodarone  200 mg Oral Daily    digoxin  0.125 mg Oral Every Mon, Wed, Fri    famotidine  20 mg Oral Daily    furosemide  20 mg Oral Daily    magnesium oxide  400 mg Oral Daily    sacubitril-valsartan  1 tablet Oral BID    spironolactone  25 mg Oral Daily    warfarin  2.5 mg Oral Every Mon, Wed, Fri, Sun    warfarin  5 mg Oral Every Tues, Thurs, Sat     PRN Meds:acetaminophen, docusate sodium, ramelteon    Review of patient's allergies indicates:   Allergen Reactions    Augmentin [amoxicillin-pot clavulanate] Swelling     Lip swelling      Penicillins      Other reaction(s): Swelling  Lip swelling       Objective:     Vital Signs (Most Recent):  Temp: 97.8 °F (36.6 °C) (09/10/19 0709)  Pulse: 72 (09/10/19 1117)  Resp: 20 (09/10/19 0709)  BP: 99/64 (09/10/19 0709)  SpO2: 96 % (09/10/19 0709) Vital Signs (24h Range):  Temp:  [97.8 °F (36.6 °C)-98.3 °F (36.8 °C)] 97.8 °F (36.6 °C)  Pulse:  [69-72] 72  Resp:  [16-20] 20  SpO2:  [95 %-99 %] 96 %  BP: ()/(50-64) 99/64     Patient Vitals for the past 72 hrs (Last 3 readings):   Weight   09/10/19 0709 74.2 kg (163 lb 9.3 oz)   09/09/19 0600 78.6 kg (173 lb 4.5 oz)   09/08/19 0500 73.5 kg (162 lb 0.6 oz)     Body mass index is 27.22 kg/m².      Intake/Output Summary (Last 24 hours) at 9/10/2019 1140  Last data filed at 9/10/2019 0800  Gross per 24 hour   Intake 1320 ml   Output 200 ml   Net 1120 ml       Physical Exam   Constitutional: She appears well-developed and well-nourished. No distress.   Neck: Normal  range of motion. Neck supple. No JVD present.   Cardiovascular: Normal rate, regular rhythm and normal heart sounds. Exam reveals no gallop and no friction rub.   No murmur heard.  Pulmonary/Chest: Effort normal and breath sounds normal. No stridor. No respiratory distress. She has no wheezes.   Musculoskeletal: She exhibits no edema.   Left wrist ROM improved   Skin: Skin is warm and dry. She is not diaphoretic.   Nursing note and vitals reviewed.      Significant Labs:  CBC:  Recent Labs   Lab 09/08/19  0338 09/09/19  0338 09/10/19  0302   WBC 7.27 6.77 5.97   RBC 3.85* 3.91* 3.79*   HGB 11.1* 11.3* 10.9*   HCT 35.6* 36.0* 35.0*    198 192   MCV 93 92 92   MCH 28.8 28.9 28.8   MCHC 31.2* 31.4* 31.1*     BNP:  Recent Labs   Lab 09/10/19  0302   BNP 1,145*     CMP:  Recent Labs   Lab 09/08/19 0338 09/09/19  0038 09/09/19  0338 09/10/19  0302    110 101 101   CALCIUM 8.6* 8.9 8.9 8.8   ALBUMIN 2.6*  --  2.7* 2.7*   PROT 6.6  --  6.6 6.7    136 141 140   K 3.8 3.8 4.3 4.2   CO2 25 24 26 25    102 105 104   BUN 30* 28* 28* 27*   CREATININE 1.7* 1.5* 1.4 1.4   ALKPHOS 120  --  109 104   ALT 26  --  25 21   AST 21  --  19 18   BILITOT 0.4  --  0.4 0.4      Coagulation:   Recent Labs   Lab 09/08/19  0337 09/09/19  0338 09/10/19  0302   INR 2.5* 2.4* 2.3*     LDH:  No results for input(s): LDH in the last 72 hours.  Microbiology:  Microbiology Results (last 7 days)     ** No results found for the last 168 hours. **          I have reviewed all pertinent labs within the past 24 hours.    Estimated Creatinine Clearance: 38.8 mL/min (based on SCr of 1.4 mg/dL).    Diagnostic Results:  I have reviewed and interpreted all pertinent imaging results/findings within the past 24 hours.    Assessment and Plan:     * Acute on chronic combined systolic and diastolic heart failure  69 yo F with NIDCM EF 20% , NYHA FC III with admitted with afib with rvr and cardiogenic shock   - IABP placed 8/20 and pulled  8/23   -TTE with contrast 8/20: LVEDD 6.51, LVEF 15%, indeterminate diastolic function, severe LAE, trace AI, mod MR and TR, PAS 51 and IVC 15.     - Continue Entresto to 97/103 mg PO BID today (home dose 97/103)  - Continue dig 125 mcg MWF  - Not a candidate for LVAD/OHTX at this time. Will need aggressive rehab and re-evaluation. Patient awaiting placement. Medically stable for discharge at this time.    Acute on chronic kidney failure  Cr stable  - Monitor    Gout  Uric acid 13  - Continue colchicine  - Radiograph unrevealing  - F/u with rheum outpatient    Impaired functional mobility and endurance  Rehab following discharge    Atrial fibrillation with RVR  Atrial fibrillation with RVR s/p AVN ablation and BiV upgrade [place] [employer] generator with SJM LV lead:   - Seen by device nurse today. F/u EP outpatient.   - Amiodarone 200mg qday  - INR 2.5    Automatic implantable cardioverter-defibrillator in situ  Underwent AVN ablation with upgrade to CRT  - AVN ablation and BiV upgrade [place] [employer] generator with SJM LV lead  - EP follow up        Saroj Mccartney MD  Heart Transplant  [facility]

## 2019-09-10 NOTE — PLAN OF CARE
Problem: Adult Inpatient Plan of Care  Goal: Plan of Care Review  Outcome: Ongoing (interventions implemented as appropriate)  Plan of care reviewed with pt.  ruth in place. Pain management for gout pain left wrist per pt request. Pt paced on the monitor. Pt possible placement to SNF. Pt remains free from injury at this time. No further questions at this time. Will continue to monitor.

## 2019-09-10 NOTE — PLAN OF CARE
P2P set up for 3pm for Dr. Mcpherson to discuss with NARCISO Webb the need for IPR based on PT/OT/PMR recommendations    Faxed latest PT/OT notes to the post-acute fax as requested.     Dr. mcpherson and Wilberto (MARIA) aware of the above information

## 2019-09-11 NOTE — PLAN OF CARE
[facility]    FACILITY TRANSFER ORDERS      Patient Name: Sherice Squires  YOB: 1950    PCP: Annamarie Soria MD   PCP Address: [address] [place] [address]  PCP Phone Number: [phone]  PCP Fax: [phone]    Encounter Date: 09/11/2019    Admit to: SNF    Vital Signs:  Routine    Diagnoses:   Active Hospital Problems    Diagnosis  POA    *Acute on chronic combined systolic and diastolic heart failure [I50.43]  Yes    Acute on chronic kidney failure [N17.9, N18.9]  Yes    Arthralgia of hand, left [M25.542]  No    Gout [M10.9]  No    Impaired functional mobility and endurance [Z74.09]  Yes    Atrial fibrillation with RVR [I48.91]  Yes    Automatic implantable cardioverter-defibrillator in situ [Z95.810]  Yes      Resolved Hospital Problems    Diagnosis Date Resolved POA    Chronic pain of left knee [M25.562, G89.29] 08/31/2019 Unknown    Thrombocytopenia, unspecified [D69.6] 09/01/2019 No    Constipation [K59.00] 08/31/2019 No    Sepsis due to gram-negative urinary tract infection [A41.50, N39.0] 09/01/2019 Unknown    Cardiogenic shock [R57.0] 09/03/2019 Yes    Hypertension [I10] 08/26/2019 Yes     Chronic       Allergies:  Review of patient's allergies indicates:   Allergen Reactions    Augmentin [amoxicillin-pot clavulanate] Swelling     Lip swelling      Penicillins      Other reaction(s): Swelling  Lip swelling         Diet: cardiac diet    Activities: Activity as tolerated    Labs: INR checks on Coumadin (goal 2-3). BMP weekly.    CONSULTS:    Physical Therapy to evaluate and treat.  and Occupational Therapy to evaluate and treat.    WOUND CARE ORDERS    Stage II sacral wound on left ischial region. No evidence of infection. Wound care per SNF    Medications: Review discharge medications with patient and family and provide education.      Current Discharge Medication List      START taking these medications    Details   amiodarone (PACERONE) 200 MG Tab  Take 1 tablet (200 mg total) by mouth once daily.  Qty: 30 tablet, Refills: 11         CONTINUE these medications which have CHANGED    Details   digoxin (LANOXIN) 125 mcg tablet Take 1 tablet (0.125 mg total) by mouth every Mon, Wed, Fri.  Qty: 12 tablet, Refills: 11      metoprolol succinate (TOPROL-XL) 25 MG 24 hr tablet Take 1 tablet (25 mg total) by mouth once daily.  Qty: 30 tablet, Refills: 11         CONTINUE these medications which have NOT CHANGED    Details   apixaban (ELIQUIS) 5 mg Tab Take 1 tablet (5 mg total) by mouth 2 (two) times daily.  Qty: 60 tablet, Refills: 11    Comments: Please consider 90 day supplies to promote better adherence      esomeprazole (NEXIUM) 20 MG capsule Take 20 mg by mouth before breakfast.      sacubitril-valsartan (ENTRESTO)  mg per tablet Take 1 tablet by mouth 2 (two) times daily.  Qty: 180 tablet, Refills: 4    Comments: Please prior auth or see about communicating with the insurance about decreased cost to patient, as it is very expensive all of a sudden and patient has been on it for over a year. Thank you.      spironolactone (ALDACTONE) 25 MG tablet TAKE 1 TABLET EVERY DAY  Qty: 90 tablet, Refills: 3    Associated Diagnoses: Dilated cardiomyopathy; Hyperlipidemia, unspecified hyperlipidemia type         STOP taking these medications       hydrALAZINE (APRESOLINE) 50 MG tablet Comments:   Reason for Stopping:         isosorbide mononitrate (IMDUR) 30 MG 24 hr tablet Comments:   Reason for Stopping:         potassium chloride SA (K-DUR,KLOR-CON) 20 MEQ tablet Comments:   Reason for Stopping:         simvastatin (ZOCOR) 40 MG tablet Comments:   Reason for Stopping:         torsemide (DEMADEX) 20 MG Tab Comments:   Reason for Stopping:         metOLazone (ZAROXOLYN) 2.5 MG tablet Comments:   Reason for Stopping:                    _________________________________  Saroj Mccartney MD  09/11/2019

## 2019-09-11 NOTE — PLAN OF CARE
Problem: Adult Inpatient Plan of Care  Goal: Plan of Care Review  Outcome: Ongoing (interventions implemented as appropriate)  Pt free from falls and injury during shift. Pt pending d/c tomorrow to [facility] in [place]. Coumadin d/c, eliquis to start. VSS, pt c/o paint to L wrist, PRN meds administered. POC updated with pt, will continue to monitor.

## 2019-09-11 NOTE — TELEPHONE ENCOUNTER
Care of patient is being transferred to CHF section from Preht section, per Dr. Mccartney.    Dx:   CHF  Reason:  debility  Pt is not on home inotrope therapy.  Outstanding orders scheduled: Plan is for d/c to SNF tomorrow.   Pt has 2 week f/u in [place] on 10/29/19.

## 2019-09-11 NOTE — PLAN OF CARE
Problem: Occupational Therapy Goal  Goal: Occupational Therapy Goal  Goals to be met by: 09-09-19     Patient will increase functional independence with ADLs by performing:    UE Dressing with Stand-by Assistance.  Grooming while standing with Contact Guard Assistance.  Goal revised:Toileting from toilet with Minimal Assistance for hygiene and clothing management. - MET 9/9  Goal revised: toileting from toilet with supervision for hygiene and clothing management. - MET 9/11  Supine to sit with Stand-by Assistance.  Stand pivot transfers with Contact Guard Assistance. MET 9/5  Goal revised: Toilet transfer to toilet  with Contact Guard Assistance.- MET 9/11  Pt. To be I with HEP to improve level of endurance        Outcome: Ongoing (interventions implemented as appropriate)  Continue POC     Lucy Bean OTR/L  Pager: [phone]  9/11/2019

## 2019-09-11 NOTE — PROGRESS NOTES
[facility]  Heart Transplant  Progress Note    Patient Name: Sherice Squires  MRN: [id number]  Admission Date: 8/20/2019  Hospital Length of Stay: 22 days  Attending Physician: Nadir Gutierrez MD  Primary Care Provider: Annamarie Soria MD  Principal Problem:Acute on chronic combined systolic and diastolic heart failure    Subjective:     Interval History: No issues overnight. Patient with no complaints today. Awaiting placement.     Continuous Infusions:  Scheduled Meds:   amiodarone  200 mg Oral Daily    digoxin  0.125 mg Oral Every Mon, Wed, Fri    famotidine  20 mg Oral Daily    furosemide  20 mg Oral Daily    magnesium oxide  400 mg Oral Daily    metoprolol succinate  25 mg Oral Daily    sacubitril-valsartan  1 tablet Oral BID    spironolactone  25 mg Oral Daily    warfarin  5 mg Oral Daily     PRN Meds:acetaminophen, docusate sodium, ramelteon    Review of patient's allergies indicates:   Allergen Reactions    Augmentin [amoxicillin-pot clavulanate] Swelling     Lip swelling      Penicillins      Other reaction(s): Swelling  Lip swelling       Objective:     Vital Signs (Most Recent):  Temp: 97.5 °F (36.4 °C) (09/11/19 0810)  Pulse: 70 (09/11/19 0810)  Resp: 17 (09/11/19 0810)  BP: 108/72 (09/11/19 0810)  SpO2: 95 % (09/11/19 0810) Vital Signs (24h Range):  Temp:  [97.5 °F (36.4 °C)-98.4 °F (36.9 °C)] 97.5 °F (36.4 °C)  Pulse:  [69-78] 70  Resp:  [17-18] 17  SpO2:  [94 %-98 %] 95 %  BP: ()/(66-76) 108/72     Patient Vitals for the past 72 hrs (Last 3 readings):   Weight   09/11/19 0723 74.8 kg (164 lb 14.5 oz)   09/10/19 0709 74.2 kg (163 lb 9.3 oz)   09/09/19 0600 78.6 kg (173 lb 4.5 oz)     Body mass index is 27.44 kg/m².      Intake/Output Summary (Last 24 hours) at 9/11/2019 1016  Last data filed at 9/11/2019 0900  Gross per 24 hour   Intake 480 ml   Output 350 ml   Net 130 ml     Physical Exam   Constitutional: She appears well-developed and well-nourished. No distress.    Neck: Normal range of motion. Neck supple. No JVD present.   Cardiovascular: Normal rate, regular rhythm and normal heart sounds. Exam reveals no gallop and no friction rub.   No murmur heard.  Pulmonary/Chest: Effort normal and breath sounds normal. No stridor. No respiratory distress. She has no wheezes.   Musculoskeletal: She exhibits no edema.   Left wrist ROM improved   Skin: Skin is warm and dry. She is not diaphoretic.   Nursing note and vitals reviewed.      Significant Labs:  CBC:  Recent Labs   Lab 09/09/19  0338 09/10/19  0302 09/11/19  0309   WBC 6.77 5.97 7.76   RBC 3.91* 3.79* 3.88*   HGB 11.3* 10.9* 11.2*   HCT 36.0* 35.0* 36.3*    192 187   MCV 92 92 94   MCH 28.9 28.8 28.9   MCHC 31.4* 31.1* 30.9*     BNP:  Recent Labs   Lab 09/10/19  0302   BNP 1,145*     CMP:  Recent Labs   Lab 09/09/19  0338 09/10/19  0302 09/11/19  0309    101 90   CALCIUM 8.9 8.8 9.2   ALBUMIN 2.7* 2.7* 2.9*   PROT 6.6 6.7 6.9    140 140   K 4.3 4.2 4.2   CO2 26 25 24    104 104   BUN 28* 27* 26*   CREATININE 1.4 1.4 1.4   ALKPHOS 109 104 106   ALT 25 21 20   AST 19 18 18   BILITOT 0.4 0.4 0.4      Coagulation:   Recent Labs   Lab 09/09/19  0338 09/10/19  0302 09/11/19  0309   INR 2.4* 2.3* 2.0*     LDH:  No results for input(s): LDH in the last 72 hours.  Microbiology:  Microbiology Results (last 7 days)     ** No results found for the last 168 hours. **          I have reviewed all pertinent labs within the past 24 hours.    Estimated Creatinine Clearance: 38.4 mL/min (based on SCr of 1.4 mg/dL).    Diagnostic Results:  I have reviewed and interpreted all pertinent imaging results/findings within the past 24 hours.    Assessment and Plan:     * Acute on chronic combined systolic and diastolic heart failure  69 yo F with NIDCM EF 20% , NYHA FC III with admitted with afib with rvr and cardiogenic shock   - IABP placed 8/20 and pulled 8/23   -TTE with contrast 8/20: LVEDD 6.51, LVEF 15%, indeterminate  diastolic function, severe LAE, trace AI, mod MR and TR, PAS 51 and IVC 15.     - Continue Entresto to 97/103 mg PO BID today (home dose 97/103)  - Continue dig 125 mcg MWF  - Not a candidate for LVAD/OHTX at this time. Will need aggressive rehab and re-evaluation. Patient awaiting placement. Medically stable for discharge at this time.    Acute on chronic kidney failure  Cr 1.5    Gout  Uric acid 13  - Continue colchicine  - Radiograph unrevealing  - F/u with rheum outpatient    Impaired functional mobility and endurance  Rehab following discharge    Atrial fibrillation with RVR  Atrial fibrillation with RVR s/p AVN ablation and BiV upgrade [place] scientific generator with SJM LV lead:   - Seen by device nurse today. F/u EP outpatient  - Amiodarone 200mg qday  - Stop warfarin  - Start Eliquis once INR <2    Automatic implantable cardioverter-defibrillator in situ  Underwent AVN ablation with upgrade to CRT  - AVN ablation and BiV upgrade [place] scientific generator with SJM LV lead  - EP follow up    Saroj Mccartney MD  Heart Transplant  [facility]

## 2019-09-11 NOTE — ASSESSMENT & PLAN NOTE
Atrial fibrillation with RVR s/p AVN ablation and BiV upgrade [place] scientific generator with SJM LV lead:   - Seen by device nurse today. F/u EP outpatient  - Amiodarone 200mg qday  - Stop warfarin  - Start Eliquis

## 2019-09-11 NOTE — PLAN OF CARE
Problem: Physical Therapy Goal  Goal: Physical Therapy Goal  Goals to be met by: 19     Patient will increase functional independence with mobility by performin. Supine to sit with MInimal Assistance - met 19  1a. Supine to sit with SBA   2. Sit to supine with MInimal Assistance - met 19  2a. Sit to supine with SBA   3. Sit to stand transfer with Stand-by Assistance - met   3a. Sit to stand with Mod-I  4. Bed to chair transfer with Stand-by Assistance using Rolling Walker  5. Gait  x 50 feet with Stand-by Assistance using Rolling Walker.         Outcome: Ongoing (interventions implemented as appropriate)  Goals reviewed and remain appropriate. Pt progressing towards goals.    Ariadna Herman, PT, DPT   2019  [phone]

## 2019-09-11 NOTE — PLAN OF CARE
Problem: Adult Inpatient Plan of Care  Goal: Plan of Care Review  Outcome: Ongoing (interventions implemented as appropriate)  Plan of care reviewed with pt. Pt remains free of falls/injury. Nightly PO meds given. Sling to L arm applied before bed. PRN tylenol given for hand pain. Waiting for insurance to accept pt d/c to SNF. All questions answered. Will continue to monitor.

## 2019-09-11 NOTE — PT/OT/SLP PROGRESS
Physical Therapy Treatment    Patient Name:  Sherice Squires   MRN:  [id number]    Recommendations:     Discharge Recommendations:  rehabilitation facility   Discharge Equipment Recommendations: walker, rolling, commode   Barriers to discharge: Decreased caregiver support    Assessment:     Sherice Squires is a 69 y.o. female admitted with a medical diagnosis of Acute on chronic combined systolic and diastolic heart failure.  She presents with the following impairments/functional limitations:  weakness, gait instability, impaired endurance, impaired balance, impaired self care skills, impaired functional mobilty, impaired cardiopulmonary response to activity, decreased upper extremity function. Pt able to perform functional mobility without physical assist. However, continues to require BUE support throughout gait for safety, stability, and energy conservation. Also demo's impaired endurance, limiting further progression of gait distance. Pt would continue to benefit from skilled acute PT in order to address current deficits and progress functional mobility.      Rehab Prognosis: Good; patient would benefit from acute skilled PT services to address these deficits and reach maximum level of function.    Recent Surgery: Procedure(s) (LRB):  ABLATION, AVN (N/A)  INSERTION, CARDIAC PACEMAKER, BIVENTRICULAR, PERMANENT, AS UPGRADE (Left)  Cardioversion or Defibrillation 12 Days Post-Op    Plan:     During this hospitalization, patient to be seen 4 x/week to address the identified rehab impairments via gait training, therapeutic activities, therapeutic exercises, neuromuscular re-education and progress toward the following goals:    · Plan of Care Expires:  09/26/19    Subjective     Chief Complaint: L wrist pain   Patient/Family Comments/goals: return home   Pain/Comfort:  · Pain Rating 1: (reported pain in L wrist; did not rate)  · Pain Addressed 1: Reposition, Distraction      Objective:     Communicated with RN prior to  session.  Patient found supine with telemetry, [place] upon PT entry to room.     General Precautions: Standard, fall, pacemaker   Orthopedic Precautions:N/A   Braces: Sling and swathe(at night)     Functional Mobility:  · Bed Mobility:     · Supine to Sit: stand by assistance and with side rail and HOB elevated  · Transfers:     · Sit to Stand:  stand by assistance with rolling walker from EOB   · Toilet Transfer: minimum assistance with  rolling walker and grab bars  using  Stand Pivot  · Gait: ~150 ft. with RW and CGA  ? Demo'd decreased rossi, decreased step length, impaired weight-shifting ability, and increased forward flex   ? Cues for decreased pressure through LUE in order to maintain ppm precautions; minimal pressure noted when assessed by PT  ? Distance limited 2* urge to void       AM-PAC 6 CLICK MOBILITY  Turning over in bed (including adjusting bedclothes, sheets and blankets)?: 3  Sitting down on and standing up from a chair with arms (e.g., wheelchair, bedside commode, etc.): 3  Moving from lying on back to sitting on the side of the bed?: 3  Moving to and from a bed to a chair (including a wheelchair)?: 3  Need to walk in hospital room?: 3  Climbing 3-5 steps with a railing?: 2  Basic Mobility Total Score: 17       Therapeutic Activities and Exercises:  Performed functional mobility as described above.   Voided on toilet with Amari to transfer to/from toilet. Required assist for hygiene following. Performed handwashing at bathroom sink with SBA for standing balance.   Later this date, met with pt and MD Gutierrez to discuss potential d/c plans, reasoning for rehab recs, and preparing for potential surgery. Questions answered within PT scope of practice.     Patient left up in chair with all lines intact and call button in reach..    GOALS:   Multidisciplinary Problems     Physical Therapy Goals        Problem: Physical Therapy Goal    Goal Priority Disciplines Outcome Goal Variances Interventions    Physical Therapy Goal     PT, PT/OT Ongoing (interventions implemented as appropriate)     Description:  Goals to be met by: 19     Patient will increase functional independence with mobility by performin. Supine to sit with MInimal Assistance - met 19  1a. Supine to sit with SBA   2. Sit to supine with MInimal Assistance - met 19  2a. Sit to supine with SBA   3. Sit to stand transfer with Stand-by Assistance - met   3a. Sit to stand with Mod-I  4. Bed to chair transfer with Stand-by Assistance using Rolling Walker  5. Gait  x 50 feet with Stand-by Assistance using Rolling Walker.                           Time Tracking:     PT Received On: 19  PT Start Time: 0947     PT Stop Time: 1016  PT Total Time (min): 29 min     Billable Minutes: Therapeutic Activity 29    Treatment Type: Treatment  PT/PTA: PT     PTA Visit Number: 0     Ariadna Herman, PT, DPT   2019

## 2019-09-11 NOTE — PT/OT/SLP PROGRESS
Occupational Therapy   Treatment    Name: Sherice Squires  MRN: [id number]  Admitting Diagnosis:  Acute on chronic combined systolic and diastolic heart failure  12 Days Post-Op    Recommendations:     Discharge Recommendations: rehabilitation facility  Discharge Equipment Recommendations:  commode, walker, rolling  Barriers to discharge:  None    Assessment:     Sherice Squires is a 69 y.o. female with a medical diagnosis of Acute on chronic combined systolic and diastolic heart failure.  She presents with performance deficits affecting function are weakness, impaired functional mobilty, gait instability, impaired endurance, impaired self care skills, impaired balance, impaired cardiopulmonary response to activity, decreased upper extremity function. Pt would benefit from continued skilled acute OT services in order to maximize independence and safety with ADLs and functional mobility to ensure safe return to PLOF in the least restrictive environment.     Rehab Prognosis:  Good; patient would benefit from acute skilled OT services to address these deficits and reach maximum level of function.       Plan:     Patient to be seen 4 x/week to address the above listed problems via self-care/home management, therapeutic exercises, therapeutic activities  · Plan of Care Expires: 09/25/19  · Plan of Care Reviewed with: patient    Subjective     Pain/Comfort:  · Pain Rating 1: 0/10  · Pain Rating Post-Intervention 1: 0/10    Objective:     Communicated with: RN prior to session.  Patient found up in chair with telemetry, PureWick upon OT entry to room. Pt agreeable to therapy session.     General Precautions: Standard, fall, pacemaker   Orthopedic Precautions:N/A   Braces: (at night )     Occupational Performance:     Bed Mobility:    · Not performed this date.     Functional Mobility/Transfers:  · Patient completed Sit <> Stand Transfer from bedside chair with supervision  with  rolling walker   · Verbal cues provided for  prevention of pushing through L UE   · Functional Mobility: Pt engaging in functional mobility to simulate household distances within pt's room  with SBA using RW  in order to maximize functional activity tolerance and standing balance required for engagement in occupations of choice.     Activities of Daily Living:  · Grooming: stand by assistance Pt performed oral hygiene and washed face while standing at the sink   · Bathing: stand by assistance to performed sink side bath in standing with min A to wash back and buttock   · Upper Body Dressing: minimum assistance for donning gown while in standing with min A to thread UEs    AMPA 6 Click ADL: 20    Treatment & Education:  - Pt educated on role of OT, POC, and goals for therapy.    - Educated pt on being appropriate to transfer with nsg and PCT with x 1 person assist using RW   - Time provided for therapeutic counseling and discussion of health disposition.   - Importance of OOB ax's with staff member assistance and sitting OOB majority of day.   - Pt completed ADLs and functional mobility for treatment session as noted above   - Pt verbalized understanding. Pt expressed no further concerns/questions.  - whiteboard updated     Patient left up in chair with all lines intact, call button in reach and RN notifiedEducation:      GOALS:   Multidisciplinary Problems     Occupational Therapy Goals        Problem: Occupational Therapy Goal    Goal Priority Disciplines Outcome Interventions   Occupational Therapy Goal     OT, PT/OT            Problem: Occupational Therapy Goal    Goal Priority Disciplines Outcome Interventions   Occupational Therapy Goal     OT, PT/OT Ongoing (interventions implemented as appropriate)    Description:  Goals to be met by: 09-09-19     Patient will increase functional independence with ADLs by performing:    UE Dressing with Stand-by Assistance.  Grooming while standing with Contact Guard Assistance.  Goal revised:Toileting from toilet with  Minimal Assistance for hygiene and clothing management. - MET 9/9  Goal revised: toileting from toilet with supervision for hygiene and clothing management. - MET 9/11  Supine to sit with Stand-by Assistance.  Stand pivot transfers with Contact Guard Assistance. MET 9/5  Goal revised: Toilet transfer to toilet  with Contact Guard Assistance.- MET 9/11  Pt. To be I with HEP to improve level of endurance                          Time Tracking:     OT Date of Treatment: 09/11/19  OT Start Time: 1045  OT Stop Time: 1118  OT Total Time (min): 33 min    Billable Minutes:Self Care/Home Management 30    Lucy Bean OT  9/11/2019

## 2019-09-11 NOTE — PROGRESS NOTES
Case reviewed in HTS rounds.  Per Dr. Gutierrez, close AHF referral as pt not candidate for advanced options currently with discharge to SNF vs rehab planned.

## 2019-09-11 NOTE — SUBJECTIVE & OBJECTIVE
Interval History: No issues overnight. Patient with no complaints today. Awaiting placement.     Continuous Infusions:  Scheduled Meds:   amiodarone  200 mg Oral Daily    digoxin  0.125 mg Oral Every Mon, Wed, Fri    famotidine  20 mg Oral Daily    furosemide  20 mg Oral Daily    magnesium oxide  400 mg Oral Daily    metoprolol succinate  25 mg Oral Daily    sacubitril-valsartan  1 tablet Oral BID    spironolactone  25 mg Oral Daily    warfarin  5 mg Oral Daily     PRN Meds:acetaminophen, docusate sodium, ramelteon    Review of patient's allergies indicates:   Allergen Reactions    Augmentin [amoxicillin-pot clavulanate] Swelling     Lip swelling      Penicillins      Other reaction(s): Swelling  Lip swelling       Objective:     Vital Signs (Most Recent):  Temp: 97.5 °F (36.4 °C) (09/11/19 0810)  Pulse: 70 (09/11/19 0810)  Resp: 17 (09/11/19 0810)  BP: 108/72 (09/11/19 0810)  SpO2: 95 % (09/11/19 0810) Vital Signs (24h Range):  Temp:  [97.5 °F (36.4 °C)-98.4 °F (36.9 °C)] 97.5 °F (36.4 °C)  Pulse:  [69-78] 70  Resp:  [17-18] 17  SpO2:  [94 %-98 %] 95 %  BP: ()/(66-76) 108/72     Patient Vitals for the past 72 hrs (Last 3 readings):   Weight   09/11/19 0723 74.8 kg (164 lb 14.5 oz)   09/10/19 0709 74.2 kg (163 lb 9.3 oz)   09/09/19 0600 78.6 kg (173 lb 4.5 oz)     Body mass index is 27.44 kg/m².      Intake/Output Summary (Last 24 hours) at 9/11/2019 1016  Last data filed at 9/11/2019 0900  Gross per 24 hour   Intake 480 ml   Output 350 ml   Net 130 ml     Physical Exam   Constitutional: She appears well-developed and well-nourished. No distress.   Neck: Normal range of motion. Neck supple. No JVD present.   Cardiovascular: Normal rate, regular rhythm and normal heart sounds. Exam reveals no gallop and no friction rub.   No murmur heard.  Pulmonary/Chest: Effort normal and breath sounds normal. No stridor. No respiratory distress. She has no wheezes.   Musculoskeletal: She exhibits no edema.   Left  wrist ROM improved   Skin: Skin is warm and dry. She is not diaphoretic.   Nursing note and vitals reviewed.      Significant Labs:  CBC:  Recent Labs   Lab 09/09/19  0338 09/10/19  0302 09/11/19  0309   WBC 6.77 5.97 7.76   RBC 3.91* 3.79* 3.88*   HGB 11.3* 10.9* 11.2*   HCT 36.0* 35.0* 36.3*    192 187   MCV 92 92 94   MCH 28.9 28.8 28.9   MCHC 31.4* 31.1* 30.9*     BNP:  Recent Labs   Lab 09/10/19  0302   BNP 1,145*     CMP:  Recent Labs   Lab 09/09/19  0338 09/10/19  0302 09/11/19  0309    101 90   CALCIUM 8.9 8.8 9.2   ALBUMIN 2.7* 2.7* 2.9*   PROT 6.6 6.7 6.9    140 140   K 4.3 4.2 4.2   CO2 26 25 24    104 104   BUN 28* 27* 26*   CREATININE 1.4 1.4 1.4   ALKPHOS 109 104 106   ALT 25 21 20   AST 19 18 18   BILITOT 0.4 0.4 0.4      Coagulation:   Recent Labs   Lab 09/09/19  0338 09/10/19  0302 09/11/19  0309   INR 2.4* 2.3* 2.0*     LDH:  No results for input(s): LDH in the last 72 hours.  Microbiology:  Microbiology Results (last 7 days)     ** No results found for the last 168 hours. **          I have reviewed all pertinent labs within the past 24 hours.    Estimated Creatinine Clearance: 38.4 mL/min (based on SCr of 1.4 mg/dL).    Diagnostic Results:  I have reviewed and interpreted all pertinent imaging results/findings within the past 24 hours.

## 2019-09-12 PROBLEM — R07.9 CHEST PAIN: Status: ACTIVE | Noted: 2019-01-01

## 2019-09-12 NOTE — PLAN OF CARE
Problem: Adult Inpatient Plan of Care  Goal: Plan of Care Review  Outcome: Ongoing (interventions implemented as appropriate)  Plan of care reviewed with pt. Pt remains free of falls/injury. VSS-Paced on monitor. PRN tylenol given for hand pain. Sling worn on L arm for sleep. Possible d/c tomorrow to [facility]. Pt resting comfortably. Will continue to monitor.

## 2019-09-12 NOTE — PROGRESS NOTES
D/C PLANNING NOTE    MARIA spoke with Dr. Mccartney this morning, who reports pt had chest pain this a.m. which must be worked up before she can d/c to SNF.  MARIA notified Viry (cell: [phone]) with [facility].  Per HTS rounds this afternoon, pt will remain admitted overnight to watch troponins and may be ready for txfer to SNF in a.m.  SW informed Viry.    MARIA spoke with pt & son Ousmane at bedside this morning.  Pt states she is ready for SNF, but understands needs for CP to be evaluated.  Pt verbalizes understanding of plan for possible LVAD eval in future if she progresses well at SNF.  SW discussed LVAD caregiving needs with pt & son today and reviewed what is required for a caregiver to learn DL dressing change.  Pt states she does not think she has anyone who can be a caregiver, as her son works, her dgtr-in-law has school-age children, one of her sisters has Alzheimer's, and her other sister works for the [employer].  SW encouraged pt & son to consider possible caregiver options and to discuss with family.  SW encouraged pt to bring a family member and/or potential caregiver with her to HTS clinic f/u so that caregiving needs/plan/options can be discussed more fully.    SW updated pt & son via room phone this afternoon on dispo plan (watch troponins & possible d/c in a.m.).  Pt & son verbalize understanding.  All records & facility transfer orders have been sent to [facility] already via [employer] & paper fax (f: [phone]).  Pt's son Ousmane will transport pt at time of d/c.  SW will f/u in a.m. to confirm whether pt is stable for d/c.  SW following and remains available.

## 2019-09-12 NOTE — PROGRESS NOTES
"[facility]  Heart Transplant  Progress Note    Patient Name: Sherice Squires  MRN: [id number]  Admission Date: 8/20/2019  Hospital Length of Stay: 23 days  Attending Physician: Nadir Gutierrez MD  Primary Care Provider: Annamarie Soria MD  Principal Problem:Acute on chronic combined systolic and diastolic heart failure    Subjective:     Interval History: patient reports acute onset of substernal non-radiating chest "tightness" at 5am while she was awake. Improved with sitting up, and exacerbated by laying flat.     Continuous Infusions:  Scheduled Meds:   amiodarone  200 mg Oral Daily    apixaban  5 mg Oral BID    digoxin  0.125 mg Oral Every Mon, Wed, Fri    famotidine  20 mg Oral Daily    furosemide  20 mg Oral Daily    magnesium oxide  400 mg Oral Daily    metoprolol succinate  25 mg Oral Daily    sacubitril-valsartan  1 tablet Oral BID     PRN Meds:acetaminophen, docusate sodium, ramelteon    Review of patient's allergies indicates:   Allergen Reactions    Augmentin [amoxicillin-pot clavulanate] Swelling     Lip swelling      Penicillins      Other reaction(s): Swelling  Lip swelling       Objective:     Vital Signs (Most Recent):  Temp: 97.8 °F (36.6 °C) (09/12/19 0739)  Pulse: 70 (09/12/19 0739)  Resp: 15 (09/12/19 0739)  BP: 132/86 (09/12/19 0739)  SpO2: 98 % (09/12/19 0739) Vital Signs (24h Range):  Temp:  [97.8 °F (36.6 °C)-99 °F (37.2 °C)] 97.8 °F (36.6 °C)  Pulse:  [69-88] 70  Resp:  [15-18] 15  SpO2:  [95 %-98 %] 98 %  BP: ()/(65-86) 132/86     Patient Vitals for the past 72 hrs (Last 3 readings):   Weight   09/12/19 0600 74.8 kg (164 lb 14.5 oz)   09/11/19 0723 74.8 kg (164 lb 14.5 oz)   09/10/19 0709 74.2 kg (163 lb 9.3 oz)     Body mass index is 27.44 kg/m².      Intake/Output Summary (Last 24 hours) at 9/12/2019 0823  Last data filed at 9/12/2019 0600  Gross per 24 hour   Intake 600 ml   Output 250 ml   Net 350 ml     Physical Exam   Constitutional: She appears " well-developed and well-nourished. No distress.   Neck: Normal range of motion. Neck supple. JVD present.   Cardiovascular: Normal rate, regular rhythm and normal heart sounds. Exam reveals no gallop and no friction rub.   No murmur heard.  Pulmonary/Chest: Effort normal and breath sounds normal. No stridor. No respiratory distress. She has no wheezes.   Musculoskeletal: She exhibits no edema.   Left wrist ROM improved   Skin: Skin is warm and dry. She is not diaphoretic.   Nursing note and vitals reviewed.      Significant Labs:  CBC:  Recent Labs   Lab 09/10/19  0302 09/11/19  0309 09/12/19  0249   WBC 5.97 7.76 8.72   RBC 3.79* 3.88* 3.82*   HGB 10.9* 11.2* 10.7*   HCT 35.0* 36.3* 35.3*    187 131*   MCV 92 94 92   MCH 28.8 28.9 28.0   MCHC 31.1* 30.9* 30.3*     BNP:  Recent Labs   Lab 09/10/19  0302   BNP 1,145*     CMP:  Recent Labs   Lab 09/10/19  0302 09/11/19  0309 09/12/19  0249    90 101   CALCIUM 8.8 9.2 9.0   ALBUMIN 2.7* 2.9* 2.9*   PROT 6.7 6.9 6.8    140 139   K 4.2 4.2 4.2   CO2 25 24 25    104 106   BUN 27* 26* 25*   CREATININE 1.4 1.4 1.4   ALKPHOS 104 106 100   ALT 21 20 18   AST 18 18 14   BILITOT 0.4 0.4 0.5      Coagulation:   Recent Labs   Lab 09/10/19  0302 09/11/19  0309 09/12/19  0249   INR 2.3* 2.0* 2.2*     LDH:  No results for input(s): LDH in the last 72 hours.  Microbiology:  Microbiology Results (last 7 days)     ** No results found for the last 168 hours. **          I have reviewed all pertinent labs within the past 24 hours.    Estimated Creatinine Clearance: 38.4 mL/min (based on SCr of 1.4 mg/dL).    Diagnostic Results:  I have reviewed and interpreted all pertinent imaging results/findings within the past 24 hours.    Assessment and Plan:     * Acute on chronic combined systolic and diastolic heart failure  69 yo F with NIDCM EF 20% , NYHA FC III with admitted with afib with rvr and cardiogenic shock   - IABP placed 8/20 and pulled 8/23   -TTE with  contrast 8/20: LVEDD 6.51, LVEF 15%, indeterminate diastolic function, severe LAE, trace AI, mod MR and TR, PAS 51 and IVC 15.     - Continue Entresto to 97/103 mg PO BID today (home dose 97/103)  - Continue dig 125 mcg MWF  - Not a candidate for LVAD/OHTX at this time. Will need aggressive rehab and re-evaluation. Patient awaiting placement. Medically stable for discharge at this time.    Chest pain  ?volume related with increasing JVP. No history of ACS.  - Check troponin    Acute on chronic kidney failure  Cr 1.4 and stable    Gout  Uric acid 13  - Continue colchicine  - Radiograph unrevealing  - F/u with rheum outpatient    Impaired functional mobility and endurance  Rehab following discharge    Atrial fibrillation with RVR  Atrial fibrillation with RVR s/p AVN ablation and BiV upgrade [employer] scientific generator with SJM LV lead:   - Seen by device nurse today. F/u EP outpatient  - Amiodarone 200mg qday  - Start Eliquis once INR < 2    Automatic implantable cardioverter-defibrillator in situ  Underwent AVN ablation with upgrade to CRT  - AVN ablation and BiV upgrade [employer] scientific generator with SJM LV lead  - EP follow up    Saroj Mccartney MD  Heart Transplant  [facility]

## 2019-09-12 NOTE — PLAN OF CARE
Problem: Adult Inpatient Plan of Care  Goal: Plan of Care Review  Patient remains free from falls and injuries through out shift. Patient AAO and VSS. Patient complained of chest tightness. Sherrill ROSS aware. Patient transitioned back to Lasix IVP to diuresis. Purewick in place. Plan for discharge to rehab.  Plan of care reviewed with patient. Patient verbalizes understanding of plan.  Will continue to monitor.

## 2019-09-12 NOTE — PLAN OF CARE
Problem: Occupational Therapy Goal  Goal: Occupational Therapy Goal  Goals to be met by: 09-09-19     Patient will increase functional independence with ADLs by performing:    UE Dressing with Stand-by Assistance.  Grooming while standing with Contact Guard Assistance.  Goal revised:Toileting from toilet with Minimal Assistance for hygiene and clothing management. - MET 9/9  Goal revised: toileting from toilet with supervision for hygiene and clothing management. - MET 9/11  Supine to sit with Stand-by Assistance.  Stand pivot transfers with Contact Guard Assistance. MET 9/5  Goal revised: Toilet transfer to toilet  with Contact Guard Assistance.- MET 9/11  Pt. To be I with HEP to improve level of endurance         Outcome: Ongoing (interventions implemented as appropriate)  Continue POC     Lucy Bean OTR/L  Pager: [phone]  9/12/2019

## 2019-09-12 NOTE — SUBJECTIVE & OBJECTIVE
"Interval History: patient reports acute onset of substernal non-radiating chest "tightness" at 5am while she was awake. Improved with sitting up, and exacerbated by laying flat.     Continuous Infusions:  Scheduled Meds:   amiodarone  200 mg Oral Daily    apixaban  5 mg Oral BID    digoxin  0.125 mg Oral Every Mon, Wed, Fri    famotidine  20 mg Oral Daily    furosemide  20 mg Oral Daily    magnesium oxide  400 mg Oral Daily    metoprolol succinate  25 mg Oral Daily    sacubitril-valsartan  1 tablet Oral BID     PRN Meds:acetaminophen, docusate sodium, ramelteon    Review of patient's allergies indicates:   Allergen Reactions    Augmentin [amoxicillin-pot clavulanate] Swelling     Lip swelling      Penicillins      Other reaction(s): Swelling  Lip swelling       Objective:     Vital Signs (Most Recent):  Temp: 97.8 °F (36.6 °C) (09/12/19 0739)  Pulse: 70 (09/12/19 0739)  Resp: 15 (09/12/19 0739)  BP: 132/86 (09/12/19 0739)  SpO2: 98 % (09/12/19 0739) Vital Signs (24h Range):  Temp:  [97.8 °F (36.6 °C)-99 °F (37.2 °C)] 97.8 °F (36.6 °C)  Pulse:  [69-88] 70  Resp:  [15-18] 15  SpO2:  [95 %-98 %] 98 %  BP: ()/(65-86) 132/86     Patient Vitals for the past 72 hrs (Last 3 readings):   Weight   09/12/19 0600 74.8 kg (164 lb 14.5 oz)   09/11/19 0723 74.8 kg (164 lb 14.5 oz)   09/10/19 0709 74.2 kg (163 lb 9.3 oz)     Body mass index is 27.44 kg/m².      Intake/Output Summary (Last 24 hours) at 9/12/2019 0823  Last data filed at 9/12/2019 0600  Gross per 24 hour   Intake 600 ml   Output 250 ml   Net 350 ml     Physical Exam   Constitutional: She appears well-developed and well-nourished. No distress.   Neck: Normal range of motion. Neck supple. JVD present.   Cardiovascular: Normal rate, regular rhythm and normal heart sounds. Exam reveals no gallop and no friction rub.   No murmur heard.  Pulmonary/Chest: Effort normal and breath sounds normal. No stridor. No respiratory distress. She has no wheezes. "   Musculoskeletal: She exhibits no edema.   Left wrist ROM improved   Skin: Skin is warm and dry. She is not diaphoretic.   Nursing note and vitals reviewed.      Significant Labs:  CBC:  Recent Labs   Lab 09/10/19  0302 09/11/19  0309 09/12/19  0249   WBC 5.97 7.76 8.72   RBC 3.79* 3.88* 3.82*   HGB 10.9* 11.2* 10.7*   HCT 35.0* 36.3* 35.3*    187 131*   MCV 92 94 92   MCH 28.8 28.9 28.0   MCHC 31.1* 30.9* 30.3*     BNP:  Recent Labs   Lab 09/10/19  0302   BNP 1,145*     CMP:  Recent Labs   Lab 09/10/19  0302 09/11/19  0309 09/12/19  0249    90 101   CALCIUM 8.8 9.2 9.0   ALBUMIN 2.7* 2.9* 2.9*   PROT 6.7 6.9 6.8    140 139   K 4.2 4.2 4.2   CO2 25 24 25    104 106   BUN 27* 26* 25*   CREATININE 1.4 1.4 1.4   ALKPHOS 104 106 100   ALT 21 20 18   AST 18 18 14   BILITOT 0.4 0.4 0.5      Coagulation:   Recent Labs   Lab 09/10/19  0302 09/11/19  0309 09/12/19  0249   INR 2.3* 2.0* 2.2*     LDH:  No results for input(s): LDH in the last 72 hours.  Microbiology:  Microbiology Results (last 7 days)     ** No results found for the last 168 hours. **          I have reviewed all pertinent labs within the past 24 hours.    Estimated Creatinine Clearance: 38.4 mL/min (based on SCr of 1.4 mg/dL).    Diagnostic Results:  I have reviewed and interpreted all pertinent imaging results/findings within the past 24 hours.

## 2019-09-12 NOTE — PROGRESS NOTES
DISCHARGE PLANNING    9/9 - [employer] still reviewing additional clinicals for IPR auth.    9/10 - Per mgr FITO Price, [employer] denied IPR & recommended SNF.  Peer to peer scheduled for 3pm today to try to overturn IPR denial.  MARIA updated pt at bedside.  SW discussed SNF level of care with pt.  Pt states she prefers hospital-based SNF in [place], if possible.  Otherwise, pt prefers [facility].  [facility] offers IPR & SNF levels of care.  Nusrat with [facility] reports their affiliated SNF is at [facility].  Per FITO Price this afternoon, peer to peer was unsuccessful and [employer] will only approve SNF.  SW informed pt by phone this afternoon.  Pt verbalizes understanding and requests [facility].  Per Nusrat with [facility], she will forward pt's paperwork to [facility] today.  SW updated pt's son Ousmane by phone ([phone]), per pt's request.    9/11 - Per Viry (cell: [phone]) with [facility], they received pt's paperwork, submitted to [employer], and received SNF auth today.  [facility] reports they can accept pt tomorrow and requests that pt leave [facility] around 10am so that she does not arrive to their facility late in the day.  SW informed pt.  Pt verbalizes agreement with plan for txfer tomorrow morning & confirms her son can still transport her.  Pt will inform son to be at [facility] early in the day for goal of d/c at 10am.  MARIA informed mgr FITO Price & Dr. Mccartney of SNF request for early d/c.  Facility txfer orders were entered today & SW sent orders to [facility] [place].  SW will f/u for final d/c coordination in a.m.

## 2019-09-12 NOTE — PT/OT/SLP PROGRESS
Occupational Therapy   Treatment    Name: Sherice Squires  MRN: [id number]  Admitting Diagnosis:  Acute on chronic combined systolic and diastolic heart failure  13 Days Post-Op    Recommendations:     Discharge Recommendations: rehabilitation facility  Discharge Equipment Recommendations:  commode, walker, rolling  Barriers to discharge:  None    Assessment:     Sherice Squires is a 69 y.o. female with a medical diagnosis of Acute on chronic combined systolic and diastolic heart failure.  She presents with performance deficits affecting function are weakness, impaired self care skills, impaired functional mobilty, gait instability, impaired endurance, impaired balance, decreased upper extremity function, impaired cardiopulmonary response to activity. Pt would benefit from continued skilled acute OT services in order to maximize independence and safety with ADLs and functional mobility to ensure safe return to PLOF in the least restrictive environment.      Rehab Prognosis:  Good; patient would benefit from acute skilled OT services to address these deficits and reach maximum level of function.       Plan:     Patient to be seen 4 x/week to address the above listed problems via self-care/home management, therapeutic activities, therapeutic exercises  · Plan of Care Expires: 09/25/19  · Plan of Care Reviewed with: patient, son    Subjective     Pain/Comfort:  · Pain Rating 1: 0/10  · Pain Rating Post-Intervention 1: 0/10    Objective:     Communicated with: RN prior to session.  Patient found up in chair with telemetry, PureWick upon OT entry to room. Pt agreeable to therapy session. Pt's son present.    General Precautions: Standard, fall, pacemaker   Orthopedic Precautions:N/A   Braces: Sling and swathe(at night )     Occupational Performance:     Bed Mobility:    · Not performed.     Functional Mobility/Transfers:  · Patient completed x2 reps Sit <> Stand Transfer EOB and bedside chair with stand by assistance   with RW  · 1st trial from bedside chair with SBA using RW; increased time provided   · 2nd trial from EOB simulating sternal precautions: height of bed elevated and pt educated on use of momentum and body mechanics when standing without using B UEs. Pt perforemd sit <>stand transfer with CGA from elevated bed height.   · Functional Mobility: Pt engaging in functional mobility to simulate household/community distances approx 120ft x2  with SBA using rollator in order to maximize functional activity tolerance and standing balance required for engagement in occupations of choice.   · Pt required min A to lock/unlock L hand lock due on rollator due to L  impairment.   · Slow pacing during mobility with rollator.     Activities of Daily Living:  · Grooming: stand by assistance pt performed oral hygiene and washed face while standing at the sink     [facility] 6 Click ADL: 20    Treatment & Education:  - Pt educated on role of OT, POC, and goals for therapy.    - pt performed sit<>stand from EOB without the use of UEs.  - Time provided for therapeutic counseling and discussion of health disposition.   - Importance of OOB ax's with staff member assistance and sitting OOB majority of day.   - Pt completed ADLs and functional mobility for treatment session as noted above   - Pt verbalized understanding. Pt expressed no further concerns/questions.  - whiteboard updated     Patient left up in chair with all lines intact, call button in reach, Rn notified and son presentEducation:      GOALS:   Multidisciplinary Problems     Occupational Therapy Goals        Problem: Occupational Therapy Goal    Goal Priority Disciplines Outcome Interventions   Occupational Therapy Goal     OT, PT/OT            Problem: Occupational Therapy Goal    Goal Priority Disciplines Outcome Interventions   Occupational Therapy Goal     OT, PT/OT Ongoing (interventions implemented as appropriate)    Description:  Goals to be met by: 09-09-19     Patient  will increase functional independence with ADLs by performing:    UE Dressing with Stand-by Assistance.  Grooming while standing with Contact Guard Assistance.  Goal revised:Toileting from toilet with Minimal Assistance for hygiene and clothing management. - MET 9/9  Goal revised: toileting from toilet with supervision for hygiene and clothing management. - MET 9/11  Supine to sit with Stand-by Assistance.  Stand pivot transfers with Contact Guard Assistance. MET 9/5  Goal revised: Toilet transfer to toilet  with Contact Guard Assistance.- MET 9/11  Pt. To be I with HEP to improve level of endurance                          Time Tracking:     OT Date of Treatment: 09/12/19  OT Start Time: 0940  OT Stop Time: 1016  OT Total Time (min): 36 min    Billable Minutes:Self Care/Home Management 15  Therapeutic Activity 15    Lucy Bean OT  9/12/2019

## 2019-09-12 NOTE — PLAN OF CARE
Problem: Physical Therapy Goal  Goal: Physical Therapy Goal  Goals to be met by: 19     Patient will increase functional independence with mobility by performin. Supine to sit with MInimal Assistance - met 19  1a. Supine to sit with SBA   2. Sit to supine with MInimal Assistance - met 19  2a. Sit to supine with SBA   3. Sit to stand transfer with Stand-by Assistance - met   3a. Sit to stand with Mod-I  4. Bed to chair transfer with Stand-by Assistance using Rolling Walker  5. Gait  x 50 feet with Stand-by Assistance using Rolling Walker. - met   5a. Gait x 150 ft with SBA and LRAD         Outcome: Ongoing (interventions implemented as appropriate)    Discharge Recommendation: Rehab.  1 goal met today. PT goals still appropriate.  Appropriate transfer level with nursing staff: Bed <> Chair:  Step Transfer with stand by assistance with 4 wheeled walker.    Iona Gomez PT, DPT  2019  Pager: [phone]

## 2019-09-12 NOTE — PT/OT/SLP PROGRESS
"Physical Therapy Treatment    Patient Name:  Sherice Squires   MRN:  [id number]  Admitting Diagnosis:  Acute on chronic combined systolic and diastolic heart failure   Recent Surgery: Procedure(s) (LRB):  ABLATION, AVN (N/A)  INSERTION, CARDIAC PACEMAKER, BIVENTRICULAR, PERMANENT, AS UPGRADE (Left)  Cardioversion or Defibrillation 13 Days Post-Op  Admit Date: 8/20/2019  Length of Stay: 23 days    Recommendations:     Discharge Recommendations:  rehabilitation facility   Discharge Equipment Recommendations: commode, walker, rolling   Barriers to discharge: None    Assessment:     Sherice Squires is a 69 y.o. female admitted with a medical diagnosis of Acute on chronic combined systolic and diastolic heart failure.  She presents with the following impairments/functional limitations:  weakness, impaired endurance, impaired functional mobilty, gait instability, impaired self care skills, impaired fine motor, impaired cardiopulmonary response to activity, decreased upper extremity function. Pt tolerated ambulation with a LRAD well today. Pt was able to ambulate 240 ft today with rollator with good safety awareness throughout. Pt showed decreased reliance on rollator as opposed to RW in previous sessions. Pt's endurance is still limited however. Pt stated she was "pooped" after ambulating during session and asked the the session be terminated due to fatigue. Pt was educated about the importance of building endurance by continuing to push herself in PT sessions. Pt was instructed about attempting to not using BUE during transfers. Pt stated understanding. Pt continues to qualify for skilled PT services.    Rehab Prognosis: Good; patient would benefit from acute skilled PT services to address these deficits and reach maximum level of function.    Recent Surgery: Procedure(s) (LRB):  ABLATION, AVN (N/A)  INSERTION, CARDIAC PACEMAKER, BIVENTRICULAR, PERMANENT, AS UPGRADE (Left)  Cardioversion or Defibrillation 13 Days " "Post-Op    Plan:     During this hospitalization, patient to be seen 4 x/week to address the identified rehab impairments via gait training, therapeutic activities, therapeutic exercises, neuromuscular re-education and progress toward the following goals:    · Plan of Care Expires:  09/26/19    Subjective     RN notified prior to session. Son present upon PT entrance into room.    Chief Complaint: "I'm so glad you came back this afternoon I wouldn't have been able to walk like that before lunch"  Patient/Family Comments/goals: Improve endurance and return home safely  Pain/Comfort:  · Pain Rating 1: 0/10  · Pain Rating Post-Intervention 1: 0/10      Objective:     Patient found up in chair with: telemetry, Matilde   Mental Status: Patient is oriented to AxOx4 and follows multi-steo commands. Patient is Alert and Cooperative during session.    General Precautions: Standard, Cardiac fall, pacemaker   Orthopedic Precautions:N/A   Braces: Sling and swathe(at night)   Body mass index is 27.44 kg/m².  Oxygen Device: Room Air    Outcome Measures:  AM-PAC 6 CLICK MOBILITY  Turning over in bed (including adjusting bedclothes, sheets and blankets)?: 3  Sitting down on and standing up from a chair with arms (e.g., wheelchair, bedside commode, etc.): 3  Moving from lying on back to sitting on the side of the bed?: 3  Moving to and from a bed to a chair (including a wheelchair)?: 3  Need to walk in hospital room?: 3  Climbing 3-5 steps with a railing?: 2  Basic Mobility Total Score: 17     Functional Mobility:  Additional staff present: None  Bed Mobility:   · Pt found/returned to bedside chair    Transfers:   · Sit <> Stand Transfer: stand by assistance with no assistive device   · Stand <> Sit Transfer: stand by assistance with no assistive device   · [id number] from bedside chair   · Pt instructed to not use LUE for   · Sit <> Stand Transfer: stand by assistance with no assistive device   · Stand <> Sit Transfer: stand by " assistance with no assistive device   · x1 trials from bedside commode  · Pt instructed to not use LUE for   Gait:  · Patient ambulated: 240 ft   · Patient required: stand by assistance  · Patient used:  4 wheeled walker   · Gait Pattern observed: swing through  · Gait Deviation(s): decreased step length and decreased rossi  · Impairments due to: decreased endurance  · Comments: Rollator trialed today. Pt with good safety awareness with rollator but req'd cueing to lock rollator before standing/sitting. Pt was able to perform vertical/horizontal head turns, 180 degree turns while ambulating, and avoid hallway obstacles without LOB.    Education:  All questions answered within PT scope of practice  PT role in POC  Safe to perform step transfer to/from chair SBA and rollator/RW w/ nursing/PCT  Importance of OOB tolerance to improve lung ventilation and expansion as well as strengthen postural musculature  Ambulate 1x today (total 3x/day) with nsg before dinner    Patient left up in chair with all lines intact, call button in reach, RN notified and son present. RN stated she would made sure pt ambulated 1x by dinner today.    GOALS:   Multidisciplinary Problems     Physical Therapy Goals        Problem: Physical Therapy Goal    Goal Priority Disciplines Outcome Goal Variances Interventions   Physical Therapy Goal     PT, PT/OT Ongoing (interventions implemented as appropriate)     Description:  Goals to be met by: 19     Patient will increase functional independence with mobility by performin. Supine to sit with MInimal Assistance - met 19  1a. Supine to sit with SBA   2. Sit to supine with MInimal Assistance - met 19  2a. Sit to supine with SBA   3. Sit to stand transfer with Stand-by Assistance - met   3a. Sit to stand with Mod-I  4. Bed to chair transfer with Stand-by Assistance using Rolling Walker  5. Gait  x 50 feet with Stand-by Assistance using Rolling Walker. - met   5a. Gait x  150 ft with SBA and LRAD                         Time Tracking:     PT Received On: 09/12/19  PT Start Time: 1304     PT Stop Time: 1329  PT Total Time (min): 25 min       Billable Minutes:   · Gait Training 17 and Therapeutic Activity 8    Treatment Type: Treatment  PT/PTA: PT       Iona Gomez PT, DPT  9/12/2019   Pager: [phone]

## 2019-09-12 NOTE — NURSING
During AM assessment patient reports chest tightness which started this morning. VSS stable but not at baseline.  Sherrill ROSS notified and arrived to bedside shortly after. Will continue to monitor.

## 2019-09-13 PROBLEM — L89.152 SACRAL DECUBITUS ULCER, STAGE II: Status: ACTIVE | Noted: 2019-01-01

## 2019-09-13 PROBLEM — L89.152 SACRAL DECUBITUS ULCER, STAGE II: Status: RESOLVED | Noted: 2019-01-01 | Resolved: 2019-01-01

## 2019-09-13 NOTE — PROGRESS NOTES
Discharge Note:   MARIA met with pt and pt's brother Rick in pt's room in order to review discharge plan. Pt was AAOx4, pleasant and sitting up in the chair. Pt states that brother Rick will be transporting pt to [facility] this afternoon. Pt reports that she is looking forward to being closer to home. Pt reports no major issues at this time and reports that she will reach out if anything changes. SW remains available for continued psychosocial support, education, resources, and additional d/c plans if needed.     MARIA contacted Viry at [facility] (ph#[phone]) to confirm discharge for this afternoon. MARIA also faxed updated progress notes, MAR, and updated orders reflecting wound care to (fax#[phone]). MARIA will fax d/c summary once completed. Viry denying further needs and expressed understanding of all information. SW remains available.     MARIA confirmed discharge with Cardiology Fellow Dr. Mccartney and notified bedside RN Jerilyn who will contact charge nurse at [facility] (ph#[phone]) to provide sign out. SW remains available.

## 2019-09-13 NOTE — HOSPITAL COURSE
Acute on chronic combined systolic and diastolic heart failure  Patient initially presented on 8/20 in cardiogenic shock. [facility] EF 20% , NYHA 3. She was in afib with RVR at that time and failed DCCV. She underwent AVN ablation with BiV pacing. TTE during this admission LVEDD 6.51, LVEF 15%, indeterminate diastolic function, severe LAE, trace AI, mod MR and TR, PAS 51 and IVC 15. She was being evaluated for OHTx/LVAD but she was felt to be too deconditioned at this time. Plan was to improve her functional status at rehab and revisit in outpatient setting. Medications were titrated. She was discharged to SNF on Entresto 97/103 mg PO BID, digoxin 125 mcg WMF, and Toprol XL 25 mg daily.    Atrial fibrillation with RVR  Atrial fibrillation with RVR s/p AVN ablation and BiV upgrade [employer] generator with SJM LV lead:   - Seen by device nurse today. F/u EP outpatient  - Amiodarone 200mg qday  - Coumadin 2.5 mg Tue, Thur, Sat, Sun & 5 mg MWF  - Amiodarone 200 mg daily per EP to try and keep atria out of Afib to assess with cardiac function even in the setting of AVN ablation and pacing    Gout: evaluated by rheumatology. Uric acid 13. S/p intraarticular injection and discharged on colchicine. She will f/u with rheumatology outpatient.    Sacral decubitus ulcer, stage II: noticed on day of discharge. No evidence of infection. Wound care consult ordered     Automatic implantable cardioverter-defibrillator in situ  Underwent AVN ablation with upgrade to CRT  - AVN ablation and BiV upgrade [employer] with SJM LV lead  - EP follow up

## 2019-09-13 NOTE — PT/OT/SLP PROGRESS
Occupational Therapy   Treatment    Name: Sherice Squires  MRN: [id number]  Admitting Diagnosis:  Acute on chronic combined systolic and diastolic heart failure  14 Days Post-Op    Recommendations:     Discharge Recommendations: rehabilitation facility  Discharge Equipment Recommendations:  commode, walker, rolling, shower chair  Barriers to discharge:  None    Assessment:     Sherice Squires is a 69 y.o. female with a medical diagnosis of Acute on chronic combined systolic and diastolic heart failure.  She presents with performance deficits affecting function are weakness, impaired endurance, impaired self care skills, gait instability, impaired functional mobilty, impaired balance, impaired cardiopulmonary response to activity, pain, decreased upper extremity function. Pt would benefit from continued skilled acute OT services in order to maximize independence and safety with ADLs and functional mobility to ensure safe return to PLOF in the least restrictive environment.    Rehab Prognosis:  Good; patient would benefit from acute skilled OT services to address these deficits and reach maximum level of function.       Plan:     Patient to be seen 4 x/week to address the above listed problems via self-care/home management, therapeutic activities, therapeutic exercises  · Plan of Care Expires: 09/25/19  · Plan of Care Reviewed with: patient    Subjective     Pain/Comfort:  · Pain Rating 1: (Pt reported L hand pain and L knee pain; pt did not rate )  · Pain Addressed 1: Distraction, Reposition, Nurse notified    Objective:     Communicated with: RN prior to session.  Patient found HOB elevated with telemetry, PureWick upon OT entry to room. Pt agreeable to therapy session.    General Precautions: Standard, fall, pacemaker   Orthopedic Precautions:N/A   Braces: Sling and swathe(at night)     Occupational Performance:     Bed Mobility:    · Patient completed Supine to Sit with stand by assistance, with side rail and HOB  elevated      Functional Mobility/Transfers:  · Patient completed Sit <> Stand Transfer from EOB with stand by assistance  with  rolling walker   · Patient completed Toilet Transfer with functional ambulation to toilet and step transfer technique with contact guard assistance to sit and minimum assistance to stand with  rolling walker and grab bars  · Functional Mobility: Pt engaging in functional mobility to simulate household distances approx 120ft  with close SBA using rollator  in order to maximize functional activity tolerance and standing balance required for engagement in occupations of choice.   · Pt reported slight fatigue during mobility   · No LOB but slight instability noted    Activities of Daily Living:  · Grooming: supervision Pt performed oral hygiene and washed face standing at the sink. Pt reported difficulty holding dentures in L hand due to L hand weakness and pain   · Bathing: minimum assistance pt performed bedside bath with min A to wash back, buttock, and posterior of thighs. Pt was able to wash B UEs, chest, abdomen, [place]-area, and thighs.   · Upper Body Dressing: minimum assistance for doffing/donning clean gowns   · Toileting: contact guard assistance for clothing management, pt was able to perform hygiene in standing with set-up A     [facility] 6 Click ADL: 20    Treatment & Education:  - Pt educated on role of OT, POC, and goals for therapy.    - Patient and family aware of patient's deficits and therapy progression.   - Educated pt on being appropriate to transfer with nsg and PCT with x 1 person for supervision   - Time provided for therapeutic counseling and discussion of health disposition.   - RN (Jerilyn) notified of skin tear to L medial buttock and put in a wound care consult.   - Pt completed ADLs and functional mobility for treatment session as noted above   - Pt verbalized understanding. Pt expressed no further concerns/questions.  - whiteboard updated       Patient left up in chair  with all lines intact, call button in reach, RN  notified and son  presentEducation:      GOALS:   Multidisciplinary Problems     Occupational Therapy Goals        Problem: Occupational Therapy Goal    Goal Priority Disciplines Outcome Interventions   Occupational Therapy Goal     OT, PT/OT Ongoing (interventions implemented as appropriate)    Description:  Goals to be met by: 09-09-19     Patient will increase functional independence with ADLs by performing:    UE Dressing with Stand-by Assistance.  Grooming while standing with Contact Guard Assistance.  Goal revised:Toileting from toilet with Minimal Assistance for hygiene and clothing management. - MET 9/9  Goal revised: toileting from toilet with supervision for hygiene and clothing management. - MET 9/11  Supine to sit with Stand-by Assistance.  Stand pivot transfers with Contact Guard Assistance. MET 9/5  Goal revised: Toilet transfer to toilet  with Contact Guard Assistance.- MET 9/11  Pt. To be I with HEP to improve level of endurance                     Multidisciplinary Problems (Resolved)        Problem: Occupational Therapy Goal    Goal Priority Disciplines Outcome Interventions   Occupational Therapy Goal   (Resolved)     OT, PT/OT Outcome(s) achieved                    Time Tracking:     OT Date of Treatment: 09/13/19  OT Start Time: 0846  OT Stop Time: 0925  OT Total Time (min): 39 min    Billable Minutes:Self Care/Home Management 30  Therapeutic Activity 9    Lucy Bean OTR/L  Pager: [phone]  9/13/2019

## 2019-09-13 NOTE — PLAN OF CARE
Problem: Occupational Therapy Goal  Goal: Occupational Therapy Goal  Goals to be met by: 09-09-19     Patient will increase functional independence with ADLs by performing:    UE Dressing with Stand-by Assistance.  Grooming while standing with Contact Guard Assistance.  Goal revised:Toileting from toilet with Minimal Assistance for hygiene and clothing management. - MET 9/9  Goal revised: toileting from toilet with supervision for hygiene and clothing management. - MET 9/11  Supine to sit with Stand-by Assistance.  Stand pivot transfers with Contact Guard Assistance. MET 9/5  Goal revised: Toilet transfer to toilet  with Contact Guard Assistance.- MET 9/11  Pt. To be I with HEP to improve level of endurance         Outcome: Ongoing (interventions implemented as appropriate)  Continue POC     Lucy Bean OTR/L  Pager: [phone]  9/13/2019

## 2019-09-13 NOTE — SUBJECTIVE & OBJECTIVE
Interval History:  ***>4 elements OR status of 3 inpatient conditions    Review of Systems  ***2 systems OR Unable to obtain a complete ROS due to level of consciousness.  Objective:     Vitals:  Temp: 98 °F (36.7 °C)  Pulse: 70  Rhythm: paced rhythm  BP: 124/78  MAP (mmHg): 99  Resp: 16  SpO2: 99 %    Temp  Min: 98 °F (36.7 °C)  Max: 98.8 °F (37.1 °C)  Pulse  Min: 68  Max: 70  BP  Min: 94/57  Max: 124/78  MAP (mmHg)  Min: 71  Max: 99  Resp  Min: 14  Max: 18  SpO2  Min: 95 %  Max: 99 %    09/12 0701 - 09/13 0700  In: 780 [P.O.:780]  Out: 375 [Urine:375]   Unmeasured Output  Urine Occurrence: 1  Stool Occurrence: 1       Physical Exam  ***Unable to test {Select Exam:39701} due to level of consciousness.    Medications:  Continuous Scheduled  amiodarone 200 mg Daily   digoxin 0.125 mg Every Mon, Wed, Fri   famotidine 20 mg Daily   furosemide 40 mg Daily   magnesium oxide 400 mg Daily   metoprolol succinate 25 mg Daily   sacubitril-valsartan 1 tablet BID   PRN  acetaminophen 650 mg Q6H PRN   docusate sodium 50 mg Daily PRN   ramelteon 8 mg Nightly PRN     Today I personally reviewed pertinent {Select Patient Info Reviewed:76636} notably:    Diet  Diet Cardiac  Diet Cardiac  Diet Cardiac  Diet Cardiac    ***

## 2019-09-13 NOTE — NURSING
Patient [facility]. Report called to Martina WOMACK. Patient remains free from falls and injuries through out shift. Patient telemetry removed. IV removed with catheter tip in place.  Patient VSS. Discharge instructions, medication list reviewed, and patient verbalizes understanding. Patient transported by son to [facility].

## 2019-09-13 NOTE — SUBJECTIVE & OBJECTIVE
Interval History: no acute events overnight. Patient denies recurrent chest pain.     Continuous Infusions:  Scheduled Meds:   amiodarone  200 mg Oral Daily    digoxin  0.125 mg Oral Every Mon, Wed, Fri    famotidine  20 mg Oral Daily    furosemide  40 mg Oral Daily    magnesium oxide  400 mg Oral Daily    metoprolol succinate  25 mg Oral Daily    sacubitril-valsartan  1 tablet Oral BID     PRN Meds:acetaminophen, docusate sodium, ramelteon    Review of patient's allergies indicates:   Allergen Reactions    Augmentin [amoxicillin-pot clavulanate] Swelling     Lip swelling      Penicillins      Other reaction(s): Swelling  Lip swelling       Objective:     Vital Signs (Most Recent):  Temp: 98 °F (36.7 °C) (09/13/19 0745)  Pulse: 70 (09/13/19 1000)  Resp: 16 (09/13/19 0745)  BP: 124/78 (09/13/19 0745)  SpO2: 99 % (09/13/19 0745) Vital Signs (24h Range):  Temp:  [98 °F (36.7 °C)-98.8 °F (37.1 °C)] 98 °F (36.7 °C)  Pulse:  [68-70] 70  Resp:  [14-18] 16  SpO2:  [95 %-99 %] 99 %  BP: ()/(57-83) 124/78     Patient Vitals for the past 72 hrs (Last 3 readings):   Weight   09/13/19 0428 74.2 kg (163 lb 9.3 oz)   09/12/19 0600 74.8 kg (164 lb 14.5 oz)   09/11/19 0723 74.8 kg (164 lb 14.5 oz)     Body mass index is 27.22 kg/m².      Intake/Output Summary (Last 24 hours) at 9/13/2019 1032  Last data filed at 9/13/2019 0900  Gross per 24 hour   Intake 900 ml   Output 375 ml   Net 525 ml     Physical Exam   Constitutional: She appears well-developed and well-nourished. No distress.   Neck: Normal range of motion. Neck supple. No JVD present.   Cardiovascular: Normal rate, regular rhythm and normal heart sounds. Exam reveals no gallop and no friction rub.   No murmur heard.  Pulmonary/Chest: Effort normal and breath sounds normal. No stridor. No respiratory distress. She has no wheezes.   Musculoskeletal: She exhibits no edema.   Left wrist ROM improved  Stage II left ischial ulcer without evidence of infection. No  warmth or purulent drainage   Skin: Skin is warm and dry. She is not diaphoretic.   Nursing note and vitals reviewed.      Significant Labs:  CBC:  Recent Labs   Lab 09/11/19 0309 09/12/19 0249 09/13/19 0237   WBC 7.76 8.72 9.53   RBC 3.88* 3.82* 3.80*   HGB 11.2* 10.7* 10.8*   HCT 36.3* 35.3* 35.5*    131* 135*   MCV 94 92 93   MCH 28.9 28.0 28.4   MCHC 30.9* 30.3* 30.4*     BNP:  Recent Labs   Lab 09/10/19  0302   BNP 1,145*     CMP:  Recent Labs   Lab 09/11/19 0309 09/12/19 0249 09/13/19 0237   GLU 90 101 105   CALCIUM 9.2 9.0 9.5   ALBUMIN 2.9* 2.9* 3.1*   PROT 6.9 6.8 7.4    139 141   K 4.2 4.2 4.3   CO2 24 25 25    106 105   BUN 26* 25* 27*   CREATININE 1.4 1.4 1.5*   ALKPHOS 106 100 101   ALT 20 18 17   AST 18 14 14   BILITOT 0.4 0.5 0.8      Coagulation:   Recent Labs   Lab 09/11/19 0309 09/12/19 0249 09/13/19 0237   INR 2.0* 2.2* 2.0*     LDH:  No results for input(s): LDH in the last 72 hours.  Microbiology:  Microbiology Results (last 7 days)     ** No results found for the last 168 hours. **          I have reviewed all pertinent labs within the past 24 hours.    Estimated Creatinine Clearance: 35.7 mL/min (A) (based on SCr of 1.5 mg/dL (H)).    Diagnostic Results:  I have reviewed and interpreted all pertinent imaging results/findings within the past 24 hours.

## 2019-09-13 NOTE — PT/OT/SLP PROGRESS
Physical Therapy Treatment    Patient Name:  Sherice Squires   MRN:  [id number]  Admitting Diagnosis:  Acute on chronic combined systolic and diastolic heart failure   Recent Surgery: Procedure(s) (LRB):  ABLATION, AVN (N/A)  INSERTION, CARDIAC PACEMAKER, BIVENTRICULAR, PERMANENT, AS UPGRADE (Left)  Cardioversion or Defibrillation 14 Days Post-Op  Admit Date: 8/20/2019  Length of Stay: 24 days    Recommendations:     Discharge Recommendations:  rehabilitation facility   Discharge Equipment Recommendations: commode, walker, rolling, shower chair   Barriers to discharge: None    Assessment:     Sherice Squires is a 69 y.o. female admitted with a medical diagnosis of Acute on chronic combined systolic and diastolic heart failure.  She presents with the following impairments/functional limitations:  weakness, impaired endurance, impaired functional mobilty, gait instability, decreased lower extremity function, decreased upper extremity function, impaired fine motor, impaired cardiopulmonary response to activity, pain. Pt demonstrated better rollator management today, with better safety awareness. Pt remembered to lock rollator brakes before standing, and better utilized brakes while walking with rollator today. Pt also demonstrated better Lt  strength and was able to squeeze rollator brake on the Lt successfully today. Pt continues to demonstrate more functional strength with transfers, requiring less support. Pt does require motivation to continue to improve ambulation distance. Pt has strength as well as improved cardiovascular endurance to ambulate further distances but has some apprehension about increasing mobility status. Pt could continue to benefit from skilled PT services.    Rehab Prognosis: Good; patient would benefit from acute skilled PT services to address these deficits and reach maximum level of function.     Recent Surgery: Procedure(s) (LRB):  ABLATION, AVN (N/A)  INSERTION, CARDIAC PACEMAKER,  "BIVENTRICULAR, PERMANENT, AS UPGRADE (Left)  Cardioversion or Defibrillation 14 Days Post-Op    Plan:     During this hospitalization, patient to be seen 4 x/week to address the identified rehab impairments via gait training, therapeutic activities, therapeutic exercises, neuromuscular re-education and progress toward the following goals:    · Plan of Care Expires:  09/26/19    Subjective     RN notified prior to session. Son present (but asleep) upon PT entrance into room.    Chief Complaint: "I can't believe I'm walking like this right now I never thought I would get here"  Patient/Family Comments/goals: Return home safely  Pain/Comfort:  Pain Rating 1: 0/10  Pain Rating Post-Intervention 1: 0/10      Objective:     Patient found up in chair with: telemetry   Mental Status: Patient is oriented to AxOx4 and follows multi-step commands. Patient is Alert and Cooperative during session.    General Precautions: Standard, Cardiac fall, pacemaker   Orthopedic Precautions:N/A   Braces: N/A   Body mass index is 27.22 kg/m².  Oxygen Device: Room Air      Outcome Measures:  AM-PAC 6 CLICK MOBILITY  Turning over in bed (including adjusting bedclothes, sheets and blankets)?: 3  Sitting down on and standing up from a chair with arms (e.g., wheelchair, bedside commode, etc.): 3  Moving from lying on back to sitting on the side of the bed?: 3  Moving to and from a bed to a chair (including a wheelchair)?: 3  Need to walk in hospital room?: 3  Climbing 3-5 steps with a railing?: 2  Basic Mobility Total Score: 17     Functional Mobility:  Additional staff present: none  Bed Mobility:   · Pt found/returned to bedside chair      Transfers:   · Sit <> Stand Transfer: supervision with 4 wheeled walker   · Stand <> Sit Transfer: supervision with 4 wheeled walker   · [id number] from bedside chair and [id number] from bedside commode      Gait:  · Patient ambulated: 240 ft   · Patient required: standy by assistance  · Patient used:  4 wheeled " walker   · Gait Pattern observed: swing-through gait   · Gait Deviation(s): wide base of support, steady gait and decreased rossi  · Impairments due to: decreased endurance      Education:  All questions answered within PT scope of practice  PT role in POC  Safe to perform step transfer to/from chair SBA and rollator w/ nursing/PCT  Ambulate 3x/day with nsg/PCT  Importance of OOB tolerance to improve lung ventilation and expansion as well as strengthen postural musculature    Patient left up in chair with all lines intact, call button in reach and son present.    GOALS:   Multidisciplinary Problems     Physical Therapy Goals     Not on file          Multidisciplinary Problems (Resolved)        Problem: Physical Therapy Goal    Goal Priority Disciplines Outcome Goal Variances Interventions   Physical Therapy Goal   (Resolved)     PT, PT/OT Outcome(s) achieved     Description:  Goals to be met by: 19     Patient will increase functional independence with mobility by performin. Supine to sit with MInimal Assistance - met 19  1a. Supine to sit with SBA   2. Sit to supine with MInimal Assistance - met 19  2a. Sit to supine with SBA   3. Sit to stand transfer with Stand-by Assistance - met   3a. Sit to stand with Mod-I  4. Bed to chair transfer with Stand-by Assistance using Rolling Walker  5. Gait  x 50 feet with Stand-by Assistance using Rolling Walker. - met   5a. Gait x 150 ft with SBA and LRAD                         Time Tracking:     PT Received On: 19  PT Start Time: 1010     PT Stop Time: 1031  PT Total Time (min): 21 min       Billable Minutes:   · Gait Training 13 and Therapeutic Activity 8    Treatment Type: Treatment  PT/PTA: PT       Iona Gomez PT, DPT  2019  Pager: [phone]

## 2019-09-13 NOTE — DISCHARGE SUMMARY
[facility]  Heart Transplant  Discharge Summary      Patient Name: Sherice Squires  MRN: [id number]  Admission Date: 8/20/2019  Hospital Length of Stay: 24 days  Discharge Date and Time: 09/13/2019 2:53 PM  Attending Physician: Hilda att. providers found   Discharging Provider: Saroj Mccartney MD  Primary Care Provider: Annamarie Soria MD     HPI: Sherice Squires is a 67 yo AAF with PMHx significant for NIDCM / stage D HFrEF (LVEF 20-25%, LVEDD 5.9 cm) s/p dc ICD, AF, HTN, DLD, CKD who is admitted as a transfer from  ED for management of ADHF +/- possible cardiogenic shock.     Patient reports she presented to OSH with progressive/worsening shortness of breath on exertion, orthopnea, and peripheral swelling of approx 5 days duration associated with nausea and fatigue. Denies fever/chills/sweats, chest pain, diaphoresis, presyncope/syncope. Does report intermittent palpitations.      Patient was afebrile and normotensive on arrival (/55 mmHg) to OSH ED with pulses in the 120-130s in AF with RVR. Initial labs showed worsening FAMILIA on CKD (with Cr 3.8 from 2.7-3.1 this past week from previous baseline of 1.3-1.4 in June 2019) as well as elevated T bili 2.3 and BNP >3000. CXR obtained without acute cardiopulmonary process. Patient ultimately transferred to [facility] for higher level of care.      Patient follows with [facility] clinic - currently on Entresto 97/103 mg BID, Toprol  mg daily, Aldactone 25 mg daily, and digoxin 0.125 mg daily. She was last seen by Dr Rodriguez on 8/6/19 who added metolazone 2.5 mg QHS before evening dose of Bumex to augment diuresis due to complaints of SOB / peripheral swelling at the time. She did not respond to this regimen and reports she was ultimately switched to Torsemide instead.      Patient is fully complaint with her medicines. Also adherent to fluid / salt restrictions with her hx of congestive heart failure.      States she was hospitalized only once before for ADHF  within the past year.      Of note patient follows with Dr Reece of EP for her AF hx, last seen by him in 5/2019    Procedure(s) (LRB):  ABLATION, AVN (N/A)  INSERTION, CARDIAC PACEMAKER, BIVENTRICULAR, PERMANENT, AS UPGRADE (Left)  Cardioversion or Defibrillation     Hospital Course: Acute on chronic combined systolic and diastolic heart failure  Patient initially presented on 8/20 in cardiogenic shock. [facility] EF 20% , NYHA 3. She was in afib with RVR at that time and failed DCCV. She underwent AVN ablation with BiV pacing. TTE during this admission LVEDD 6.51, LVEF 15%, indeterminate diastolic function, severe LAE, trace AI, mod MR and TR, PAS 51 and IVC 15. She was being evaluated for OHTx/LVAD but she was felt to be too deconditioned at this time. Plan was to improve her functional status at rehab and revisit in outpatient setting. Medications were titrated. She was discharged to SNF on Entresto 97/103 mg PO BID, digoxin 125 mcg WMF, and Toprol XL 25 mg daily.    Atrial fibrillation with RVR  Atrial fibrillation with RVR s/p AVN ablation and BiV upgrade [employer] generator with SJM LV lead:   - Seen by device nurse today. F/u EP outpatient  - Amiodarone 200mg qday  - Coumadin 2.5 mg Tue, Thur, Sat, Sun & 5 mg MWF  - Amiodarone 200 mg daily per EP to try and keep atria out of Afib to assess with cardiac function even in the setting of AVN ablation and pacing    Gout: evaluated by rheumatology. Uric acid 13. S/p intraarticular injection and discharged on colchicine. She will f/u with rheumatology outpatient.    Sacral decubitus ulcer, stage II: noticed on day of discharge. No evidence of infection. Wound care consult ordered     Automatic implantable cardioverter-defibrillator in situ  Underwent AVN ablation with upgrade to CRT  - AVN ablation and BiV upgrade [employer] generator with SJM LV lead  - EP follow up    Consults (From admission, onward)        Status Ordering Provider     Inpatient  consult to Electrophysiology  Once     Provider:  (Not yet assigned)    Completed MILAN PATE     Inpatient consult to Physical Medicine Rehab  Once     Provider:  (Not yet assigned)    Completed MILAN PATE     Inpatient consult to Physical Medicine Rehab  Once     Provider:  (Not yet assigned)    Completed IVAN EASLEY JR     Inpatient consult to Registered Dietitian/Nutritionist  Once     Provider:  (Not yet assigned)    Completed MILAN PATE     Inpatient consult to Rheumatology  Once     Provider:  (Not yet assigned)    Completed SHONA DAWN     Inpatient consult to Social Work/Case Management  Once     Provider:  (Not yet assigned)    Completed RICHARD LINDER Diagnostic Studies: Labs: All labs within the past 24 hours have been reviewed    Pending Diagnostic Studies:     Procedure Component Value Units Date/Time    Basic metabolic panel [[id number]] Collected:  08/21/19 1217    Order Status:  Sent Lab Status:  In process Updated:  08/21/19 1217    Specimen:  Blood     CBC auto differential [[id number]] Collected:  09/01/19 1136    Order Status:  Sent Lab Status:  In process Updated:  09/01/19 1136    Specimen:  Blood     Comprehensive metabolic panel [[id number]] Collected:  09/01/19 1020    Order Status:  Sent Lab Status:  In process Updated:  09/01/19 1020    Specimen:  Blood     Comprehensive metabolic panel [[id number]] Collected:  08/29/19 0300    Order Status:  Sent Lab Status:  In process Updated:  08/29/19 0452    Specimen:  Blood     Comprehensive metabolic panel [[id number]] Collected:  08/23/19 1724    Order Status:  Sent Lab Status:  In process Updated:  08/23/19 1724    Specimen:  Blood     Magnesium [[id number]]     Order Status:  Sent Lab Status:  No result     Specimen:  Blood     Magnesium [[id number]] Collected:  09/01/19 1020    Order Status:  Sent Lab Status:  In process Updated:  09/01/19 1021    Specimen:  Blood     Magnesium [[id number]]  Collected:  08/23/19 1724    Order Status:  Sent Lab Status:  In process Updated:  08/23/19 1724    Specimen:  Blood     Magnesium [[id number]] Collected:  08/23/19 1003    Order Status:  Sent Lab Status:  In process Updated:  08/23/19 1004    Specimen:  Blood         Final Active Diagnoses:    Diagnosis Date Noted POA    PRINCIPAL PROBLEM:  Acute on chronic combined systolic and diastolic heart failure [I50.43] 08/20/2019 Yes    Sacral decubitus ulcer, stage II [L89.152] 09/13/2019 No    Chest pain [R07.9] 09/12/2019 No    Acute on chronic kidney failure [N17.9, N18.9] 09/08/2019 Yes    Arthralgia of hand, left [M25.542] 09/06/2019 No    Gout [M10.9] 09/02/2019 No    Impaired functional mobility and endurance [Z74.09] 08/27/2019 Yes    Atrial fibrillation with RVR [I48.91] 08/20/2019 Yes    Automatic implantable cardioverter-defibrillator in situ [Z95.810] 07/09/2012 Yes      Problems Resolved During this Admission:    Diagnosis Date Noted Date Resolved POA    Chronic pain of left knee [M25.562, G89.29] 08/22/2019 08/31/2019 Unknown    Thrombocytopenia, unspecified [D69.6] 08/22/2019 09/01/2019 No    Constipation [K59.00] 08/22/2019 08/31/2019 No    Sepsis due to gram-negative urinary tract infection [A41.50, N39.0] 08/21/2019 09/01/2019 Unknown    Cardiogenic shock [R57.0]  09/03/2019 Yes    Hypertension [I10] 07/09/2012 08/26/2019 Yes     Chronic      Discharged Condition: stable    Disposition: Skilled Nursing Facility    Follow Up:    Patient Instructions:      Diet Cardiac     Notify your health care provider if you experience any of the following:  temperature >100.4     Notify your health care provider if you experience any of the following:  persistent dizziness, light-headedness, or visual disturbances     Notify your health care provider if you experience any of the following:  difficulty breathing or increased cough     Activity as tolerated     Medications:  Transfer Medications (for  Discharge Readmit only):   Current Facility-Administered Medications   Medication Dose Route Frequency Provider Last Rate Last Dose    acetaminophen tablet 650 mg  650 mg Oral Q6H PRN Nimesh Montgomery Jr., MD   650 mg at 09/13/19 0924    amiodarone tablet 200 mg  200 mg Oral Daily Carla Kike Perez MD   200 mg at 09/13/19 0821    digoxin tablet 0.125 mg  0.125 mg Oral Every Mon, Wed, Fri Fanny Clemens NP   0.125 mg at 09/13/19 0821    docusate sodium capsule 50 mg  50 mg Oral Daily PRN Quang Brownlee MD   50 mg at 09/02/19 2116    famotidine tablet 20 mg  20 mg Oral Daily Jenny Cabral PA-C   20 mg at 09/13/19 0821    furosemide tablet 40 mg  40 mg Oral Daily Saroj Mccartney MD   40 mg at 09/13/19 0821    magnesium oxide tablet 400 mg  400 mg Oral Daily Jenny Cabral PA-C   400 mg at 09/13/19 0821    metoprolol succinate (TOPROL-XL) 24 hr tablet 25 mg  25 mg Oral Daily Nadir Gutierrez MD   25 mg at 09/13/19 0821    ramelteon tablet 8 mg  8 mg Oral Nightly PRN Fanny Clemens NP   8 mg at 09/01/19 2035    sacubitril-valsartan  mg per tablet 1 tablet  1 tablet Oral BID Saroj Mccartney MD   1 tablet at 09/13/19 0821    [START ON 9/14/2019] warfarin (COUMADIN) tablet 2.5 mg  2.5 mg Oral Every Tues, Thurs, Sat, Sun Saroj Mccartney MD        warfarin (COUMADIN) tablet 5 mg  5 mg Oral Every Mon, Wed, Fri Saroj Mccartney MD   5 mg at 09/13/19 1234     Current Outpatient Medications   Medication Sig Dispense Refill    esomeprazole (NEXIUM) 20 MG capsule Take 20 mg by mouth before breakfast.      sacubitril-valsartan (ENTRESTO)  mg per tablet Take 1 tablet by mouth 2 (two) times daily. 180 tablet 4    spironolactone (ALDACTONE) 25 MG tablet TAKE 1 TABLET EVERY DAY 90 tablet 3    amiodarone (PACERONE) 200 MG Tab Take 1 tablet (200 mg total) by mouth once daily. 30 tablet 11    digoxin (LANOXIN) 125 mcg tablet Take 1 tablet (0.125 mg total) by mouth  every Mon, Wed, Fri. 12 tablet 11    [START ON 9/14/2019] furosemide (LASIX) 40 MG tablet Take 1 tablet (40 mg total) by mouth once daily. 30 tablet 11    metoprolol succinate (TOPROL-XL) 25 MG 24 hr tablet Take 1 tablet (25 mg total) by mouth once daily. 30 tablet 11    [START ON 9/14/2019] warfarin (COUMADIN) 2.5 MG tablet Take 1 tablet (2.5 mg total) by mouth every Tues, Thurs, Sat. And Sunday 12 tablet 11    warfarin (COUMADIN) 5 MG tablet Take 1 tablet (5 mg total) by mouth every Mon, Wed, Fri. 12 tablet 11       Saroj Mccartney MD  Heart Transplant  [facility]

## 2019-09-13 NOTE — PROGRESS NOTES
[facility]  Heart Transplant  Progress Note    Patient Name: Sherice Squires  MRN: [id number]  Admission Date: 8/20/2019  Hospital Length of Stay: 24 days  Attending Physician: Nadir Gutierrez MD  Primary Care Provider: Annamarie Soria MD  Principal Problem:Acute on chronic combined systolic and diastolic heart failure    Subjective:     Interval History: no acute events overnight. Patient denies recurrent chest pain.     Continuous Infusions:  Scheduled Meds:   amiodarone  200 mg Oral Daily    digoxin  0.125 mg Oral Every Mon, Wed, Fri    famotidine  20 mg Oral Daily    furosemide  40 mg Oral Daily    magnesium oxide  400 mg Oral Daily    metoprolol succinate  25 mg Oral Daily    sacubitril-valsartan  1 tablet Oral BID     PRN Meds:acetaminophen, docusate sodium, ramelteon    Review of patient's allergies indicates:   Allergen Reactions    Augmentin [amoxicillin-pot clavulanate] Swelling     Lip swelling      Penicillins      Other reaction(s): Swelling  Lip swelling       Objective:     Vital Signs (Most Recent):  Temp: 98 °F (36.7 °C) (09/13/19 0745)  Pulse: 70 (09/13/19 1000)  Resp: 16 (09/13/19 0745)  BP: 124/78 (09/13/19 0745)  SpO2: 99 % (09/13/19 0745) Vital Signs (24h Range):  Temp:  [98 °F (36.7 °C)-98.8 °F (37.1 °C)] 98 °F (36.7 °C)  Pulse:  [68-70] 70  Resp:  [14-18] 16  SpO2:  [95 %-99 %] 99 %  BP: ()/(57-83) 124/78     Patient Vitals for the past 72 hrs (Last 3 readings):   Weight   09/13/19 0428 74.2 kg (163 lb 9.3 oz)   09/12/19 0600 74.8 kg (164 lb 14.5 oz)   09/11/19 0723 74.8 kg (164 lb 14.5 oz)     Body mass index is 27.22 kg/m².      Intake/Output Summary (Last 24 hours) at 9/13/2019 1032  Last data filed at 9/13/2019 0900  Gross per 24 hour   Intake 900 ml   Output 375 ml   Net 525 ml     Physical Exam   Constitutional: She appears well-developed and well-nourished. No distress.   Neck: Normal range of motion. Neck supple. No JVD present.   Cardiovascular:  Normal rate, regular rhythm and normal heart sounds. Exam reveals no gallop and no friction rub.   No murmur heard.  Pulmonary/Chest: Effort normal and breath sounds normal. No stridor. No respiratory distress. She has no wheezes.   Musculoskeletal: She exhibits no edema.   Left wrist ROM improved  Stage II left ischial ulcer without evidence of infection. No warmth or purulent drainage   Skin: Skin is warm and dry. She is not diaphoretic.   Nursing note and vitals reviewed.      Significant Labs:  CBC:  Recent Labs   Lab 09/11/19 0309 09/12/19 0249 09/13/19 0237   WBC 7.76 8.72 9.53   RBC 3.88* 3.82* 3.80*   HGB 11.2* 10.7* 10.8*   HCT 36.3* 35.3* 35.5*    131* 135*   MCV 94 92 93   MCH 28.9 28.0 28.4   MCHC 30.9* 30.3* 30.4*     BNP:  Recent Labs   Lab 09/10/19  0302   BNP 1,145*     CMP:  Recent Labs   Lab 09/11/19 0309 09/12/19 0249 09/13/19 0237   GLU 90 101 105   CALCIUM 9.2 9.0 9.5   ALBUMIN 2.9* 2.9* 3.1*   PROT 6.9 6.8 7.4    139 141   K 4.2 4.2 4.3   CO2 24 25 25    106 105   BUN 26* 25* 27*   CREATININE 1.4 1.4 1.5*   ALKPHOS 106 100 101   ALT 20 18 17   AST 18 14 14   BILITOT 0.4 0.5 0.8      Coagulation:   Recent Labs   Lab 09/11/19 0309 09/12/19 0249 09/13/19 0237   INR 2.0* 2.2* 2.0*     LDH:  No results for input(s): LDH in the last 72 hours.  Microbiology:  Microbiology Results (last 7 days)     ** No results found for the last 168 hours. **          I have reviewed all pertinent labs within the past 24 hours.    Estimated Creatinine Clearance: 35.7 mL/min (A) (based on SCr of 1.5 mg/dL (H)).    Diagnostic Results:  I have reviewed and interpreted all pertinent imaging results/findings within the past 24 hours.    Assessment and Plan:     * Acute on chronic combined systolic and diastolic heart failure  69 yo F with NIDCM EF 20% , NYHA FC III with admitted with afib with rvr and cardiogenic shock   - IABP placed 8/20 and pulled 8/23   -TTE with contrast 8/20: LVEDD 6.51,  LVEF 15%, indeterminate diastolic function, severe LAE, trace AI, mod MR and TR, PAS 51 and IVC 15.     - Continue Entresto to 97/103 mg PO BID today (home dose 97/103)  - Continue dig 125 mcg MWF  - Continue Toprol XL 25 mg daily  - Not a candidate for LVAD/OHTX at this time. Will need aggressive rehab and re-evaluation. Patient awaiting placement. Medically stable for discharge at this time.    Sacral decubitus ulcer, stage II  - No evidence of infection  - Wound care consult ordered    Chest pain  Resolved. NM stress in July 2019 without evidence of ischemia    Acute on chronic kidney failure  Cr 1.5. Renal function stable.    Gout  Uric acid 13  - Continue colchicine  - Radiograph unrevealing  - F/u with rheum outpatient    Impaired functional mobility and endurance  Rehab following discharge    Atrial fibrillation with RVR  Atrial fibrillation with RVR s/p AVN ablation and BiV upgrade [employer] generator with SJM LV lead:   - Seen by device nurse today. F/u EP outpatient  - Amiodarone 200mg qday  - Coumadin 2.5 mg Tue, Thur, Sat, Sun & 5 mg MWF  - Amiodarone 200 mg daily per EP to try and keep atria out of Afib to assess with cardiac function even in the setting of AVN ablation and pacing    Automatic implantable cardioverter-defibrillator in situ  Underwent AVN ablation with upgrade to CRT  - AVN ablation and BiV upgrade [employer] generator with SJM LV lead  - EP follow up      Saroj Mccartney MD  Heart Transplant  [facility]

## 2019-09-13 NOTE — PLAN OF CARE
Problem: Adult Inpatient Plan of Care  Goal: Plan of Care Review  Outcome: Ongoing (interventions implemented as appropriate)  Patient remains free of falls or injury. Patient denies pain. Continued on IVP lasix daily. Possible d/c to SNF today. Plan of care reviewed with patient.

## 2019-09-23 NOTE — TELEPHONE ENCOUNTER
Left voicemail #2 for Ousmane, pt's son, to contact the clinic for further information regarding a medication change per Dr. Reece.     When pt's son calls back, he should be advised that Ms. Squires needs to increase amiodarone to 400mg daily for VT.

## 2019-09-23 NOTE — TELEPHONE ENCOUNTER
"Remote device transmission received; alert for treated ventricular arrhythmia.    Date/Time:  9/20/2019 @ 3:37am  Event Type:  MMVT (with AF underlying)  Ventricular CL:  225 ms  [place]:  43 secs  Therapy Delivered:  Shock  Appropriate Therapy:  Yes  Successful:  Yes  Pt Symptomatic:  Unknown at this time    NOTE: the shock also converted AF back to SR    On amiodarone 200mg daily, Toprol XL 25mg daily, coumadin and entresto  Underwent AVN ablation, DCCV, and upgrade to CRT-D on 8/30/19.    She was discharged to  [facility].    Attempted to contact patient/son for follow-up.  Left voicemail on the listed "mobile" phone number.       "

## 2019-09-24 NOTE — TELEPHONE ENCOUNTER
Spoke with RN at [facility]. Patient currently has positive blood cultures and not progressing well in therapy. Will continue to monitor.

## 2019-09-25 NOTE — TELEPHONE ENCOUNTER
"Latitude alert transmission received from ICD/VT event.  On 9/24/19 pt had MMVT 238 bpm, received ATP x1 with type 2 break. Concurrently having AF, hx of AVN RFA. Reviewed with . Prev orders received to increase Amiodarone from 200mg po daily to 400mg po daily.  Unable to get in touch with pt or son. Called and spoke with Chavez ARMIJO at [facility] and informed her of VT events from ICD/home monitor.     Relayed new med dosage to her. Asked her if she needed documentation faxed. She said she will contact "[facility]" to get dosage changed.     Will update med in med list and send new Rx.  "

## 2019-09-25 NOTE — TELEPHONE ENCOUNTER
Tried to call pt and her son on all avail phone numbers and left vm. [facility].  Left message for pt's Nurse at [facility] to call me back regarding treated VT events and Amiodarone change per .     Will await return phone call.

## 2019-09-26 NOTE — TELEPHONE ENCOUNTER
Received call from KAVON Morejon From [facility]. She was calling in reference to a call a nurse received from [place] yesterday. She was requesting information for Dr Brown on this pt. Advised that pt had MMVT on 9/24 and Dr Reece was increasing her amiodarone to 400 mg QD.  She stated that she would pass on the information but wanted to update Dr Reece on the pt.  On 9/23 WBC count of 21.47 and on 9/24 WBC count of 24.03. Blood and urine cultures where done which revealed Strep Viridans. Pt has been on Cipro, then IV Rocephin and now Vancomycin. Her WBC as of 9/26 is 18.5. Pt struggling with led neuropathies they suspect is from Cipro. Her B/P is running 96/46 and lower. Pt having to hold diuretics and metoprolol.  She asked that I pass info on to Dr Reece. If he would like to talk to the doctor his name is Dr Meg Schofield @ [phone].    Advised Dr Reece of above and he called Dr Brown.

## 2019-10-01 NOTE — TELEPHONE ENCOUNTER
Returned call to pt's son who stated that pt hasn't been able to get out of bed due to generalized pain for almost 2 weeks.  Pt's son stated that PT was working with pt but that she is unable to ambulate much.  Pt's son stated that he does not know when pt will be discharged from the hospital at this time.  Pt's son asked to call the clinic back once pt is discharged so that pt can be scheduled to see Dr. Boateng in clinic.  Pt's son verbalized understanding.    ----- Message from Byron Oconnor sent at 10/1/2019  3:33 PM CDT -----  Contact: Pt's son Ousmane  Type:  Needs Medical Advice    Who Called: The caller states that the Pt is in [facility] and has been for a week now.  The caller states that the Pt hasn't even been able to get out of the bed for almost two weeks now, so the Pt won't be able to make her procedure for 10/03/19.  Please cancel the appt.        Best Call Back Number: [phone]

## 2019-10-15 ENCOUNTER — CLINICAL SUPPORT (OUTPATIENT)
Dept: CARDIOLOGY | Facility: HOSPITAL | Age: 69
End: 2019-10-15
Payer: MEDICARE

## 2019-10-15 PROCEDURE — 93296 REM INTERROG EVL PM/IDS: CPT | Performed by: INTERNAL MEDICINE

## 2020-01-16 NOTE — PROGRESS NOTES
Cardiac device interrogation and/or reprogramming completed by industry representative,Adonay Yu ; please refer to report located in the Cards Procedure tab.      Had received a request for PA on test stripes.  PA sent . When checking with insurance on status there was not PA needed the claimed has already processed.  Angelina notified

## 2020-03-03 NOTE — PROGRESS NOTES
Known history of alcoholic cirrhosis  GI consult [facility]  Cardiac Electrophysiology  Progress Note    Admission Date: 8/20/2019  Code Status: Full Code   Attending Physician: Nimesh Montgomery Jr.,*   Expected Discharge Date: 9/4/2019  Principal Problem:Acute on chronic combined systolic and diastolic heart failure    Subjective:     Interval History: Patient reports feeling well today. Post-op pain well controlled, reports no new symptoms or concerns.    Review of Systems   All other systems reviewed and are negative.    Objective:     Vital Signs (Most Recent):  Temp: 97.9 °F (36.6 °C) (09/01/19 0809)  Pulse: 97 (09/01/19 1100)  Resp: 16 (09/01/19 0809)  BP: 110/67 (09/01/19 0809)  SpO2: (!) 91 % (09/01/19 0809) Vital Signs (24h Range):  Temp:  [97.6 °F (36.4 °C)-98.6 °F (37 °C)] 97.9 °F (36.6 °C)  Pulse:  [] 97  Resp:  [16-17] 16  SpO2:  [91 %-95 %] 91 %  BP: ()/(55-67) 110/67     Weight: 70 kg (154 lb 5.2 oz)  Body mass index is 25.68 kg/m².     SpO2: (!) 91 %  O2 Device (Oxygen Therapy): room air    Physical Exam   Constitutional: She is oriented to person, place, and time. She appears well-developed and well-nourished. No distress.   HENT:   Head: Normocephalic and atraumatic.   Neck: No JVD present.   Right neck TLC   Cardiovascular: Normal rate, regular rhythm, normal heart sounds and intact distal pulses. Exam reveals no gallop and no friction rub.   No murmur heard.  PPM site c/d/i   Pulmonary/Chest: Effort normal and breath sounds normal. No respiratory distress. She has no wheezes. She has no rales.   Abdominal: Soft. Bowel sounds are normal. She exhibits no distension. There is no tenderness.   Musculoskeletal: She exhibits no edema.   Neurological: She is alert and oriented to person, place, and time.   Skin: She is not diaphoretic.       Significant Labs:     Recent Results (from the past 24 hour(s))   Comprehensive metabolic panel    Collection Time: 08/31/19  4:23 PM   Result Value Ref Range    Sodium 133 (L)  136 - 145 mmol/L    Potassium 4.0 3.5 - 5.1 mmol/L    Chloride 96 95 - 110 mmol/L    CO2 26 23 - 29 mmol/L    Glucose 105 70 - 110 mg/dL    BUN, Bld 37 (H) 8 - 23 mg/dL    Creatinine 1.9 (H) 0.5 - 1.4 mg/dL    Calcium 9.7 8.7 - 10.5 mg/dL    Total Protein 6.8 6.0 - 8.4 g/dL    Albumin 2.3 (L) 3.5 - 5.2 g/dL    Total Bilirubin 0.5 0.1 - 1.0 mg/dL    Alkaline Phosphatase 125 55 - 135 U/L    AST 38 10 - 40 U/L    ALT 21 10 - 44 U/L    Anion Gap 11 8 - 16 mmol/L    eGFR if African American 30.8 (A) >60 mL/min/1.73 m^2    eGFR if non  26.7 (A) >60 mL/min/1.73 m^2   Magnesium    Collection Time: 08/31/19  4:23 PM   Result Value Ref Range    Magnesium 2.2 1.6 - 2.6 mg/dL   Comprehensive metabolic panel    Collection Time: 09/01/19  6:04 AM   Result Value Ref Range    Sodium 136 136 - 145 mmol/L    Potassium 3.7 3.5 - 5.1 mmol/L    Chloride 99 95 - 110 mmol/L    CO2 28 23 - 29 mmol/L    Glucose 92 70 - 110 mg/dL    BUN, Bld 34 (H) 8 - 23 mg/dL    Creatinine 1.7 (H) 0.5 - 1.4 mg/dL    Calcium 9.3 8.7 - 10.5 mg/dL    Total Protein 6.4 6.0 - 8.4 g/dL    Albumin 2.2 (L) 3.5 - 5.2 g/dL    Total Bilirubin 0.8 0.1 - 1.0 mg/dL    Alkaline Phosphatase 110 55 - 135 U/L    AST 29 10 - 40 U/L    ALT 19 10 - 44 U/L    Anion Gap 9 8 - 16 mmol/L    eGFR if African American 35.2 (A) >60 mL/min/1.73 m^2    eGFR if non African American 30.6 (A) >60 mL/min/1.73 m^2   CBC auto differential    Collection Time: 09/01/19  6:04 AM   Result Value Ref Range    WBC 6.43 3.90 - 12.70 K/uL    RBC 3.87 (L) 4.00 - 5.40 M/uL    Hemoglobin 11.2 (L) 12.0 - 16.0 g/dL    Hematocrit 34.4 (L) 37.0 - 48.5 %    Mean Corpuscular Volume 89 82 - 98 fL    Mean Corpuscular Hemoglobin 28.9 27.0 - 31.0 pg    Mean Corpuscular Hemoglobin Conc 32.6 32.0 - 36.0 g/dL    RDW 18.1 (H) 11.5 - 14.5 %    Platelets 166 150 - 350 K/uL    MPV 12.5 9.2 - 12.9 fL    Immature Granulocytes 0.8 (H) 0.0 - 0.5 %    Gran # (ANC) 4.2 1.8 - 7.7 K/uL    Immature Grans (Abs)  0.05 (H) 0.00 - 0.04 K/uL    Lymph # 1.1 1.0 - 4.8 K/uL    Mono # 1.0 0.3 - 1.0 K/uL    Eos # 0.1 0.0 - 0.5 K/uL    Baso # 0.03 0.00 - 0.20 K/uL    nRBC 0 0 /100 WBC    Gran% 64.9 38.0 - 73.0 %    Lymph% 17.6 (L) 18.0 - 48.0 %    Mono% 15.1 (H) 4.0 - 15.0 %    Eosinophil% 1.1 0.0 - 8.0 %    Basophil% 0.5 0.0 - 1.9 %    Differential Method Automated    Comprehensive metabolic panel    Collection Time: 09/01/19  6:04 AM   Result Value Ref Range    Sodium 136 136 - 145 mmol/L    Potassium 3.7 3.5 - 5.1 mmol/L    Chloride 99 95 - 110 mmol/L    CO2 28 23 - 29 mmol/L    Glucose 92 70 - 110 mg/dL    BUN, Bld 34 (H) 8 - 23 mg/dL    Creatinine 1.7 (H) 0.5 - 1.4 mg/dL    Calcium 9.3 8.7 - 10.5 mg/dL    Total Protein 6.4 6.0 - 8.4 g/dL    Albumin 2.2 (L) 3.5 - 5.2 g/dL    Total Bilirubin 0.8 0.1 - 1.0 mg/dL    Alkaline Phosphatase 110 55 - 135 U/L    AST 29 10 - 40 U/L    ALT 19 10 - 44 U/L    Anion Gap 9 8 - 16 mmol/L    eGFR if African American 35.2 (A) >60 mL/min/1.73 m^2    eGFR if non African American 30.6 (A) >60 mL/min/1.73 m^2   Protime-INR    Collection Time: 09/01/19  6:04 AM   Result Value Ref Range    Prothrombin Time 22.2 (H) 9.0 - 12.5 sec    INR 2.3 (H) 0.8 - 1.2   Magnesium    Collection Time: 09/01/19  6:04 AM   Result Value Ref Range    Magnesium 2.2 1.6 - 2.6 mg/dL   ISTAT PROCEDURE    Collection Time: 09/01/19  7:37 AM   Result Value Ref Range    POC PH 7.412 7.35 - 7.45    POC PCO2 41.3 35 - 45 mmHg    POC PO2 31 (LL) 40 - 60 mmHg    POC HCO3 26.3 24 - 28 mmol/L    POC BE 2 -2 to 2 mmol/L    POC SATURATED O2 61 (L) 95 - 100 %    POC TCO2 28 24 - 29 mmol/L    Sample VENOUS     Site Other     Allens Test N/A     DelSys Room Air          Significant Imaging:     I have reviewed all pertinent imaging studies from the last 24 hours      Assessment and Plan:     Atrial fibrillation with RVR  69 y/o woman history of NICM 20-25%, persistent atrial fibrillation on apixaban, [employer] ICD, CKD presenting  with cardiogenic shock and acute decompensated heart failure in setting of atrial fibrillation w RVR, and high atrial fibrillation burden on device interrogation. Failed DCCV x 3.    S/p AVN ablation with upgrade to Bi-V ICD on 8/30/19  Continue warfarin for anticoagulation  Recommend amio PO (200 mg BID for 2 weeks then 200 mg daily), can continue digoxin (although AF will not effect her ventricular rate, she may benefit from atrial contractions)  Please d/c TLC today to prevent device infection  Base rate set to 90 bpm and we will gradually reduce the heart rate towards 60 bpm    - Sling to left arm - wear for 48 hours, then only at night for 6 weeks.  - NO HEPARIN PRODUCTS  - Doxycycline 100 mg BID for five days   - No lifting left elbow above shoulder height  - No lifting over 5 pounds  - No driving for 1 week and for 4 weeks if patient uses left arm to make turns  - Patient may shower in 48 hours, do not let beam of shower hit site directly and no scrubbing in area  - Follow up in device clinic in 1 week    EP will sign off, please call with questions or concerns    Elliot Vásquez MD  Cardiac Electrophysiology  [facility]

## 2020-04-11 NOTE — PLAN OF CARE
Problem: Adult Inpatient Plan of Care  Goal: Absence of Hospital-Acquired Illness or Injury    Intervention: Prevent VTE (venous thromboembolism)    No acute events throughout day. See vital signs and assessments in flowsheets. See below for updates on today's progress.     Pulmonary: RA    Cardiovascular: A-fib, -150, SBP cuff 110-130, CVP 9-10, SVO2 65; IABP 1:2 pressure triggered, Aug 90-100s    Neurological: AAOX4, Afebrile; restless throughout night     Gastrointestinal: NPO since MN for possible cardioversion today     Genitourinary: Grant remains, hourly UO ~ 200cc    Skin/Bath: CDI  Date of last CHG bath given: 8/21/19    Infusions: Lasix, heparin, amio, epi     Patient progressing towards goals as tolerated, plan of care communicated and reviewed with Sherice Squires and family. All concerns addressed. Will continue to monitor.                  yes

## (undated) DEVICE — CATH BLAZER PRIME XP SC 7F 8MM

## (undated) DEVICE — ADHESIVE DERMABOND ADVANCED

## (undated) DEVICE — CATH BLAZER II HTD STD CRV 5MM

## (undated) DEVICE — DRESSING AQUACEL FOAM 5 X 5

## (undated) DEVICE — PACE/SENSE LEAD
Type: IMPLANTABLE DEVICE | Site: HEART | Status: NON-FUNCTIONAL
Brand: ACUITY™ X4 SPIRAL S
Removed: 2019-08-30

## (undated) DEVICE — CATH ELECTRD DECAPOLAR 6F

## (undated) DEVICE — Device

## (undated) DEVICE — PACK PACER PERMANENT

## (undated) DEVICE — PAD DEFIB CADENCE ADULT R2

## (undated) DEVICE — DRAPE INCISE IOBAN 2 23X17IN

## (undated) DEVICE — GUIDE CATH CPS DIRECT 54CM 135

## (undated) DEVICE — GUIDEWIRE CPS COURIER MED

## (undated) DEVICE — INTRO 8.5FR 63CM SRO

## (undated) DEVICE — PACK EP DRAPE

## (undated) DEVICE — SHEATH SAFE ULTRA 9FR

## (undated) DEVICE — BLADE PLASMA WIDE SPATULA TIP

## (undated) DEVICE — ELECTRODE POLYHESIVEPRE-ATTACH

## (undated) DEVICE — GUIDEWIRE ANGLED .035 150CM

## (undated) DEVICE — SLING SWATHE UNIVERSAL FOAM

## (undated) DEVICE — PACE/SENSE LEAD
Type: IMPLANTABLE DEVICE | Site: HEART | Status: NON-FUNCTIONAL
Brand: ACUITY™ X4 STRAIGHT
Removed: 2019-08-30

## (undated) DEVICE — CATH BLLN COR SINUS VENOGRAPHY

## (undated) DEVICE — WIRE WHISPER MS 014 X 190

## (undated) DEVICE — CATH ATTAIN SELECT II 90

## (undated) DEVICE — INTRODUCER AVANTI 8.5F .038X11

## (undated) DEVICE — INTRODUCER HEMOSTASIS 5.5FR

## (undated) DEVICE — KIT PROBE COVER WITH GEL